# Patient Record
Sex: FEMALE | Race: BLACK OR AFRICAN AMERICAN | NOT HISPANIC OR LATINO | Employment: OTHER | URBAN - METROPOLITAN AREA
[De-identification: names, ages, dates, MRNs, and addresses within clinical notes are randomized per-mention and may not be internally consistent; named-entity substitution may affect disease eponyms.]

---

## 2023-06-26 ENCOUNTER — APPOINTMENT (OUTPATIENT)
Dept: LAB | Facility: LAB | Age: 68
End: 2023-06-26
Payer: MEDICARE

## 2023-06-26 LAB
ACTIVATED PARTIAL THROMBOPLASTIN TIME IN PPP BY COAGULATION ASSAY: 33 SEC (ref 27–38)
ALBUMIN (G/DL) IN SER/PLAS: 4.1 G/DL (ref 3.4–5)
ANION GAP IN SER/PLAS: 14 MMOL/L (ref 10–20)
CALCIUM (MG/DL) IN SER/PLAS: 10.1 MG/DL (ref 8.6–10.6)
CARBON DIOXIDE, TOTAL (MMOL/L) IN SER/PLAS: 27 MMOL/L (ref 21–32)
CHLORIDE (MMOL/L) IN SER/PLAS: 104 MMOL/L (ref 98–107)
CREATININE (MG/DL) IN SER/PLAS: 0.8 MG/DL (ref 0.5–1.05)
ERYTHROCYTE DISTRIBUTION WIDTH (RATIO) BY AUTOMATED COUNT: 31.4 % (ref 11.5–14.5)
ERYTHROCYTE MEAN CORPUSCULAR HEMOGLOBIN CONCENTRATION (G/DL) BY AUTOMATED: 31 G/DL (ref 32–36)
ERYTHROCYTE MEAN CORPUSCULAR VOLUME (FL) BY AUTOMATED COUNT: 74 FL (ref 80–100)
ERYTHROCYTES (10*6/UL) IN BLOOD BY AUTOMATED COUNT: 5.85 X10E12/L (ref 4–5.2)
GFR FEMALE: 80 ML/MIN/1.73M2
GLUCOSE (MG/DL) IN SER/PLAS: 97 MG/DL (ref 74–99)
HEMATOCRIT (%) IN BLOOD BY AUTOMATED COUNT: 43.5 % (ref 36–46)
HEMOGLOBIN (G/DL) IN BLOOD: 13.5 G/DL (ref 12–16)
INR IN PPP BY COAGULATION ASSAY: 1.2 (ref 0.9–1.1)
LEUKOCYTES (10*3/UL) IN BLOOD BY AUTOMATED COUNT: 6.5 X10E9/L (ref 4.4–11.3)
NRBC (PER 100 WBCS) BY AUTOMATED COUNT: 0 /100 WBC (ref 0–0)
PHOSPHATE (MG/DL) IN SER/PLAS: 4.7 MG/DL (ref 2.5–4.9)
PLATELETS (10*3/UL) IN BLOOD AUTOMATED COUNT: 255 X10E9/L (ref 150–450)
POTASSIUM (MMOL/L) IN SER/PLAS: 4.5 MMOL/L (ref 3.5–5.3)
PROTHROMBIN TIME (PT) IN PPP BY COAGULATION ASSAY: 13.6 SEC (ref 9.8–12.8)
SODIUM (MMOL/L) IN SER/PLAS: 140 MMOL/L (ref 136–145)
UREA NITROGEN (MG/DL) IN SER/PLAS: 12 MG/DL (ref 6–23)

## 2023-07-06 ENCOUNTER — APPOINTMENT (OUTPATIENT)
Dept: LAB | Facility: LAB | Age: 68
End: 2023-07-06
Payer: MEDICARE

## 2023-07-06 LAB
ALANINE AMINOTRANSFERASE (SGPT) (U/L) IN SER/PLAS: 13 U/L (ref 7–45)
ALBUMIN (G/DL) IN SER/PLAS: 4.2 G/DL (ref 3.4–5)
ALKALINE PHOSPHATASE (U/L) IN SER/PLAS: 109 U/L (ref 33–136)
ANION GAP IN SER/PLAS: 13 MMOL/L (ref 10–20)
ASPARTATE AMINOTRANSFERASE (SGOT) (U/L) IN SER/PLAS: 16 U/L (ref 9–39)
BASOPHILS (10*3/UL) IN BLOOD BY AUTOMATED COUNT: 0.1 X10E9/L (ref 0–0.1)
BASOPHILS/100 LEUKOCYTES IN BLOOD BY AUTOMATED COUNT: 1.7 % (ref 0–2)
BILIRUBIN TOTAL (MG/DL) IN SER/PLAS: 0.7 MG/DL (ref 0–1.2)
BURR CELLS PRESENCE IN BLOOD BY LIGHT MICROSCOPY: NORMAL
CALCIUM (MG/DL) IN SER/PLAS: 9.8 MG/DL (ref 8.6–10.6)
CARBON DIOXIDE, TOTAL (MMOL/L) IN SER/PLAS: 27 MMOL/L (ref 21–32)
CHLORIDE (MMOL/L) IN SER/PLAS: 103 MMOL/L (ref 98–107)
CREATININE (MG/DL) IN SER/PLAS: 0.81 MG/DL (ref 0.5–1.05)
DEAMIDATED GLIADIN PEPTIDE IGA: <1 U/ML (ref 0–14)
DEAMIDATED GLIADIN PEPTIDE IGG: <1 U/ML (ref 0–14)
EOSINOPHILS (10*3/UL) IN BLOOD BY AUTOMATED COUNT: 0.1 X10E9/L (ref 0–0.7)
EOSINOPHILS/100 LEUKOCYTES IN BLOOD BY AUTOMATED COUNT: 1.7 % (ref 0–6)
ERYTHROCYTE DISTRIBUTION WIDTH (RATIO) BY AUTOMATED COUNT: ABNORMAL % (ref 11.5–14.5)
ERYTHROCYTE MEAN CORPUSCULAR HEMOGLOBIN CONCENTRATION (G/DL) BY AUTOMATED: 31.6 G/DL (ref 32–36)
ERYTHROCYTE MEAN CORPUSCULAR VOLUME (FL) BY AUTOMATED COUNT: 76 FL (ref 80–100)
ERYTHROCYTES (10*6/UL) IN BLOOD BY AUTOMATED COUNT: 5.96 X10E12/L (ref 4–5.2)
GFR FEMALE: 79 ML/MIN/1.73M2
GLUCOSE (MG/DL) IN SER/PLAS: 78 MG/DL (ref 74–99)
HEMATOCRIT (%) IN BLOOD BY AUTOMATED COUNT: 45.2 % (ref 36–46)
HEMOGLOBIN (G/DL) IN BLOOD: 14.3 G/DL (ref 12–16)
IMMATURE GRANULOCYTES/100 LEUKOCYTES IN BLOOD BY AUTOMATED COUNT: 0.3 % (ref 0–0.9)
LEUKOCYTES (10*3/UL) IN BLOOD BY AUTOMATED COUNT: 6 X10E9/L (ref 4.4–11.3)
LYMPHOCYTES (10*3/UL) IN BLOOD BY AUTOMATED COUNT: 2.9 X10E9/L (ref 1.2–4.8)
LYMPHOCYTES/100 LEUKOCYTES IN BLOOD BY AUTOMATED COUNT: 48.3 % (ref 13–44)
MONOCYTES (10*3/UL) IN BLOOD BY AUTOMATED COUNT: 0.7 X10E9/L (ref 0.1–1)
MONOCYTES/100 LEUKOCYTES IN BLOOD BY AUTOMATED COUNT: 11.6 % (ref 2–10)
NEUTROPHILS (10*3/UL) IN BLOOD BY AUTOMATED COUNT: 2.19 X10E9/L (ref 1.2–7.7)
NEUTROPHILS/100 LEUKOCYTES IN BLOOD BY AUTOMATED COUNT: 36.4 % (ref 40–80)
NRBC (PER 100 WBCS) BY AUTOMATED COUNT: 0 /100 WBC (ref 0–0)
PLATELETS (10*3/UL) IN BLOOD AUTOMATED COUNT: 282 X10E9/L (ref 150–450)
POTASSIUM (MMOL/L) IN SER/PLAS: 4.6 MMOL/L (ref 3.5–5.3)
PROTEIN TOTAL: 6.9 G/DL (ref 6.4–8.2)
RBC MORPHOLOGY IN BLOOD: NORMAL
SCHISTOCYTES (PRESENCE) IN BLOOD BY LIGHT MICROSCOPY: NORMAL
SODIUM (MMOL/L) IN SER/PLAS: 138 MMOL/L (ref 136–145)
TARGET CELLS IN BLOOD BY LIGHT MICROSCOPY: NORMAL
THYROTROPIN (MIU/L) IN SER/PLAS BY DETECTION LIMIT <= 0.05 MIU/L: 1.58 MIU/L (ref 0.44–3.98)
TISSUE TRANSGLUTAMINASE IGG: <1 U/ML (ref 0–14)
TISSUE TRANSGLUTAMINASE, IGA: <1 U/ML (ref 0–14)
UREA NITROGEN (MG/DL) IN SER/PLAS: 12 MG/DL (ref 6–23)

## 2023-07-25 ENCOUNTER — APPOINTMENT (OUTPATIENT)
Dept: LAB | Facility: LAB | Age: 68
End: 2023-07-25
Payer: MEDICARE

## 2023-07-25 LAB
ANION GAP IN SER/PLAS: 15 MMOL/L (ref 10–20)
CALCIUM (MG/DL) IN SER/PLAS: 10 MG/DL (ref 8.6–10.6)
CARBON DIOXIDE, TOTAL (MMOL/L) IN SER/PLAS: 31 MMOL/L (ref 21–32)
CHLORIDE (MMOL/L) IN SER/PLAS: 99 MMOL/L (ref 98–107)
CHOLESTEROL (MG/DL) IN SER/PLAS: 141 MG/DL (ref 0–199)
CHOLESTEROL IN HDL (MG/DL) IN SER/PLAS: 51.4 MG/DL
CHOLESTEROL/HDL RATIO: 2.7
CREATININE (MG/DL) IN SER/PLAS: 0.81 MG/DL (ref 0.5–1.05)
ERYTHROCYTE DISTRIBUTION WIDTH (RATIO) BY AUTOMATED COUNT: 26.9 % (ref 11.5–14.5)
ERYTHROCYTE MEAN CORPUSCULAR HEMOGLOBIN CONCENTRATION (G/DL) BY AUTOMATED: 32.5 G/DL (ref 32–36)
ERYTHROCYTE MEAN CORPUSCULAR VOLUME (FL) BY AUTOMATED COUNT: 79 FL (ref 80–100)
ERYTHROCYTES (10*6/UL) IN BLOOD BY AUTOMATED COUNT: 5.83 X10E12/L (ref 4–5.2)
ESTIMATED AVERAGE GLUCOSE FOR HBA1C: 123 MG/DL
GFR FEMALE: 79 ML/MIN/1.73M2
GLUCOSE (MG/DL) IN SER/PLAS: 92 MG/DL (ref 74–99)
HEMATOCRIT (%) IN BLOOD BY AUTOMATED COUNT: 45.8 % (ref 36–46)
HEMOGLOBIN (G/DL) IN BLOOD: 14.9 G/DL (ref 12–16)
HEMOGLOBIN A1C/HEMOGLOBIN TOTAL IN BLOOD: 5.9 %
INR IN PPP BY COAGULATION ASSAY: 1.9 (ref 0.9–1.1)
LDL: 72 MG/DL (ref 0–99)
LEUKOCYTES (10*3/UL) IN BLOOD BY AUTOMATED COUNT: 8 X10E9/L (ref 4.4–11.3)
NRBC (PER 100 WBCS) BY AUTOMATED COUNT: 0 /100 WBC (ref 0–0)
PLATELETS (10*3/UL) IN BLOOD AUTOMATED COUNT: 247 X10E9/L (ref 150–450)
POTASSIUM (MMOL/L) IN SER/PLAS: 5.3 MMOL/L (ref 3.5–5.3)
PROTHROMBIN TIME (PT) IN PPP BY COAGULATION ASSAY: 21.6 SEC (ref 9.8–12.8)
SODIUM (MMOL/L) IN SER/PLAS: 140 MMOL/L (ref 136–145)
TRIGLYCERIDE (MG/DL) IN SER/PLAS: 86 MG/DL (ref 0–149)
UREA NITROGEN (MG/DL) IN SER/PLAS: 16 MG/DL (ref 6–23)
VLDL: 17 MG/DL (ref 0–40)

## 2023-07-26 LAB
CALPROTECTIN, STOOL: NORMAL
CAMPYLOBACTER GP: NOT DETECTED
CRYPTOSPORIDIUM ANTIGEN-DATA CONVERSION: NORMAL
FAT, FECAL - NEUTRAL: NORMAL
FAT, FECAL - SPLIT: NORMAL
GIARDIA LAMBLIA AG-DATA CONVERSION: NORMAL
HELICOBACTER PYLORI AG: NORMAL
LACTOFERRIN, FECAL, QUANT.: NORMAL
NOROVIRUS GI/GII: NOT DETECTED
OVA + PARASITE EXAM: NORMAL
ROTAVIRUS A: NOT DETECTED
SALMONELLA SP.: NOT DETECTED
SHIGA TOXIN 1: NOT DETECTED
SHIGA TOXIN 2: NOT DETECTED
SHIGELLA SP.: NOT DETECTED
VIBRIO GRP.: NOT DETECTED
YERSINIA ENTEROCOLITICA: NOT DETECTED

## 2023-08-14 ENCOUNTER — APPOINTMENT (OUTPATIENT)
Dept: LAB | Facility: LAB | Age: 68
End: 2023-08-14
Payer: MEDICARE

## 2023-08-14 LAB
ALBUMIN (G/DL) IN SER/PLAS: 4.2 G/DL (ref 3.4–5)
ANION GAP IN SER/PLAS: 13 MMOL/L (ref 10–20)
CALCIUM (MG/DL) IN SER/PLAS: 9.7 MG/DL (ref 8.6–10.6)
CARBON DIOXIDE, TOTAL (MMOL/L) IN SER/PLAS: 27 MMOL/L (ref 21–32)
CHLORIDE (MMOL/L) IN SER/PLAS: 106 MMOL/L (ref 98–107)
CREATININE (MG/DL) IN SER/PLAS: 0.81 MG/DL (ref 0.5–1.05)
ERYTHROCYTE DISTRIBUTION WIDTH (RATIO) BY AUTOMATED COUNT: 22.2 % (ref 11.5–14.5)
ERYTHROCYTE MEAN CORPUSCULAR HEMOGLOBIN CONCENTRATION (G/DL) BY AUTOMATED: 32.5 G/DL (ref 32–36)
ERYTHROCYTE MEAN CORPUSCULAR VOLUME (FL) BY AUTOMATED COUNT: 80 FL (ref 80–100)
ERYTHROCYTES (10*6/UL) IN BLOOD BY AUTOMATED COUNT: 5.79 X10E12/L (ref 4–5.2)
FERRITIN (UG/LL) IN SER/PLAS: 30 UG/L (ref 8–150)
GFR FEMALE: 79 ML/MIN/1.73M2
GLUCOSE (MG/DL) IN SER/PLAS: 77 MG/DL (ref 74–99)
HEMATOCRIT (%) IN BLOOD BY AUTOMATED COUNT: 46.2 % (ref 36–46)
HEMOGLOBIN (G/DL) IN BLOOD: 15 G/DL (ref 12–16)
IRON (UG/DL) IN SER/PLAS: 44 UG/DL (ref 35–150)
IRON BINDING CAPACITY (UG/DL) IN SER/PLAS: 368 UG/DL (ref 240–445)
IRON SATURATION (%) IN SER/PLAS: 12 % (ref 25–45)
LEUKOCYTES (10*3/UL) IN BLOOD BY AUTOMATED COUNT: 6.6 X10E9/L (ref 4.4–11.3)
NATRIURETIC PEPTIDE B (PG/ML) IN SER/PLAS: 385 PG/ML (ref 0–99)
NRBC (PER 100 WBCS) BY AUTOMATED COUNT: 0 /100 WBC (ref 0–0)
PHOSPHATE (MG/DL) IN SER/PLAS: 4.9 MG/DL (ref 2.5–4.9)
PLATELETS (10*3/UL) IN BLOOD AUTOMATED COUNT: 295 X10E9/L (ref 150–450)
POTASSIUM (MMOL/L) IN SER/PLAS: 4.8 MMOL/L (ref 3.5–5.3)
SODIUM (MMOL/L) IN SER/PLAS: 141 MMOL/L (ref 136–145)
UREA NITROGEN (MG/DL) IN SER/PLAS: 8 MG/DL (ref 6–23)

## 2023-10-05 PROCEDURE — 96374 THER/PROPH/DIAG INJ IV PUSH: CPT

## 2023-10-05 PROCEDURE — 85025 COMPLETE CBC W/AUTO DIFF WBC: CPT | Performed by: EMERGENCY MEDICINE

## 2023-10-05 PROCEDURE — 80053 COMPREHEN METABOLIC PANEL: CPT | Performed by: EMERGENCY MEDICINE

## 2023-10-05 PROCEDURE — 99285 EMERGENCY DEPT VISIT HI MDM: CPT | Mod: 25 | Performed by: STUDENT IN AN ORGANIZED HEALTH CARE EDUCATION/TRAINING PROGRAM

## 2023-10-05 PROCEDURE — 93010 ELECTROCARDIOGRAM REPORT: CPT | Performed by: STUDENT IN AN ORGANIZED HEALTH CARE EDUCATION/TRAINING PROGRAM

## 2023-10-05 PROCEDURE — 85025 COMPLETE CBC W/AUTO DIFF WBC: CPT | Performed by: STUDENT IN AN ORGANIZED HEALTH CARE EDUCATION/TRAINING PROGRAM

## 2023-10-05 PROCEDURE — 36415 COLL VENOUS BLD VENIPUNCTURE: CPT | Performed by: EMERGENCY MEDICINE

## 2023-10-05 PROCEDURE — 80053 COMPREHEN METABOLIC PANEL: CPT | Performed by: STUDENT IN AN ORGANIZED HEALTH CARE EDUCATION/TRAINING PROGRAM

## 2023-10-05 PROCEDURE — 99285 EMERGENCY DEPT VISIT HI MDM: CPT | Performed by: STUDENT IN AN ORGANIZED HEALTH CARE EDUCATION/TRAINING PROGRAM

## 2023-10-05 PROCEDURE — 2500000004 HC RX 250 GENERAL PHARMACY W/ HCPCS (ALT 636 FOR OP/ED)

## 2023-10-05 RX ORDER — ONDANSETRON HYDROCHLORIDE 2 MG/ML
4 INJECTION, SOLUTION INTRAVENOUS ONCE
Status: COMPLETED | OUTPATIENT
Start: 2023-10-05 | End: 2023-10-05

## 2023-10-05 RX ORDER — ONDANSETRON HYDROCHLORIDE 2 MG/ML
INJECTION, SOLUTION INTRAVENOUS
Status: COMPLETED
Start: 2023-10-05 | End: 2023-10-05

## 2023-10-05 RX ADMIN — ONDANSETRON HYDROCHLORIDE 4 MG: 2 INJECTION, SOLUTION INTRAVENOUS at 23:51

## 2023-10-05 RX ADMIN — ONDANSETRON 4 MG: 2 INJECTION INTRAMUSCULAR; INTRAVENOUS at 23:51

## 2023-10-05 ASSESSMENT — PAIN - FUNCTIONAL ASSESSMENT: PAIN_FUNCTIONAL_ASSESSMENT: 0-10

## 2023-10-05 ASSESSMENT — PAIN DESCRIPTION - LOCATION: LOCATION: ABDOMEN

## 2023-10-05 ASSESSMENT — PAIN SCALES - GENERAL: PAINLEVEL_OUTOF10: 5 - MODERATE PAIN

## 2023-10-06 ENCOUNTER — ANESTHESIA EVENT (OUTPATIENT)
Dept: OPERATING ROOM | Facility: HOSPITAL | Age: 68
DRG: 336 | End: 2023-10-06
Payer: MEDICARE

## 2023-10-06 ENCOUNTER — ANESTHESIA (OUTPATIENT)
Dept: OPERATING ROOM | Facility: HOSPITAL | Age: 68
DRG: 336 | End: 2023-10-06
Payer: MEDICARE

## 2023-10-06 ENCOUNTER — HOSPITAL ENCOUNTER (INPATIENT)
Facility: HOSPITAL | Age: 68
LOS: 13 days | Discharge: HOME | DRG: 336 | End: 2023-10-19
Attending: STUDENT IN AN ORGANIZED HEALTH CARE EDUCATION/TRAINING PROGRAM | Admitting: SURGERY
Payer: MEDICARE

## 2023-10-06 ENCOUNTER — APPOINTMENT (OUTPATIENT)
Dept: CARDIOLOGY | Facility: HOSPITAL | Age: 68
DRG: 336 | End: 2023-10-06
Payer: MEDICARE

## 2023-10-06 ENCOUNTER — APPOINTMENT (OUTPATIENT)
Dept: RADIOLOGY | Facility: HOSPITAL | Age: 68
DRG: 336 | End: 2023-10-06
Payer: MEDICARE

## 2023-10-06 DIAGNOSIS — N93.9 VAGINAL BLEEDING: ICD-10-CM

## 2023-10-06 DIAGNOSIS — K55.9 MESENTERIC ISCHEMIA (MULTI): ICD-10-CM

## 2023-10-06 DIAGNOSIS — I34.2 NONRHEUMATIC MITRAL VALVE STENOSIS: ICD-10-CM

## 2023-10-06 DIAGNOSIS — I38 VALVULAR HEART DISEASE: ICD-10-CM

## 2023-10-06 DIAGNOSIS — I73.9 PAD (PERIPHERAL ARTERY DISEASE) (CMS-HCC): ICD-10-CM

## 2023-10-06 DIAGNOSIS — K55.069 SUPERIOR MESENTERIC ARTERY THROMBOSIS (MULTI): Primary | ICD-10-CM

## 2023-10-06 DIAGNOSIS — R19.7 DIARRHEA, UNSPECIFIED TYPE: ICD-10-CM

## 2023-10-06 DIAGNOSIS — R60.0 ARM EDEMA: ICD-10-CM

## 2023-10-06 DIAGNOSIS — I48.20 CHRONIC ATRIAL FIBRILLATION (MULTI): ICD-10-CM

## 2023-10-06 DIAGNOSIS — I48.91 ATRIAL FIBRILLATION, UNSPECIFIED TYPE (MULTI): ICD-10-CM

## 2023-10-06 PROBLEM — J44.9 CHRONIC OBSTRUCTIVE PULMONARY DISEASE (MULTI): Status: ACTIVE | Noted: 2023-10-06

## 2023-10-06 PROBLEM — F17.210 CIGARETTE SMOKER: Status: ACTIVE | Noted: 2023-10-06

## 2023-10-06 PROBLEM — E11.9 DIABETES MELLITUS, TYPE 2 (MULTI): Status: ACTIVE | Noted: 2023-10-06

## 2023-10-06 PROBLEM — M32.9 SYSTEMIC LUPUS ERYTHEMATOSUS, UNSPECIFIED (MULTI): Status: ACTIVE | Noted: 2022-09-01

## 2023-10-06 PROBLEM — M13.0 POLYARTHRITIS: Status: ACTIVE | Noted: 2019-04-17

## 2023-10-06 PROBLEM — F41.9 ANXIETY: Status: ACTIVE | Noted: 2019-05-31

## 2023-10-06 LAB
ABO GROUP (TYPE) IN BLOOD: NORMAL
ALBUMIN SERPL BCP-MCNC: 3.9 G/DL (ref 3.4–5)
ALBUMIN SERPL BCP-MCNC: 4.3 G/DL (ref 3.4–5)
ALP SERPL-CCNC: 107 U/L (ref 33–136)
ALT SERPL W P-5'-P-CCNC: 18 U/L (ref 7–45)
ANION GAP BLDA CALCULATED.4IONS-SCNC: 11 MMO/L (ref 10–25)
ANION GAP BLDA CALCULATED.4IONS-SCNC: 9 MMO/L (ref 10–25)
ANION GAP BLDV CALCULATED.4IONS-SCNC: 8 MMOL/L (ref 10–25)
ANION GAP SERPL CALC-SCNC: 17 MMOL/L (ref 10–20)
ANION GAP SERPL CALC-SCNC: 19 MMOL/L (ref 10–20)
ANTIBODY SCREEN: NORMAL
APTT PPP: 26 SECONDS (ref 27–38)
APTT PPP: 27 SECONDS (ref 27–38)
APTT PPP: 28 SECONDS (ref 27–38)
AST SERPL W P-5'-P-CCNC: 53 U/L (ref 9–39)
ATRIAL RATE: 55 BPM
BASE EXCESS BLDA CALC-SCNC: -0.5 MMOL/L (ref -2–3)
BASE EXCESS BLDA CALC-SCNC: -1.2 MMOL/L (ref -2–3)
BASE EXCESS BLDV CALC-SCNC: 2.5 MMOL/L (ref -2–3)
BASOPHILS # BLD AUTO: 0.13 X10*3/UL (ref 0–0.1)
BASOPHILS NFR BLD AUTO: 0.8 %
BILIRUB SERPL-MCNC: 0.6 MG/DL (ref 0–1.2)
BODY TEMPERATURE: 37 DEGREES CELSIUS
BUN SERPL-MCNC: 13 MG/DL (ref 6–23)
BUN SERPL-MCNC: 21 MG/DL (ref 6–23)
CA-I BLD-SCNC: 1.05 MMOL/L (ref 1.1–1.33)
CA-I BLDA-SCNC: 1.09 MMOL/L (ref 1.1–1.33)
CA-I BLDA-SCNC: 1.11 MMOL/L (ref 1.1–1.33)
CA-I BLDV-SCNC: 1.2 MMOL/L (ref 1.1–1.33)
CALCIUM SERPL-MCNC: 8.5 MG/DL (ref 8.6–10.6)
CALCIUM SERPL-MCNC: 9.7 MG/DL (ref 8.6–10.6)
CHLORIDE BLDA-SCNC: 108 MMOL/L (ref 98–107)
CHLORIDE BLDA-SCNC: 109 MMOL/L (ref 98–107)
CHLORIDE BLDV-SCNC: 105 MMOL/L (ref 98–107)
CHLORIDE SERPL-SCNC: 100 MMOL/L (ref 98–107)
CHLORIDE SERPL-SCNC: 110 MMOL/L (ref 98–107)
CLOT INIT BLD TEG: 7.8 MIN (ref 4.6–9.1)
CLOT LYSIS 30M P MA LENFR BLD TEG: 0 % (ref 0–2.6)
CO2 SERPL-SCNC: 24 MMOL/L (ref 21–32)
CO2 SERPL-SCNC: 24 MMOL/L (ref 21–32)
CREAT SERPL-MCNC: 0.63 MG/DL (ref 0.5–1.05)
CREAT SERPL-MCNC: 1.03 MG/DL (ref 0.5–1.05)
EOSINOPHIL # BLD AUTO: 0.25 X10*3/UL (ref 0–0.7)
EOSINOPHIL NFR BLD AUTO: 1.5 %
ERYTHROCYTE [DISTWIDTH] IN BLOOD BY AUTOMATED COUNT: 15.6 % (ref 11.5–14.5)
ERYTHROCYTE [DISTWIDTH] IN BLOOD BY AUTOMATED COUNT: 15.7 % (ref 11.5–14.5)
GFR SERPL CREATININE-BSD FRML MDRD: 59 ML/MIN/1.73M*2
GFR SERPL CREATININE-BSD FRML MDRD: >90 ML/MIN/1.73M*2
GLUCOSE BLD MANUAL STRIP-MCNC: 214 MG/DL (ref 74–99)
GLUCOSE BLDA-MCNC: 176 MG/DL (ref 74–99)
GLUCOSE BLDA-MCNC: 181 MG/DL (ref 74–99)
GLUCOSE BLDV-MCNC: 227 MG/DL (ref 74–99)
GLUCOSE SERPL-MCNC: 187 MG/DL (ref 74–99)
GLUCOSE SERPL-MCNC: 197 MG/DL (ref 74–99)
HCO3 BLDA-SCNC: 24.2 MMOL/L (ref 22–26)
HCO3 BLDA-SCNC: 24.8 MMOL/L (ref 22–26)
HCO3 BLDV-SCNC: 28.5 MMOL/L (ref 22–26)
HCT VFR BLD AUTO: 36 % (ref 36–46)
HCT VFR BLD AUTO: 45.3 % (ref 36–46)
HCT VFR BLD EST: 38 % (ref 36–46)
HCT VFR BLD EST: 45 % (ref 36–46)
HCT VFR BLD EST: 53 % (ref 36–46)
HGB BLD-MCNC: 12.1 G/DL (ref 12–16)
HGB BLD-MCNC: 15.6 G/DL (ref 12–16)
HGB BLDA-MCNC: 12.6 G/DL (ref 12–16)
HGB BLDA-MCNC: 15.1 G/DL (ref 12–16)
HGB BLDV-MCNC: 17.6 G/DL (ref 12–16)
IMM GRANULOCYTES # BLD AUTO: 0.08 X10*3/UL (ref 0–0.7)
IMM GRANULOCYTES NFR BLD AUTO: 0.5 % (ref 0–0.9)
INHALED O2 CONCENTRATION: 44 %
INHALED O2 CONCENTRATION: 60 %
INR PPP: 1.1 (ref 0.9–1.1)
INR PPP: 1.4 (ref 0.9–1.1)
LACTATE BLDA-SCNC: 2.6 MMOL/L (ref 0.4–2)
LACTATE BLDA-SCNC: 2.9 MMOL/L (ref 0.4–2)
LACTATE BLDV-SCNC: 1.8 MMOL/L (ref 0.4–2)
LYMPHOCYTES # BLD AUTO: 2.69 X10*3/UL (ref 1.2–4.8)
LYMPHOCYTES NFR BLD AUTO: 16.3 %
MCF BLD TEG: 20 MM (ref 15–32)
MCF BLD TEG: 61 MM (ref 52–70)
MCH RBC QN AUTO: 26.4 PG (ref 26–34)
MCH RBC QN AUTO: 26.7 PG (ref 26–34)
MCHC RBC AUTO-ENTMCNC: 33.6 G/DL (ref 32–36)
MCHC RBC AUTO-ENTMCNC: 34.4 G/DL (ref 32–36)
MCV RBC AUTO: 77 FL (ref 80–100)
MCV RBC AUTO: 79 FL (ref 80–100)
MONOCYTES # BLD AUTO: 1.76 X10*3/UL (ref 0.1–1)
MONOCYTES NFR BLD AUTO: 10.7 %
NEUTROPHILS # BLD AUTO: 11.59 X10*3/UL (ref 1.2–7.7)
NEUTROPHILS NFR BLD AUTO: 70.2 %
NRBC BLD-RTO: 0 /100 WBCS (ref 0–0)
NRBC BLD-RTO: 0 /100 WBCS (ref 0–0)
OXYHGB MFR BLDA: 93.2 % (ref 94–98)
OXYHGB MFR BLDA: 95.6 % (ref 94–98)
OXYHGB MFR BLDV: 93.6 % (ref 45–75)
P AXIS: 71 DEGREES
P OFFSET: 147 MS
P ONSET: 84 MS
PCO2 BLDA: 39 MM HG (ref 38–42)
PCO2 BLDA: 45 MM HG (ref 38–42)
PCO2 BLDV: 47 MM HG (ref 41–51)
PH BLDA: 7.35 PH (ref 7.38–7.42)
PH BLDA: 7.4 PH (ref 7.38–7.42)
PH BLDV: 7.39 PH (ref 7.33–7.43)
PHOSPHATE SERPL-MCNC: 3 MG/DL (ref 2.5–4.9)
PLATELET # BLD AUTO: 182 X10*3/UL (ref 150–450)
PLATELET # BLD AUTO: 247 X10*3/UL (ref 150–450)
PMV BLD AUTO: 10.1 FL (ref 7.5–11.5)
PMV BLD AUTO: 11.1 FL (ref 7.5–11.5)
PO2 BLDA: 108 MM HG (ref 85–95)
PO2 BLDA: 84 MM HG (ref 85–95)
PO2 BLDV: 88 MM HG (ref 35–45)
POTASSIUM BLDA-SCNC: 2.8 MMOL/L (ref 3.5–5.3)
POTASSIUM BLDA-SCNC: 3.5 MMOL/L (ref 3.5–5.3)
POTASSIUM BLDV-SCNC: 3 MMOL/L (ref 3.5–5.3)
POTASSIUM SERPL-SCNC: 3.5 MMOL/L (ref 3.5–5.3)
POTASSIUM SERPL-SCNC: 3.7 MMOL/L (ref 3.5–5.3)
PR INTERVAL: 200 MS
PROT SERPL-MCNC: 7.4 G/DL (ref 6.4–8.2)
PROTHROMBIN TIME: 12.6 SECONDS (ref 9.8–12.8)
PROTHROMBIN TIME: 15.3 SECONDS (ref 9.8–12.8)
Q ONSET: 184 MS
QRS COUNT: 9 BEATS
QRS DURATION: 152 MS
QT INTERVAL: 514 MS
QTC CALCULATION(BAZETT): 491 MS
QTC FREDERICIA: 499 MS
R AXIS: -43 DEGREES
RBC # BLD AUTO: 4.54 X10*6/UL (ref 4–5.2)
RBC # BLD AUTO: 5.9 X10*6/UL (ref 4–5.2)
RBC MORPH BLD: NORMAL
RH FACTOR (ANTIGEN D): NORMAL
SAO2 % BLDA: 97 % (ref 94–100)
SAO2 % BLDA: 99 % (ref 94–100)
SAO2 % BLDV: 98 % (ref 45–75)
SODIUM BLDA-SCNC: 139 MMOL/L (ref 136–145)
SODIUM BLDA-SCNC: 141 MMOL/L (ref 136–145)
SODIUM BLDV-SCNC: 138 MMOL/L (ref 136–145)
SODIUM SERPL-SCNC: 139 MMOL/L (ref 136–145)
SODIUM SERPL-SCNC: 147 MMOL/L (ref 136–145)
T AXIS: 52 DEGREES
T OFFSET: 441 MS
TARGETS BLD QL SMEAR: NORMAL
TEST COMMENT: NORMAL
VENTRICULAR RATE: 55 BPM
WBC # BLD AUTO: 16.1 X10*3/UL (ref 4.4–11.3)
WBC # BLD AUTO: 16.5 X10*3/UL (ref 4.4–11.3)

## 2023-10-06 PROCEDURE — 85027 COMPLETE CBC AUTOMATED: CPT | Performed by: STUDENT IN AN ORGANIZED HEALTH CARE EDUCATION/TRAINING PROGRAM

## 2023-10-06 PROCEDURE — 88304 TISSUE EXAM BY PATHOLOGIST: CPT | Mod: TC | Performed by: SURGERY

## 2023-10-06 PROCEDURE — 3700000002 HC GENERAL ANESTHESIA TIME - EACH INCREMENTAL 1 MINUTE: Performed by: SURGERY

## 2023-10-06 PROCEDURE — 0DNW0ZZ RELEASE PERITONEUM, OPEN APPROACH: ICD-10-PCS | Performed by: SURGERY

## 2023-10-06 PROCEDURE — 3700000001 HC GENERAL ANESTHESIA TIME - INITIAL BASE CHARGE: Performed by: SURGERY

## 2023-10-06 PROCEDURE — 3600000003 HC OR TIME - INITIAL BASE CHARGE - PROCEDURE LEVEL THREE: Performed by: SURGERY

## 2023-10-06 PROCEDURE — 74174 CTA ABD&PLVS W/CONTRAST: CPT

## 2023-10-06 PROCEDURE — 85610 PROTHROMBIN TIME: CPT | Performed by: STUDENT IN AN ORGANIZED HEALTH CARE EDUCATION/TRAINING PROGRAM

## 2023-10-06 PROCEDURE — 2500000002 HC RX 250 W HCPCS SELF ADMINISTERED DRUGS (ALT 637 FOR MEDICARE OP, ALT 636 FOR OP/ED): Performed by: STUDENT IN AN ORGANIZED HEALTH CARE EDUCATION/TRAINING PROGRAM

## 2023-10-06 PROCEDURE — 82947 ASSAY GLUCOSE BLOOD QUANT: CPT

## 2023-10-06 PROCEDURE — 2500000005 HC RX 250 GENERAL PHARMACY W/O HCPCS: Performed by: ANESTHESIOLOGIST ASSISTANT

## 2023-10-06 PROCEDURE — 2580000001 HC RX 258 IV SOLUTIONS: Performed by: ANESTHESIOLOGIST ASSISTANT

## 2023-10-06 PROCEDURE — 2500000004 HC RX 250 GENERAL PHARMACY W/ HCPCS (ALT 636 FOR OP/ED): Performed by: SURGERY

## 2023-10-06 PROCEDURE — 2020000001 HC ICU ROOM DAILY

## 2023-10-06 PROCEDURE — 85730 THROMBOPLASTIN TIME PARTIAL: CPT | Performed by: EMERGENCY MEDICINE

## 2023-10-06 PROCEDURE — 99140 ANES COMP EMERGENCY COND: CPT | Performed by: STUDENT IN AN ORGANIZED HEALTH CARE EDUCATION/TRAINING PROGRAM

## 2023-10-06 PROCEDURE — C1757 CATH, THROMBECTOMY/EMBOLECT: HCPCS | Performed by: SURGERY

## 2023-10-06 PROCEDURE — 94002 VENT MGMT INPAT INIT DAY: CPT

## 2023-10-06 PROCEDURE — 82435 ASSAY OF BLOOD CHLORIDE: CPT | Performed by: ANESTHESIOLOGIST ASSISTANT

## 2023-10-06 PROCEDURE — 74018 RADEX ABDOMEN 1 VIEW: CPT | Mod: FR

## 2023-10-06 PROCEDURE — 34151 REMOVAL OF ARTERY CLOT: CPT | Performed by: SURGERY

## 2023-10-06 PROCEDURE — 2500000004 HC RX 250 GENERAL PHARMACY W/ HCPCS (ALT 636 FOR OP/ED)

## 2023-10-06 PROCEDURE — 2500000004 HC RX 250 GENERAL PHARMACY W/ HCPCS (ALT 636 FOR OP/ED): Performed by: ANESTHESIOLOGIST ASSISTANT

## 2023-10-06 PROCEDURE — 3600000008 HC OR TIME - EACH INCREMENTAL 1 MINUTE - PROCEDURE LEVEL THREE: Performed by: SURGERY

## 2023-10-06 PROCEDURE — 85018 HEMOGLOBIN: CPT | Performed by: ANESTHESIOLOGIST ASSISTANT

## 2023-10-06 PROCEDURE — 74174 CTA ABD&PLVS W/CONTRAST: CPT | Performed by: RADIOLOGY

## 2023-10-06 PROCEDURE — 74177 CT ABD & PELVIS W/CONTRAST: CPT | Performed by: RADIOLOGY

## 2023-10-06 PROCEDURE — 85347 COAGULATION TIME ACTIVATED: CPT

## 2023-10-06 PROCEDURE — 96372 THER/PROPH/DIAG INJ SC/IM: CPT | Performed by: STUDENT IN AN ORGANIZED HEALTH CARE EDUCATION/TRAINING PROGRAM

## 2023-10-06 PROCEDURE — 82330 ASSAY OF CALCIUM: CPT | Performed by: STUDENT IN AN ORGANIZED HEALTH CARE EDUCATION/TRAINING PROGRAM

## 2023-10-06 PROCEDURE — 80069 RENAL FUNCTION PANEL: CPT | Performed by: STUDENT IN AN ORGANIZED HEALTH CARE EDUCATION/TRAINING PROGRAM

## 2023-10-06 PROCEDURE — 74177 CT ABD & PELVIS W/CONTRAST: CPT

## 2023-10-06 PROCEDURE — A34151 PR REMV ART CLOT VISC,ABD INCIS: Performed by: ANESTHESIOLOGIST ASSISTANT

## 2023-10-06 PROCEDURE — A4217 STERILE WATER/SALINE, 500 ML: HCPCS | Performed by: SURGERY

## 2023-10-06 PROCEDURE — 99223 1ST HOSP IP/OBS HIGH 75: CPT | Performed by: SURGERY

## 2023-10-06 PROCEDURE — 2720000007 HC OR 272 NO HCPCS: Performed by: SURGERY

## 2023-10-06 PROCEDURE — 88304 TISSUE EXAM BY PATHOLOGIST: CPT | Performed by: PATHOLOGY

## 2023-10-06 PROCEDURE — 2780000003 HC OR 278 NO HCPCS: Performed by: SURGERY

## 2023-10-06 PROCEDURE — G0378 HOSPITAL OBSERVATION PER HR: HCPCS

## 2023-10-06 PROCEDURE — 84295 ASSAY OF SERUM SODIUM: CPT

## 2023-10-06 PROCEDURE — 85384 FIBRINOGEN ACTIVITY: CPT | Performed by: STUDENT IN AN ORGANIZED HEALTH CARE EDUCATION/TRAINING PROGRAM

## 2023-10-06 PROCEDURE — 2550000001 HC RX 255 CONTRASTS: Performed by: EMERGENCY MEDICINE

## 2023-10-06 PROCEDURE — 71045 X-RAY EXAM CHEST 1 VIEW: CPT | Mod: FOREIGN READ | Performed by: RADIOLOGY

## 2023-10-06 PROCEDURE — 88304 TISSUE EXAM BY PATHOLOGIST: CPT | Mod: TC,SUR | Performed by: SURGERY

## 2023-10-06 PROCEDURE — 82330 ASSAY OF CALCIUM: CPT | Performed by: ANESTHESIOLOGIST ASSISTANT

## 2023-10-06 PROCEDURE — 2500000005 HC RX 250 GENERAL PHARMACY W/O HCPCS: Performed by: SURGERY

## 2023-10-06 PROCEDURE — 04C50ZZ EXTIRPATION OF MATTER FROM SUPERIOR MESENTERIC ARTERY, OPEN APPROACH: ICD-10-PCS | Performed by: SURGERY

## 2023-10-06 PROCEDURE — 2500000004 HC RX 250 GENERAL PHARMACY W/ HCPCS (ALT 636 FOR OP/ED): Performed by: STUDENT IN AN ORGANIZED HEALTH CARE EDUCATION/TRAINING PROGRAM

## 2023-10-06 PROCEDURE — 85730 THROMBOPLASTIN TIME PARTIAL: CPT | Performed by: STUDENT IN AN ORGANIZED HEALTH CARE EDUCATION/TRAINING PROGRAM

## 2023-10-06 PROCEDURE — 99291 CRITICAL CARE FIRST HOUR: CPT | Performed by: STUDENT IN AN ORGANIZED HEALTH CARE EDUCATION/TRAINING PROGRAM

## 2023-10-06 PROCEDURE — 82435 ASSAY OF BLOOD CHLORIDE: CPT | Performed by: STUDENT IN AN ORGANIZED HEALTH CARE EDUCATION/TRAINING PROGRAM

## 2023-10-06 PROCEDURE — 37799 UNLISTED PX VASCULAR SURGERY: CPT | Performed by: STUDENT IN AN ORGANIZED HEALTH CARE EDUCATION/TRAINING PROGRAM

## 2023-10-06 PROCEDURE — P9045 ALBUMIN (HUMAN), 5%, 250 ML: HCPCS | Mod: JZ | Performed by: ANESTHESIOLOGIST ASSISTANT

## 2023-10-06 PROCEDURE — 82805 BLOOD GASES W/O2 SATURATION: CPT | Performed by: ANESTHESIOLOGIST ASSISTANT

## 2023-10-06 PROCEDURE — 93005 ELECTROCARDIOGRAM TRACING: CPT

## 2023-10-06 PROCEDURE — A34151 PR REMV ART CLOT VISC,ABD INCIS: Performed by: STUDENT IN AN ORGANIZED HEALTH CARE EDUCATION/TRAINING PROGRAM

## 2023-10-06 PROCEDURE — 86900 BLOOD TYPING SEROLOGIC ABO: CPT | Performed by: SURGERY

## 2023-10-06 PROCEDURE — 76937 US GUIDE VASCULAR ACCESS: CPT | Performed by: STUDENT IN AN ORGANIZED HEALTH CARE EDUCATION/TRAINING PROGRAM

## 2023-10-06 PROCEDURE — 71045 X-RAY EXAM CHEST 1 VIEW: CPT | Mod: FY,FR

## 2023-10-06 PROCEDURE — 36556 INSERT NON-TUNNEL CV CATH: CPT | Performed by: STUDENT IN AN ORGANIZED HEALTH CARE EDUCATION/TRAINING PROGRAM

## 2023-10-06 PROCEDURE — 74018 RADEX ABDOMEN 1 VIEW: CPT | Mod: FOREIGN READ | Performed by: RADIOLOGY

## 2023-10-06 PROCEDURE — 36415 COLL VENOUS BLD VENIPUNCTURE: CPT | Performed by: STUDENT IN AN ORGANIZED HEALTH CARE EDUCATION/TRAINING PROGRAM

## 2023-10-06 PROCEDURE — 86920 COMPATIBILITY TEST SPIN: CPT

## 2023-10-06 PROCEDURE — 36620 INSERTION CATHETER ARTERY: CPT | Performed by: STUDENT IN AN ORGANIZED HEALTH CARE EDUCATION/TRAINING PROGRAM

## 2023-10-06 DEVICE — PATCH, TACHOSIL, 9.5CM X 4.8CM, ABSORBABLE FIBRIN SEALANT: Type: IMPLANTABLE DEVICE | Site: ABDOMEN | Status: FUNCTIONAL

## 2023-10-06 RX ORDER — DEXTROSE MONOHYDRATE 100 MG/ML
0.3 INJECTION, SOLUTION INTRAVENOUS ONCE AS NEEDED
Status: DISCONTINUED | OUTPATIENT
Start: 2023-10-06 | End: 2023-10-11

## 2023-10-06 RX ORDER — PROPOFOL 10 MG/ML
INJECTION, EMULSION INTRAVENOUS
Status: COMPLETED
Start: 2023-10-06 | End: 2023-10-06

## 2023-10-06 RX ORDER — POTASSIUM CHLORIDE 14.9 MG/ML
20 INJECTION INTRAVENOUS EVERY 6 HOURS PRN
Status: DISCONTINUED | OUTPATIENT
Start: 2023-10-06 | End: 2023-10-11

## 2023-10-06 RX ORDER — PHENYLEPHRINE HCL IN 0.9% NACL 0.4MG/10ML
SYRINGE (ML) INTRAVENOUS AS NEEDED
Status: DISCONTINUED | OUTPATIENT
Start: 2023-10-06 | End: 2023-10-06

## 2023-10-06 RX ORDER — POTASSIUM CHLORIDE 29.8 MG/ML
40 INJECTION INTRAVENOUS EVERY 6 HOURS PRN
Status: DISCONTINUED | OUTPATIENT
Start: 2023-10-06 | End: 2023-10-11

## 2023-10-06 RX ORDER — NEOSTIGMINE METHYLSULFATE 0.5 MG/ML
3 INJECTION, SOLUTION INTRAVENOUS ONCE
Status: DISCONTINUED | OUTPATIENT
Start: 2023-10-06 | End: 2023-10-07

## 2023-10-06 RX ORDER — GLYCOPYRROLATE 0.2 MG/ML
0.3 INJECTION INTRAMUSCULAR; INTRAVENOUS ONCE
Status: DISCONTINUED | OUTPATIENT
Start: 2023-10-06 | End: 2023-10-06

## 2023-10-06 RX ORDER — HEPARIN SODIUM 5000 [USP'U]/ML
2000-4000 INJECTION, SOLUTION INTRAVENOUS; SUBCUTANEOUS EVERY 4 HOURS PRN
Status: DISCONTINUED | OUTPATIENT
Start: 2023-10-06 | End: 2023-10-06

## 2023-10-06 RX ORDER — CEFAZOLIN SODIUM 2 G/50ML
SOLUTION INTRAVENOUS AS NEEDED
Status: DISCONTINUED | OUTPATIENT
Start: 2023-10-06 | End: 2023-10-06

## 2023-10-06 RX ORDER — ROCURONIUM BROMIDE 10 MG/ML
INJECTION, SOLUTION INTRAVENOUS AS NEEDED
Status: DISCONTINUED | OUTPATIENT
Start: 2023-10-06 | End: 2023-10-06

## 2023-10-06 RX ORDER — MAGNESIUM SULFATE HEPTAHYDRATE 40 MG/ML
4 INJECTION, SOLUTION INTRAVENOUS EVERY 6 HOURS PRN
Status: DISCONTINUED | OUTPATIENT
Start: 2023-10-06 | End: 2023-10-11

## 2023-10-06 RX ORDER — HYDROMORPHONE HYDROCHLORIDE 1 MG/ML
0.2 INJECTION, SOLUTION INTRAMUSCULAR; INTRAVENOUS; SUBCUTANEOUS
Status: DISCONTINUED | OUTPATIENT
Start: 2023-10-06 | End: 2023-10-19 | Stop reason: HOSPADM

## 2023-10-06 RX ORDER — DEXTROSE 50 % IN WATER (D50W) INTRAVENOUS SYRINGE
25
Status: DISCONTINUED | OUTPATIENT
Start: 2023-10-06 | End: 2023-10-11

## 2023-10-06 RX ORDER — MAGNESIUM SULFATE HEPTAHYDRATE 40 MG/ML
2 INJECTION, SOLUTION INTRAVENOUS EVERY 6 HOURS PRN
Status: DISCONTINUED | OUTPATIENT
Start: 2023-10-06 | End: 2023-10-11

## 2023-10-06 RX ORDER — POLYETHYLENE GLYCOL 3350 17 G/17G
17 POWDER, FOR SOLUTION ORAL DAILY
Status: DISCONTINUED | OUTPATIENT
Start: 2023-10-06 | End: 2023-10-12

## 2023-10-06 RX ORDER — CALCIUM GLUCONATE 20 MG/ML
INJECTION, SOLUTION INTRAVENOUS
Status: COMPLETED
Start: 2023-10-06 | End: 2023-10-07

## 2023-10-06 RX ORDER — HEPARIN SODIUM 10000 [USP'U]/100ML
0-4500 INJECTION, SOLUTION INTRAVENOUS CONTINUOUS
Status: DISCONTINUED | OUTPATIENT
Start: 2023-10-06 | End: 2023-10-06

## 2023-10-06 RX ORDER — PROPOFOL 10 MG/ML
10-50 INJECTION, EMULSION INTRAVENOUS CONTINUOUS
Status: DISCONTINUED | OUTPATIENT
Start: 2023-10-06 | End: 2023-10-06

## 2023-10-06 RX ORDER — LIDOCAINE HYDROCHLORIDE 20 MG/ML
INJECTION, SOLUTION INFILTRATION; PERINEURAL AS NEEDED
Status: DISCONTINUED | OUTPATIENT
Start: 2023-10-06 | End: 2023-10-06

## 2023-10-06 RX ORDER — GLYCOPYRROLATE 0.2 MG/ML
INJECTION INTRAMUSCULAR; INTRAVENOUS
Status: COMPLETED
Start: 2023-10-06 | End: 2023-10-06

## 2023-10-06 RX ORDER — PANTOPRAZOLE SODIUM 40 MG/1
40 TABLET, DELAYED RELEASE ORAL
Status: DISCONTINUED | OUTPATIENT
Start: 2023-10-07 | End: 2023-10-06

## 2023-10-06 RX ORDER — CEFAZOLIN SODIUM 2 G/100ML
2 INJECTION, SOLUTION INTRAVENOUS EVERY 8 HOURS
Status: CANCELLED | OUTPATIENT
Start: 2023-10-06

## 2023-10-06 RX ORDER — CEFAZOLIN 1 G/1
INJECTION, POWDER, FOR SOLUTION INTRAVENOUS AS NEEDED
Status: DISCONTINUED | OUTPATIENT
Start: 2023-10-06 | End: 2023-10-06

## 2023-10-06 RX ORDER — ESOMEPRAZOLE MAGNESIUM 40 MG/1
40 GRANULE, DELAYED RELEASE ORAL
Status: DISCONTINUED | OUTPATIENT
Start: 2023-10-07 | End: 2023-10-06

## 2023-10-06 RX ORDER — CEFAZOLIN SODIUM 2 G/100ML
2 INJECTION, SOLUTION INTRAVENOUS EVERY 8 HOURS
Status: COMPLETED | OUTPATIENT
Start: 2023-10-07 | End: 2023-10-08

## 2023-10-06 RX ORDER — METRONIDAZOLE 500 MG/100ML
INJECTION, SOLUTION INTRAVENOUS AS NEEDED
Status: DISCONTINUED | OUTPATIENT
Start: 2023-10-06 | End: 2023-10-06

## 2023-10-06 RX ORDER — METRONIDAZOLE 500 MG/100ML
500 INJECTION, SOLUTION INTRAVENOUS EVERY 8 HOURS
Status: DISCONTINUED | OUTPATIENT
Start: 2023-10-07 | End: 2023-10-09

## 2023-10-06 RX ORDER — HEPARIN SODIUM 5000 [USP'U]/ML
80 INJECTION, SOLUTION INTRAVENOUS; SUBCUTANEOUS ONCE
Status: COMPLETED | OUTPATIENT
Start: 2023-10-06 | End: 2023-10-06

## 2023-10-06 RX ORDER — CEFAZOLIN SODIUM 2 G/100ML
2 INJECTION, SOLUTION INTRAVENOUS EVERY 8 HOURS
Status: DISCONTINUED | OUTPATIENT
Start: 2023-10-07 | End: 2023-10-06

## 2023-10-06 RX ORDER — PROPOFOL 10 MG/ML
5-50 INJECTION, EMULSION INTRAVENOUS CONTINUOUS
Status: DISCONTINUED | OUTPATIENT
Start: 2023-10-06 | End: 2023-10-08

## 2023-10-06 RX ORDER — HYDROMORPHONE HYDROCHLORIDE 1 MG/ML
0.5 INJECTION, SOLUTION INTRAMUSCULAR; INTRAVENOUS; SUBCUTANEOUS ONCE
Status: COMPLETED | OUTPATIENT
Start: 2023-10-06 | End: 2023-10-06

## 2023-10-06 RX ORDER — ALBUMIN HUMAN 50 G/1000ML
SOLUTION INTRAVENOUS AS NEEDED
Status: DISCONTINUED | OUTPATIENT
Start: 2023-10-06 | End: 2023-10-06

## 2023-10-06 RX ORDER — PROPOFOL 10 MG/ML
INJECTION, EMULSION INTRAVENOUS AS NEEDED
Status: DISCONTINUED | OUTPATIENT
Start: 2023-10-06 | End: 2023-10-06

## 2023-10-06 RX ORDER — HEPARIN SODIUM 1000 [USP'U]/ML
INJECTION, SOLUTION INTRAVENOUS; SUBCUTANEOUS AS NEEDED
Status: DISCONTINUED | OUTPATIENT
Start: 2023-10-06 | End: 2023-10-06

## 2023-10-06 RX ORDER — FENTANYL CITRATE 50 UG/ML
INJECTION, SOLUTION INTRAMUSCULAR; INTRAVENOUS AS NEEDED
Status: DISCONTINUED | OUTPATIENT
Start: 2023-10-06 | End: 2023-10-06

## 2023-10-06 RX ORDER — INSULIN LISPRO 100 [IU]/ML
0-10 INJECTION, SOLUTION INTRAVENOUS; SUBCUTANEOUS EVERY 4 HOURS
Status: DISCONTINUED | OUTPATIENT
Start: 2023-10-06 | End: 2023-10-19 | Stop reason: HOSPADM

## 2023-10-06 RX ORDER — PROTAMINE SULFATE 10 MG/ML
INJECTION, SOLUTION INTRAVENOUS AS NEEDED
Status: DISCONTINUED | OUTPATIENT
Start: 2023-10-06 | End: 2023-10-06

## 2023-10-06 RX ORDER — PANTOPRAZOLE SODIUM 40 MG/10ML
40 INJECTION, POWDER, LYOPHILIZED, FOR SOLUTION INTRAVENOUS
Status: DISCONTINUED | OUTPATIENT
Start: 2023-10-07 | End: 2023-10-19 | Stop reason: HOSPADM

## 2023-10-06 RX ORDER — GLYCOPYRROLATE 0.2 MG/ML
0.6 INJECTION INTRAMUSCULAR; INTRAVENOUS ONCE
Status: COMPLETED | OUTPATIENT
Start: 2023-10-06 | End: 2023-10-06

## 2023-10-06 RX ORDER — NEOSTIGMINE METHYLSULFATE 1 MG/ML
INJECTION, SOLUTION INTRAVENOUS
Status: COMPLETED
Start: 2023-10-06 | End: 2023-10-06

## 2023-10-06 RX ORDER — POTASSIUM CHLORIDE 29.8 MG/ML
INJECTION INTRAVENOUS AS NEEDED
Status: DISCONTINUED | OUTPATIENT
Start: 2023-10-06 | End: 2023-10-06

## 2023-10-06 RX ADMIN — HYDROMORPHONE HYDROCHLORIDE 0.5 MG: 1 INJECTION, SOLUTION INTRAMUSCULAR; INTRAVENOUS; SUBCUTANEOUS at 09:23

## 2023-10-06 RX ADMIN — METRONIDAZOLE 500 MG: 500 INJECTION, SOLUTION INTRAVENOUS at 16:47

## 2023-10-06 RX ADMIN — ALBUMIN (HUMAN) 250 ML: 12.5 SOLUTION INTRAVENOUS at 19:39

## 2023-10-06 RX ADMIN — HEPARIN SODIUM 1152 UNITS/HR: 10000 INJECTION, SOLUTION INTRAVENOUS at 14:49

## 2023-10-06 RX ADMIN — ALBUMIN (HUMAN) 250 ML: 12.5 SOLUTION INTRAVENOUS at 18:37

## 2023-10-06 RX ADMIN — IOHEXOL 70 ML: 350 INJECTION, SOLUTION INTRAVENOUS at 14:12

## 2023-10-06 RX ADMIN — Medication: at 20:20

## 2023-10-06 RX ADMIN — POTASSIUM CHLORIDE 40 MEQ: 29.8 INJECTION, SOLUTION INTRAVENOUS at 18:15

## 2023-10-06 RX ADMIN — NEOSTIGMINE METHYLSULFATE 10 MG: 1 INJECTION INTRAVENOUS at 22:56

## 2023-10-06 RX ADMIN — Medication 80 MCG: at 17:23

## 2023-10-06 RX ADMIN — ROCURONIUM BROMIDE 20 MG: 10 INJECTION, SOLUTION INTRAVENOUS at 17:57

## 2023-10-06 RX ADMIN — Medication 120 MCG: at 19:35

## 2023-10-06 RX ADMIN — ROCURONIUM BROMIDE 20 MG: 10 INJECTION, SOLUTION INTRAVENOUS at 16:45

## 2023-10-06 RX ADMIN — SODIUM CHLORIDE, SODIUM LACTATE, POTASSIUM CHLORIDE, AND CALCIUM CHLORIDE: 600; 310; 30; 20 INJECTION, SOLUTION INTRAVENOUS at 19:20

## 2023-10-06 RX ADMIN — HEPARIN SODIUM 5000 UNITS: 5000 INJECTION, SOLUTION INTRAVENOUS; SUBCUTANEOUS at 14:47

## 2023-10-06 RX ADMIN — Medication 120 MCG: at 18:58

## 2023-10-06 RX ADMIN — GLYCOPYRROLATE 0.6 MG: 0.2 INJECTION INTRAMUSCULAR; INTRAVENOUS at 22:59

## 2023-10-06 RX ADMIN — Medication 120 MCG: at 19:24

## 2023-10-06 RX ADMIN — Medication 200 MCG: at 19:29

## 2023-10-06 RX ADMIN — Medication 120 MCG: at 16:15

## 2023-10-06 RX ADMIN — HEPARIN SODIUM 5000 UNITS: 1000 INJECTION, SOLUTION INTRAVENOUS; SUBCUTANEOUS at 17:42

## 2023-10-06 RX ADMIN — SODIUM CHLORIDE: 0.9 INJECTION, SOLUTION INTRAVENOUS at 16:41

## 2023-10-06 RX ADMIN — SODIUM CHLORIDE, SODIUM LACTATE, POTASSIUM CHLORIDE, AND CALCIUM CHLORIDE: 600; 310; 30; 20 INJECTION, SOLUTION INTRAVENOUS at 15:49

## 2023-10-06 RX ADMIN — PROTAMINE SULFATE 20 MG: 10 INJECTION, SOLUTION INTRAVENOUS at 19:02

## 2023-10-06 RX ADMIN — ROCURONIUM BROMIDE 20 MG: 10 INJECTION, SOLUTION INTRAVENOUS at 17:24

## 2023-10-06 RX ADMIN — INSULIN LISPRO 2 UNITS: 100 INJECTION, SOLUTION INTRAVENOUS; SUBCUTANEOUS at 21:34

## 2023-10-06 RX ADMIN — ALBUMIN (HUMAN) 250 ML: 12.5 SOLUTION INTRAVENOUS at 18:18

## 2023-10-06 RX ADMIN — ROCURONIUM BROMIDE 20 MG: 10 INJECTION, SOLUTION INTRAVENOUS at 19:27

## 2023-10-06 RX ADMIN — Medication 160 MCG: at 19:10

## 2023-10-06 RX ADMIN — ROCURONIUM BROMIDE 50 MG: 10 INJECTION, SOLUTION INTRAVENOUS at 16:10

## 2023-10-06 RX ADMIN — PROPOFOL 40 MCG/KG/MIN: 10 INJECTION, EMULSION INTRAVENOUS at 21:29

## 2023-10-06 RX ADMIN — CEFAZOLIN SODIUM 2 G: 2 SOLUTION INTRAVENOUS at 16:23

## 2023-10-06 RX ADMIN — FENTANYL CITRATE 100 MCG: 50 INJECTION, SOLUTION INTRAMUSCULAR; INTRAVENOUS at 16:09

## 2023-10-06 RX ADMIN — Medication 80 MCG: at 16:09

## 2023-10-06 RX ADMIN — PROPOFOL 50 MG: 10 INJECTION, EMULSION INTRAVENOUS at 16:09

## 2023-10-06 RX ADMIN — Medication 120 MCG: at 16:19

## 2023-10-06 RX ADMIN — IOHEXOL 75 ML: 350 INJECTION, SOLUTION INTRAVENOUS at 09:38

## 2023-10-06 RX ADMIN — GLYCOPYRROLATE 0.6 MG: 0.2 INJECTION, SOLUTION INTRAMUSCULAR; INTRAVENOUS at 22:59

## 2023-10-06 RX ADMIN — LIDOCAINE HYDROCHLORIDE 60 MG: 20 INJECTION, SOLUTION INFILTRATION; PERINEURAL at 16:09

## 2023-10-06 ASSESSMENT — ENCOUNTER SYMPTOMS
HEMATURIA: 0
CHILLS: 0
WOUND: 0
NECK PAIN: 0
SHORTNESS OF BREATH: 1
FEVER: 0
RECTAL PAIN: 0
NAUSEA: 1
COUGH: 0
LIGHT-HEADEDNESS: 0
DIARRHEA: 1
PALPITATIONS: 0
APPETITE CHANGE: 1
JOINT SWELLING: 0
HEADACHES: 0
ABDOMINAL PAIN: 1
NUMBNESS: 0
CHEST TIGHTNESS: 0
CONSTIPATION: 0

## 2023-10-06 ASSESSMENT — COLUMBIA-SUICIDE SEVERITY RATING SCALE - C-SSRS
2. HAVE YOU ACTUALLY HAD ANY THOUGHTS OF KILLING YOURSELF?: NO
1. IN THE PAST MONTH, HAVE YOU WISHED YOU WERE DEAD OR WISHED YOU COULD GO TO SLEEP AND NOT WAKE UP?: NO
6. HAVE YOU EVER DONE ANYTHING, STARTED TO DO ANYTHING, OR PREPARED TO DO ANYTHING TO END YOUR LIFE?: NO
2. HAVE YOU ACTUALLY HAD ANY THOUGHTS OF KILLING YOURSELF?: NO
1. IN THE PAST MONTH, HAVE YOU WISHED YOU WERE DEAD OR WISHED YOU COULD GO TO SLEEP AND NOT WAKE UP?: NO
6. HAVE YOU EVER DONE ANYTHING, STARTED TO DO ANYTHING, OR PREPARED TO DO ANYTHING TO END YOUR LIFE?: NO

## 2023-10-06 ASSESSMENT — LIFESTYLE VARIABLES
EVER FELT BAD OR GUILTY ABOUT YOUR DRINKING: NO
EVER HAD A DRINK FIRST THING IN THE MORNING TO STEADY YOUR NERVES TO GET RID OF A HANGOVER: NO
HAVE YOU EVER FELT YOU SHOULD CUT DOWN ON YOUR DRINKING: NO
HAVE PEOPLE ANNOYED YOU BY CRITICIZING YOUR DRINKING: NO

## 2023-10-06 ASSESSMENT — PAIN SCALES - WONG BAKER: WONGBAKER_NUMERICALRESPONSE: NO HURT

## 2023-10-06 ASSESSMENT — PAIN - FUNCTIONAL ASSESSMENT
PAIN_FUNCTIONAL_ASSESSMENT: CPOT (CRITICAL CARE PAIN OBSERVATION TOOL)
PAIN_FUNCTIONAL_ASSESSMENT: WONG-BAKER FACES

## 2023-10-06 NOTE — ANESTHESIA PREPROCEDURE EVALUATION
Patient: Hilda Jones    Procedure Information       Date/Time: 10/06/23 1350    Procedure: Laparotomy with open superior mesenteric embolectomy    Location: [facility] OR 22 / [facility] OR    Surgeons: Júnior Isaacs MD          HPI  Patient is a 68-year-old female with PMH of COPD, CHF, atrial flutter/A-fib on Coumadin, mitral stenosis, tricuspid regurgitation, aortic insufficiency s/p AVR x2, prior SMA occlusion s/p thromboembolectomy, DM 2, and HTN presenting to the emergency department for abdominal pain, vaginal bleeding, diarrhea.  Patient reports that all her symptoms began earlier today.  With regards to the abdominal pain, it does appear to be diffuse without any localization.  No radiation of pain.  Does report her vomiting in addition to her diarrhea.  Of note, patient states that she has had approximately half a Gatorade bottles worth of vaginal bleeding.  Has not had a hysterectomy.  Is compliant with her Coumadin, however does not know her last INR.  Denies any other subjective fevers, chest pain, or shortness of breath at this time.    Relevant Problems   Cardiovascular  Mitral Stenosis     (+) Atrial fibrillation (CMS/HCC)   (+) Superior mesenteric artery thrombosis (CMS/HCC)   (+) Valvular heart disease      Endocrine   (+) Diabetes mellitus, type 2 (CMS/HCC)      Pulmonary   (+) Chronic obstructive pulmonary disease (CMS/HCC)     Vitals:    10/06/23 1300   BP:    Pulse: 73   Resp: 15   Temp:    SpO2: 99%       No past surgical history on file.  No past medical history on file.  No current facility-administered medications for this encounter.    Current Outpatient Medications:   •  acetaminophen (Tylenol) 325 mg tablet, TAKE 2 TABLETS BY MOUTH EVERY 4 HOURS AS NEEDED FOR PAIN, Disp: 360 tablet, Rfl: 3  •  albuterol 90 mcg/actuation inhaler, INHALE 1 PUFF BY MOUTH EVERY 4 HOURS AS NEEDED, Disp: 6.7 g, Rfl: 3  •  atorvastatin (Lipitor) 10 mg tablet, TAKE 1 TABLET BY MOUTH ONCE DAILY, Disp: 30  tablet, Rfl: 3  •  cholecalciferol (Vitamin D-3) 50 MCG (2000 UT) tablet, TAKE 1 TABLET BY MOUTH ONCE DAILY, Disp: 30 tablet, Rfl: 3  •  donepezil (Aricept) 5 mg tablet, TAKE 1 TABLET BY MOUTH AT BEDTIME, Disp: 30 tablet, Rfl: 3  •  empagliflozin (Jardiance) 10 mg, TAKE 1 TABLET BY MOUTH ONCE DAILY, Disp: 30 tablet, Rfl: 3  •  enoxaparin (Lovenox) 60 mg/0.6 mL syringe, INJECT 0.6 ML (60MG) UNDER THE SKIN TWO TIMES A DAY, Disp: 8.4 mL, Rfl: 3  •  FLUoxetine (PROzac) 20 mg capsule, TAKE 1 CAPSULE BY MOUTH ONCE DAILY, Disp: 30 capsule, Rfl: 3  •  fluticasone furoate-vilanteroL (Breo Ellipta) 100-25 mcg/dose inhaler, INHALE 1 PUFF BY MOUTH ONCE DAILY, Disp: 60 each, Rfl: 3  •  gabapentin (Neurontin) 100 mg capsule, TAKE 1 CAPSULE BY MOUTH AT BEDTIME, Disp: 30 capsule, Rfl: 3  •  insulin glargine (Lantus) 100 unit/mL (3 mL) pen, INJECT 10 UNITS UNDER THE SKIN AT BEDTIME, Disp: 15 mL, Rfl: 3  •  melatonin 5 mg tablet, TAKE 1 TABLET BY MOUTH AT BEDTIME AS NEEDED FOR INSOMNIA, Disp: 30 tablet, Rfl: 3  •  metoprolol tartrate (Lopressor) 50 mg tablet, TAKE 1 TABLET BY MOUTH TWO TIMES A DAY, Disp: 60 tablet, Rfl: 3  •  multivitamin with minerals tablet, TAKE 1 TABLET BY MOUTH ONCE DAILY, Disp: 30 tablet, Rfl: 3  •  nicotine (Nicoderm CQ) 14 mg/24 hr patch, APPLY 1 PATCH TO SKIN EVERY 24 HOURS, Disp: 28 patch, Rfl: 3  •  potassium chloride CR (Klor-Con M20) 20 mEq ER tablet, TAKE 1 TABLET BY MOUTH ONCE DAILY, Disp: 30 tablet, Rfl: 3  •  spironolactone (Aldactone) 25 mg tablet, TAKE 1 TABLET BY MOUTH ONCE DAILY, Disp: 30 tablet, Rfl: 3  •  torsemide (Demadex) 20 mg tablet, TAKE 2 TABLETS BY MOUTH ONCE DAILY, Disp: 60 tablet, Rfl: 3  •  traZODone (Desyrel) 50 mg tablet, TAKE 1 TABLET BY MOUTH AT BEDTIME AS NEEDED, Disp: 30 tablet, Rfl: 3  •  warfarin (Coumadin) 5 mg tablet, TAKE 1 TABLET BY MOUTH ONCE DAILY, Disp: 30 tablet, Rfl: 3  Prior to Admission medications    Medication Sig Start Date End Date Taking? Authorizing Provider    acetaminophen (Tylenol) 325 mg tablet TAKE 2 TABLETS BY MOUTH EVERY 4 HOURS AS NEEDED FOR PAIN 6/12/23 6/11/24  Vandana Villanueva, PUJA-CNP   albuterol 90 mcg/actuation inhaler INHALE 1 PUFF BY MOUTH EVERY 4 HOURS AS NEEDED 6/12/23 6/11/24  Vandana Villanueva, PUJA-CNP   atorvastatin (Lipitor) 10 mg tablet TAKE 1 TABLET BY MOUTH ONCE DAILY 6/12/23 6/11/24  Vandana  Kay, PUJA-CNP   cholecalciferol (Vitamin D-3) 50 MCG (2000 UT) tablet TAKE 1 TABLET BY MOUTH ONCE DAILY 6/12/23 6/11/24  Vandana Villanueva, PUJA-CNP   donepezil (Aricept) 5 mg tablet TAKE 1 TABLET BY MOUTH AT BEDTIME 6/12/23 6/11/24  Vandana  Kay, PUJA-CNP   empagliflozin (Jardiance) 10 mg TAKE 1 TABLET BY MOUTH ONCE DAILY 6/12/23 6/11/24  Vandana  PUJA Villanueva-CNP   enoxaparin (Lovenox) 60 mg/0.6 mL syringe INJECT 0.6 ML (60MG) UNDER THE SKIN TWO TIMES A DAY 6/12/23 6/11/24  Vandana  ANTONIO Villanueva   FLUoxetine (PROzac) 20 mg capsule TAKE 1 CAPSULE BY MOUTH ONCE DAILY 6/12/23 6/11/24  Vandana  Kay, PUJA-CNP   fluticasone furoate-vilanteroL (Breo Ellipta) 100-25 mcg/dose inhaler INHALE 1 PUFF BY MOUTH ONCE DAILY 6/12/23 6/11/24  Vandana  Kay, APRN-CNP   gabapentin (Neurontin) 100 mg capsule TAKE 1 CAPSULE BY MOUTH AT BEDTIME 6/12/23 6/11/24  Vandana  Kay, PUJA-CNP   insulin glargine (Lantus) 100 unit/mL (3 mL) pen INJECT 10 UNITS UNDER THE SKIN AT BEDTIME 6/12/23 6/11/24  Vandana Villanueva, APRN-CNP   melatonin 5 mg tablet TAKE 1 TABLET BY MOUTH AT BEDTIME AS NEEDED FOR INSOMNIA 6/12/23 6/11/24  VandanaANTONIO Deng   metoprolol tartrate (Lopressor) 50 mg tablet TAKE 1 TABLET BY MOUTH TWO TIMES A DAY 6/12/23 6/11/24  ANTONIO Yan   multivitamin with minerals tablet TAKE 1 TABLET BY MOUTH ONCE DAILY 6/12/23 6/11/24  ANTONIO Yan   nicotine (Nicoderm CQ) 14 mg/24 hr patch APPLY 1 PATCH TO SKIN EVERY 24 HOURS 6/12/23 6/11/24  ANTONIO Yan   potassium chloride CR (Klor-Con M20) 20 mEq ER tablet TAKE 1 TABLET BY MOUTH  ONCE DAILY 6/12/23 6/11/24  Vandana GINGER Houstonsh, APRN-CNP   spironolactone (Aldactone) 25 mg tablet TAKE 1 TABLET BY MOUTH ONCE DAILY 6/12/23 6/11/24  [facility], APRN-CNP   torsemide (Demadex) 20 mg tablet TAKE 2 TABLETS BY MOUTH ONCE DAILY 6/12/23 6/11/24  [facility], APRN-CNP   traZODone (Desyrel) 50 mg tablet TAKE 1 TABLET BY MOUTH AT BEDTIME AS NEEDED 6/12/23 6/11/24  [facility], APRN-CNP   warfarin (Coumadin) 5 mg tablet TAKE 1 TABLET BY MOUTH ONCE DAILY 6/12/23 6/11/24  Vandana GINGER Houstonsh, APRN-CNP     Allergies   Allergen Reactions   • Flexeril [Cyclobenzaprine] Unknown     Social History     Tobacco Use   • Smoking status: Not on file   • Smokeless tobacco: Not on file   Substance Use Topics   • Alcohol use: Not on file         Chemistry    Lab Results   Component Value Date/Time     10/05/2023 2342    K 3.5 10/05/2023 2342     10/05/2023 2342    CO2 24 10/05/2023 2342    BUN 21 10/05/2023 2342    CREATININE 1.03 10/05/2023 2342    Lab Results   Component Value Date/Time    CALCIUM 9.7 10/05/2023 2342    ALKPHOS 107 10/05/2023 2342    AST 53 (H) 10/05/2023 2342    ALT 18 10/05/2023 2342    BILITOT 0.6 10/05/2023 2342          Lab Results   Component Value Date/Time    WBC 16.5 (H) 10/05/2023 2342    HGB 15.6 10/05/2023 2342    HCT 45.3 10/05/2023 2342     10/05/2023 2342     Lab Results   Component Value Date/Time    PROTIME 12.6 10/06/2023 0642    INR 1.1 10/06/2023 0642     Encounter Date: 10/06/23   ECG 12 lead   Result Value    Ventricular Rate 55    Atrial Rate 55    LA Interval 200    QRS Duration 152    QT Interval 514    QTC Calculation(Bazett) 491    P Axis 71    R Axis -43    T Axis 52    QRS Count 9    Q Onset 184    P Onset 84    P Offset 147    T Offset 441    QTC Fredericia 499    Narrative    Please see ED Provider Note for formal interpretation    Confirmed by Pranay Anderson ([id number]) on 10/6/2023 5:05:33 AM     KATHY 7/13/23  CONCLUSIONS:  1. Left ventricular systolic  function is normal with a 55-60% estimated ejection fraction.  2. The left atrium is severely dilated.  3. The mitral valve is mild to moderately thickened. The mitral valve appears to be rheumatic.  4. There is severe mitral valve stenosis.  5. There is moderate thickening of the tricuspid valve leaflets.  6. Mild to moderate tricuspid regurgitation visualized.  7. RVSP within normal limits.  8. Aortic valve stenosis is not present.  9. Atrial septal aneurysm present.  10. There is Stentless Free style.  11. There is plaque visualized in the descending aorta.  12. Due to the presence of SALTY thrombus; percutaneous mitral balloon valvuloplasty procedure was aborted.  No results found for this or any previous visit from the past 1095 days.   Clinical information reviewed:    CT A/P 10/6/23  IMPRESSION:  1. Occlusion of the distal superior mesenteric artery proximal to the  takeoff the jejunal and ileocolic branches increased in extension  when compared to 08/17/2022 CT. Note that finding is seen in the  context of interval thromboembolectomy 08/17/2022 and documented  resolution of findings on 08/24/2022 CT, demonstrating interval  recurrence of SMA occlusion. Additionally, there is mild  hypoenhancement of jejunal and ileal loops as well as the cecum  suspected to represent underlying hypoperfusion/early ischemic  changes. Recommend correlation with laboratory values including  lactate and mesenteric ischemia protocol CTA of the abdomen and  pelvis for definitive evaluation of the mesenteric vasculature. Given  extensive thrombogenic history, consider further evaluation of  underlying cause with nonemergent coagulation panel.  2. Enlarged uterus with intramural fibroid which is not significantly  changed in size or imaging characteristics from 08/17/2022 CT and in  the setting of anticoagulant use may be the related to abnormal  uterine bleeding, however underlying malignant degeneration can not  be definitively  excluded. This finding may be further evaluated with  pelvic ultrasound, obstetric/gynecology consultation, and potential  tissue sampling when clinically feasible.  3. Additional stable chronic findings as detailed above.      I personally reviewed the images/study and I agree with the findings  as stated. This study was interpreted at [facility], [place], [place].      MACRO:  Dino Reyes discussed the significance and urgency of this critical  finding by telephone with  Nicholas Harris mD on 10/6/2023 at 11:15 am.  (**-RCF-**) Findings:  See findings.      Signed by: Jeremy Buckley 10/6/2023 11:37 AM  Dictation workstation:   [id number]      Allergies                NPO Detail:  No data recorded     PHYSICAL EXAM    Anesthesia Plan    ASA 4 - emergent     general

## 2023-10-06 NOTE — ANESTHESIA PROCEDURE NOTES
Arterial Line:    Date/Time: 10/6/2023 4:06 PM    Staffing  Performed: attending   Authorized by: Wilber Felipe MD    Performed by: RONNA Fowler    An arterial line was placed. in the OR for the following indication(s): continuous blood pressure monitoring and blood sampling needed.    A 20 gauge (size) (length), Angiocath (type) catheter was placed into the Right radial artery, secured by Tegaderm,   Seldinger technique not used.  Events:  patient tolerated procedure well with no complications.

## 2023-10-06 NOTE — ANESTHESIA PROCEDURE NOTES
Airway  Date/Time: 10/6/2023 4:13 PM  Urgency: elective    Airway not difficult    Staffing  Performed: RONNA   Authorized by: Wilber Felipe MD    Performed by: RONNA Fowler  Patient location during procedure: OR    Indications and Patient Condition  Indications for airway management: anesthesia  Spontaneous Ventilation: absent  Sedation level: deep  Preoxygenated: yes  Patient position: sniffing  Mask difficulty assessment: 1 - vent by mask  Planned trial extubation    Final Airway Details  Final airway type: endotracheal airway      Successful airway: ETT  Cuffed: yes   Successful intubation technique: direct laryngoscopy  Blade: Anny  Blade size: #3  ETT size (mm): 7.0  Cormack-Lehane Classification: grade I - full view of glottis  Placement verified by: chest auscultation and capnometry   Measured from: lips  ETT to lips (cm): 19  Number of attempts at approach: 1

## 2023-10-06 NOTE — H&P
History Of Present Illness  Hilda Jones is a 68 y.o. female presenting with abdominal pain, vaginal bleeding, and diarrhea. She has a history of COPD, CHF, atrial flutter/A-fib on Coumadin, mitral stenosis, tricuspid regurgitation, aortic insufficiency s/p AVR x2, prior SMA occlusion s/p thromboembolectomy, DM 2, and HTN. In 8/2022 the patient underwent open mesenteric embolectomy. She is on coumadin, but comes in with an INR of 1.1. Also, multiple days of significant vaginal bleeding, which has been a recurrent issue for her.      Past Medical History  Past Medical History:   Diagnosis Date    Atrial fibrillation (CMS/HCC)     CHF (congestive heart failure) (CMS/HCC)     COPD (chronic obstructive pulmonary disease) (CMS/HCC)     Diabetes (CMS/HCC)     HTN (hypertension)     Mitral insufficiency     S/P AVR        Surgical History  Past Surgical History:   Procedure Laterality Date    AORTIC ROOT REPLACEMENT      AORTIC VALVE REPLACEMENT      EMBOLECTOMY N/A 08/17/2022    SMA embolectomy via laparotomy        Social History  She reports that she has been smoking cigarettes. She has a 15.00 pack-year smoking history. She uses smokeless tobacco. She reports that she does not drink alcohol and does not use drugs.    Family History  Family History   Family history unknown: Yes        Allergies  Flexeril [cyclobenzaprine]    Review of Systems   Constitutional:  Positive for appetite change. Negative for chills and fever.   HENT:  Negative for nosebleeds.    Respiratory:  Positive for shortness of breath. Negative for cough and chest tightness.    Cardiovascular:  Negative for chest pain, palpitations and leg swelling.   Gastrointestinal:  Positive for abdominal pain, diarrhea and nausea. Negative for constipation and rectal pain.   Genitourinary:  Positive for menstrual problem. Negative for hematuria.   Musculoskeletal:  Positive for gait problem. Negative for joint swelling and neck pain.   Skin:  Negative for wound.  "  Neurological:  Negative for light-headedness, numbness and headaches.        Physical Exam  Vitals and nursing note reviewed.   Constitutional:       General: She is not in acute distress.     Appearance: Normal appearance.   HENT:      Head: Normocephalic and atraumatic.      Nose: No rhinorrhea.      Mouth/Throat:      Mouth: Mucous membranes are moist.   Eyes:      General: No scleral icterus.     Conjunctiva/sclera: Conjunctivae normal.   Cardiovascular:      Rate and Rhythm: Normal rate.   Pulmonary:      Effort: Pulmonary effort is normal. No respiratory distress.      Breath sounds: No stridor. Wheezing present.      Comments: On supplemental oxygen  Abdominal:      General: There is no distension.      Palpations: Abdomen is soft.      Tenderness: There is abdominal tenderness. There is no guarding or rebound.   Musculoskeletal:         General: Normal range of motion.      Cervical back: Normal range of motion and neck supple.   Skin:     General: Skin is warm.      Capillary Refill: Capillary refill takes less than 2 seconds.   Neurological:      General: No focal deficit present.      Mental Status: She is alert and oriented to person, place, and time. Mental status is at baseline.   Psychiatric:         Mood and Affect: Mood normal.          Last Recorded Vitals  Blood pressure (!) 158/98, pulse 73, temperature 36.3 °C (97.3 °F), temperature source Oral, resp. rate 15, height 1.651 m (5' 5\"), weight 63.5 kg (139 lb 15.9 oz), SpO2 99 %.    Relevant Results  Results for orders placed or performed during the hospital encounter of 10/06/23 (from the past 24 hour(s))   CBC with Differential   Result Value Ref Range    WBC 16.5 (H) 4.4 - 11.3 x10*3/uL    nRBC 0.0 0.0 - 0.0 /100 WBCs    RBC 5.90 (H) 4.00 - 5.20 x10*6/uL    Hemoglobin 15.6 12.0 - 16.0 g/dL    Hematocrit 45.3 36.0 - 46.0 %    MCV 77 (L) 80 - 100 fL    MCH 26.4 26.0 - 34.0 pg    MCHC 34.4 32.0 - 36.0 g/dL    RDW 15.6 (H) 11.5 - 14.5 %    " Platelets 247 150 - 450 x10*3/uL    MPV 10.1 7.5 - 11.5 fL    Neutrophils % 70.2 40.0 - 80.0 %    Immature Granulocytes %, Automated 0.5 0.0 - 0.9 %    Lymphocytes % 16.3 13.0 - 44.0 %    Monocytes % 10.7 2.0 - 10.0 %    Eosinophils % 1.5 0.0 - 6.0 %    Basophils % 0.8 0.0 - 2.0 %    Neutrophils Absolute 11.59 (H) 1.20 - 7.70 x10*3/uL    Immature Granulocytes Absolute, Automated 0.08 0.00 - 0.70 x10*3/uL    Lymphocytes Absolute 2.69 1.20 - 4.80 x10*3/uL    Monocytes Absolute 1.76 (H) 0.10 - 1.00 x10*3/uL    Eosinophils Absolute 0.25 0.00 - 0.70 x10*3/uL    Basophils Absolute 0.13 (H) 0.00 - 0.10 x10*3/uL   Comprehensive Metabolic Panel   Result Value Ref Range    Glucose 197 (H) 74 - 99 mg/dL    Sodium 139 136 - 145 mmol/L    Potassium 3.5 3.5 - 5.3 mmol/L    Chloride 100 98 - 107 mmol/L    Bicarbonate 24 21 - 32 mmol/L    Anion Gap 19 10 - 20 mmol/L    Urea Nitrogen 21 6 - 23 mg/dL    Creatinine 1.03 0.50 - 1.05 mg/dL    eGFR 59 (L) >60 mL/min/1.73m*2    Calcium 9.7 8.6 - 10.6 mg/dL    Albumin 4.3 3.4 - 5.0 g/dL    Alkaline Phosphatase 107 33 - 136 U/L    Total Protein 7.4 6.4 - 8.2 g/dL    AST 53 (H) 9 - 39 U/L    Bilirubin, Total 0.6 0.0 - 1.2 mg/dL    ALT 18 7 - 45 U/L   Morphology   Result Value Ref Range    RBC Morphology See Below     Target Cells Many    ECG 12 lead   Result Value Ref Range    Ventricular Rate 55 BPM    Atrial Rate 55 BPM    NV Interval 200 ms    QRS Duration 152 ms    QT Interval 514 ms    QTC Calculation(Bazett) 491 ms    P Axis 71 degrees    R Axis -43 degrees    T Axis 52 degrees    QRS Count 9 beats    Q Onset 184 ms    P Onset 84 ms    P Offset 147 ms    T Offset 441 ms    QTC Fredericia 499 ms   Coagulation Screen   Result Value Ref Range    Protime 12.6 9.8 - 12.8 seconds    INR 1.1 0.9 - 1.1    aPTT 26 (L) 27 - 38 seconds     CT scan of the abdomen and pelvis reviewed and patient noted to have an acute SMA embolus.     Assessment/Plan   Principal Problem:    Mesenteric ischemia  (CMS/HCC)      Patient with acute SMA embolus and diarrhea, concerning for acute mesenteric ischemia    Plan:  Heparin drip  Type and screen, preop labs  Emergent surgery for laparotomy with SMA embolectomy  Gyn consult for vaginal bleeding since patient is unable to be off anticoagulation       Bahman Dubose MD

## 2023-10-06 NOTE — PROGRESS NOTES
Patient was handed off to me from the previous team. For full history, physical, and prior ED course, please see previous provider note prior to patient handoff. This is an addendum to the record.    Briefly, this is a 68-year-old female with history of mitral stenosis, tricuspid and aortic regurgitation, SMA thrombus s/p thrombectomy on Coumadin, who presents emergency department with 24 hours of increased abdominal pain as well as increased vaginal bleeding.  Pending CT imaging of the abdomen pelvis and reevaluation for disposition at time of signout.    Hospital Course/MDM:  Contrasted CT imaging is concerning for a new occlusive thrombus within the SMA with early findings suggestive of bowel ischemia.  Lactate is normal, hemodynamically stable.  Discussed with vascular surgery who reviewed imaging and evaluated patient.  We will plan for emergent mesenteric embolectomy, admission to SICU afterwards for further management.  Patient started on a heparin drip.    I did reach out to gynecology about her vaginal bleeding, they do not feel strongly about doing an emergent hysterectomy in the OR as they cannot confirm a history of cancer in this patient who is having postmenopausal bleeding.  They are open to consult while inpatient or for any acute concerns per vascular surgery.    Disposition:  OR with vascular surgery then SICU    --  Nicholas Harris MD  Emergency Medicine, PGY-3

## 2023-10-06 NOTE — ED PROVIDER NOTES
HPI  Patient is a 68-year-old female with PMH of COPD, CHF, atrial flutter/A-fib on Coumadin, mitral stenosis, tricuspid regurgitation, aortic insufficiency s/p AVR x2, prior SMA occlusion s/p thromboembolectomy, DM 2, and HTN presenting to the emergency department for abdominal pain, vaginal bleeding, diarrhea.  Patient reports that all her symptoms began earlier today.  With regards to the abdominal pain, it does appear to be diffuse without any localization.  No radiation of pain.  Does report her vomiting in addition to her diarrhea.  Of note, patient states that she has had approximately half a Gatorade bottles worth of vaginal bleeding.  Has not had a hysterectomy.  Is compliant with her Coumadin, however does not know her last INR.  Denies any other subjective fevers, chest pain, or shortness of breath at this time.    Physical Exam  VITALS: Vital signs reviewed in nursing triage note, EMR flow sheets, and at patient's bedside  CONSTITUTIONAL: Well-appearing, in no apparent distress  HEAD: NCAT  EYES: PERRL, EOMI  NECK: Supple, non-tender, full ROM  CARD: RRR, no m/r/g, no JVD  RESP: LCTAB, speaking full sentences, no increased work of breathing, no use of accessory respiratory muscles  ABD: Bowel sounds present, non-distended, soft, diffuse mild tenderness, well-healed laparotomy scar, no palpable organomegaly, no masses, no guarding or rebound tenderness  : Normal external female genitalia, no active bleeding, blood clot appreciated within the vaginal vault, no obvious lacerations or masses appreciated, no significant CMT  BACK: No vertebral point or CVA tenderness, no obvious bony deformities, no spinal step-offs   EXT: Normal ROM in all four extremities, 2+ radial and DP pulses bilaterally, no edema  SKIN: Dry with appropriate turgor, no apparent rashes, suspicious lesions, or masses   NEURO: CNs II-XII grossly intact, AAOx4, sensation intact bilaterally, strength 5/5 on UEs and LEs bilaterally, speech  is fluent and comprehensible  PSYCH: Appropriate mood and affect, no HI/SI, not responding to internal stimuli    Vitals:    10/06/23 0545   BP: 169/89   Pulse:    Resp:    Temp:    SpO2: 94%       Assessment/Plan/MDM  Patient clinically stable with normal vital signs upon presentation to the emergency department.  Laboratory work-up does demonstrate a slight leukocytosis, however hemoglobin is stable.  Given the fact that she was having vaginal bleeding and is on Coumadin, pelvic exam was performed at the bedside which not demonstrate any significant active vaginal hemorrhage.  However, given her significant comorbidities as well as presenting symptoms of leukocytosis, CT abdomen/pelvis was ordered in addition to a C. difficile.  Of note, patient does not have risk factors for nosocomial infection at this time, however given profuse diarrhea will order.  Coags will be added onto laboratory work-up to assess patient's INR.  EKG obtained which does not demonstrate any significant ischemic events.  We will presented to think a provider pending CT imaging, however suspect patient will likely require admission for symptomatic Introl the very least.    Results  *See section(s) entitled ``Lab Results´´, ``Diagnostic Imaging Results Review´´ for entirety.  Notable results listed below  - EKG: TIME: 2345, I independently interpreted as normal sinus rhythm, rate of 55, SD interval 200 ms, QTc interval 491 ms, all other intervals normal length, left axis deviation, no ST elevations, isolated T wave versions appreciated in lead V1, RBBB appreciated, no significant change compared to EKG on 7/28/2023  - Labs: I independently interpreted as WBC 16.9 with neutrophilic predominance, remainder of laboratory work-up unremarkable  - Images: I independently interpreted as CT A/P pending at the time of signout    Clinical Impression  Abdominal pain    Dispo: Signed out to Dr. Harris at 0700; please see their note for full details  regarding disposition.    Patient seen and discussed with attending physician Dr. Rajput.    Prince Santana MD  Emergency Medicine, PGY-3    Was dictated using Dragon dictation. Please excuse any errors found in the note.     Prince Santana MD  Resident  10/06/23 0676

## 2023-10-06 NOTE — ANESTHESIA PROCEDURE NOTES
Central Venous Line:    Date/Time: 10/6/2023 4:29 PM    A central venous line was placed in the OR for the following indication(s): central venous access.  Staffing  Performed: attending   Authorized by: Wilber Felipe MD    Performed by: RONNA Fowler    Sterility preparation included the following: provider hand hygiene performed prior to central venous catheter insertion, all 5 sterile barriers used (gloves, gown, cap, mask, large sterile drape) during central venous catheter insertion, antiseptic used during central venous catheter insertion and skin prep agent completely dried prior to procedure.  The patient was placed in Trendelenburg position.    Right internal jugular vein was prepped.    The site was prepped with Chlorhexidine.  Size: 9 Fr   Length: 11.5  Number of Lumens: double lumen      During the procedure, the following specific steps were taken: target vein identified, needle advanced into vein and blood aspirated and guidewire advanced into vein.  Seldinger technique used.  Procedure performed using ultrasound guidance.  Sterile gel and probe cover used in ultrasound-guided central venous catheter insertion.    Intravenous verification was obtained by ultrasound and venous blood return.      Post insertion care included: all ports aspirated, all ports flushed easily, guidewire removed intact, Biopatch applied, line sutured in place and dressing applied.    During the procedure the patient experienced: patient tolerated procedure well with no complications.

## 2023-10-06 NOTE — H&P
SICU History & Physical    Subjective   HPI:  Hilda Jones is a 68-year-old female with PMH of COPD, CHF, atrial flutter/A-fib on Coumadin, mitral stenosis, tricuspid regurgitation, aortic insufficiency s/p AVR x2, prior SMA occlusion s/p thromboembolectomy, DM 2, and HTN presenting to the emergency department for abdominal pain, vaginal bleeding, diarrhea. Presents from the OR s/p laparotomy with KLEVER, SMA embolectomy  w/ Dr. Isaacs on 10/6.    OR Course:   EBL: 300  UOP: 1L  Crystalloid: 2L  Colloid: 500cc 5%  Products: none  Antibiotic time: Acef last @ 1623, Zosyn last @ 1623  Intubation: easy intubation  Lines/Access: RIJ Mac, R radial kayla, PIV x 2     Surgery requests:  - no hep tonight  - Take back Sunday  - ancef and flagyl while abd open  - -140    Echo from 7/13/23  CONCLUSIONS:  1. Left ventricular systolic function is normal with a 55-60% estimated ejection fraction.  2. The left atrium is severely dilated.  3. The mitral valve is mild to moderately thickened. The mitral valve appears to be rheumatic.  4. There is severe mitral valve stenosis.  5. There is moderate thickening of the tricuspid valve leaflets.  6. Mild to moderate tricuspid regurgitation visualized.  7. RVSP within normal limits.  8. Aortic valve stenosis is not present.  9. Atrial septal aneurysm present.  10. There is Stentless Free style.  11. There is plaque visualized in the descending aorta.  12. Due to the presence of SALTY thrombus; percutaneous mitral balloon valvuloplasty procedure was aborted.    Past Medical History:   Diagnosis Date    Atrial fibrillation (CMS/HCC)     Awareness under anesthesia     CHF (congestive heart failure) (CMS/HCC)     COPD (chronic obstructive pulmonary disease) (CMS/HCC)     Diabetes (CMS/HCC)     GERD (gastroesophageal reflux disease)     HTN (hypertension)     Mitral insufficiency     PONV (postoperative nausea and vomiting)     S/P AVR      Past Surgical History:   Procedure Laterality Date     AORTIC ROOT REPLACEMENT      AORTIC VALVE REPLACEMENT      CT ABDOMEN PELVIS ANGIOGRAM W AND/OR WO IV CONTRAST  10/6/2023    CT ABDOMEN PELVIS ANGIOGRAM W AND/OR WO IV CONTRAST 10/6/2023 [facility] CT    EMBOLECTOMY N/A 08/17/2022    SMA embolectomy via laparotomy     Medications Prior to Admission   Medication Sig Dispense Refill Last Dose    acetaminophen (Tylenol) 325 mg tablet TAKE 2 TABLETS BY MOUTH EVERY 4 HOURS AS NEEDED FOR PAIN 360 tablet 3     albuterol 90 mcg/actuation inhaler INHALE 1 PUFF BY MOUTH EVERY 4 HOURS AS NEEDED 6.7 g 3     atorvastatin (Lipitor) 10 mg tablet TAKE 1 TABLET BY MOUTH ONCE DAILY 30 tablet 3     cholecalciferol (Vitamin D-3) 50 MCG (2000 UT) tablet TAKE 1 TABLET BY MOUTH ONCE DAILY 30 tablet 3     donepezil (Aricept) 5 mg tablet TAKE 1 TABLET BY MOUTH AT BEDTIME 30 tablet 3     empagliflozin (Jardiance) 10 mg TAKE 1 TABLET BY MOUTH ONCE DAILY 30 tablet 3     enoxaparin (Lovenox) 60 mg/0.6 mL syringe INJECT 0.6 ML (60MG) UNDER THE SKIN TWO TIMES A DAY 8.4 mL 3     FLUoxetine (PROzac) 20 mg capsule TAKE 1 CAPSULE BY MOUTH ONCE DAILY 30 capsule 3     fluticasone furoate-vilanteroL (Breo Ellipta) 100-25 mcg/dose inhaler INHALE 1 PUFF BY MOUTH ONCE DAILY 60 each 3     gabapentin (Neurontin) 100 mg capsule TAKE 1 CAPSULE BY MOUTH AT BEDTIME 30 capsule 3     insulin glargine (Lantus) 100 unit/mL (3 mL) pen INJECT 10 UNITS UNDER THE SKIN AT BEDTIME 15 mL 3     melatonin 5 mg tablet TAKE 1 TABLET BY MOUTH AT BEDTIME AS NEEDED FOR INSOMNIA 30 tablet 3     metoprolol tartrate (Lopressor) 50 mg tablet TAKE 1 TABLET BY MOUTH TWO TIMES A DAY 60 tablet 3     multivitamin with minerals tablet TAKE 1 TABLET BY MOUTH ONCE DAILY 30 tablet 3     nicotine (Nicoderm CQ) 14 mg/24 hr patch APPLY 1 PATCH TO SKIN EVERY 24 HOURS 28 patch 3     potassium chloride CR (Klor-Con M20) 20 mEq ER tablet TAKE 1 TABLET BY MOUTH ONCE DAILY 30 tablet 3     spironolactone (Aldactone) 25 mg tablet TAKE 1 TABLET BY MOUTH ONCE  DAILY 30 tablet 3     torsemide (Demadex) 20 mg tablet TAKE 2 TABLETS BY MOUTH ONCE DAILY 60 tablet 3     traZODone (Desyrel) 50 mg tablet TAKE 1 TABLET BY MOUTH AT BEDTIME AS NEEDED 30 tablet 3     warfarin (Coumadin) 5 mg tablet TAKE 1 TABLET BY MOUTH ONCE DAILY 30 tablet 3      Flexeril [cyclobenzaprine]  Social History     Tobacco Use    Smoking status: Every Day     Packs/day: 1.00     Years: 15.00     Additional pack years: 0.00     Total pack years: 15.00     Types: Cigarettes    Smokeless tobacco: Current   Substance Use Topics    Alcohol use: Never    Drug use: Never     Family History   Family history unknown: Yes       Review of Systems:  Review of Systems   Reason unable to perform ROS: Patient intubated and sedated.       Scheduled Medications:   [START ON 10/7/2023] esomeprazole, 40 mg, nasoduodenal tube, Daily before breakfast   Or  [START ON 10/7/2023] pantoprazole, 40 mg, oral, Daily before breakfast   Or  [START ON 10/7/2023] pantoprazole, 40 mg, intravenous, Daily before breakfast         Continuous Medications:   heparin, 0-4,500 Units/hr, Last Rate: 1,152 Units/hr (10/06/23 1449)         PRN Medications:   PRN medications: [MAR Hold] heparin, oxygen    Objective   Vitals:  Most Recent:  Vitals:    10/06/23 1530   BP: 161/87   Pulse: 82   Resp: 16   Temp: 36.5 °C (97.7 °F)   SpO2: 100%       24hr Min/Max:  Temp  Min: 36.1 °C (97 °F)  Max: 36.5 °C (97.7 °F)  Pulse  Min: 57  Max: 90  BP  Min: 151/131  Max: 169/89  Resp  Min: 8  Max: 22  SpO2  Min: 88 %  Max: 100 %    I/O:  I/O last 2 completed shifts:  In: - (0 mL/kg)   Out: 975 (15.4 mL/kg) [Urine:475 (0.3 mL/kg/hr); Emesis/NG output:200; Blood:300]  Weight: 63.5 kg     Hemodynamic parameters for last 24 hours:         Vent settings:  PEEP/CPAP (cm H2O):  [0 cm H20-5 cm H20] 0 cm H20    LDA:  CVC 10/06/23 Double lumen Right Internal jugular (Active)   Placement Date/Time: 10/06/23 (c) 4391   Hand Hygiene Performed Prior to CVC Insertion: Yes  Site  Prep: Chlorhexidine   Site Prep Agent has Completely Dried Before Insertion: Yes  All 5 Sterile Barriers Used (Gloves, Gown, Cap, Mask, Large Sterile Katie...   Number of days: 0       Arterial Line 10/06/23 Right Radial (Active)   Placement Date/Time: 10/06/23 (c) 1606   Size: 20 G  Orientation: Right  Location: Radial  Securement Method: Transparent dressing  Patient Tolerance: Tolerated well   Number of days: 0       Urethral Catheter Non-latex 14 Fr. (Active)   Placement Date/Time: 10/06/23 1621   Placed by: sent rn  Catheter Type: Non-latex  Tube Size (Fr.): 14 Fr.  Catheter Balloon Size: 10 mL   Number of days: 0       NG/OG/Feeding Tube Nasogastric 16 Fr Right nostril (Active)   Placement Date/Time: 10/06/23 1711   Placed by: Ez FRIEND  Hand Hygiene Completed: Yes  Type of Tube: NG/OG Tube  Tube Type: Nasogastric  NG/OG Tube Size: 16 Fr  Tube Location: Right nostril   Number of days: 0         Physical Exam:   Physical Exam  HENT:      Head: Normocephalic and atraumatic.   Eyes:      General: No scleral icterus.  Neck:      Comments: RIJ Mac in place. Dressing CDI  Cardiovascular:      Rate and Rhythm: Normal rate.   Pulmonary:      Comments: Intubated with symmetrical chest rise  Abdominal:      General: There is no distension.      Comments: Open abdomen with wound vac in place   Musculoskeletal:      Right lower leg: No edema.      Left lower leg: No edema.   Neurological:      General: No focal deficit present.         Lab/Radiology/Diagnostic Review:  Results for orders placed or performed during the hospital encounter of 10/06/23 (from the past 24 hour(s))   CBC with Differential   Result Value Ref Range    WBC 16.5 (H) 4.4 - 11.3 x10*3/uL    nRBC 0.0 0.0 - 0.0 /100 WBCs    RBC 5.90 (H) 4.00 - 5.20 x10*6/uL    Hemoglobin 15.6 12.0 - 16.0 g/dL    Hematocrit 45.3 36.0 - 46.0 %    MCV 77 (L) 80 - 100 fL    MCH 26.4 26.0 - 34.0 pg    MCHC 34.4 32.0 - 36.0 g/dL    RDW 15.6 (H) 11.5 - 14.5 %    Platelets  247 150 - 450 x10*3/uL    MPV 10.1 7.5 - 11.5 fL    Neutrophils % 70.2 40.0 - 80.0 %    Immature Granulocytes %, Automated 0.5 0.0 - 0.9 %    Lymphocytes % 16.3 13.0 - 44.0 %    Monocytes % 10.7 2.0 - 10.0 %    Eosinophils % 1.5 0.0 - 6.0 %    Basophils % 0.8 0.0 - 2.0 %    Neutrophils Absolute 11.59 (H) 1.20 - 7.70 x10*3/uL    Immature Granulocytes Absolute, Automated 0.08 0.00 - 0.70 x10*3/uL    Lymphocytes Absolute 2.69 1.20 - 4.80 x10*3/uL    Monocytes Absolute 1.76 (H) 0.10 - 1.00 x10*3/uL    Eosinophils Absolute 0.25 0.00 - 0.70 x10*3/uL    Basophils Absolute 0.13 (H) 0.00 - 0.10 x10*3/uL   Comprehensive Metabolic Panel   Result Value Ref Range    Glucose 197 (H) 74 - 99 mg/dL    Sodium 139 136 - 145 mmol/L    Potassium 3.5 3.5 - 5.3 mmol/L    Chloride 100 98 - 107 mmol/L    Bicarbonate 24 21 - 32 mmol/L    Anion Gap 19 10 - 20 mmol/L    Urea Nitrogen 21 6 - 23 mg/dL    Creatinine 1.03 0.50 - 1.05 mg/dL    eGFR 59 (L) >60 mL/min/1.73m*2    Calcium 9.7 8.6 - 10.6 mg/dL    Albumin 4.3 3.4 - 5.0 g/dL    Alkaline Phosphatase 107 33 - 136 U/L    Total Protein 7.4 6.4 - 8.2 g/dL    AST 53 (H) 9 - 39 U/L    Bilirubin, Total 0.6 0.0 - 1.2 mg/dL    ALT 18 7 - 45 U/L   Morphology   Result Value Ref Range    RBC Morphology See Below     Target Cells Many    ECG 12 lead   Result Value Ref Range    Ventricular Rate 55 BPM    Atrial Rate 55 BPM    IL Interval 200 ms    QRS Duration 152 ms    QT Interval 514 ms    QTC Calculation(Bazett) 491 ms    P Axis 71 degrees    R Axis -43 degrees    T Axis 52 degrees    QRS Count 9 beats    Q Onset 184 ms    P Onset 84 ms    P Offset 147 ms    T Offset 441 ms    QTC Fredericia 499 ms   Coagulation Screen   Result Value Ref Range    Protime 12.6 9.8 - 12.8 seconds    INR 1.1 0.9 - 1.1    aPTT 26 (L) 27 - 38 seconds   BLOOD GAS VENOUS FULL PANEL   Result Value Ref Range    POCT pH, Venous 7.39 7.33 - 7.43 pH    POCT pCO2, Venous 47 41 - 51 mm Hg    POCT pO2, Venous 88 (H) 35 - 45 mm Hg     POCT SO2, Venous 98 (H) 45 - 75 %    POCT Oxy Hemoglobin, Venous 93.6 (H) 45.0 - 75.0 %    POCT Hematocrit Calculated, Venous 53.0 (H) 36.0 - 46.0 %    POCT Sodium, Venous 138 136 - 145 mmol/L    POCT Potassium, Venous 3.0 (L) 3.5 - 5.3 mmol/L    POCT Chloride, Venous 105 98 - 107 mmol/L    POCT Ionized Calicum, Venous 1.20 1.10 - 1.33 mmol/L    POCT Glucose, Venous 227 (H) 74 - 99 mg/dL    POCT Lactate, Venous 1.8 0.4 - 2.0 mmol/L    POCT Base Excess, Venous 2.5 -2.0 - 3.0 mmol/L    POCT HCO3 Calculated, Venous 28.5 (H) 22.0 - 26.0 mmol/L    POCT Hemoglobin, Venous 17.6 (H) 12.0 - 16.0 g/dL    POCT Anion Gap, Venous 8.0 (L) 10.0 - 25.0 mmol/L    Patient Temperature 37.0 degrees Celsius    FiO2 44 %   Type and screen   Result Value Ref Range    ABO TYPE B     Rh TYPE POS     ANTIBODY SCREEN NEG    APTT   Result Value Ref Range    aPTT 27 27 - 38 seconds   POCT GLUCOSE   Result Value Ref Range    POCT Glucose 214 (H) 74 - 99 mg/dL   Blood Gas Arterial Full Panel   Result Value Ref Range    POCT pH, Arterial 7.35 (L) 7.38 - 7.42 pH    POCT pCO2, Arterial 45 (H) 38 - 42 mm Hg    POCT pO2, Arterial 84 (L) 85 - 95 mm Hg    POCT SO2, Arterial 97 94 - 100 %    POCT Oxy Hemoglobin, Arterial 93.2 (L) 94.0 - 98.0 %    POCT Hematocrit Calculated, Arterial 45.0 36.0 - 46.0 %    POCT Sodium, Arterial 139 136 - 145 mmol/L    POCT Potassium, Arterial 2.8 (LL) 3.5 - 5.3 mmol/L    POCT Chloride, Arterial 108 (H) 98 - 107 mmol/L    POCT Ionized Calcium, Arterial 1.11 1.10 - 1.33 mmol/L    POCT Glucose, Arterial 176 (H) 74 - 99 mg/dL    POCT Lactate, Arterial 2.6 (H) 0.4 - 2.0 mmol/L    POCT Base Excess, Arterial -1.2 -2.0 - 3.0 mmol/L    POCT HCO3 Calculated, Arterial 24.8 22.0 - 26.0 mmol/L    POCT Hemoglobin, Arterial 15.1 12.0 - 16.0 g/dL    POCT Anion Gap, Arterial 9 (L) 10 - 25 mmo/L    Patient Temperature 37.0 degrees Celsius    FiO2 60 %   Prepare Plasma: 2 Units   Result Value Ref Range    PRODUCT CODE Y5458Z34     Unit  Number [id number]     Unit ABO B     Unit RH POS     Dispense Status IS     Blood Expiration Date October 11, 2023 11:40 EDT     PRODUCT BLOOD TYPE 7300     UNIT VOLUME 191     PRODUCT CODE W6475B18     Unit Number [id number]     Unit ABO B     Unit RH POS     Dispense Status IS     Blood Expiration Date October 11, 2023 11:40 EDT     PRODUCT BLOOD TYPE 7300     UNIT VOLUME 296    Prepare RBC: 2 Units   Result Value Ref Range    PRODUCT CODE S7325P87     Unit Number [id number]     Unit ABO B     Unit RH POS     XM INTEP COMP     Dispense Status RE     Blood Expiration Date October 24, 2023 23:59 EDT     PRODUCT BLOOD TYPE 7300     UNIT VOLUME 281     PRODUCT CODE Q5624I50     Unit Number [id number]     Unit ABO B     Unit RH POS     XM INTEP COMP     Dispense Status RE     Blood Expiration Date October 24, 2023 23:59 EDT     PRODUCT BLOOD TYPE 7300     UNIT VOLUME 284     PRODUCT CODE S2858T93     Unit Number [id number]     Unit ABO B     Unit RH POS     XM INTEP COMP     Dispense Status XM     Blood Expiration Date October 26, 2023 23:59 EDT     PRODUCT BLOOD TYPE 7300     UNIT VOLUME 350     PRODUCT CODE V2690C35     Unit Number [id number]     Unit ABO B     Unit RH POS     XM INTEP COMP     Dispense Status XM     Blood Expiration Date October 24, 2023 23:59 EDT     PRODUCT BLOOD TYPE 7300     UNIT VOLUME 350        Additional Labs:    This patient has a central line   Reason for the central line remaining today? Parenteral medication    This patient has a urinary catheter   Reason for the urinary catheter remaining today? critically ill patient who need accurate urinary output measurements        Assessment/Plan     Hilda Jones is a 68-year-old female with PMH of COPD, CHF, atrial flutter/A-fib on Coumadin, severe mitral stenosis, moderate tricuspid regurgitation, aortic insufficiency s/p AVR x2, prior SMA occlusion s/p thromboembolectomy, DM 2, and HTN presenting to the emergency  department for abdominal pain, vaginal bleeding, diarrhea. Presents from the OR s/p laparotomy with SMA embolectomy  w/ Dr. Isaacs on 10/6.    Plan:  NEURO: Sedated. Acute post op pain  - Pain control with hydromorphone prn    CV:  CHF, atrial flutter/A-fib on Coumadin, severe MS, moderate TR, AI s/p AVR x2. Patient maintaining adequate BP without vasoactive support. Baseline cardiac function 55-60% (7/2023). Presents from the OR s/p laparotomy with KLEVER, SMA embolectomy  w/ Dr. Isaacs on 10/6.  -Goal -140  - continuous EKG/abp monitoring    PULM: Arrived to SICU intubated  - Wean FiO2 as tolerated.    - Q1h incentive spirometer while awake  - additional pulm toilet prn.    - F/U post op CXR    GI: NPO.   - NG to LIWS  - PPI for GI prophylaxis    : No history of renal disease. Baseline creatinine 0.6-1.0  - Maintenance IVF  - Volume resuscitation as needed  - Maintain U/O >0.5ml/kg/hr  - Check renal function panel post op and daily  - Replete electrolytes to goal K>4, Mg>2, Phos>2.5, ionized Ca>1.10.    HEME: Acute blood loss anemia  - Check CBC and coags post op and daily  - scds  - ongoing monitoring for s/s bleeding  - No heparin per surgery    ENDO: Hx of DM  - Q4h BG   - SSI Lispro per ICU protocol.    ID: Afebrile. Await post op WBC  - Continue Ancef and flagyl while abdomen Is open  - temp q4h, wbc daily  - ongoing monitoring for s/s infection    Lines:  - R radial art line 10/7  - RIJ Mac 10/7  - piv    Dispo: continue ICU care. Reassess dispo in am

## 2023-10-07 ENCOUNTER — ANESTHESIA (OUTPATIENT)
Dept: OPERATING ROOM | Facility: HOSPITAL | Age: 68
DRG: 336 | End: 2023-10-07
Payer: MEDICARE

## 2023-10-07 ENCOUNTER — ANESTHESIA EVENT (OUTPATIENT)
Dept: OPERATING ROOM | Facility: HOSPITAL | Age: 68
DRG: 336 | End: 2023-10-07
Payer: MEDICARE

## 2023-10-07 LAB
ALBUMIN SERPL BCP-MCNC: 3.6 G/DL (ref 3.4–5)
ALBUMIN SERPL BCP-MCNC: 3.9 G/DL (ref 3.4–5)
ANION GAP BLDA CALCULATED.4IONS-SCNC: 12 MMO/L (ref 10–25)
ANION GAP BLDA CALCULATED.4IONS-SCNC: 12 MMO/L (ref 10–25)
ANION GAP SERPL CALC-SCNC: 15 MMOL/L (ref 10–20)
ANION GAP SERPL CALC-SCNC: 16 MMOL/L (ref 10–20)
APTT PPP: 27 SECONDS (ref 27–38)
BASE EXCESS BLDA CALC-SCNC: -1.3 MMOL/L (ref -2–3)
BASE EXCESS BLDA CALC-SCNC: 2.3 MMOL/L (ref -2–3)
BODY TEMPERATURE: 37 DEGREES CELSIUS
BODY TEMPERATURE: 37 DEGREES CELSIUS
BUN SERPL-MCNC: 12 MG/DL (ref 6–23)
BUN SERPL-MCNC: 14 MG/DL (ref 6–23)
CA-I BLD-SCNC: 1.19 MMOL/L (ref 1.1–1.33)
CA-I BLD-SCNC: 1.19 MMOL/L (ref 1.1–1.33)
CA-I BLDA-SCNC: 1.17 MMOL/L (ref 1.1–1.33)
CA-I BLDA-SCNC: 1.24 MMOL/L (ref 1.1–1.33)
CALCIUM SERPL-MCNC: 9.2 MG/DL (ref 8.6–10.6)
CALCIUM SERPL-MCNC: 9.5 MG/DL (ref 8.6–10.6)
CHLORIDE BLDA-SCNC: 109 MMOL/L (ref 98–107)
CHLORIDE BLDA-SCNC: 109 MMOL/L (ref 98–107)
CHLORIDE SERPL-SCNC: 109 MMOL/L (ref 98–107)
CHLORIDE SERPL-SCNC: 110 MMOL/L (ref 98–107)
CO2 SERPL-SCNC: 25 MMOL/L (ref 21–32)
CO2 SERPL-SCNC: 25 MMOL/L (ref 21–32)
CREAT SERPL-MCNC: 0.61 MG/DL (ref 0.5–1.05)
CREAT SERPL-MCNC: 0.72 MG/DL (ref 0.5–1.05)
ERYTHROCYTE [DISTWIDTH] IN BLOOD BY AUTOMATED COUNT: 15.7 % (ref 11.5–14.5)
ERYTHROCYTE [DISTWIDTH] IN BLOOD BY AUTOMATED COUNT: 15.9 % (ref 11.5–14.5)
GFR SERPL CREATININE-BSD FRML MDRD: >90 ML/MIN/1.73M*2
GFR SERPL CREATININE-BSD FRML MDRD: >90 ML/MIN/1.73M*2
GLUCOSE BLD MANUAL STRIP-MCNC: 158 MG/DL (ref 74–99)
GLUCOSE BLD MANUAL STRIP-MCNC: 171 MG/DL (ref 74–99)
GLUCOSE BLD MANUAL STRIP-MCNC: 173 MG/DL (ref 74–99)
GLUCOSE BLD MANUAL STRIP-MCNC: 179 MG/DL (ref 74–99)
GLUCOSE BLD MANUAL STRIP-MCNC: 187 MG/DL (ref 74–99)
GLUCOSE BLDA-MCNC: 145 MG/DL (ref 74–99)
GLUCOSE BLDA-MCNC: 148 MG/DL (ref 74–99)
GLUCOSE SERPL-MCNC: 149 MG/DL (ref 74–99)
GLUCOSE SERPL-MCNC: 157 MG/DL (ref 74–99)
HCO3 BLDA-SCNC: 25 MMOL/L (ref 22–26)
HCO3 BLDA-SCNC: 25.7 MMOL/L (ref 22–26)
HCT VFR BLD AUTO: 37.2 % (ref 36–46)
HCT VFR BLD AUTO: 40 % (ref 36–46)
HCT VFR BLD EST: 40 % (ref 36–46)
HCT VFR BLD EST: 42 % (ref 36–46)
HGB BLD-MCNC: 12.8 G/DL (ref 12–16)
HGB BLD-MCNC: 13.4 G/DL (ref 12–16)
HGB BLDA-MCNC: 13.2 G/DL (ref 12–16)
HGB BLDA-MCNC: 14 G/DL (ref 12–16)
INR PPP: 1.3 (ref 0.9–1.1)
LACTATE BLDA-SCNC: 2.3 MMOL/L (ref 0.4–2)
LACTATE BLDA-SCNC: 2.3 MMOL/L (ref 0.4–2)
MCH RBC QN AUTO: 26.2 PG (ref 26–34)
MCH RBC QN AUTO: 26.6 PG (ref 26–34)
MCHC RBC AUTO-ENTMCNC: 33.5 G/DL (ref 32–36)
MCHC RBC AUTO-ENTMCNC: 34.4 G/DL (ref 32–36)
MCV RBC AUTO: 77 FL (ref 80–100)
MCV RBC AUTO: 78 FL (ref 80–100)
NRBC BLD-RTO: 0 /100 WBCS (ref 0–0)
NRBC BLD-RTO: 0 /100 WBCS (ref 0–0)
OXYHGB MFR BLDA: 94.2 % (ref 94–98)
OXYHGB MFR BLDA: 96.3 % (ref 94–98)
PCO2 BLDA: 32 MM HG (ref 38–42)
PCO2 BLDA: 51 MM HG (ref 38–42)
PH BLDA: 7.31 PH (ref 7.38–7.42)
PH BLDA: 7.5 PH (ref 7.38–7.42)
PHOSPHATE SERPL-MCNC: 2.6 MG/DL (ref 2.5–4.9)
PHOSPHATE SERPL-MCNC: 3.6 MG/DL (ref 2.5–4.9)
PLATELET # BLD AUTO: 178 X10*3/UL (ref 150–450)
PLATELET # BLD AUTO: 190 X10*3/UL (ref 150–450)
PMV BLD AUTO: 11.2 FL (ref 7.5–11.5)
PMV BLD AUTO: 11.7 FL (ref 7.5–11.5)
PO2 BLDA: 125 MM HG (ref 85–95)
PO2 BLDA: 90 MM HG (ref 85–95)
POTASSIUM BLDA-SCNC: 3.7 MMOL/L (ref 3.5–5.3)
POTASSIUM BLDA-SCNC: 5.3 MMOL/L (ref 3.5–5.3)
POTASSIUM SERPL-SCNC: 3.2 MMOL/L (ref 3.5–5.3)
POTASSIUM SERPL-SCNC: 5.1 MMOL/L (ref 3.5–5.3)
PROTHROMBIN TIME: 15.2 SECONDS (ref 9.8–12.8)
RBC # BLD AUTO: 4.81 X10*6/UL (ref 4–5.2)
RBC # BLD AUTO: 5.11 X10*6/UL (ref 4–5.2)
SAO2 % BLDA: 97 % (ref 94–100)
SAO2 % BLDA: 99 % (ref 94–100)
SODIUM BLDA-SCNC: 141 MMOL/L (ref 136–145)
SODIUM BLDA-SCNC: 142 MMOL/L (ref 136–145)
SODIUM SERPL-SCNC: 146 MMOL/L (ref 136–145)
SODIUM SERPL-SCNC: 146 MMOL/L (ref 136–145)
WBC # BLD AUTO: 13.2 X10*3/UL (ref 4.4–11.3)
WBC # BLD AUTO: 17.1 X10*3/UL (ref 4.4–11.3)

## 2023-10-07 PROCEDURE — 2500000005 HC RX 250 GENERAL PHARMACY W/O HCPCS: Performed by: STUDENT IN AN ORGANIZED HEALTH CARE EDUCATION/TRAINING PROGRAM

## 2023-10-07 PROCEDURE — 2500000005 HC RX 250 GENERAL PHARMACY W/O HCPCS

## 2023-10-07 PROCEDURE — 37799 UNLISTED PX VASCULAR SURGERY: CPT | Performed by: STUDENT IN AN ORGANIZED HEALTH CARE EDUCATION/TRAINING PROGRAM

## 2023-10-07 PROCEDURE — 93005 ELECTROCARDIOGRAM TRACING: CPT

## 2023-10-07 PROCEDURE — 2500000004 HC RX 250 GENERAL PHARMACY W/ HCPCS (ALT 636 FOR OP/ED): Performed by: SURGERY

## 2023-10-07 PROCEDURE — 2500000004 HC RX 250 GENERAL PHARMACY W/ HCPCS (ALT 636 FOR OP/ED): Performed by: STUDENT IN AN ORGANIZED HEALTH CARE EDUCATION/TRAINING PROGRAM

## 2023-10-07 PROCEDURE — A49002 PR REOPEN RECENT ABD EXPLORATORY: Performed by: STUDENT IN AN ORGANIZED HEALTH CARE EDUCATION/TRAINING PROGRAM

## 2023-10-07 PROCEDURE — 49002 REOPENING OF ABDOMEN: CPT | Performed by: SURGERY

## 2023-10-07 PROCEDURE — 3700000002 HC GENERAL ANESTHESIA TIME - EACH INCREMENTAL 1 MINUTE: Performed by: SURGERY

## 2023-10-07 PROCEDURE — 99291 CRITICAL CARE FIRST HOUR: CPT | Performed by: STUDENT IN AN ORGANIZED HEALTH CARE EDUCATION/TRAINING PROGRAM

## 2023-10-07 PROCEDURE — 80069 RENAL FUNCTION PANEL: CPT | Performed by: STUDENT IN AN ORGANIZED HEALTH CARE EDUCATION/TRAINING PROGRAM

## 2023-10-07 PROCEDURE — 2500000004 HC RX 250 GENERAL PHARMACY W/ HCPCS (ALT 636 FOR OP/ED)

## 2023-10-07 PROCEDURE — 51702 INSERT TEMP BLADDER CATH: CPT

## 2023-10-07 PROCEDURE — 2720000007 HC OR 272 NO HCPCS: Performed by: SURGERY

## 2023-10-07 PROCEDURE — 96372 THER/PROPH/DIAG INJ SC/IM: CPT | Performed by: STUDENT IN AN ORGANIZED HEALTH CARE EDUCATION/TRAINING PROGRAM

## 2023-10-07 PROCEDURE — 82330 ASSAY OF CALCIUM: CPT | Performed by: STUDENT IN AN ORGANIZED HEALTH CARE EDUCATION/TRAINING PROGRAM

## 2023-10-07 PROCEDURE — 2580000001 HC RX 258 IV SOLUTIONS: Performed by: STUDENT IN AN ORGANIZED HEALTH CARE EDUCATION/TRAINING PROGRAM

## 2023-10-07 PROCEDURE — 85027 COMPLETE CBC AUTOMATED: CPT | Performed by: STUDENT IN AN ORGANIZED HEALTH CARE EDUCATION/TRAINING PROGRAM

## 2023-10-07 PROCEDURE — C9113 INJ PANTOPRAZOLE SODIUM, VIA: HCPCS | Performed by: STUDENT IN AN ORGANIZED HEALTH CARE EDUCATION/TRAINING PROGRAM

## 2023-10-07 PROCEDURE — 82805 BLOOD GASES W/O2 SATURATION: CPT

## 2023-10-07 PROCEDURE — 82947 ASSAY GLUCOSE BLOOD QUANT: CPT | Performed by: STUDENT IN AN ORGANIZED HEALTH CARE EDUCATION/TRAINING PROGRAM

## 2023-10-07 PROCEDURE — 82947 ASSAY GLUCOSE BLOOD QUANT: CPT

## 2023-10-07 PROCEDURE — 2500000002 HC RX 250 W HCPCS SELF ADMINISTERED DRUGS (ALT 637 FOR MEDICARE OP, ALT 636 FOR OP/ED): Performed by: STUDENT IN AN ORGANIZED HEALTH CARE EDUCATION/TRAINING PROGRAM

## 2023-10-07 PROCEDURE — 84132 ASSAY OF SERUM POTASSIUM: CPT

## 2023-10-07 PROCEDURE — 85610 PROTHROMBIN TIME: CPT | Performed by: STUDENT IN AN ORGANIZED HEALTH CARE EDUCATION/TRAINING PROGRAM

## 2023-10-07 PROCEDURE — A4217 STERILE WATER/SALINE, 500 ML: HCPCS | Performed by: SURGERY

## 2023-10-07 PROCEDURE — 3600000003 HC OR TIME - INITIAL BASE CHARGE - PROCEDURE LEVEL THREE: Performed by: SURGERY

## 2023-10-07 PROCEDURE — 3700000001 HC GENERAL ANESTHESIA TIME - INITIAL BASE CHARGE: Performed by: SURGERY

## 2023-10-07 PROCEDURE — 49000 EXPLORATION OF ABDOMEN: CPT | Performed by: STUDENT IN AN ORGANIZED HEALTH CARE EDUCATION/TRAINING PROGRAM

## 2023-10-07 PROCEDURE — 2020000001 HC ICU ROOM DAILY

## 2023-10-07 PROCEDURE — 94003 VENT MGMT INPAT SUBQ DAY: CPT

## 2023-10-07 PROCEDURE — 3600000008 HC OR TIME - EACH INCREMENTAL 1 MINUTE - PROCEDURE LEVEL THREE: Performed by: SURGERY

## 2023-10-07 PROCEDURE — 0DJD0ZZ INSPECTION OF LOWER INTESTINAL TRACT, OPEN APPROACH: ICD-10-PCS | Performed by: SURGERY

## 2023-10-07 PROCEDURE — A6213 FOAM DRG >16<=48 SQ IN W/BDR: HCPCS | Performed by: SURGERY

## 2023-10-07 RX ORDER — PROPOFOL 10 MG/ML
INJECTION, EMULSION INTRAVENOUS
Status: COMPLETED
Start: 2023-10-07 | End: 2023-10-07

## 2023-10-07 RX ORDER — CALCIUM GLUCONATE 20 MG/ML
1 INJECTION, SOLUTION INTRAVENOUS EVERY 6 HOURS PRN
Status: DISCONTINUED | OUTPATIENT
Start: 2023-10-07 | End: 2023-10-11

## 2023-10-07 RX ORDER — METOPROLOL TARTRATE 1 MG/ML
INJECTION, SOLUTION INTRAVENOUS
Status: COMPLETED
Start: 2023-10-07 | End: 2023-10-07

## 2023-10-07 RX ORDER — METOPROLOL TARTRATE 1 MG/ML
10 INJECTION, SOLUTION INTRAVENOUS ONCE
Status: COMPLETED | OUTPATIENT
Start: 2023-10-07 | End: 2023-10-07

## 2023-10-07 RX ORDER — CALCIUM GLUCONATE 20 MG/ML
2 INJECTION, SOLUTION INTRAVENOUS EVERY 6 HOURS PRN
Status: DISCONTINUED | OUTPATIENT
Start: 2023-10-07 | End: 2023-10-11

## 2023-10-07 RX ORDER — FENTANYL CITRATE 50 UG/ML
INJECTION, SOLUTION INTRAMUSCULAR; INTRAVENOUS AS NEEDED
Status: DISCONTINUED | OUTPATIENT
Start: 2023-10-07 | End: 2023-10-07

## 2023-10-07 RX ORDER — LABETALOL HYDROCHLORIDE 5 MG/ML
10 INJECTION, SOLUTION INTRAVENOUS EVERY 4 HOURS PRN
Status: DISCONTINUED | OUTPATIENT
Start: 2023-10-07 | End: 2023-10-19 | Stop reason: HOSPADM

## 2023-10-07 RX ORDER — ALBUMIN HUMAN 50 G/1000ML
12.5 SOLUTION INTRAVENOUS ONCE
Status: COMPLETED | OUTPATIENT
Start: 2023-10-07 | End: 2023-10-08

## 2023-10-07 RX ORDER — SODIUM CHLORIDE 0.9 G/100ML
IRRIGANT IRRIGATION AS NEEDED
Status: DISCONTINUED | OUTPATIENT
Start: 2023-10-07 | End: 2023-10-07 | Stop reason: HOSPADM

## 2023-10-07 RX ORDER — POTASSIUM CHLORIDE 29.8 MG/ML
40 INJECTION INTRAVENOUS ONCE
Status: COMPLETED | OUTPATIENT
Start: 2023-10-07 | End: 2023-10-07

## 2023-10-07 RX ORDER — METOPROLOL TARTRATE 1 MG/ML
5 INJECTION, SOLUTION INTRAVENOUS ONCE
Status: COMPLETED | OUTPATIENT
Start: 2023-10-07 | End: 2023-10-07

## 2023-10-07 RX ORDER — DEXAMETHASONE SODIUM PHOSPHATE 100 MG/10ML
INJECTION INTRAMUSCULAR; INTRAVENOUS AS NEEDED
Status: DISCONTINUED | OUTPATIENT
Start: 2023-10-07 | End: 2023-10-07

## 2023-10-07 RX ORDER — HYDROMORPHONE HYDROCHLORIDE 1 MG/ML
INJECTION, SOLUTION INTRAMUSCULAR; INTRAVENOUS; SUBCUTANEOUS AS NEEDED
Status: DISCONTINUED | OUTPATIENT
Start: 2023-10-07 | End: 2023-10-07

## 2023-10-07 RX ORDER — ONDANSETRON HYDROCHLORIDE 2 MG/ML
INJECTION, SOLUTION INTRAVENOUS AS NEEDED
Status: DISCONTINUED | OUTPATIENT
Start: 2023-10-07 | End: 2023-10-07

## 2023-10-07 RX ORDER — ROCURONIUM BROMIDE 10 MG/ML
INJECTION, SOLUTION INTRAVENOUS AS NEEDED
Status: DISCONTINUED | OUTPATIENT
Start: 2023-10-07 | End: 2023-10-07

## 2023-10-07 RX ADMIN — FENTANYL CITRATE 50 MCG: 50 INJECTION, SOLUTION INTRAMUSCULAR; INTRAVENOUS at 11:44

## 2023-10-07 RX ADMIN — ROCURONIUM BROMIDE 50 MG: 10 INJECTION, SOLUTION INTRAVENOUS at 11:27

## 2023-10-07 RX ADMIN — PROPOFOL 40 MCG/KG/MIN: 10 INJECTION, EMULSION INTRAVENOUS at 09:21

## 2023-10-07 RX ADMIN — PANTOPRAZOLE SODIUM 40 MG: 40 INJECTION, POWDER, FOR SOLUTION INTRAVENOUS at 07:16

## 2023-10-07 RX ADMIN — AMIODARONE HYDROCHLORIDE 1 MG/MIN: 1.8 INJECTION, SOLUTION INTRAVENOUS at 22:44

## 2023-10-07 RX ADMIN — METRONIDAZOLE 500 MG: 500 INJECTION, SOLUTION INTRAVENOUS at 16:20

## 2023-10-07 RX ADMIN — AMIODARONE HYDROCHLORIDE 150 MG: 1.5 INJECTION, SOLUTION INTRAVENOUS at 19:38

## 2023-10-07 RX ADMIN — HYDROMORPHONE HYDROCHLORIDE 0.2 MG: 1 INJECTION, SOLUTION INTRAMUSCULAR; INTRAVENOUS; SUBCUTANEOUS at 12:30

## 2023-10-07 RX ADMIN — INSULIN LISPRO 2 UNITS: 100 INJECTION, SOLUTION INTRAVENOUS; SUBCUTANEOUS at 04:35

## 2023-10-07 RX ADMIN — HYDROMORPHONE HYDROCHLORIDE 0.2 MG: 1 INJECTION, SOLUTION INTRAMUSCULAR; INTRAVENOUS; SUBCUTANEOUS at 12:55

## 2023-10-07 RX ADMIN — METOPROLOL TARTRATE 5 MG: 1 INJECTION, SOLUTION INTRAVENOUS at 18:26

## 2023-10-07 RX ADMIN — ONDANSETRON 4 MG: 2 INJECTION INTRAMUSCULAR; INTRAVENOUS at 12:25

## 2023-10-07 RX ADMIN — CEFAZOLIN SODIUM 2 G: 2 INJECTION, SOLUTION INTRAVENOUS at 16:20

## 2023-10-07 RX ADMIN — SODIUM CHLORIDE, SODIUM LACTATE, POTASSIUM CHLORIDE, AND CALCIUM CHLORIDE: 600; 310; 30; 20 INJECTION, SOLUTION INTRAVENOUS at 12:32

## 2023-10-07 RX ADMIN — Medication: at 13:01

## 2023-10-07 RX ADMIN — METRONIDAZOLE 500 MG: 500 INJECTION, SOLUTION INTRAVENOUS at 00:58

## 2023-10-07 RX ADMIN — CEFAZOLIN SODIUM 2 G: 2 INJECTION, SOLUTION INTRAVENOUS at 07:54

## 2023-10-07 RX ADMIN — LABETALOL HYDROCHLORIDE 10 MG: 5 INJECTION, SOLUTION INTRAVENOUS at 14:18

## 2023-10-07 RX ADMIN — INSULIN LISPRO 2 UNITS: 100 INJECTION, SOLUTION INTRAVENOUS; SUBCUTANEOUS at 01:04

## 2023-10-07 RX ADMIN — ROCURONIUM BROMIDE 10 MG: 10 INJECTION, SOLUTION INTRAVENOUS at 11:50

## 2023-10-07 RX ADMIN — PROPOFOL 40 MCG/KG/MIN: 10 INJECTION, EMULSION INTRAVENOUS at 03:04

## 2023-10-07 RX ADMIN — METOPROLOL TARTRATE 10 MG: 1 INJECTION, SOLUTION INTRAVENOUS at 18:34

## 2023-10-07 RX ADMIN — POTASSIUM CHLORIDE 40 MEQ: 29.8 INJECTION, SOLUTION INTRAVENOUS at 06:42

## 2023-10-07 RX ADMIN — METRONIDAZOLE 500 MG: 500 INJECTION, SOLUTION INTRAVENOUS at 07:54

## 2023-10-07 RX ADMIN — INSULIN LISPRO 2 UNITS: 100 INJECTION, SOLUTION INTRAVENOUS; SUBCUTANEOUS at 16:30

## 2023-10-07 RX ADMIN — HYDROMORPHONE HYDROCHLORIDE 0.2 MG: 1 INJECTION, SOLUTION INTRAMUSCULAR; INTRAVENOUS; SUBCUTANEOUS at 20:40

## 2023-10-07 RX ADMIN — POTASSIUM CHLORIDE 40 MEQ: 29.8 INJECTION, SOLUTION INTRAVENOUS at 09:35

## 2023-10-07 RX ADMIN — FENTANYL CITRATE 50 MCG: 50 INJECTION, SOLUTION INTRAMUSCULAR; INTRAVENOUS at 11:53

## 2023-10-07 RX ADMIN — SUGAMMADEX 200 MG: 100 INJECTION, SOLUTION INTRAVENOUS at 12:29

## 2023-10-07 RX ADMIN — CEFAZOLIN SODIUM 2 G: 2 INJECTION, SOLUTION INTRAVENOUS at 00:58

## 2023-10-07 RX ADMIN — CALCIUM GLUCONATE 1 G: 20 INJECTION, SOLUTION INTRAVENOUS at 00:58

## 2023-10-07 RX ADMIN — DEXAMETHASONE SODIUM PHOSPHATE 4 MG: 10 INJECTION INTRAMUSCULAR; INTRAVENOUS at 11:45

## 2023-10-07 RX ADMIN — INSULIN LISPRO 2 UNITS: 100 INJECTION, SOLUTION INTRAVENOUS; SUBCUTANEOUS at 20:41

## 2023-10-07 SDOH — SOCIAL STABILITY: SOCIAL INSECURITY: DO YOU FEEL UNSAFE GOING BACK TO THE PLACE WHERE YOU ARE LIVING?: UNABLE TO ASSESS

## 2023-10-07 SDOH — SOCIAL STABILITY: SOCIAL INSECURITY: ARE THERE ANY APPARENT SIGNS OF INJURIES/BEHAVIORS THAT COULD BE RELATED TO ABUSE/NEGLECT?: UNABLE TO ASSESS

## 2023-10-07 SDOH — SOCIAL STABILITY: SOCIAL INSECURITY: DOES ANYONE TRY TO KEEP YOU FROM HAVING/CONTACTING OTHER FRIENDS OR DOING THINGS OUTSIDE YOUR HOME?: UNABLE TO ASSESS

## 2023-10-07 SDOH — SOCIAL STABILITY: SOCIAL INSECURITY: WERE YOU ABLE TO COMPLETE ALL THE BEHAVIORAL HEALTH SCREENINGS?: NO

## 2023-10-07 SDOH — SOCIAL STABILITY: SOCIAL INSECURITY: ARE YOU OR HAVE YOU BEEN THREATENED OR ABUSED PHYSICALLY, EMOTIONALLY, OR SEXUALLY BY ANYONE?: UNABLE TO ASSESS

## 2023-10-07 SDOH — SOCIAL STABILITY: SOCIAL INSECURITY: HAVE YOU HAD THOUGHTS OF HARMING ANYONE ELSE?: UNABLE TO ASSESS

## 2023-10-07 SDOH — SOCIAL STABILITY: SOCIAL INSECURITY: ABUSE: ADULT

## 2023-10-07 SDOH — SOCIAL STABILITY: SOCIAL INSECURITY: HAS ANYONE EVER THREATENED TO HURT YOUR FAMILY OR YOUR PETS?: UNABLE TO ASSESS

## 2023-10-07 SDOH — SOCIAL STABILITY: SOCIAL INSECURITY: DO YOU FEEL ANYONE HAS EXPLOITED OR TAKEN ADVANTAGE OF YOU FINANCIALLY OR OF YOUR PERSONAL PROPERTY?: UNABLE TO ASSESS

## 2023-10-07 ASSESSMENT — ACTIVITIES OF DAILY LIVING (ADL)
HEARING - LEFT EAR: UNABLE TO ASSESS
FEEDING YOURSELF: UNABLE TO ASSESS
ADEQUATE_TO_COMPLETE_ADL: UNABLE TO ASSESS
WALKS IN HOME: UNABLE TO ASSESS
PATIENT'S MEMORY ADEQUATE TO SAFELY COMPLETE DAILY ACTIVITIES?: UNABLE TO ASSESS
JUDGMENT_ADEQUATE_SAFELY_COMPLETE_DAILY_ACTIVITIES: UNABLE TO ASSESS
HEARING - RIGHT EAR: UNABLE TO ASSESS
TOILETING: UNABLE TO ASSESS
BATHING: UNABLE TO ASSESS
DRESSING YOURSELF: UNABLE TO ASSESS
GROOMING: UNABLE TO ASSESS

## 2023-10-07 ASSESSMENT — PAIN SCALES - GENERAL
PAIN_LEVEL: 2
PAINLEVEL_OUTOF10: 0 - NO PAIN
PAINLEVEL_OUTOF10: 5 - MODERATE PAIN
PAINLEVEL_OUTOF10: 5 - MODERATE PAIN
PAINLEVEL_OUTOF10: 0 - NO PAIN

## 2023-10-07 ASSESSMENT — PAIN - FUNCTIONAL ASSESSMENT
PAIN_FUNCTIONAL_ASSESSMENT: CPOT (CRITICAL CARE PAIN OBSERVATION TOOL)
PAIN_FUNCTIONAL_ASSESSMENT: CPOT (CRITICAL CARE PAIN OBSERVATION TOOL)
PAIN_FUNCTIONAL_ASSESSMENT: 0-10
PAIN_FUNCTIONAL_ASSESSMENT: CPOT (CRITICAL CARE PAIN OBSERVATION TOOL)

## 2023-10-07 ASSESSMENT — COGNITIVE AND FUNCTIONAL STATUS - GENERAL: PATIENT BASELINE BEDBOUND: UNABLE TO ASSESS AT THIS TIME

## 2023-10-07 NOTE — BRIEF OP NOTE
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]     Procedures  Exploratory laparotomy  07805 - [place] EXPLORATORY LAPAROTOMY CELIOTOMY W/WO BIOPSY SPX    Open embolectomy of the superior mesenteric artery  85104 - [place] EMBLC/THRMBC RNL CELIAC MESENTRY AORTO-ILIAC ART    Lysis of Adhesions for 30 minutes  82998 - [place] ENTEROLSS FRING INTSTINAL ADHESION SPX    Application of Abthera Vaccum Open Abdomen Management Device      Surgeons      * Júnior Isaacs - Primary    Resident/Fellow/Other Assistant:  Bahman Dubose - Fellow    Procedure Summary  Anesthesia: General  ASA: IV  Anesthesia Staff: Anesthesiologist: Wilber Felipe MD; Marge Pelayo MD  C-AA: RONNA Fowler  Anesthesia Resident: Roxann Collazo MD  Estimated Blood Loss: 300 mL  Intra-op Medications: * Intraprocedure medication information is unavailable because the case start and end events have not been set *           Anesthesia Record               Intraprocedure I/O Totals          Intake    LR 1000.00 mL    NaCl 0.9 % 1000.00 mL    Propofol Drip 0.00 mL    The total shown is the total volume documented since Anesthesia Start was filed.    Total Intake 2000 mL       Output    Urine 1000 mL    Est. Blood Loss 300 mL    NG/OG Tube Output 300 mL    Total Output 1600 mL       Net    Net Volume 400 mL          Specimen:   ID Type Source Tests Collected by Time   1 : 1. SMA THROMBUS Tissue CLOT/THROMBUS SURGICAL PATHOLOGY EXAM Júnior Isaacs MD 10/6/2023 1904        Staff:   Circulator: Espinoza Becerril RN; Max Amaro RN  Relief Circulator: Grace Jacobs RN; Fadumo Bueno RN  Relief Scrub: Prudence  Dallas; Júnior Gillette  Scrub Person: Christiane Wallace RN          Findings: Dense intraabdominal adhesions. SMA with adhesions and scar from prior dissection. SMA embolus retrieved with Tito thrombectomy. Excellent distal mesenteric flow.     Complications:  None; patient tolerated the procedure well.     Disposition: ICU - intubated and hemodynamically stable.  Condition: stable  Specimens Collected:   ID Type Source Tests Collected by Time   1 : 1. SMA THROMBUS Tissue CLOT/THROMBUS SURGICAL PATHOLOGY EXAM Júnior Isaacs MD 10/6/2023 1904     Attending Attestation: I was present and scrubbed for the entire procedure.    Júnior Isaacs  Phone Number: [phone]

## 2023-10-07 NOTE — BRIEF OP NOTE
Date: 10/6/2023 - 10/7/2023  OR Location: [facility] OR    Name: Hilda Jones YOB: 1955, Age: 68 y.o., MRN: [id number], Sex: female    Diagnosis  Pre-op Diagnosis     * Mesenteric ischemia (CMS/HCC) [K55.9] Post-op Diagnosis     * Mesenteric ischemia (CMS/HCC) [K55.9]     Procedures  Exploration Laparotomy  90592 - [place] REOPENING RECENT LAPAROTOMY      Surgeons      * Júnior Isaacs - Primary     * Júnior Isaacs    Resident/Fellow/Other Assistant:  No surgical staff documented.    Acute care surgery:  Luzmaria Pak M.D.    Procedure Summary  Anesthesia: General  ASA: III  Anesthesia Staff: Anesthesiologist: Steffi Gaona MD  Anesthesia Resident: Faye Gamboa DO; Nicho Stokes DO  Estimated Blood Loss: 5mL  Intra-op Medications:   Medication Name Total Dose   sodium chloride 0.9 % irrigation solution 2,000 mL   calcium gluconate in NS IV 1 g Cannot be calculated   calcium gluconate in NS IV 2 g Cannot be calculated   dextrose 10 % in water (D10W) infusion Cannot be calculated   dextrose 50 % injection 25 g Cannot be calculated   glucagon (Glucagen) injection 1 mg Cannot be calculated   HYDROmorphone (Dilaudid) injection 0.2 mg Cannot be calculated   insulin lispro (HumaLOG) injection 0-10 Units Cannot be calculated   magnesium sulfate IV 2 g Cannot be calculated   magnesium sulfate IV 4 g Cannot be calculated   oxygen (O2) therapy Cannot be calculated   polyethylene glycol (Glycolax, Miralax) packet 17 g Cannot be calculated   potassium chloride IV 20 mEq Cannot be calculated   potassium chloride IV 40 mEq Cannot be calculated   propofol (Diprivan) infusion Cannot be calculated              Anesthesia Record               Intraprocedure I/O Totals          Intake    Propofol Drip 0.00 mL    The total shown is the total volume documented since Anesthesia Start was filed.    Total Intake 0 mL       Output    Urine 130 mL    Est. Blood Loss 0 mL    Total Output 130 mL       Net    Net Volume -130 mL           Specimen: No specimens collected     Staff:   Circulator: Cindi Dey RN  Scrub Person: Mauricio Maki          Findings: Well-perfused bowel. Also inspected by Dr. Pak ([facility])    Complications:  None; patient tolerated the procedure well.     Disposition: ICU - extubated and stable.  Condition: stable  Specimens Collected: No specimens collected  Attending Attestation: I was present and scrubbed for the entire procedure.    Marcio Quintero MD  PGY5 - Integrated Vascular Surgery

## 2023-10-07 NOTE — SIGNIFICANT EVENT
Vascular surgery post-op plan of care  S/p re-exploration of open laparotomy.   Bowel well-perfused. Abdomen closed    Plan:  - Will plan to restart heparin anticoagulation tomorrow pending clinical stability, abdominal exam, Hgb trend  - 24hrs antibiotics after closure  - NPO with NGT in place to RONDA Quintero MD  PGY5 - Integrated Vascular Surgery

## 2023-10-07 NOTE — OP NOTE
Operative Date: 10/7/2023     PREOPERATIVE DIAGNOSIS:   Acute mesenteric ischemia, open abdomen; s/p superior mesenteric artery embolectomy     POSTOPERATIVE DIAGNOSIS:   Same.      PROCEDURE PERFORMED:  Second look exploratory laparotomy.   Closure of the abdomen.         SURGEON: Júnior Isaacs M.D.     ASSISTANT: Marcio Quintero M.D.    ACUTE CARE SURGERY: Luzmaria Pak M.D.     ANESTHESIA:  General      FINDINGS:  Viable small and large intestines.     INDICATION:  This is a 68 year old female patient s/p SMA embolectomy who is here for planned second look laparotomy.   I discussed the plan for second look exploratory laparotomy for acute mesenteric ischemia. The risks of general anesthesia and surgery, including myocardial infarction, other heart problems, pneumonia or respiratory failure, renal failure, mesenteric ischemia, hemorrhage, wound complications, infection, sexual dysfunction, extremity ischemia, nerve injury, hernia, and other perioperative or late complications, were discussed. We also discussed possibility of bowel resection, ostomy creation, open abdomen with vac system for second look laparotomy. We also discussed the potential need for and risks of blood transfusion. The patient's daughter (given that the patient remains intubated under sedation at the ICU) indicated understanding of the procedure, plan, alternatives, and risks, and voiced consent to proceed.     DESCRIPTION OF PROCEDURE:  The patient was placed supine on the operating table. Patient's arms were placed on arm rests in an 80 degree angle. General anesthesia was achieved by anesthesia team. The patient's abdomen was prepped and draped in the usual sterile fashion. Preoperative antibiotics were administered prior to skin incision.     Wound vac system was removed and abdominal cavity was thoroughly irrigated with warm saline solution. The bowel was examined and appeared to be viable. At this point, Dr. Pak came to the OR to  examine the entire bowel for viability. Please refer to her operative note. Dr. Pak deemed the bowel to be viable, thus we proceeded with abdominal closure.   Abdominal wall closure was then performed with running double looped 1 PDS sutures. The wound was irrigated and dried.  The skin was closed with skin stapler.     The patient tolerated the procedure well and was transferred to the ICU in stable condition.      I was physically present for the entire length of the case and scrubbed for the entire portions.      Júnior Isaacs M.D.  Clinical   [facility] [employer]  Co-Director, [facility]  [facility] [place]

## 2023-10-07 NOTE — OP NOTE
INTRAOPERATIVE CONSULT REPORT  I was called intraoperatively by Dr. Isaacs to assess the bowel in Hilda [place]. She had undergone redo SMA embolectomy yesterday by the Vascular Surgery team and had been left open with plan for second look. Bowel all appeared viable at that time. On my arrival to the OR the patient had already been prepped and draped and Vascular had inspected their repair. I ran the bowel from ligament of Treitz to terminal ileum. It all appeared warm, pink, and well-perfused with no evidence of ischemia. She had some adhesions that were non obstructing. About 40 cm segment of the terminal ileum appeared chronically thickened with some creeping fat, suspicious for inflammatory bowel disease, but this area was not ischemic appearing at all. The colon was inspected and appeared normal. I was in support of Dr. Isaacs's plan for abdominal closure. The patient's diet may be advanced per the usual Vascular Surgery protocol (note she is at high risk of ileus). I then scrubbed out and the patient was turned back over to the Vascular team to complete the operation.     Luzmaria Pak MD  Acute Care Surgery

## 2023-10-07 NOTE — ANESTHESIA POSTPROCEDURE EVALUATION
Patient: Hilda Jones    Procedure Summary       Date: 10/06/23 Room / Location: [facility] OR 22 / [facility] OR    Anesthesia Start: 1549 Anesthesia Stop:     Procedure: Exploratory laparotomy, lysis of adhesions for 30 minutes, open embolectomy of the superior mesenteric artery, and application of Abthera wound vac (Abdomen) Diagnosis:       Acute mesenteric ischemia (CMS/HCC)      Embolus of superior mesenteric artery (CMS/HCC)      (Mesenteric ischemia (CMS/HCC) [K55.9])    Surgeons: Júnior Isaacs MD Responsible Provider: Wilber Felipe MD    Anesthesia Type: general ASA Status: 4 - Emergent            Anesthesia Type: general    Vitals Value Taken Time   /70 10/06/23 2028   Temp 36 10/06/23 2028   Pulse 63 10/06/23 2027   Resp 15 10/06/23 2027   SpO2 100 % 10/06/23 2027   Vitals shown include unvalidated device data.    Anesthesia Post Evaluation    Patient location during evaluation: ICU  Post-procedure mental status: sedated on propofol.  Pain management: adequate  Multimodal analgesia pain management approach  Airway patency: patent  Cardiovascular status: acceptable and hemodynamically stable  Respiratory status: ETT  Hydration status: acceptable  Comments: Transported to ICU intubated on propofol drip, HDS       No notable events documented.

## 2023-10-07 NOTE — ANESTHESIA PREPROCEDURE EVALUATION
Patient: Hilda Jones    Procedure Information       Date/Time: 10/07/23 1200    Procedure: Exploration Laparotomy    Location: [facility] OR 17 / [facility] OR    Surgeons: Júnior Isaacs MD            Relevant Problems   Cardiovascular   (+) Atrial fibrillation (CMS/HCC)   (+) PAD (peripheral artery disease) (CMS/HCC)   (+) Superior mesenteric artery thrombosis (CMS/HCC)   (+) Valvular heart disease      Endocrine   (+) Diabetes mellitus, type 2 (CMS/HCC)      Neuro/Psych   (+) Anxiety   (+) Major depressive disorder      Pulmonary   (+) Chronic obstructive pulmonary disease (CMS/HCC)      Musculoskeletal   (+) Systemic lupus erythematosus, unspecified (CMS/HCC)       Clinical information reviewed:   Tobacco  Allergies  Meds  Problems  Med Hx  Surg Hx  OB Status    Fam Hx  Soc Hx        NPO Detail:  NPO/Void Status  Date of Last Liquid: 10/06/23  Time of Last Liquid: 0000  Date of Last Solid: 10/06/23  Time of Last Solid: 0000      Vitals:    10/07/23 1000   BP:    Pulse: 95   Resp: 16   Temp:    SpO2: 100%         Physical Exam    Airway  Mallampati: unable to assess  Comments: Intubated. 7.0 ETT. 19 cm at the lip.   Cardiovascular    Dental    Pulmonary    Abdominal      Other findings: Sedated on propofol.          Anesthesia Plan    ASA 3     general     intravenous induction   Postoperative administration of opioids is intended.

## 2023-10-07 NOTE — SIGNIFICANT EVENT
S:    POD 0 from relook ex lap for acute mesenteric ischemia s/p SMA embolectomy    O:   Vital signs are stable, normotensive, afebrile, no new or worsening oxygen requirement, not tachycardic  Visit Vitals  /88   Pulse (!) 135   Temp 36.5 °C (97.7 °F) (Bladder)   Resp 16        Constitutional: no acute distress  Skin: warm and dry overall   Neuro: A/O x4, no gross deficits   HEENT: Atraumatic, no scleral icterus  Cardiac: RRR  Pulmonary: Unlabored respirations on NC  Abdomen: Non distended, appropriately tender. Mepilex with silver in place without strikethrough over midline laparotomy.  GI: Voiding appropriately into torre    A/P:  Overall, patient is doing well postoperatively with no acute concerns.  Will continue to optimize pain control as needed.  Will continue to monitor clinical exam, vitals, I&O's, and labs when available.  Will follow up on the patient in the a.m. or sooner as needed.  Remainder of care per CTICU    Moreno Quintanilla MD  PGY-1 General Surgery  Epic Chat Preferred

## 2023-10-07 NOTE — OP NOTE
PREOPERATIVE DIAGNOSIS:   Superior mesenteric artery occlusion with acute mesenteric ischemia     POSTOPERATIVE DIAGNOSIS:   Same.      PROCEDURE PERFORMED:  Superior mesenteric artery Tito catheter embolectomy.   Exploratory laparotomy.   Abdominal vac placement.   Extensive lysis of adhesion.         SURGEON: Júnior Isaacs M.D.     ASSISTANT: Mateusz Dubose M.D.    ACUTE CARE SURGERY: Luzmaria Pak M.D.     ANESTHESIA:  General      FINDINGS:  SMA thromboembolus.   Grossly viable small bowel and colon       INDICATION:  This is a 68 year old female with extensive PMH with PSH of previous SMA embolectomy with Dr. Oropeza last year. She presented to the ER with diarrhea and with leukocytosis with CT finding of SMA thrombus.   I discussed the plan for open superior mesenteric artery embolectomy for acute mesenteric ischemia. The risks of general anesthesia and surgery, including myocardial infarction, other heart problems, pneumonia or respiratory failure, renal failure, mesenteric ischemia, hemorrhage, wound complications, infection, sexual dysfunction, extremity ischemia, nerve injury, hernia, and other perioperative or late complications, were discussed. We also discussed possibility of bowel resection, ostomy creation, open abdomen with vac system for second look laparotomy. We also discussed the potential need for and risks of blood transfusion. The patient and her daughter indicated understanding of the procedure, plan, alternatives, and risks, and voiced consent to proceed.     DESCRIPTION OF PROCEDURE:  The patient was placed supine on the operating table. Patient's arms were tucked in. General anesthesia was achieved by anesthesia team. The patient's abdomen was prepped and draped in the usual sterile fashion. Preoperative antibiotics were administered prior to skin incision.      The skin incision was made from the subxiphoid to the suprapubic region. The subcutaneous tissue was divided with electrocautery.  The linea alba was exposed and incised. The abdominal wall incision was then extended to the full length of the skin incision.   There was significant amount of adhesions requiring lysis for approximately an hour. Using electrocautery and Metzenbaum, lysis of adhesion was performed.    Abdominal exploration revealed ischemic distal ileum and right colon.      The superior mesenteric artery (SMA)'s surrounding tissue was extremely scarred from the previous exposure; with care the SMA was exposed by dissecting the peritoneum at the root of the small bowel mesentery.  The SMA was pulseless distally but proximally had pulse. Systemic anticoagulation was achieved with heparin. Proximal and distal control of the SMA was obtained with vessel loops and vascular clamps.     A transverse arteriotomy over the anterior SMA wall was made with scalpel. Using a Tito catheter, proximally and distally, embolectomy was performed until the clean pass. Well organized thromboembolic materials were sent to pathology. After successful thromboembolectomy, SMA had excellent forward and back bleeding. The SMA arteriotomy closed with interrupted 6-0 Prolene suture. Hemostasis was achieved. SMA had easily palpable pulses, and doppler exam was satisfactory proximal and distal to the arteriotomy closure site, mid-distal SMA and marginal artery.     At this point, acute care surgery team (Dr. Pak) join and examined the bowel and deemed viable. Please refer to Dr. Pak' note operative note).     Temporary abdominal vac was placed for the planned second look.   The patient tolerated the procedure well and was transferred to the ICU in stable condition.     I was physically present for the entire length of the case and scrubbed for the entire portions.

## 2023-10-07 NOTE — SIGNIFICANT EVENT
Postop Check    Hilda Jones  is a 68 y.o. female POD#0 s/p laparotomy with SMA embolectomy who is recovering in the ICU postoperatively. Patient is currently sedated and intubated.    Vitals:    10/07/23 0145   BP:    Pulse: 82   Resp: 16   Temp:    SpO2: 100%     General: appears comfortable  Neuro: sedated  CV: regular rate  Resp: nonlabored respirations, vent setting per ICU  Abd: wound vac in place over midline wound, holding suction. soft, nondistended.  Extremities: MAEx4    A/P:  68 year old female POD0 s/p SMA embolectomy, recovering well in the ICU.    Plan:  - continue ancef and flagyl  -no heparin POD0  - CMP, CBC, ABG with lactate q4h   - Monitor Abthera output  -rest of care per ICU team    Tracy Rios MD-[id number]  Vascular Surgery  Pager [phone]

## 2023-10-07 NOTE — ANESTHESIA POSTPROCEDURE EVALUATION
Patient: Hilda Jones    Procedure Summary       Date: 10/07/23 Room / Location: [facility] OR 17 / [facility] OR    Anesthesia Start: 1118 Anesthesia Stop: 1258    Procedure: Exploration Laparotomy Diagnosis:       Mesenteric ischemia (CMS/HCC)      (Mesenteric ischemia (CMS/HCC) [K55.9])    Surgeons: Júnior Isaacs MD Responsible Provider: Steffi Gaona MD    Anesthesia Type: general ASA Status: 3            Anesthesia Type: general    Vitals Value Taken Time   /76 10/07/23 1258   Temp 36 10/07/23 1258   Pulse 78 10/07/23 1258   Resp 10 10/07/23 1258   SpO2 100 10/07/23 1258       Anesthesia Post Evaluation    Patient location during evaluation: ICU  Patient participation: complete - patient cannot participate  Level of consciousness: awake  Pain score: 2  Pain management: adequate  Airway patency: patent  Cardiovascular status: acceptable  Respiratory status: acceptable  Hydration status: acceptable        There were no known notable events for this encounter.

## 2023-10-07 NOTE — PROGRESS NOTES
"Hilda Jones is a 68 y.o. female on day 1 of admission presenting with Mesenteric ischemia (CMS/HCC).    Subjective   Patient intubated and sedated this morning. Patient planned for OR on 10/7/2023 for re-exploration of open laparotomy.         Objective     Physical Exam  Constitutional:       Comments: Patient intubated and sedated   HENT:      Head: Normocephalic and atraumatic.      Mouth/Throat:      Mouth: Mucous membranes are moist.   Eyes:      Comments: Eyes closed, patient intubated and sedated   Cardiovascular:      Rate and Rhythm: Normal rate and regular rhythm.   Pulmonary:      Comments: Patient intubated and sedated  Abdominal:      General: Abdomen is flat.      Palpations: Abdomen is soft.   Musculoskeletal:      Right lower leg: No edema.      Left lower leg: No edema.   Skin:     General: Skin is warm and dry.   Neurological:      Comments: Patient intubated and sedated    Psychiatric:      Comments: Unable to assess, patient intubated and sedated         Last Recorded Vitals  Blood pressure 173/88, pulse 77, temperature 36.5 °C (97.7 °F), temperature source Bladder, resp. rate 17, height 1.651 m (5' 5\"), weight 63.5 kg (139 lb 15.9 oz), SpO2 97 %.  Intake/Output last 3 Shifts:  I/O last 3 completed shifts:  In: 2100 (33.1 mL/kg) [IV Piggyback:2100]  Out: 2400 (37.8 mL/kg) [Urine:1500 (0.7 mL/kg/hr); Emesis/NG output:400; Drains:200; Blood:300]  Weight: 63.5 kg     Relevant Results  Scheduled medications  ceFAZolin, 2 g, intravenous, q8h  insulin lispro, 0-10 Units, subcutaneous, q4h  metroNIDAZOLE, 500 mg, intravenous, q8h  pantoprazole, 40 mg, intravenous, Daily before breakfast  polyethylene glycol, 17 g, oral, Daily      Continuous medications  propofol, 5-50 mcg/kg/min, Last Rate: Stopped (10/07/23 1125)      PRN medications  PRN medications: calcium gluconate, calcium gluconate, dextrose 10 % in water (D10W), dextrose, glucagon, HYDROmorphone, labetaloL, magnesium sulfate, magnesium sulfate, " oxygen, potassium chloride, potassium chloride    Results for orders placed or performed during the hospital encounter of 10/06/23 (from the past 24 hour(s))   Blood Gas Arterial Full Panel   Result Value Ref Range    POCT pH, Arterial 7.35 (L) 7.38 - 7.42 pH    POCT pCO2, Arterial 45 (H) 38 - 42 mm Hg    POCT pO2, Arterial 84 (L) 85 - 95 mm Hg    POCT SO2, Arterial 97 94 - 100 %    POCT Oxy Hemoglobin, Arterial 93.2 (L) 94.0 - 98.0 %    POCT Hematocrit Calculated, Arterial 45.0 36.0 - 46.0 %    POCT Sodium, Arterial 139 136 - 145 mmol/L    POCT Potassium, Arterial 2.8 (LL) 3.5 - 5.3 mmol/L    POCT Chloride, Arterial 108 (H) 98 - 107 mmol/L    POCT Ionized Calcium, Arterial 1.11 1.10 - 1.33 mmol/L    POCT Glucose, Arterial 176 (H) 74 - 99 mg/dL    POCT Lactate, Arterial 2.6 (H) 0.4 - 2.0 mmol/L    POCT Base Excess, Arterial -1.2 -2.0 - 3.0 mmol/L    POCT HCO3 Calculated, Arterial 24.8 22.0 - 26.0 mmol/L    POCT Hemoglobin, Arterial 15.1 12.0 - 16.0 g/dL    POCT Anion Gap, Arterial 9 (L) 10 - 25 mmo/L    Patient Temperature 37.0 degrees Celsius    FiO2 60 %   Prepare RBC: 2 Units   Result Value Ref Range    PRODUCT CODE J0860X65     Unit Number [id number]     Unit ABO B     Unit RH POS     XM INTEP COMP     Dispense Status RE     Blood Expiration Date October 24, 2023 23:59 EDT     PRODUCT BLOOD TYPE 7300     UNIT VOLUME 281     PRODUCT CODE H1822B63     Unit Number [id number]     Unit ABO B     Unit RH POS     XM INTEP COMP     Dispense Status RE     Blood Expiration Date October 24, 2023 23:59 EDT     PRODUCT BLOOD TYPE 7300     UNIT VOLUME 284     PRODUCT CODE W7592Y37     Unit Number [id number]     Unit ABO B     Unit RH POS     XM INTEP COMP     Dispense Status XM     Blood Expiration Date October 26, 2023 23:59 EDT     PRODUCT BLOOD TYPE 7300     UNIT VOLUME 350     PRODUCT CODE B1256O19     Unit Number [id number]     Unit ABO B     Unit RH POS     XM INTEP COMP     Dispense Status XM      Blood Expiration Date October 24, 2023 23:59 EDT     PRODUCT BLOOD TYPE 7300     UNIT VOLUME 350    Blood Gas Arterial Full Panel Unsolicited   Result Value Ref Range    POCT pH, Arterial 7.40 7.38 - 7.42 pH    POCT pCO2, Arterial 39 38 - 42 mm Hg    POCT pO2, Arterial 108 (H) 85 - 95 mm Hg    POCT SO2, Arterial 99 94 - 100 %    POCT Oxy Hemoglobin, Arterial 95.6 94.0 - 98.0 %    POCT Hematocrit Calculated, Arterial 38.0 36.0 - 46.0 %    POCT Sodium, Arterial 141 136 - 145 mmol/L    POCT Potassium, Arterial 3.5 3.5 - 5.3 mmol/L    POCT Chloride, Arterial 109 (H) 98 - 107 mmol/L    POCT Ionized Calcium, Arterial 1.09 (L) 1.10 - 1.33 mmol/L    POCT Glucose, Arterial 181 (H) 74 - 99 mg/dL    POCT Lactate, Arterial 2.9 (H) 0.4 - 2.0 mmol/L    POCT Base Excess, Arterial -0.5 -2.0 - 3.0 mmol/L    POCT HCO3 Calculated, Arterial 24.2 22.0 - 26.0 mmol/L    POCT Hemoglobin, Arterial 12.6 12.0 - 16.0 g/dL    POCT Anion Gap, Arterial 11 10 - 25 mmo/L    Patient Temperature 37.0 degrees Celsius   TEG Clot Lysis Profile   Result Value Ref Range    R (Reaction Time) K 7.8 4.6 - 9.1 min    LY30 (Lysis) 0.0 0.0 - 2.6 %    MA (Max Amplitude) RT 61.0 52.0 - 70.0 mm    MA ( Eusebio Amplitude) FF 20.0 15.0 - 32.0 mm    Test Comment [id number]    CBC   Result Value Ref Range    WBC 16.1 (H) 4.4 - 11.3 x10*3/uL    nRBC 0.0 0.0 - 0.0 /100 WBCs    RBC 4.54 4.00 - 5.20 x10*6/uL    Hemoglobin 12.1 12.0 - 16.0 g/dL    Hematocrit 36.0 36.0 - 46.0 %    MCV 79 (L) 80 - 100 fL    MCH 26.7 26.0 - 34.0 pg    MCHC 33.6 32.0 - 36.0 g/dL    RDW 15.7 (H) 11.5 - 14.5 %    Platelets 182 150 - 450 x10*3/uL    MPV 11.1 7.5 - 11.5 fL   Coagulation Screen   Result Value Ref Range    Protime 15.3 (H) 9.8 - 12.8 seconds    INR 1.4 (H) 0.9 - 1.1    aPTT 28 27 - 38 seconds   Renal Function Panel   Result Value Ref Range    Glucose 187 (H) 74 - 99 mg/dL    Sodium 147 (H) 136 - 145 mmol/L    Potassium 3.7 3.5 - 5.3 mmol/L    Chloride 110 (H) 98 - 107 mmol/L     Bicarbonate 24 21 - 32 mmol/L    Anion Gap 17 10 - 20 mmol/L    Urea Nitrogen 13 6 - 23 mg/dL    Creatinine 0.63 0.50 - 1.05 mg/dL    eGFR >90 >60 mL/min/1.73m*2    Calcium 8.5 (L) 8.6 - 10.6 mg/dL    Phosphorus 3.0 2.5 - 4.9 mg/dL    Albumin 3.9 3.4 - 5.0 g/dL   Calcium, Ionized   Result Value Ref Range    POCT Calcium, Ionized 1.05 (L) 1.1 - 1.33 mmol/L   Prepare Plasma: 2 Units   Result Value Ref Range    PRODUCT CODE L2760A91     Unit Number [id number]     Unit ABO B     Unit RH POS     Dispense Status XM     Blood Expiration Date October 11, 2023 11:40 EDT     PRODUCT BLOOD TYPE 7300     UNIT VOLUME 191     PRODUCT CODE K8549Q66     Unit Number [id number]     Unit ABO B     Unit RH POS     Dispense Status XM     Blood Expiration Date October 11, 2023 11:40 EDT     PRODUCT BLOOD TYPE 7300     UNIT VOLUME 296    POCT GLUCOSE   Result Value Ref Range    POCT Glucose 187 (H) 74 - 99 mg/dL   Calcium, Ionized   Result Value Ref Range    POCT Calcium, Ionized 1.19 1.1 - 1.33 mmol/L   CBC   Result Value Ref Range    WBC 13.2 (H) 4.4 - 11.3 x10*3/uL    nRBC 0.0 0.0 - 0.0 /100 WBCs    RBC 4.81 4.00 - 5.20 x10*6/uL    Hemoglobin 12.8 12.0 - 16.0 g/dL    Hematocrit 37.2 36.0 - 46.0 %    MCV 77 (L) 80 - 100 fL    MCH 26.6 26.0 - 34.0 pg    MCHC 34.4 32.0 - 36.0 g/dL    RDW 15.7 (H) 11.5 - 14.5 %    Platelets 178 150 - 450 x10*3/uL    MPV 11.2 7.5 - 11.5 fL   Renal Function Panel   Result Value Ref Range    Glucose 157 (H) 74 - 99 mg/dL    Sodium 146 (H) 136 - 145 mmol/L    Potassium 3.2 (L) 3.5 - 5.3 mmol/L    Chloride 109 (H) 98 - 107 mmol/L    Bicarbonate 25 21 - 32 mmol/L    Anion Gap 15 10 - 20 mmol/L    Urea Nitrogen 12 6 - 23 mg/dL    Creatinine 0.61 0.50 - 1.05 mg/dL    eGFR >90 >60 mL/min/1.73m*2    Calcium 9.5 8.6 - 10.6 mg/dL    Phosphorus 2.6 2.5 - 4.9 mg/dL    Albumin 3.9 3.4 - 5.0 g/dL   POCT GLUCOSE   Result Value Ref Range    POCT Glucose 158 (H) 74 - 99 mg/dL   Blood Gas Arterial Full Panel  Unsolicited   Result Value Ref Range    POCT pH, Arterial 7.50 (H) 7.38 - 7.42 pH    POCT pCO2, Arterial 32 (L) 38 - 42 mm Hg    POCT pO2, Arterial 125 (H) 85 - 95 mm Hg    POCT SO2, Arterial 99 94 - 100 %    POCT Oxy Hemoglobin, Arterial 96.3 94.0 - 98.0 %    POCT Hematocrit Calculated, Arterial 40.0 36.0 - 46.0 %    POCT Sodium, Arterial 142 136 - 145 mmol/L    POCT Potassium, Arterial 3.7 3.5 - 5.3 mmol/L    POCT Chloride, Arterial 109 (H) 98 - 107 mmol/L    POCT Ionized Calcium, Arterial 1.17 1.10 - 1.33 mmol/L    POCT Glucose, Arterial 145 (H) 74 - 99 mg/dL    POCT Lactate, Arterial 2.3 (H) 0.4 - 2.0 mmol/L    POCT Base Excess, Arterial 2.3 -2.0 - 3.0 mmol/L    POCT HCO3 Calculated, Arterial 25.0 22.0 - 26.0 mmol/L    POCT Hemoglobin, Arterial 13.2 12.0 - 16.0 g/dL    POCT Anion Gap, Arterial 12 10 - 25 mmo/L    Patient Temperature 37.0 degrees Celsius   Blood Gas Arterial Full Panel Unsolicited   Result Value Ref Range    POCT pH, Arterial 7.31 (L) 7.38 - 7.42 pH    POCT pCO2, Arterial 51 (H) 38 - 42 mm Hg    POCT pO2, Arterial 90 85 - 95 mm Hg    POCT SO2, Arterial 97 94 - 100 %    POCT Oxy Hemoglobin, Arterial 94.2 94.0 - 98.0 %    POCT Hematocrit Calculated, Arterial 42.0 36.0 - 46.0 %    POCT Sodium, Arterial 141 136 - 145 mmol/L    POCT Potassium, Arterial 5.3 3.5 - 5.3 mmol/L    POCT Chloride, Arterial 109 (H) 98 - 107 mmol/L    POCT Ionized Calcium, Arterial 1.24 1.10 - 1.33 mmol/L    POCT Glucose, Arterial 148 (H) 74 - 99 mg/dL    POCT Lactate, Arterial 2.3 (H) 0.4 - 2.0 mmol/L    POCT Base Excess, Arterial -1.3 -2.0 - 3.0 mmol/L    POCT HCO3 Calculated, Arterial 25.7 22.0 - 26.0 mmol/L    POCT Hemoglobin, Arterial 14.0 12.0 - 16.0 g/dL    POCT Anion Gap, Arterial 12 10 - 25 mmo/L    Patient Temperature 37.0 degrees Celsius   Calcium, Ionized   Result Value Ref Range    POCT Calcium, Ionized 1.19 1.1 - 1.33 mmol/L   CBC   Result Value Ref Range    WBC 17.1 (H) 4.4 - 11.3 x10*3/uL    nRBC 0.0 0.0 - 0.0  /100 WBCs    RBC 5.11 4.00 - 5.20 x10*6/uL    Hemoglobin 13.4 12.0 - 16.0 g/dL    Hematocrit 40.0 36.0 - 46.0 %    MCV 78 (L) 80 - 100 fL    MCH 26.2 26.0 - 34.0 pg    MCHC 33.5 32.0 - 36.0 g/dL    RDW 15.9 (H) 11.5 - 14.5 %    Platelets 190 150 - 450 x10*3/uL    MPV 11.7 (H) 7.5 - 11.5 fL   Coagulation Screen   Result Value Ref Range    Protime 15.2 (H) 9.8 - 12.8 seconds    INR 1.3 (H) 0.9 - 1.1    aPTT 27 27 - 38 seconds   Renal Function Panel   Result Value Ref Range    Glucose 149 (H) 74 - 99 mg/dL    Sodium 146 (H) 136 - 145 mmol/L    Potassium 5.1 3.5 - 5.3 mmol/L    Chloride 110 (H) 98 - 107 mmol/L    Bicarbonate 25 21 - 32 mmol/L    Anion Gap 16 10 - 20 mmol/L    Urea Nitrogen 14 6 - 23 mg/dL    Creatinine 0.72 0.50 - 1.05 mg/dL    eGFR >90 >60 mL/min/1.73m*2    Calcium 9.2 8.6 - 10.6 mg/dL    Phosphorus 3.6 2.5 - 4.9 mg/dL    Albumin 3.6 3.4 - 5.0 g/dL              Assessment/Plan   Principal Problem:    Mesenteric ischemia (CMS/HCC)  Active Problems:    Superior mesenteric artery thrombosis (CMS/HCC)    Atrial fibrillation (CMS/HCC)    Valvular heart disease    Chronic obstructive pulmonary disease (CMS/HCC)    Diabetes mellitus, type 2 (CMS/HCC)      Hilda Jones is a 68-year-old female with PMH of COPD, CHF, atrial flutter/A-fib on Coumadin, severe mitral stenosis, moderate tricuspid regurgitation, aortic insufficiency s/p AVR x2, prior SMA occlusion s/p thromboembolectomy, DM 2, and HTN presenting to the emergency department for abdominal pain, vaginal bleeding, diarrhea. Presents from the OR s/p laparotomy with SMA embolectomy  w/ Dr. Isaacs on 10/6. Patient planned for OR on 10/7/2023 for re-exploration of open laparotomy.       Plan:  NEURO: Sedated. Acute post op pain  - Pain control with hydromorphone prn     CV:  CHF, atrial flutter/A-fib on Coumadin, severe MS, moderate TR, AI s/p AVR x2. Patient maintaining adequate BP without vasoactive support. Baseline cardiac function 55-60% (7/2023). Presents  from the OR s/p laparotomy with KLEVER, SMA embolectomy  w/ Dr. Isaacs on 10/6.  -Goal -140  -Continuous EKG/abp monitoring     PULM: Arrived to SICU intubated  - Wean FiO2 as tolerated.    - Q1h incentive spirometer while awake  - additional pulm toilet prn.    - F/U post op CXR     GI: NPO.   - NG to LIWS  - PPI for GI prophylaxis     : No history of renal disease. Baseline creatinine 0.6-1.0  - Maintenance IVF  - Volume resuscitation as needed  - Maintain U/O >0.5ml/kg/hr  - Check renal function panel post op and daily  - Replete electrolytes to goal K>4, Mg>2, Phos>2.5, ionized Ca>1.10.     HEME: Acute blood loss anemia  - Check CBC and coags post op and daily  - scds  - ongoing monitoring for s/s bleeding  - No heparin per surgery     ENDO: Hx of DM  - Q4h BG   - SSI Lispro per ICU protocol.     ID: Afebrile. Await post op WBC  - Continue Ancef and flagyl while abdomen Is open  - temp q4h, wbc daily  - ongoing monitoring for s/s infection     Lines:  - R radial art line 10/7  - RIJ Mac 10/7  - piv     Dispo: continue ICU care         Yokasta Marrero DO

## 2023-10-07 NOTE — SIGNIFICANT EVENT
Vascular Surgery Plan of Care    Keep ancef and Flagyl while abdomen open  No heparinization on POD#0  Check CMP, CBC, ABG with lactate q4hr overnight  Monitor Abthera output  Abthera to 125 mmHg suction via KCI machine      10/06/23 at 8:46 PM - Bahman Dubose MD

## 2023-10-08 ENCOUNTER — APPOINTMENT (OUTPATIENT)
Dept: RADIOLOGY | Facility: HOSPITAL | Age: 68
DRG: 336 | End: 2023-10-08
Payer: MEDICARE

## 2023-10-08 LAB
ACT BLD: 178 SEC (ref 89–169)
ACT BLD: 249 SEC (ref 89–169)
ACT BLD: 291 SEC (ref 89–169)
ACT BLD: 319 SEC (ref 89–169)
ALBUMIN SERPL BCP-MCNC: 3.4 G/DL (ref 3.4–5)
ALBUMIN SERPL BCP-MCNC: 3.6 G/DL (ref 3.4–5)
ANION GAP BLDA CALCULATED.4IONS-SCNC: 8 MMO/L (ref 10–25)
ANION GAP SERPL CALC-SCNC: 12 MMOL/L (ref 10–20)
ANION GAP SERPL CALC-SCNC: 14 MMOL/L (ref 10–20)
APTT PPP: 28 SECONDS (ref 27–38)
APTT PPP: 28 SECONDS (ref 27–38)
APTT PPP: 38 SECONDS (ref 27–38)
BASE EXCESS BLDA CALC-SCNC: 2.6 MMOL/L (ref -2–3)
BLOOD EXPIRATION DATE: NORMAL
BLOOD EXPIRATION DATE: NORMAL
BODY TEMPERATURE: 37 DEGREES CELSIUS
BUN SERPL-MCNC: 15 MG/DL (ref 6–23)
BUN SERPL-MCNC: 18 MG/DL (ref 6–23)
CA-I BLD-SCNC: 1.13 MMOL/L (ref 1.1–1.33)
CA-I BLD-SCNC: 1.2 MMOL/L (ref 1.1–1.33)
CA-I BLDA-SCNC: 1.17 MMOL/L (ref 1.1–1.33)
CALCIUM SERPL-MCNC: 8.5 MG/DL (ref 8.6–10.6)
CALCIUM SERPL-MCNC: 9 MG/DL (ref 8.6–10.6)
CHLORIDE BLDA-SCNC: 108 MMOL/L (ref 98–107)
CHLORIDE SERPL-SCNC: 109 MMOL/L (ref 98–107)
CHLORIDE SERPL-SCNC: 109 MMOL/L (ref 98–107)
CO2 SERPL-SCNC: 27 MMOL/L (ref 21–32)
CO2 SERPL-SCNC: 30 MMOL/L (ref 21–32)
CREAT SERPL-MCNC: 0.76 MG/DL (ref 0.5–1.05)
CREAT SERPL-MCNC: 0.9 MG/DL (ref 0.5–1.05)
DISPENSE STATUS: NORMAL
DISPENSE STATUS: NORMAL
ERYTHROCYTE [DISTWIDTH] IN BLOOD BY AUTOMATED COUNT: 15.7 % (ref 11.5–14.5)
ERYTHROCYTE [DISTWIDTH] IN BLOOD BY AUTOMATED COUNT: 15.9 % (ref 11.5–14.5)
GFR SERPL CREATININE-BSD FRML MDRD: 69 ML/MIN/1.73M*2
GFR SERPL CREATININE-BSD FRML MDRD: 85 ML/MIN/1.73M*2
GLUCOSE BLD MANUAL STRIP-MCNC: 142 MG/DL (ref 74–99)
GLUCOSE BLD MANUAL STRIP-MCNC: 157 MG/DL (ref 74–99)
GLUCOSE BLD MANUAL STRIP-MCNC: 160 MG/DL (ref 74–99)
GLUCOSE BLD MANUAL STRIP-MCNC: 177 MG/DL (ref 74–99)
GLUCOSE BLDA-MCNC: 176 MG/DL (ref 74–99)
GLUCOSE SERPL-MCNC: 154 MG/DL (ref 74–99)
GLUCOSE SERPL-MCNC: 172 MG/DL (ref 74–99)
HCO3 BLDA-SCNC: 27.9 MMOL/L (ref 22–26)
HCT VFR BLD AUTO: 30.3 % (ref 36–46)
HCT VFR BLD AUTO: 35.4 % (ref 36–46)
HCT VFR BLD EST: 35 % (ref 36–46)
HGB BLD-MCNC: 10.3 G/DL (ref 12–16)
HGB BLD-MCNC: 12.1 G/DL (ref 12–16)
HGB BLDA-MCNC: 11.6 G/DL (ref 12–16)
INR PPP: 1.3 (ref 0.9–1.1)
INR PPP: 1.3 (ref 0.9–1.1)
LACTATE BLDA-SCNC: 1.1 MMOL/L (ref 0.4–2)
MCH RBC QN AUTO: 26.3 PG (ref 26–34)
MCH RBC QN AUTO: 26.7 PG (ref 26–34)
MCHC RBC AUTO-ENTMCNC: 34 G/DL (ref 32–36)
MCHC RBC AUTO-ENTMCNC: 34.2 G/DL (ref 32–36)
MCV RBC AUTO: 78 FL (ref 80–100)
MCV RBC AUTO: 78 FL (ref 80–100)
NRBC BLD-RTO: 0 /100 WBCS (ref 0–0)
NRBC BLD-RTO: 0 /100 WBCS (ref 0–0)
OXYHGB MFR BLDA: 91 % (ref 94–98)
PCO2 BLDA: 45 MM HG (ref 38–42)
PH BLDA: 7.4 PH (ref 7.38–7.42)
PHOSPHATE SERPL-MCNC: 1.8 MG/DL (ref 2.5–4.9)
PHOSPHATE SERPL-MCNC: 2.5 MG/DL (ref 2.5–4.9)
PLATELET # BLD AUTO: 156 X10*3/UL (ref 150–450)
PLATELET # BLD AUTO: 164 X10*3/UL (ref 150–450)
PLATELET # BLD AUTO: 170 X10*3/UL (ref 150–450)
PMV BLD AUTO: 12 FL (ref 7.5–11.5)
PMV BLD AUTO: 12.2 FL (ref 7.5–11.5)
PO2 BLDA: 67 MM HG (ref 85–95)
POTASSIUM BLDA-SCNC: 4 MMOL/L (ref 3.5–5.3)
POTASSIUM SERPL-SCNC: 3.6 MMOL/L (ref 3.5–5.3)
POTASSIUM SERPL-SCNC: 4 MMOL/L (ref 3.5–5.3)
PRODUCT BLOOD TYPE: 7300
PRODUCT BLOOD TYPE: 7300
PRODUCT CODE: NORMAL
PRODUCT CODE: NORMAL
PROTHROMBIN TIME: 14.6 SECONDS (ref 9.8–12.8)
PROTHROMBIN TIME: 14.7 SECONDS (ref 9.8–12.8)
RBC # BLD AUTO: 3.91 X10*6/UL (ref 4–5.2)
RBC # BLD AUTO: 4.54 X10*6/UL (ref 4–5.2)
SAO2 % BLDA: 93 % (ref 94–100)
SODIUM BLDA-SCNC: 140 MMOL/L (ref 136–145)
SODIUM SERPL-SCNC: 146 MMOL/L (ref 136–145)
SODIUM SERPL-SCNC: 147 MMOL/L (ref 136–145)
UFH PPP CHRO-ACNC: 0.2 IU/ML
UFH PPP CHRO-ACNC: 0.3 IU/ML
UFH PPP CHRO-ACNC: <0.1 IU/ML
UNIT ABO: NORMAL
UNIT ABO: NORMAL
UNIT NUMBER: NORMAL
UNIT NUMBER: NORMAL
UNIT RH: NORMAL
UNIT RH: NORMAL
UNIT VOLUME: 191
UNIT VOLUME: 296
WBC # BLD AUTO: 18 X10*3/UL (ref 4.4–11.3)
WBC # BLD AUTO: 18.2 X10*3/UL (ref 4.4–11.3)

## 2023-10-08 PROCEDURE — 71045 X-RAY EXAM CHEST 1 VIEW: CPT | Performed by: STUDENT IN AN ORGANIZED HEALTH CARE EDUCATION/TRAINING PROGRAM

## 2023-10-08 PROCEDURE — 85049 AUTOMATED PLATELET COUNT: CPT | Performed by: STUDENT IN AN ORGANIZED HEALTH CARE EDUCATION/TRAINING PROGRAM

## 2023-10-08 PROCEDURE — P9045 ALBUMIN (HUMAN), 5%, 250 ML: HCPCS | Mod: JZ | Performed by: STUDENT IN AN ORGANIZED HEALTH CARE EDUCATION/TRAINING PROGRAM

## 2023-10-08 PROCEDURE — 96372 THER/PROPH/DIAG INJ SC/IM: CPT | Performed by: STUDENT IN AN ORGANIZED HEALTH CARE EDUCATION/TRAINING PROGRAM

## 2023-10-08 PROCEDURE — P9045 ALBUMIN (HUMAN), 5%, 250 ML: HCPCS | Mod: JZ | Performed by: NURSE PRACTITIONER

## 2023-10-08 PROCEDURE — 85520 HEPARIN ASSAY: CPT | Performed by: STUDENT IN AN ORGANIZED HEALTH CARE EDUCATION/TRAINING PROGRAM

## 2023-10-08 PROCEDURE — 71045 X-RAY EXAM CHEST 1 VIEW: CPT

## 2023-10-08 PROCEDURE — 82435 ASSAY OF BLOOD CHLORIDE: CPT | Performed by: STUDENT IN AN ORGANIZED HEALTH CARE EDUCATION/TRAINING PROGRAM

## 2023-10-08 PROCEDURE — 37799 UNLISTED PX VASCULAR SURGERY: CPT | Performed by: STUDENT IN AN ORGANIZED HEALTH CARE EDUCATION/TRAINING PROGRAM

## 2023-10-08 PROCEDURE — 2500000004 HC RX 250 GENERAL PHARMACY W/ HCPCS (ALT 636 FOR OP/ED): Mod: JZ | Performed by: STUDENT IN AN ORGANIZED HEALTH CARE EDUCATION/TRAINING PROGRAM

## 2023-10-08 PROCEDURE — 82330 ASSAY OF CALCIUM: CPT | Performed by: STUDENT IN AN ORGANIZED HEALTH CARE EDUCATION/TRAINING PROGRAM

## 2023-10-08 PROCEDURE — 2580000001 HC RX 258 IV SOLUTIONS: Performed by: STUDENT IN AN ORGANIZED HEALTH CARE EDUCATION/TRAINING PROGRAM

## 2023-10-08 PROCEDURE — C9113 INJ PANTOPRAZOLE SODIUM, VIA: HCPCS | Performed by: STUDENT IN AN ORGANIZED HEALTH CARE EDUCATION/TRAINING PROGRAM

## 2023-10-08 PROCEDURE — 99291 CRITICAL CARE FIRST HOUR: CPT | Performed by: STUDENT IN AN ORGANIZED HEALTH CARE EDUCATION/TRAINING PROGRAM

## 2023-10-08 PROCEDURE — 83605 ASSAY OF LACTIC ACID: CPT

## 2023-10-08 PROCEDURE — 85730 THROMBOPLASTIN TIME PARTIAL: CPT | Performed by: STUDENT IN AN ORGANIZED HEALTH CARE EDUCATION/TRAINING PROGRAM

## 2023-10-08 PROCEDURE — 2020000001 HC ICU ROOM DAILY

## 2023-10-08 PROCEDURE — 82947 ASSAY GLUCOSE BLOOD QUANT: CPT

## 2023-10-08 PROCEDURE — 2500000004 HC RX 250 GENERAL PHARMACY W/ HCPCS (ALT 636 FOR OP/ED): Performed by: STUDENT IN AN ORGANIZED HEALTH CARE EDUCATION/TRAINING PROGRAM

## 2023-10-08 PROCEDURE — 82947 ASSAY GLUCOSE BLOOD QUANT: CPT | Performed by: STUDENT IN AN ORGANIZED HEALTH CARE EDUCATION/TRAINING PROGRAM

## 2023-10-08 PROCEDURE — 2500000002 HC RX 250 W HCPCS SELF ADMINISTERED DRUGS (ALT 637 FOR MEDICARE OP, ALT 636 FOR OP/ED): Performed by: STUDENT IN AN ORGANIZED HEALTH CARE EDUCATION/TRAINING PROGRAM

## 2023-10-08 PROCEDURE — 85027 COMPLETE CBC AUTOMATED: CPT | Performed by: STUDENT IN AN ORGANIZED HEALTH CARE EDUCATION/TRAINING PROGRAM

## 2023-10-08 PROCEDURE — 2500000004 HC RX 250 GENERAL PHARMACY W/ HCPCS (ALT 636 FOR OP/ED): Mod: JZ | Performed by: NURSE PRACTITIONER

## 2023-10-08 RX ORDER — POTASSIUM CHLORIDE 14.9 MG/ML
20 INJECTION INTRAVENOUS
Status: COMPLETED | OUTPATIENT
Start: 2023-10-08 | End: 2023-10-08

## 2023-10-08 RX ORDER — HEPARIN SODIUM 10000 [USP'U]/100ML
0-4000 INJECTION, SOLUTION INTRAVENOUS CONTINUOUS
Status: DISCONTINUED | OUTPATIENT
Start: 2023-10-08 | End: 2023-10-13

## 2023-10-08 RX ORDER — HEPARIN SODIUM 5000 [USP'U]/ML
INJECTION, SOLUTION INTRAVENOUS; SUBCUTANEOUS EVERY 4 HOURS PRN
Status: DISCONTINUED | OUTPATIENT
Start: 2023-10-08 | End: 2023-10-13

## 2023-10-08 RX ORDER — SODIUM CHLORIDE, SODIUM LACTATE, POTASSIUM CHLORIDE, CALCIUM CHLORIDE 600; 310; 30; 20 MG/100ML; MG/100ML; MG/100ML; MG/100ML
100 INJECTION, SOLUTION INTRAVENOUS CONTINUOUS
Status: DISCONTINUED | OUTPATIENT
Start: 2023-10-08 | End: 2023-10-09

## 2023-10-08 RX ORDER — ALBUMIN HUMAN 50 G/1000ML
12.5 SOLUTION INTRAVENOUS ONCE
Status: COMPLETED | OUTPATIENT
Start: 2023-10-08 | End: 2023-10-08

## 2023-10-08 RX ADMIN — INSULIN LISPRO 2 UNITS: 100 INJECTION, SOLUTION INTRAVENOUS; SUBCUTANEOUS at 09:40

## 2023-10-08 RX ADMIN — INSULIN LISPRO 2 UNITS: 100 INJECTION, SOLUTION INTRAVENOUS; SUBCUTANEOUS at 00:06

## 2023-10-08 RX ADMIN — HYDROMORPHONE HYDROCHLORIDE 0.2 MG: 1 INJECTION, SOLUTION INTRAMUSCULAR; INTRAVENOUS; SUBCUTANEOUS at 11:02

## 2023-10-08 RX ADMIN — METRONIDAZOLE 500 MG: 500 INJECTION, SOLUTION INTRAVENOUS at 15:30

## 2023-10-08 RX ADMIN — INSULIN LISPRO 2 UNITS: 100 INJECTION, SOLUTION INTRAVENOUS; SUBCUTANEOUS at 15:53

## 2023-10-08 RX ADMIN — METRONIDAZOLE 500 MG: 500 INJECTION, SOLUTION INTRAVENOUS at 00:05

## 2023-10-08 RX ADMIN — POTASSIUM CHLORIDE 20 MEQ: 14.9 INJECTION, SOLUTION INTRAVENOUS at 15:45

## 2023-10-08 RX ADMIN — HEPARIN SODIUM 800 UNITS/HR: 10000 INJECTION, SOLUTION INTRAVENOUS at 09:48

## 2023-10-08 RX ADMIN — HYDROMORPHONE HYDROCHLORIDE 0.2 MG: 1 INJECTION, SOLUTION INTRAMUSCULAR; INTRAVENOUS; SUBCUTANEOUS at 01:06

## 2023-10-08 RX ADMIN — SODIUM CHLORIDE, POTASSIUM CHLORIDE, SODIUM LACTATE AND CALCIUM CHLORIDE 100 ML/HR: 600; 310; 30; 20 INJECTION, SOLUTION INTRAVENOUS at 13:42

## 2023-10-08 RX ADMIN — LABETALOL HYDROCHLORIDE 10 MG: 5 INJECTION, SOLUTION INTRAVENOUS at 14:32

## 2023-10-08 RX ADMIN — METRONIDAZOLE 500 MG: 500 INJECTION, SOLUTION INTRAVENOUS at 07:44

## 2023-10-08 RX ADMIN — HYDROMORPHONE HYDROCHLORIDE 0.2 MG: 1 INJECTION, SOLUTION INTRAMUSCULAR; INTRAVENOUS; SUBCUTANEOUS at 07:17

## 2023-10-08 RX ADMIN — HYDROMORPHONE HYDROCHLORIDE 0.2 MG: 1 INJECTION, SOLUTION INTRAMUSCULAR; INTRAVENOUS; SUBCUTANEOUS at 14:32

## 2023-10-08 RX ADMIN — LABETALOL HYDROCHLORIDE 10 MG: 5 INJECTION, SOLUTION INTRAVENOUS at 05:03

## 2023-10-08 RX ADMIN — POTASSIUM CHLORIDE 20 MEQ: 14.9 INJECTION, SOLUTION INTRAVENOUS at 17:52

## 2023-10-08 RX ADMIN — AMIODARONE HYDROCHLORIDE 1 MG/MIN: 1.8 INJECTION, SOLUTION INTRAVENOUS at 21:41

## 2023-10-08 RX ADMIN — LABETALOL HYDROCHLORIDE 10 MG: 5 INJECTION, SOLUTION INTRAVENOUS at 18:47

## 2023-10-08 RX ADMIN — ALBUMIN HUMAN 12.5 G: 0.05 INJECTION, SOLUTION INTRAVENOUS at 00:05

## 2023-10-08 RX ADMIN — AMIODARONE HYDROCHLORIDE 1 MG/MIN: 1.8 INJECTION, SOLUTION INTRAVENOUS at 08:58

## 2023-10-08 RX ADMIN — CEFAZOLIN SODIUM 2 G: 2 INJECTION, SOLUTION INTRAVENOUS at 00:05

## 2023-10-08 RX ADMIN — PANTOPRAZOLE SODIUM 40 MG: 40 INJECTION, POWDER, FOR SOLUTION INTRAVENOUS at 07:17

## 2023-10-08 RX ADMIN — AMIODARONE HYDROCHLORIDE 1 MG/MIN: 1.8 INJECTION, SOLUTION INTRAVENOUS at 15:21

## 2023-10-08 RX ADMIN — CEFAZOLIN SODIUM 2 G: 2 INJECTION, SOLUTION INTRAVENOUS at 15:30

## 2023-10-08 RX ADMIN — ALBUMIN HUMAN 12.5 G: 0.05 INJECTION, SOLUTION INTRAVENOUS at 10:33

## 2023-10-08 RX ADMIN — HYDROMORPHONE HYDROCHLORIDE 0.2 MG: 1 INJECTION, SOLUTION INTRAMUSCULAR; INTRAVENOUS; SUBCUTANEOUS at 20:02

## 2023-10-08 RX ADMIN — CEFAZOLIN SODIUM 2 G: 2 INJECTION, SOLUTION INTRAVENOUS at 07:44

## 2023-10-08 SDOH — SOCIAL STABILITY: SOCIAL INSECURITY: HAVE YOU HAD THOUGHTS OF HARMING ANYONE ELSE?: NO

## 2023-10-08 SDOH — SOCIAL STABILITY: SOCIAL INSECURITY: ARE THERE ANY APPARENT SIGNS OF INJURIES/BEHAVIORS THAT COULD BE RELATED TO ABUSE/NEGLECT?: NO

## 2023-10-08 SDOH — SOCIAL STABILITY: SOCIAL INSECURITY: WERE YOU ABLE TO COMPLETE ALL THE BEHAVIORAL HEALTH SCREENINGS?: YES

## 2023-10-08 SDOH — SOCIAL STABILITY: SOCIAL INSECURITY: HAS ANYONE EVER THREATENED TO HURT YOUR FAMILY OR YOUR PETS?: NO

## 2023-10-08 SDOH — SOCIAL STABILITY: SOCIAL INSECURITY: DO YOU FEEL ANYONE HAS EXPLOITED OR TAKEN ADVANTAGE OF YOU FINANCIALLY OR OF YOUR PERSONAL PROPERTY?: NO

## 2023-10-08 SDOH — SOCIAL STABILITY: SOCIAL INSECURITY: DO YOU FEEL UNSAFE GOING BACK TO THE PLACE WHERE YOU ARE LIVING?: NO

## 2023-10-08 SDOH — SOCIAL STABILITY: SOCIAL INSECURITY: DOES ANYONE TRY TO KEEP YOU FROM HAVING/CONTACTING OTHER FRIENDS OR DOING THINGS OUTSIDE YOUR HOME?: NO

## 2023-10-08 SDOH — SOCIAL STABILITY: SOCIAL INSECURITY: ARE YOU OR HAVE YOU BEEN THREATENED OR ABUSED PHYSICALLY, EMOTIONALLY, OR SEXUALLY BY ANYONE?: NO

## 2023-10-08 ASSESSMENT — PAIN - FUNCTIONAL ASSESSMENT
PAIN_FUNCTIONAL_ASSESSMENT: 0-10

## 2023-10-08 ASSESSMENT — PAIN SCALES - GENERAL
PAINLEVEL_OUTOF10: 5 - MODERATE PAIN
PAINLEVEL_OUTOF10: 6
PAINLEVEL_OUTOF10: 8
PAINLEVEL_OUTOF10: 0 - NO PAIN
PAINLEVEL_OUTOF10: 0 - NO PAIN
PAINLEVEL_OUTOF10: 6
PAINLEVEL_OUTOF10: 0 - NO PAIN
PAINLEVEL_OUTOF10: 2
PAINLEVEL_OUTOF10: 3
PAINLEVEL_OUTOF10: 5 - MODERATE PAIN
PAINLEVEL_OUTOF10: 2

## 2023-10-08 ASSESSMENT — PATIENT HEALTH QUESTIONNAIRE - PHQ9
1. LITTLE INTEREST OR PLEASURE IN DOING THINGS: NOT AT ALL
SUM OF ALL RESPONSES TO PHQ9 QUESTIONS 1 & 2: 0
2. FEELING DOWN, DEPRESSED OR HOPELESS: NOT AT ALL

## 2023-10-08 ASSESSMENT — PAIN SCALES - WONG BAKER: WONGBAKER_NUMERICALRESPONSE: HURTS EVEN MORE

## 2023-10-08 ASSESSMENT — LIFESTYLE VARIABLES
HOW OFTEN DO YOU HAVE 6 OR MORE DRINKS ON ONE OCCASION: NEVER
AUDIT-C TOTAL SCORE: 0
SKIP TO QUESTIONS 9-10: 1
AUDIT-C TOTAL SCORE: 0
HOW OFTEN DO YOU HAVE A DRINK CONTAINING ALCOHOL: NEVER
HOW MANY STANDARD DRINKS CONTAINING ALCOHOL DO YOU HAVE ON A TYPICAL DAY: PATIENT DOES NOT DRINK

## 2023-10-08 NOTE — PROGRESS NOTES
"Hilda Jones is a 68 y.o. female on day 2 of admission presenting with Mesenteric ischemia (CMS/HCC).    Subjective     Patient went into Afib RVR last night. Patient was given 15mg total metoprolol injection and 150mg amiodarone bolus. Patient was then started on an amiodarone drip. Patient reportedly went back into sinus rhythm around 1am. No significant issues reported this morning.        Objective     Physical Exam  Constitutional:       Appearance: Normal appearance.   HENT:      Head: Normocephalic and atraumatic.      Mouth/Throat:      Mouth: Mucous membranes are moist.   Eyes:      Extraocular Movements: Extraocular movements intact.   Cardiovascular:      Rate and Rhythm: Normal rate and regular rhythm.   Pulmonary:      Effort: Pulmonary effort is normal.   Abdominal:      General: Abdomen is flat.      Palpations: Abdomen is soft.   Musculoskeletal:      Cervical back: Normal range of motion.      Right lower leg: No edema.      Left lower leg: No edema.   Skin:     General: Skin is warm and dry.   Neurological:      General: No focal deficit present.      Mental Status: She is alert and oriented to person, place, and time.   Psychiatric:         Mood and Affect: Mood normal.         Behavior: Behavior normal.         Last Recorded Vitals  Blood pressure 173/88, pulse 76, temperature 37.3 °C (99.1 °F), temperature source Bladder, resp. rate 14, height 1.651 m (5' 5\"), weight 63.5 kg (139 lb 15.9 oz), SpO2 98 %.  Intake/Output last 3 Shifts:  I/O last 3 completed shifts:  In: 4420.8 (69.6 mL/kg) [I.V.:570.8 (9 mL/kg); IV Piggyback:3850]  Out: 2640 (41.6 mL/kg) [Urine:1840 (0.8 mL/kg/hr); Emesis/NG output:600; Drains:200]  Weight: 63.5 kg     Relevant Results  Scheduled medications  ceFAZolin, 2 g, intravenous, q8h  insulin lispro, 0-10 Units, subcutaneous, q4h  metroNIDAZOLE, 500 mg, intravenous, q8h  pantoprazole, 40 mg, intravenous, Daily before breakfast  polyethylene glycol, 17 g, oral, " Daily      Continuous medications  amiodarone, 0.5-1 mg/min, Last Rate: 1 mg/min (10/08/23 0858)  heparin, 0-4,000 Units/hr, Last Rate: 800 Units/hr (10/08/23 0948)      PRN medications  PRN medications: calcium gluconate, calcium gluconate, dextrose 10 % in water (D10W), dextrose, glucagon, heparin, HYDROmorphone, labetaloL, magnesium sulfate, magnesium sulfate, oxygen, potassium chloride, potassium chloride    Results for orders placed or performed during the hospital encounter of 10/06/23 (from the past 24 hour(s))   Blood Gas Arterial Full Panel Unsolicited   Result Value Ref Range    POCT pH, Arterial 7.31 (L) 7.38 - 7.42 pH    POCT pCO2, Arterial 51 (H) 38 - 42 mm Hg    POCT pO2, Arterial 90 85 - 95 mm Hg    POCT SO2, Arterial 97 94 - 100 %    POCT Oxy Hemoglobin, Arterial 94.2 94.0 - 98.0 %    POCT Hematocrit Calculated, Arterial 42.0 36.0 - 46.0 %    POCT Sodium, Arterial 141 136 - 145 mmol/L    POCT Potassium, Arterial 5.3 3.5 - 5.3 mmol/L    POCT Chloride, Arterial 109 (H) 98 - 107 mmol/L    POCT Ionized Calcium, Arterial 1.24 1.10 - 1.33 mmol/L    POCT Glucose, Arterial 148 (H) 74 - 99 mg/dL    POCT Lactate, Arterial 2.3 (H) 0.4 - 2.0 mmol/L    POCT Base Excess, Arterial -1.3 -2.0 - 3.0 mmol/L    POCT HCO3 Calculated, Arterial 25.7 22.0 - 26.0 mmol/L    POCT Hemoglobin, Arterial 14.0 12.0 - 16.0 g/dL    POCT Anion Gap, Arterial 12 10 - 25 mmo/L    Patient Temperature 37.0 degrees Celsius   Calcium, Ionized   Result Value Ref Range    POCT Calcium, Ionized 1.19 1.1 - 1.33 mmol/L   CBC   Result Value Ref Range    WBC 17.1 (H) 4.4 - 11.3 x10*3/uL    nRBC 0.0 0.0 - 0.0 /100 WBCs    RBC 5.11 4.00 - 5.20 x10*6/uL    Hemoglobin 13.4 12.0 - 16.0 g/dL    Hematocrit 40.0 36.0 - 46.0 %    MCV 78 (L) 80 - 100 fL    MCH 26.2 26.0 - 34.0 pg    MCHC 33.5 32.0 - 36.0 g/dL    RDW 15.9 (H) 11.5 - 14.5 %    Platelets 190 150 - 450 x10*3/uL    MPV 11.7 (H) 7.5 - 11.5 fL   Coagulation Screen   Result Value Ref Range     Protime 15.2 (H) 9.8 - 12.8 seconds    INR 1.3 (H) 0.9 - 1.1    aPTT 27 27 - 38 seconds   Renal Function Panel   Result Value Ref Range    Glucose 149 (H) 74 - 99 mg/dL    Sodium 146 (H) 136 - 145 mmol/L    Potassium 5.1 3.5 - 5.3 mmol/L    Chloride 110 (H) 98 - 107 mmol/L    Bicarbonate 25 21 - 32 mmol/L    Anion Gap 16 10 - 20 mmol/L    Urea Nitrogen 14 6 - 23 mg/dL    Creatinine 0.72 0.50 - 1.05 mg/dL    eGFR >90 >60 mL/min/1.73m*2    Calcium 9.2 8.6 - 10.6 mg/dL    Phosphorus 3.6 2.5 - 4.9 mg/dL    Albumin 3.6 3.4 - 5.0 g/dL   POCT GLUCOSE   Result Value Ref Range    POCT Glucose 171 (H) 74 - 99 mg/dL   POCT GLUCOSE   Result Value Ref Range    POCT Glucose 179 (H) 74 - 99 mg/dL   POCT GLUCOSE   Result Value Ref Range    POCT Glucose 173 (H) 74 - 99 mg/dL   Calcium, Ionized   Result Value Ref Range    POCT Calcium, Ionized 1.20 1.1 - 1.33 mmol/L   CBC   Result Value Ref Range    WBC 18.2 (H) 4.4 - 11.3 x10*3/uL    nRBC 0.0 0.0 - 0.0 /100 WBCs    RBC 4.54 4.00 - 5.20 x10*6/uL    Hemoglobin 12.1 12.0 - 16.0 g/dL    Hematocrit 35.4 (L) 36.0 - 46.0 %    MCV 78 (L) 80 - 100 fL    MCH 26.7 26.0 - 34.0 pg    MCHC 34.2 32.0 - 36.0 g/dL    RDW 15.9 (H) 11.5 - 14.5 %    Platelets 170 150 - 450 x10*3/uL    MPV 12.0 (H) 7.5 - 11.5 fL   Coagulation Screen   Result Value Ref Range    Protime 14.6 (H) 9.8 - 12.8 seconds    INR 1.3 (H) 0.9 - 1.1    aPTT 28 27 - 38 seconds   Renal Function Panel   Result Value Ref Range    Glucose 172 (H) 74 - 99 mg/dL    Sodium 146 (H) 136 - 145 mmol/L    Potassium 4.0 3.5 - 5.3 mmol/L    Chloride 109 (H) 98 - 107 mmol/L    Bicarbonate 27 21 - 32 mmol/L    Anion Gap 14 10 - 20 mmol/L    Urea Nitrogen 18 6 - 23 mg/dL    Creatinine 0.90 0.50 - 1.05 mg/dL    eGFR 69 >60 mL/min/1.73m*2    Calcium 9.0 8.6 - 10.6 mg/dL    Phosphorus 2.5 2.5 - 4.9 mg/dL    Albumin 3.6 3.4 - 5.0 g/dL   Prepare Plasma: 2 Units   Result Value Ref Range    PRODUCT CODE I7171D19     Unit Number [id number]     Unit ABO  B     Unit RH POS     Dispense Status RE     Blood Expiration Date October 11, 2023 11:40 EDT     PRODUCT BLOOD TYPE 7300     UNIT VOLUME 191     PRODUCT CODE V3408D17     Unit Number [id number]     Unit ABO B     Unit RH POS     Dispense Status RE     Blood Expiration Date October 11, 2023 11:40 EDT     PRODUCT BLOOD TYPE 7300     UNIT VOLUME 296    POCT GLUCOSE   Result Value Ref Range    POCT Glucose 177 (H) 74 - 99 mg/dL   Blood Gas Arterial Full Panel Unsolicited   Result Value Ref Range    POCT pH, Arterial 7.40 7.38 - 7.42 pH    POCT pCO2, Arterial 45 (H) 38 - 42 mm Hg    POCT pO2, Arterial 67 (L) 85 - 95 mm Hg    POCT SO2, Arterial 93 (L) 94 - 100 %    POCT Oxy Hemoglobin, Arterial 91.0 (L) 94.0 - 98.0 %    POCT Hematocrit Calculated, Arterial 35.0 (L) 36.0 - 46.0 %    POCT Sodium, Arterial 140 136 - 145 mmol/L    POCT Potassium, Arterial 4.0 3.5 - 5.3 mmol/L    POCT Chloride, Arterial 108 (H) 98 - 107 mmol/L    POCT Ionized Calcium, Arterial 1.17 1.10 - 1.33 mmol/L    POCT Glucose, Arterial 176 (H) 74 - 99 mg/dL    POCT Lactate, Arterial 1.1 0.4 - 2.0 mmol/L    POCT Base Excess, Arterial 2.6 -2.0 - 3.0 mmol/L    POCT HCO3 Calculated, Arterial 27.9 (H) 22.0 - 26.0 mmol/L    POCT Hemoglobin, Arterial 11.6 (L) 12.0 - 16.0 g/dL    POCT Anion Gap, Arterial 8 (L) 10 - 25 mmo/L    Patient Temperature 37.0 degrees Celsius   Heparin Assay, UFH   Result Value Ref Range    Heparin Unfractionated <0.1 See Comment Below for Therapeutic Ranges IU/mL   aPTT - baseline   Result Value Ref Range    aPTT 28 27 - 38 seconds   Platelet count - baseline   Result Value Ref Range    Platelets 164 150 - 450 x10*3/uL              Assessment/Plan   Principal Problem:    Mesenteric ischemia (CMS/HCC)  Active Problems:    Superior mesenteric artery thrombosis (CMS/HCC)    Atrial fibrillation (CMS/HCC)    Valvular heart disease    Chronic obstructive pulmonary disease (CMS/HCC)    Diabetes mellitus, type 2 (CMS/HCC)      Hilda  Karen is a 68-year-old female with PMH of COPD, CHF, atrial flutter/A-fib on Coumadin, severe mitral stenosis, moderate tricuspid regurgitation, aortic insufficiency s/p AVR x2, prior SMA occlusion s/p thromboembolectomy, DM 2, and HTN presenting to the emergency department for abdominal pain, vaginal bleeding, diarrhea. Presents from the OR s/p laparotomy with SMA embolectomy  w/ Dr. Isaacs on 10/6. Patient planned for OR on 10/7/2023 for re-exploration of open laparotomy.       Plan:  NEURO: No significant neurological history  - Ongoing neuro and pain assessments  - PRN hydromorphone for pain control  - PT/OT as appropriate    CV:  CHF, atrial flutter/A-fib on Coumadin, severe MS, moderate TR, AI s/p AVR x2. Patient maintaining adequate BP without vasoactive support. Baseline cardiac function 55-60% (7/2023). Presents from the OR s/p laparotomy with KLEVER, SMA embolectomy  w/ Dr. Isaacs on 10/6.  - Goal -140  - Continuous EKG/abp monitoring  - Continue amiodarone drip (Afib RVR on 10/7/2023)     PULM: Arrived to SICU intubated  - Wean FiO2 as tolerated.    - Q1h incentive spirometer while awake  - additional pulm toilet prn.    - F/U post op CXR     GI: NPO.   - NG to LIWS  - PPI for GI prophylaxis     : No history of renal disease. Baseline creatinine 0.6-1.0  - Maintenance IVF  - Volume resuscitation as needed  - Maintain U/O >0.5ml/kg/hr  - Check renal function panel post op and daily  - Replete electrolytes to goal K>4, Mg>2, Phos>2.5, ionized Ca>1.10.     HEME: Acute blood loss anemia  - Check CBC and coags post op and daily  - SCDs  - Ongoing monitoring for s/s bleeding  - Low intensity heparin drip ordered      ENDO: Hx of DM  - Q4h BG   - SSI Lispro per ICU protocol.     ID: Afebrile. Await post op WBC  - Antibiotics can be stopped 24 hours post-op from closure  - Temp q4h, wbc daily  - Ongoing monitoring for s/s infection     Lines:  - R radial art line 10/7  - RIJ Mac 10/7 (discontinue 10/8/2023)   -  PIV     Dispo: continue ICU care         Sameam Elida DO

## 2023-10-08 NOTE — PROGRESS NOTES
Hilda Jones is a 68 y.o. female on day 2 of admission presenting with Mesenteric ischemia (CMS/HCC).    Subjective   Afib with RVR overnight requiring doses of metoprolol and then amio bolus with drip. Converted to NSR with amio  Pain OK, denies flatus or Bms.  Hgb stable       Objective   Last Recorded Vitals  Heart Rate:  []   Temp:  [36.5 °C (97.7 °F)-37.5 °C (99.5 °F)]   Resp:  [11-22]   BP: (173)/(88)   SpO2:  [92 %-100 %]     Intake/Output last 3 Shifts:  I/O last 3 completed shifts:  In: 4420.8 (69.6 mL/kg) [I.V.:570.8 (9 mL/kg); IV Piggyback:3850]  Out: 2640 (41.6 mL/kg) [Urine:1840 (0.8 mL/kg/hr); Emesis/NG output:600; Drains:200]  Weight: 63.5 kg     Vascular Physical Exam  Constitutional:       General: She is awake.   Cardiovascular:      Rate and Rhythm: Normal rate and regular rhythm.   Abdominal:      Tenderness: There is abdominal tenderness.      Comments: Soft, nondistended. Midline dressed and closed with staples  NGT in place with dark bilious output, 200cc last 24hrs   Neurological:      Mental Status: She is alert.      Comments: Grossly intact       Relevant Results  Lab Results   Component Value Date    WBC 18.2 (H) 10/08/2023    HGB 12.1 10/08/2023    HCT 35.4 (L) 10/08/2023    MCV 78 (L) 10/08/2023     10/08/2023     Lab Results   Component Value Date    GLUCOSE 172 (H) 10/08/2023    CALCIUM 9.0 10/08/2023     (H) 10/08/2023    K 4.0 10/08/2023    CO2 27 10/08/2023     (H) 10/08/2023    BUN 18 10/08/2023    CREATININE 0.90 10/08/2023       Active Medications  Scheduled medications  ceFAZolin, 2 g, intravenous, q8h  insulin lispro, 0-10 Units, subcutaneous, q4h  metroNIDAZOLE, 500 mg, intravenous, q8h  pantoprazole, 40 mg, intravenous, Daily before breakfast  polyethylene glycol, 17 g, oral, Daily      Continuous medications  amiodarone, 0.5-1 mg/min, Last Rate: 1 mg/min (10/08/23 0858)  heparin, 0-4,000 Units/hr  propofol, 5-50 mcg/kg/min, Last Rate: Stopped  (10/07/23 1125)      PRN medications  PRN medications: calcium gluconate, calcium gluconate, dextrose 10 % in water (D10W), dextrose, glucagon, heparin, HYDROmorphone, labetaloL, magnesium sulfate, magnesium sulfate, oxygen, potassium chloride, potassium chloride     Assessment:  68 y.o. female presenting with abdominal pain, vaginal bleeding, and diarrhea 10/6. She has a history of COPD, CHF, atrial flutter/A-fib on Coumadin, mitral stenosis, tricuspid regurgitation, aortic insufficiency s/p AVR x2, prior SMA occlusion s/p thromboembolectomy, DM 2, and HTN. In 8/2022 the patient underwent open mesenteric embolectomy. She was on coumadin, but presented with an INR of 1.1.      S/p redo exlap, open SMA embolectomy; left open with abthera dressing and brought back to OR 10/7 where she was subsequently closed and extubated.    Plan:  - OK for heparin anticoagulation today (low-intensity drip without initial bolus, OK for repeat boluses)  - Maintain NPO with NGT in place pending ROBF  - Abx can be stopped after 24hrs post-op from closure  - PT/OT, activity as tolerated  - Afib with RVR management per SICU  - Updated echo this admission       Discussed with Dr. Shital Quintero MD  [id number] - Integrated Vascular Surgery  [id number]

## 2023-10-09 ENCOUNTER — APPOINTMENT (OUTPATIENT)
Dept: CARDIOLOGY | Facility: HOSPITAL | Age: 68
DRG: 336 | End: 2023-10-09
Payer: MEDICARE

## 2023-10-09 ENCOUNTER — APPOINTMENT (OUTPATIENT)
Dept: RADIOLOGY | Facility: HOSPITAL | Age: 68
DRG: 336 | End: 2023-10-09
Payer: MEDICARE

## 2023-10-09 LAB
ALBUMIN SERPL BCP-MCNC: 3 G/DL (ref 3.4–5)
ALBUMIN SERPL BCP-MCNC: 3 G/DL (ref 3.4–5)
ANION GAP BLDA CALCULATED.4IONS-SCNC: 5 MMO/L (ref 10–25)
ANION GAP SERPL CALC-SCNC: 10 MMOL/L (ref 10–20)
ANION GAP SERPL CALC-SCNC: 14 MMOL/L (ref 10–20)
APTT PPP: 49 SECONDS (ref 27–38)
BASE EXCESS BLDA CALC-SCNC: 3.5 MMOL/L (ref -2–3)
BLOOD EXPIRATION DATE: NORMAL
BODY TEMPERATURE: 37 DEGREES CELSIUS
BUN SERPL-MCNC: 10 MG/DL (ref 6–23)
BUN SERPL-MCNC: 12 MG/DL (ref 6–23)
CA-I BLD-SCNC: 0.97 MMOL/L (ref 1.1–1.33)
CA-I BLD-SCNC: 1.15 MMOL/L (ref 1.1–1.33)
CA-I BLDA-SCNC: 1.21 MMOL/L (ref 1.1–1.33)
CALCIUM SERPL-MCNC: 8.2 MG/DL (ref 8.6–10.6)
CALCIUM SERPL-MCNC: 8.2 MG/DL (ref 8.6–10.6)
CHLORIDE BLDA-SCNC: 107 MMOL/L (ref 98–107)
CHLORIDE SERPL-SCNC: 108 MMOL/L (ref 98–107)
CHLORIDE SERPL-SCNC: 109 MMOL/L (ref 98–107)
CO2 SERPL-SCNC: 29 MMOL/L (ref 21–32)
CO2 SERPL-SCNC: 30 MMOL/L (ref 21–32)
CREAT SERPL-MCNC: 0.6 MG/DL (ref 0.5–1.05)
CREAT SERPL-MCNC: 0.63 MG/DL (ref 0.5–1.05)
DISPENSE STATUS: NORMAL
ERYTHROCYTE [DISTWIDTH] IN BLOOD BY AUTOMATED COUNT: 15.9 % (ref 11.5–14.5)
GFR SERPL CREATININE-BSD FRML MDRD: >90 ML/MIN/1.73M*2
GFR SERPL CREATININE-BSD FRML MDRD: >90 ML/MIN/1.73M*2
GLUCOSE BLD MANUAL STRIP-MCNC: 130 MG/DL (ref 74–99)
GLUCOSE BLD MANUAL STRIP-MCNC: 133 MG/DL (ref 74–99)
GLUCOSE BLD MANUAL STRIP-MCNC: 154 MG/DL (ref 74–99)
GLUCOSE BLDA-MCNC: 137 MG/DL (ref 74–99)
GLUCOSE SERPL-MCNC: 138 MG/DL (ref 74–99)
GLUCOSE SERPL-MCNC: 140 MG/DL (ref 74–99)
HCO3 BLDA-SCNC: 29.6 MMOL/L (ref 22–26)
HCT VFR BLD AUTO: 28.2 % (ref 36–46)
HCT VFR BLD EST: 39 % (ref 36–46)
HGB BLD-MCNC: 9.5 G/DL (ref 12–16)
HGB BLDA-MCNC: 13.1 G/DL (ref 12–16)
INR PPP: 1.3 (ref 0.9–1.1)
LACTATE BLDA-SCNC: 0.6 MMOL/L (ref 0.4–2)
MAGNESIUM SERPL-MCNC: 2.04 MG/DL (ref 1.6–2.4)
MCH RBC QN AUTO: 26.2 PG (ref 26–34)
MCHC RBC AUTO-ENTMCNC: 33.7 G/DL (ref 32–36)
MCV RBC AUTO: 78 FL (ref 80–100)
NRBC BLD-RTO: 0 /100 WBCS (ref 0–0)
OXYHGB MFR BLDA: 96 % (ref 94–98)
PCO2 BLDA: 50 MM HG (ref 38–42)
PH BLDA: 7.38 PH (ref 7.38–7.42)
PHOSPHATE SERPL-MCNC: 1.8 MG/DL (ref 2.5–4.9)
PHOSPHATE SERPL-MCNC: 3.4 MG/DL (ref 2.5–4.9)
PLATELET # BLD AUTO: 152 X10*3/UL (ref 150–450)
PMV BLD AUTO: 12.3 FL (ref 7.5–11.5)
PO2 BLDA: 92 MM HG (ref 85–95)
POTASSIUM BLDA-SCNC: 3.5 MMOL/L (ref 3.5–5.3)
POTASSIUM SERPL-SCNC: 3.7 MMOL/L (ref 3.5–5.3)
POTASSIUM SERPL-SCNC: 4.1 MMOL/L (ref 3.5–5.3)
PRODUCT BLOOD TYPE: 7300
PRODUCT CODE: NORMAL
PROTHROMBIN TIME: 14.6 SECONDS (ref 9.8–12.8)
RBC # BLD AUTO: 3.62 X10*6/UL (ref 4–5.2)
SAO2 % BLDA: 99 % (ref 94–100)
SODIUM BLDA-SCNC: 138 MMOL/L (ref 136–145)
SODIUM SERPL-SCNC: 145 MMOL/L (ref 136–145)
SODIUM SERPL-SCNC: 147 MMOL/L (ref 136–145)
UFH PPP CHRO-ACNC: 0.2 IU/ML
UFH PPP CHRO-ACNC: 0.4 IU/ML
UNIT ABO: NORMAL
UNIT NUMBER: NORMAL
UNIT RH: NORMAL
UNIT VOLUME: 281
UNIT VOLUME: 284
UNIT VOLUME: 350
UNIT VOLUME: 350
WBC # BLD AUTO: 18.2 X10*3/UL (ref 4.4–11.3)
XM INTEP: NORMAL

## 2023-10-09 PROCEDURE — 37799 UNLISTED PX VASCULAR SURGERY: CPT | Performed by: STUDENT IN AN ORGANIZED HEALTH CARE EDUCATION/TRAINING PROGRAM

## 2023-10-09 PROCEDURE — 71045 X-RAY EXAM CHEST 1 VIEW: CPT | Performed by: RADIOLOGY

## 2023-10-09 PROCEDURE — 96372 THER/PROPH/DIAG INJ SC/IM: CPT | Performed by: STUDENT IN AN ORGANIZED HEALTH CARE EDUCATION/TRAINING PROGRAM

## 2023-10-09 PROCEDURE — 2500000002 HC RX 250 W HCPCS SELF ADMINISTERED DRUGS (ALT 637 FOR MEDICARE OP, ALT 636 FOR OP/ED): Performed by: STUDENT IN AN ORGANIZED HEALTH CARE EDUCATION/TRAINING PROGRAM

## 2023-10-09 PROCEDURE — 85018 HEMOGLOBIN: CPT

## 2023-10-09 PROCEDURE — 99291 CRITICAL CARE FIRST HOUR: CPT | Performed by: STUDENT IN AN ORGANIZED HEALTH CARE EDUCATION/TRAINING PROGRAM

## 2023-10-09 PROCEDURE — 2500000004 HC RX 250 GENERAL PHARMACY W/ HCPCS (ALT 636 FOR OP/ED): Performed by: STUDENT IN AN ORGANIZED HEALTH CARE EDUCATION/TRAINING PROGRAM

## 2023-10-09 PROCEDURE — 85520 HEPARIN ASSAY: CPT | Performed by: STUDENT IN AN ORGANIZED HEALTH CARE EDUCATION/TRAINING PROGRAM

## 2023-10-09 PROCEDURE — 93306 TTE W/DOPPLER COMPLETE: CPT

## 2023-10-09 PROCEDURE — 2500000005 HC RX 250 GENERAL PHARMACY W/O HCPCS: Performed by: STUDENT IN AN ORGANIZED HEALTH CARE EDUCATION/TRAINING PROGRAM

## 2023-10-09 PROCEDURE — 85610 PROTHROMBIN TIME: CPT | Performed by: STUDENT IN AN ORGANIZED HEALTH CARE EDUCATION/TRAINING PROGRAM

## 2023-10-09 PROCEDURE — 71045 X-RAY EXAM CHEST 1 VIEW: CPT

## 2023-10-09 PROCEDURE — 1100000001 HC PRIVATE ROOM DAILY

## 2023-10-09 PROCEDURE — 82435 ASSAY OF BLOOD CHLORIDE: CPT | Performed by: STUDENT IN AN ORGANIZED HEALTH CARE EDUCATION/TRAINING PROGRAM

## 2023-10-09 PROCEDURE — C9113 INJ PANTOPRAZOLE SODIUM, VIA: HCPCS | Performed by: STUDENT IN AN ORGANIZED HEALTH CARE EDUCATION/TRAINING PROGRAM

## 2023-10-09 PROCEDURE — 97165 OT EVAL LOW COMPLEX 30 MIN: CPT | Mod: GO

## 2023-10-09 PROCEDURE — 83735 ASSAY OF MAGNESIUM: CPT | Performed by: STUDENT IN AN ORGANIZED HEALTH CARE EDUCATION/TRAINING PROGRAM

## 2023-10-09 PROCEDURE — 97161 PT EVAL LOW COMPLEX 20 MIN: CPT | Mod: GP

## 2023-10-09 PROCEDURE — 82947 ASSAY GLUCOSE BLOOD QUANT: CPT

## 2023-10-09 PROCEDURE — 82330 ASSAY OF CALCIUM: CPT | Performed by: STUDENT IN AN ORGANIZED HEALTH CARE EDUCATION/TRAINING PROGRAM

## 2023-10-09 PROCEDURE — 84132 ASSAY OF SERUM POTASSIUM: CPT | Performed by: STUDENT IN AN ORGANIZED HEALTH CARE EDUCATION/TRAINING PROGRAM

## 2023-10-09 PROCEDURE — 93306 TTE W/DOPPLER COMPLETE: CPT | Performed by: STUDENT IN AN ORGANIZED HEALTH CARE EDUCATION/TRAINING PROGRAM

## 2023-10-09 PROCEDURE — 85027 COMPLETE CBC AUTOMATED: CPT | Performed by: STUDENT IN AN ORGANIZED HEALTH CARE EDUCATION/TRAINING PROGRAM

## 2023-10-09 PROCEDURE — 2500000001 HC RX 250 WO HCPCS SELF ADMINISTERED DRUGS (ALT 637 FOR MEDICARE OP): Performed by: STUDENT IN AN ORGANIZED HEALTH CARE EDUCATION/TRAINING PROGRAM

## 2023-10-09 PROCEDURE — 84295 ASSAY OF SERUM SODIUM: CPT | Performed by: STUDENT IN AN ORGANIZED HEALTH CARE EDUCATION/TRAINING PROGRAM

## 2023-10-09 RX ORDER — FUROSEMIDE 10 MG/ML
40 INJECTION INTRAMUSCULAR; INTRAVENOUS ONCE
Status: DISCONTINUED | OUTPATIENT
Start: 2023-10-09 | End: 2023-10-09

## 2023-10-09 RX ORDER — AMIODARONE HYDROCHLORIDE 200 MG/1
400 TABLET ORAL 2 TIMES DAILY
Status: DISCONTINUED | OUTPATIENT
Start: 2023-10-09 | End: 2023-10-11

## 2023-10-09 RX ORDER — POTASSIUM CHLORIDE 1.5 G/1.58G
20 POWDER, FOR SOLUTION ORAL ONCE
Status: COMPLETED | OUTPATIENT
Start: 2023-10-09 | End: 2023-10-09

## 2023-10-09 RX ORDER — FUROSEMIDE 10 MG/ML
20 INJECTION INTRAMUSCULAR; INTRAVENOUS ONCE
Status: COMPLETED | OUTPATIENT
Start: 2023-10-09 | End: 2023-10-09

## 2023-10-09 RX ADMIN — AMIODARONE HYDROCHLORIDE 400 MG: 200 TABLET ORAL at 21:15

## 2023-10-09 RX ADMIN — METRONIDAZOLE 500 MG: 500 INJECTION, SOLUTION INTRAVENOUS at 00:11

## 2023-10-09 RX ADMIN — POTASSIUM PHOSPHATE, MONOBASIC POTASSIUM PHOSPHATE, DIBASIC 21 MMOL: 224; 236 INJECTION, SOLUTION, CONCENTRATE INTRAVENOUS at 09:12

## 2023-10-09 RX ADMIN — AMIODARONE HYDROCHLORIDE 400 MG: 200 TABLET ORAL at 13:31

## 2023-10-09 RX ADMIN — HYDROMORPHONE HYDROCHLORIDE 0.2 MG: 1 INJECTION, SOLUTION INTRAMUSCULAR; INTRAVENOUS; SUBCUTANEOUS at 02:24

## 2023-10-09 RX ADMIN — PERFLUTREN 0.5 ML: 6.52 INJECTION, SUSPENSION INTRAVENOUS at 13:00

## 2023-10-09 RX ADMIN — PERFLUTREN 0.5 ML: 6.52 INJECTION, SUSPENSION INTRAVENOUS at 11:28

## 2023-10-09 RX ADMIN — POTASSIUM CHLORIDE 20 MEQ: 1.5 POWDER, FOR SOLUTION ORAL at 16:39

## 2023-10-09 RX ADMIN — METRONIDAZOLE 500 MG: 500 INJECTION, SOLUTION INTRAVENOUS at 09:12

## 2023-10-09 RX ADMIN — POLYETHYLENE GLYCOL 3350 17 G: 17 POWDER, FOR SOLUTION ORAL at 09:12

## 2023-10-09 RX ADMIN — PANTOPRAZOLE SODIUM 40 MG: 40 INJECTION, POWDER, FOR SOLUTION INTRAVENOUS at 09:12

## 2023-10-09 RX ADMIN — CALCIUM GLUCONATE 1 G: 20 INJECTION, SOLUTION INTRAVENOUS at 06:00

## 2023-10-09 RX ADMIN — FUROSEMIDE 20 MG: 10 INJECTION, SOLUTION INTRAVENOUS at 13:31

## 2023-10-09 RX ADMIN — HYDROMORPHONE HYDROCHLORIDE 0.2 MG: 1 INJECTION, SOLUTION INTRAMUSCULAR; INTRAVENOUS; SUBCUTANEOUS at 14:40

## 2023-10-09 RX ADMIN — AMIODARONE HYDROCHLORIDE 0.5 MG/MIN: 1.8 INJECTION, SOLUTION INTRAVENOUS at 09:32

## 2023-10-09 RX ADMIN — HYDROMORPHONE HYDROCHLORIDE 0.2 MG: 1 INJECTION, SOLUTION INTRAMUSCULAR; INTRAVENOUS; SUBCUTANEOUS at 22:27

## 2023-10-09 RX ADMIN — HEPARIN SODIUM 1100 UNITS/HR: 10000 INJECTION, SOLUTION INTRAVENOUS at 06:00

## 2023-10-09 RX ADMIN — POTASSIUM CHLORIDE 20 MEQ: 14.9 INJECTION, SOLUTION INTRAVENOUS at 06:01

## 2023-10-09 RX ADMIN — AMIODARONE HYDROCHLORIDE 1 MG/MIN: 1.8 INJECTION, SOLUTION INTRAVENOUS at 04:11

## 2023-10-09 RX ADMIN — INSULIN LISPRO 2 UNITS: 100 INJECTION, SOLUTION INTRAVENOUS; SUBCUTANEOUS at 15:43

## 2023-10-09 ASSESSMENT — PAIN SCALES - WONG BAKER
WONGBAKER_NUMERICALRESPONSE: HURTS EVEN MORE

## 2023-10-09 ASSESSMENT — COGNITIVE AND FUNCTIONAL STATUS - GENERAL
STANDING UP FROM CHAIR USING ARMS: A LITTLE
DAILY ACTIVITIY SCORE: 21
WALKING IN HOSPITAL ROOM: A LITTLE
DRESSING REGULAR LOWER BODY CLOTHING: A LITTLE
MOVING TO AND FROM BED TO CHAIR: A LITTLE
MOBILITY SCORE: 17
TOILETING: A LITTLE
MOVING FROM LYING ON BACK TO SITTING ON SIDE OF FLAT BED WITH BEDRAILS: A LITTLE
HELP NEEDED FOR BATHING: A LITTLE
TURNING FROM BACK TO SIDE WHILE IN FLAT BAD: A LITTLE
CLIMB 3 TO 5 STEPS WITH RAILING: A LOT

## 2023-10-09 ASSESSMENT — PAIN SCALES - GENERAL
PAINLEVEL_OUTOF10: 0 - NO PAIN
PAINLEVEL_OUTOF10: 3
PAINLEVEL_OUTOF10: 6
PAINLEVEL_OUTOF10: 0 - NO PAIN
PAINLEVEL_OUTOF10: 7
PAINLEVEL_OUTOF10: 3
PAINLEVEL_OUTOF10: 2

## 2023-10-09 ASSESSMENT — PAIN - FUNCTIONAL ASSESSMENT
PAIN_FUNCTIONAL_ASSESSMENT: 0-10

## 2023-10-09 ASSESSMENT — ACTIVITIES OF DAILY LIVING (ADL)
ADL_ASSISTANCE: NEEDS ASSISTANCE
ADLS_ADDRESSED: YES
ADL_ASSISTANCE: NEEDS ASSISTANCE
BATHING_ASSISTANCE: MINIMAL

## 2023-10-09 NOTE — PROGRESS NOTES
"Hilda Jones is a 68 y.o. female on day 3 of admission presenting with Mesenteric ischemia (CMS/HCC).    Subjective   NAEON. Pt in rate controlled a fib. Reports pain well controlled. OOBTC yesterday and today.       Objective     Physical Exam  Vitals and nursing note reviewed.   Constitutional:       General: She is not in acute distress.     Appearance: Normal appearance.   HENT:      Head: Normocephalic and atraumatic.      Nose:      Comments: NGT in place     Mouth/Throat:      Mouth: Mucous membranes are dry.   Eyes:      Extraocular Movements: Extraocular movements intact.      Conjunctiva/sclera: Conjunctivae normal.      Pupils: Pupils are equal, round, and reactive to light.   Cardiovascular:      Rate and Rhythm: Normal rate. Rhythm irregular.      Pulses: Normal pulses.      Heart sounds: Normal heart sounds. No murmur heard.  Pulmonary:      Effort: Pulmonary effort is normal. No respiratory distress.      Comments: Crackles at bases  Abdominal:      General: Abdomen is flat. There is no distension.      Palpations: Abdomen is soft.      Tenderness: There is no abdominal tenderness.   Musculoskeletal:      Cervical back: Neck supple.      Right lower leg: No edema.      Left lower leg: No edema.   Skin:     General: Skin is warm and dry.   Neurological:      General: No focal deficit present.      Mental Status: She is alert and oriented to person, place, and time.   Psychiatric:         Mood and Affect: Mood normal.         Behavior: Behavior normal.       Last Recorded Vitals  Blood pressure 173/88, pulse 101, temperature 37.3 °C (99.1 °F), temperature source Bladder, resp. rate 17, height 1.651 m (5' 5\"), weight 63.5 kg (139 lb 15.9 oz), SpO2 98 %.  Intake/Output last 3 Shifts:  I/O last 3 completed shifts:  In: 4313.6 (67.9 mL/kg) [I.V.:2763.6 (43.5 mL/kg); IV Piggyback:1550]  Out: 1675 (26.4 mL/kg) [Urine:1425 (0.6 mL/kg/hr); Emesis/NG output:250]  Weight: 63.5 kg     Relevant Results            " Scheduled medications  amiodarone, 400 mg, nasogastric tube, BID  furosemide, 20 mg, intravenous, Once  insulin lispro, 0-10 Units, subcutaneous, q4h  pantoprazole, 40 mg, intravenous, Daily before breakfast  polyethylene glycol, 17 g, oral, Daily  potassium phosphate, 21 mmol, intravenous, Once      Continuous medications  amiodarone, 0.5-1 mg/min, Last Rate: 0.5 mg/min (10/09/23 0943)  heparin, 0-4,000 Units/hr, Last Rate: 1,100 Units/hr (10/09/23 0943)      PRN medications  PRN medications: calcium gluconate, calcium gluconate, dextrose 10 % in water (D10W), dextrose, glucagon, heparin, HYDROmorphone, labetaloL, magnesium sulfate, magnesium sulfate, oxygen, potassium chloride, potassium chloride     Results for orders placed or performed during the hospital encounter of 10/06/23 (from the past 24 hour(s))   POCT GLUCOSE   Result Value Ref Range    POCT Glucose 142 (H) 74 - 99 mg/dL   Heparin Assay, UFH   Result Value Ref Range    Heparin Unfractionated 0.2 See Comment Below for Therapeutic Ranges IU/mL   Calcium, Ionized   Result Value Ref Range    POCT Calcium, Ionized 1.13 1.1 - 1.33 mmol/L   CBC   Result Value Ref Range    WBC 18.0 (H) 4.4 - 11.3 x10*3/uL    nRBC 0.0 0.0 - 0.0 /100 WBCs    RBC 3.91 (L) 4.00 - 5.20 x10*6/uL    Hemoglobin 10.3 (L) 12.0 - 16.0 g/dL    Hematocrit 30.3 (L) 36.0 - 46.0 %    MCV 78 (L) 80 - 100 fL    MCH 26.3 26.0 - 34.0 pg    MCHC 34.0 32.0 - 36.0 g/dL    RDW 15.7 (H) 11.5 - 14.5 %    Platelets 156 150 - 450 x10*3/uL    MPV 12.2 (H) 7.5 - 11.5 fL   Renal Function Panel   Result Value Ref Range    Glucose 154 (H) 74 - 99 mg/dL    Sodium 147 (H) 136 - 145 mmol/L    Potassium 3.6 3.5 - 5.3 mmol/L    Chloride 109 (H) 98 - 107 mmol/L    Bicarbonate 30 21 - 32 mmol/L    Anion Gap 12 10 - 20 mmol/L    Urea Nitrogen 15 6 - 23 mg/dL    Creatinine 0.76 0.50 - 1.05 mg/dL    eGFR 85 >60 mL/min/1.73m*2    Calcium 8.5 (L) 8.6 - 10.6 mg/dL    Phosphorus 1.8 (L) 2.5 - 4.9 mg/dL    Albumin 3.4 3.4 -  5.0 g/dL   Coagulation Screen   Result Value Ref Range    Protime 14.7 (H) 9.8 - 12.8 seconds    INR 1.3 (H) 0.9 - 1.1    aPTT 38 27 - 38 seconds   POCT GLUCOSE   Result Value Ref Range    POCT Glucose 160 (H) 74 - 99 mg/dL   Heparin Assay, UFH   Result Value Ref Range    Heparin Unfractionated 0.3 See Comment Below for Therapeutic Ranges IU/mL   POCT GLUCOSE   Result Value Ref Range    POCT Glucose 157 (H) 74 - 99 mg/dL   Heparin Assay   Result Value Ref Range    Heparin Unfractionated 0.2 See Comment Below for Therapeutic Ranges IU/mL   Heparin Assay   Result Value Ref Range    Heparin Unfractionated 0.2 See Comment Below for Therapeutic Ranges IU/mL   Calcium, Ionized   Result Value Ref Range    POCT Calcium, Ionized 0.97 (L) 1.1 - 1.33 mmol/L   CBC   Result Value Ref Range    WBC 18.2 (H) 4.4 - 11.3 x10*3/uL    nRBC 0.0 0.0 - 0.0 /100 WBCs    RBC 3.62 (L) 4.00 - 5.20 x10*6/uL    Hemoglobin 9.5 (L) 12.0 - 16.0 g/dL    Hematocrit 28.2 (L) 36.0 - 46.0 %    MCV 78 (L) 80 - 100 fL    MCH 26.2 26.0 - 34.0 pg    MCHC 33.7 32.0 - 36.0 g/dL    RDW 15.9 (H) 11.5 - 14.5 %    Platelets 152 150 - 450 x10*3/uL    MPV 12.3 (H) 7.5 - 11.5 fL   Coagulation Screen   Result Value Ref Range    Protime 14.6 (H) 9.8 - 12.8 seconds    INR 1.3 (H) 0.9 - 1.1    aPTT 49 (H) 27 - 38 seconds   Renal Function Panel   Result Value Ref Range    Glucose 138 (H) 74 - 99 mg/dL    Sodium 145 136 - 145 mmol/L    Potassium 3.7 3.5 - 5.3 mmol/L    Chloride 109 (H) 98 - 107 mmol/L    Bicarbonate 30 21 - 32 mmol/L    Anion Gap 10 10 - 20 mmol/L    Urea Nitrogen 12 6 - 23 mg/dL    Creatinine 0.63 0.50 - 1.05 mg/dL    eGFR >90 >60 mL/min/1.73m*2    Calcium 8.2 (L) 8.6 - 10.6 mg/dL    Phosphorus 1.8 (L) 2.5 - 4.9 mg/dL    Albumin 3.0 (L) 3.4 - 5.0 g/dL   Blood Gas Arterial Full Panel Unsolicited   Result Value Ref Range    POCT pH, Arterial 7.38 7.38 - 7.42 pH    POCT pCO2, Arterial 50 (H) 38 - 42 mm Hg    POCT pO2, Arterial 92 85 - 95 mm Hg    POCT SO2,  Arterial 99 94 - 100 %    POCT Oxy Hemoglobin, Arterial 96.0 94.0 - 98.0 %    POCT Hematocrit Calculated, Arterial 39.0 36.0 - 46.0 %    POCT Sodium, Arterial 138 136 - 145 mmol/L    POCT Potassium, Arterial 3.5 3.5 - 5.3 mmol/L    POCT Chloride, Arterial 107 98 - 107 mmol/L    POCT Ionized Calcium, Arterial 1.21 1.10 - 1.33 mmol/L    POCT Glucose, Arterial 137 (H) 74 - 99 mg/dL    POCT Lactate, Arterial 0.6 0.4 - 2.0 mmol/L    POCT Base Excess, Arterial 3.5 (H) -2.0 - 3.0 mmol/L    POCT HCO3 Calculated, Arterial 29.6 (H) 22.0 - 26.0 mmol/L    POCT Hemoglobin, Arterial 13.1 12.0 - 16.0 g/dL    POCT Anion Gap, Arterial 5 (L) 10 - 25 mmo/L    Patient Temperature 37.0 degrees Celsius        Assessment/Plan   Principal Problem:    Mesenteric ischemia (CMS/HCC)  Active Problems:    Superior mesenteric artery thrombosis (CMS/HCC)    Atrial fibrillation (CMS/HCC)    Valvular heart disease    Chronic obstructive pulmonary disease (CMS/HCC)    Diabetes mellitus, type 2 (CMS/HCC)    Hilda Jones is a 68-year-old female with PMH of COPD, CHF, atrial flutter/A-fib on Coumadin, severe mitral stenosis, moderate tricuspid regurgitation, aortic insufficiency s/p AVR x2, prior SMA occlusion s/p thromboembolectomy, DM 2, and HTN presenting to the emergency department for abdominal pain, vaginal bleeding, diarrhea. S/p laparotomy with SMA embolectomy w/ Dr. Isaacs on 10/6 and closure on 10/7.    Plan:  NEURO: No Hx. Acute postoperative pain.  - Ongoing neuro and pain assessments  - PRN hydromorphone for pain control  - OOBTC  - PT/OT     CV:  CHF, atrial flutter/A-fib on Coumadin, severe MS, moderate TR, AI s/p AVR x2. Patient maintaining adequate BP without vasoactive support. Baseline cardiac function 55-60% (7/2023). S/p laparotomy with KLEVER, SMA embolectomy  w/ Dr. Isaacs on 10/6. Afib RVR on 10/7/2023, given IVP metopo, amio bolus and started on amio gtt.   - Goal -140  - Continuous EKG/abp monitoring  - Transition amio to PO  400 mg BID until 10g load    PULM: No hx. Oxygenating well on NC. CXR with L>R effusion v atelectasis.  - Wean FiO2 as tolerated.    - Q1h incentive spirometer while awake  - additional pulm toilet prn.      GI: NPO.   - OK for meds with NGT  - PPI for GI prophylaxis    : No history of renal disease. Baseline creatinine 0.6-1.0  - Volume resuscitation as needed  - Maintain U/O >0.5ml/kg/hr  - Check renal function panel post op and daily  - Replete electrolytes to goal K>4, Mg>2, Phos>2.5, ionized Ca>1.10.  - Diuresis with lasix 20mg IVP     HEME: Acute blood loss anemia. H/H stable  - Check CBC and coags post op and daily  - SCDs  - Ongoing monitoring for s/s bleeding  - Low intensity heparin drip     ENDO: Hx of DM. BG well controlled on SSI  - Q4h BG and SSI Lispro per ICU protocol.    ID: Afebrile. Leukocytosis  - Flagyl dc'd today  - Temp q4h, wbc daily  - Ongoing monitoring for s/s infection    Lines:  - R radial art line 10/7 - dc today  - PIV    Dispo: transfer to [facility]  Discussed with SICU intensivist, Dr. Dakotah Resendez MD

## 2023-10-09 NOTE — PROGRESS NOTES
Physical Therapy    Physical Therapy Evaluation    Patient Name: Hilda Jones  MRN: [id number]  Today's Date: 10/9/2023   Time Calculation  Start Time: 1409  Stop Time: 1425  Time Calculation (min): 16 min    Assessment/Plan   PT Assessment  PT Assessment Results: Decreased strength, Decreased endurance, Impaired balance, Decreased mobility, Pain  Rehab Prognosis: Good  Evaluation/Treatment Tolerance: Patient limited by fatigue  Medical Staff Made Aware: Yes  End of Session Communication: Bedside nurse  End of Session Patient Position: Up in chair, Alarm off, not on at start of session  IP OR SWING BED PT PLAN  Inpatient or Swing Bed: Inpatient  PT Plan  Treatment/Interventions: Bed mobility, Transfer training, Gait training, Stair training, Balance training, Strengthening, Endurance training, Therapeutic exercise, Therapeutic activity  PT Plan: Skilled PT  PT Frequency: 4 times per week  PT Discharge Recommendations: Low intensity level of continued care (with 24/7 supervision - pt stated she ian be able to stay with her daughter at D/C if needed)  PT Recommended Transfer Status: Assist x1      Subjective   General Visit Information:  General  Reason for Referral: 68 yr old female s/p laparotomy with SMA embolectomy  Referred By: Dr. Isaacs  Past Medical History Relevant to Rehab: COPD, CHF, atrial flutter/A-fib on Coumadin, mitral stenosis, tricuspid regurgitation, aortic insufficiency s/p AVR x2, prior SMA occlusion s/p thromboembolectomy, DM 2, and HTN  Family/Caregiver Present: No  Prior to Session Communication: Bedside nurse  Patient Position Received: Bed, 3 rail up, Alarm off, not on at start of session  Preferred Learning Style: auditory, verbal  General Comment: Supine, awake and alert upon entrance.  Pt agreeable to working with PT this date. (NG leaking on pts bed and gown - RN notified)  Home Living:  Home Living  Type of Home: Apartment  Lives With:  (Alone, however daughter assist when able.  Pt reports  having a home health aide 4x/wk to assist with bathing, cleaning, shopping)  Home Adaptive Equipment:  (Rollator)  Home Layout: One level (Walk in shower)  Home Living Comments: Pt reported she can live with her daughter at her apartment at D/C.  Pt stated ther are [address], 1st floor set up.  Prior Level of Function:  Prior Function Per Pt/Caregiver Report  Level of [place]: Needs assistance with ADLs (Pt states she does the cooking)  Receives Help From: Family, Home health  ADL Assistance: Needs assistance (Pt states she has trouble washing her back - home health aide assists or daughter)  Homemaking Assistance: Needs assistance (Pt states her family gets her food, home health aide does cleaning, but that she does the cooking)  Driving/Transportation:  ((-) drive)  Ambulatory Assistance: Needs assistance  Transfers: Assistive device  Gait: Assistive device (Rollator)  Stairs: Railings  Vocational: Retired  Leisure: Enjoys reading and watching movies  Prior Function Comments: Pt reports x2 falls in the past 6 months  Precautions:  Precautions  Hearing/Visual Limitations: Appears WFL  Medical Precautions: Abdominal precautions, Fall precautions, Oxygen therapy device and L/min  Post-Surgical Precautions: Abdominal surgery precautions  Precautions Comment: -140 (Amio)  Vital Signs:  Vital Signs  Heart Rate: 109 (End of session -114)  Heart Rate Source: Monitor  Resp: 22 (End of session - 22)  SpO2: 91 % (End of session - 97)  BP: 123/69 (End of session - 127/81)  MAP (mmHg): 90 (End of session - 96)  BP Method: Arterial line  Patient Position: Lying (Sitting at end of session)    Objective   Pain:  Pain Assessment  Pain Assessment:  (Pt reported mild abdominal pain, unrated however)  Cognition:  Cognition  Overall Cognitive Status: Within Functional Limits  Orientation Level: Oriented X4    General Assessments:  General Observation  General Observation: Call light in reach     Activity Tolerance  Activity  "Tolerance Comments: After transfer to the chair, pt stated, \"I feel like I have ran a mile\"    Sensation  Light Touch: No apparent deficits    Postural Control  Postural Control: Within Functional Limits    Static Sitting Balance  Static Sitting-Balance Support: Bilateral upper extremity supported  Static Sitting-Level of Assistance: Contact guard    Static Standing Balance  Static Standing-Balance Support: Bilateral upper extremity supported  Static Standing-Level of Assistance: Contact guard  Static Standing-Comment/Number of Minutes: B arm in arm  Functional Assessments:  Bed Mobility  Bed Mobility: Yes  Bed Mobility 1  Bed Mobility 1: Supine to sitting  Level of Assistance 1: Minimum assistance  Bed Mobility Comments 1: HOB elevated, cues for hand placement and sequencing - log roll;  Assistance with advancing trunk upright    Transfers  Transfer: Yes  Transfer 1  Transfer From 1: Sit to, Stand to  Transfer to 1: Sit, Stand  Technique 1: Sit to stand, Stand to sit  Transfer Device 1:  (B arm in arm)  Transfer Level of Assistance 1: Minimum assistance (x1)  Trials/Comments 1: Cues for hand placement    Ambulation/Gait Training  Ambulation/Gait Training Performed: Yes  Ambulation/Gait Training 1  Surface 1: Level tile  Device 1:  (B arm in arm)  Assistance 1: Minimum assistance (x1)  Comments/Distance (ft) 1: ~5ft (bed>chair) (Limited distance d/t low endurance and fatigue)    Stairs  Stairs: No  Extremity/Trunk Assessments:  RUE   RUE : Within Functional Limits  LUE   LUE: Within Functional Limits  RLE   RLE : Within Functional Limits  LLE   LLE : Within Functional Limits  Outcome Measures:  [facility] Basic Mobility  Turning from your back to your side while in a flat bed without using bedrails: A little  Moving from lying on your back to sitting on the side of a flat bed without using bedrails: A little  Moving to and from bed to chair (including a wheelchair): A little  Standing up from a chair using your arms " (e.g. wheelchair or bedside chair): A little  To walk in hospital room: A little  Climbing 3-5 steps with railing: A lot  Basic Mobility - Total Score: 17    FSS-ICU  Ambulation: Walks <50 feet with any assistance x1 or walks any distance with assistance x2 people  Rolling: Minimal assistance (performs 75% or more of task)  Sitting: Supervision or set-up only  Transfer Sit-to-Stand: Minimal assistance (performs 75% or more of task)  Transfer Supine-to-Sit: Minimal assistance (performs 75% or more of task)  Total Score: 18    Encounter Problems       Encounter Problems (Active)       Balance       Pt will demonstrate ability to complete standing static/dynamic balance activities with unilateral UE support and no LOB for increase in safety up D/C.  (Progressing)       Start:  10/09/23    Expected End:  10/23/23               Mobility       Pt will demonstrated ability to ambulate >/=100ft with proper form and no balance deficits for safe home going.   (Progressing)       Start:  10/09/23    Expected End:  10/23/23            Pt will be able to complete 5X STS from lowest bed height with UE assist and RW  <30 seconds with stable vitals and RPD </=3/10 and RPE </=13/20 for improved functional mobility  (Progressing)       Start:  10/09/23    Expected End:  10/23/23               Transfers       Pt will demonstrated ability to complete bed mobility and sit<>stand transfers without assistance and LRAD to safely return home.  (Progressing)       Start:  10/09/23    Expected End:  10/23/23                   Education Documentation  Precautions, taught by Lori Ramirez PT at 10/9/2023  3:23 PM.  Learner: Patient  Readiness: Eager  Method: Explanation  Response: Verbalizes Understanding, Demonstrated Understanding  Comment: Log roll transfer - abdominal precautions    Mobility Training, taught by Lori Ramirez PT at 10/9/2023  3:23 PM.  Learner: Patient  Readiness: Eager  Method: Explanation  Response: Verbalizes  Understanding, Demonstrated Understanding  Comment: Log roll transfer - abdominal precautions    Education Comments  No comments found.

## 2023-10-09 NOTE — NURSING NOTE
Heparin Assay sent at approximately 1200; however, it took over 1hr to be processed in lab, therefore an additional assay was sent

## 2023-10-09 NOTE — CARE PLAN
Problem: Balance  Goal: Pt will demonstrate ability to complete standing static/dynamic balance activities with unilateral UE support and no LOB for increase in safety up D/C.   Outcome: Progressing     Problem: Mobility  Goal: Pt will demonstrated ability to ambulate >/=100ft with proper form and no balance deficits for safe home going.    Outcome: Progressing  Goal: Pt will be able to complete 5X STS from lowest bed height with UE assist and RW  <30 seconds with stable vitals and RPD </=3/10 and RPE </=13/20 for improved functional mobility   Outcome: Progressing     Problem: Transfers  Goal: Pt will demonstrated ability to complete bed mobility and sit<>stand transfers without assistance and LRAD to safely return home.   Outcome: Progressing

## 2023-10-09 NOTE — PROGRESS NOTES
Hilda Jones is a 68 y.o. female on day 3 of admission presenting with Mesenteric ischemia (CMS/HCC).      Subjective:  NAOE. Patient doing well on rounds today. No BM, gas. No F/C. NGT in place. Waiting for return to bowel function. Patient to be transferred out of the unit.     Objective:  Vitals:  Vitals:    10/09/23 1800   BP:    Pulse: 102   Resp: 21   Temp:    SpO2:         Exam:  Constitutional:       General: She is awake.   Cardiovascular:      Rate and Rhythm: Normal rate and regular rhythm.   Abdominal:      Tenderness: There is abdominal tenderness.      Comments: Soft, nondistended. Midline dressed and closed with staples  NGT in place with dark bilious output, 50cc last 24hrs, yesterday 200.   Neurological:      Mental Status: She is alert.      Comments: Grossly intact     I&Os:    Intake/Output Summary (Last 24 hours) at 10/9/2023 1833  Last data filed at 10/9/2023 1802  Gross per 24 hour   Intake 2507.83 ml   Output 1750 ml   Net 757.83 ml        Imaging/Labs:  Lab Results   Component Value Date    WBC 18.2 (H) 10/09/2023    HGB 9.5 (L) 10/09/2023    HCT 28.2 (L) 10/09/2023    MCV 78 (L) 10/09/2023     10/09/2023     Lab Results   Component Value Date    GLUCOSE 140 (H) 10/09/2023    CALCIUM 8.2 (L) 10/09/2023     (H) 10/09/2023    K 4.1 10/09/2023    CO2 29 10/09/2023     (H) 10/09/2023    BUN 10 10/09/2023    CREATININE 0.60 10/09/2023     Lab Results   Component Value Date    INR 1.3 (H) 10/09/2023    PROTIME 14.6 (H) 10/09/2023     Scheduled medications  amiodarone, 400 mg, nasogastric tube, BID  insulin lispro, 0-10 Units, subcutaneous, q4h  pantoprazole, 40 mg, intravenous, Daily before breakfast  perflutren protein A microsphere, 0.5 mL, intravenous, Once in imaging  polyethylene glycol, 17 g, oral, Daily  sulfur hexafluoride microsphr, 2 mL, intravenous, Once in imaging      Continuous medications  amiodarone, 0.5-1 mg/min, Last Rate: 0.5 mg/min (10/09/23 1802)  heparin,  0-4,000 Units/hr, Last Rate: 1,200 Units/hr (10/09/23 1802)      PRN medications  PRN medications: calcium gluconate, calcium gluconate, dextrose 10 % in water (D10W), dextrose, glucagon, heparin, HYDROmorphone, labetaloL, magnesium sulfate, magnesium sulfate, oxygen, potassium chloride, potassium chloride   XR chest 1 view 10/09/2023    Narrative  Interpreted By:  Gus Ruiz and Maltbie Grace  STUDY:  XR CHEST 1 VIEW; ;  10/9/2023 3:34 am    INDICATION:  Signs/Symptoms:daily icu xray.    COMPARISON:  None.  Chest radiograph 10/08/2023 CT 10/06/2023, CTA chest 05/28/2023  ACCESSION NUMBER(S):    ACCESSION NUMBER(S):  [id number]    ORDERING CLINICIAN:  MARY SAUNDERS    TECHNIQUE:  AP radiograph of the chest was provided.    FINDINGS:  Test    The central venous catheter has been removed.  The enteric tube distal tip projects at the expected location of the  gastric body. Mediastinal wires remain stable.    CARDIOMEDIASTINAL SILHOUETTE:  Cardiomediastinal silhouette is stable in size and configuration.    LUNGS:  Bilateral emphysema is present. Bibasilar hazy opacities with  blunting of the costophrenic angle increased slightly in comparison  to previous study. Right calcifications are noted.    Impression  1. Bilateral parahilar interstitial edema and ground-glass opacities  within lower lobe segments greater on the left than right consistent  with subsegmental atelectasis and bilateral pleural effusions.  2. Bilateral emphysema      I personally reviewed the images/study and I agree with the findings  as stated. This study was interpreted at [place], [place].    MACRO:  None    Signed by: Gus Ruiz 10/9/2023 10:54 AM  Dictation workstation:   [id number]        Assessment & Plan:    Hilda Jones 68 y.o. female presenting with abdominal pain, vaginal bleeding, and diarrhea 10/6. She has a history of COPD, CHF, atrial flutter/A-fib on Coumadin, mitral stenosis,  tricuspid regurgitation, aortic insufficiency s/p AVR x2, prior SMA occlusion s/p thromboembolectomy, DM 2, and HTN. In 8/2022 the patient underwent open mesenteric embolectomy. She was on coumadin, but presented with an INR of 1.1.       S/p redo exlap, open SMA embolectomy; left open with abthera dressing and brought back to OR 10/7 where she was subsequently closed and extubated.    Plan:   - Recommend Diuresis with 40 mg Lasix  - Follow up WBC    - Maintain NPO with NGT in place pending ROBF  - PT/OT, activity as tolerated  - Patient meant to be transferred to the SDU, but will be transferred to floors.   - Continue Afib with RVR management per SICU   - Updated echo this admission     Discussed with Dr. Shital Helton MD, PGY1   Vascular Surgery [id number]

## 2023-10-09 NOTE — CARE PLAN
Problem: ADLs  Goal: Pt. Will complete LB dressing with MOD I.    Outcome: Progressing  Goal: Pt. will complete toileting with MOD I.   Outcome: Progressing  Goal: Pt. will complete bathing with MOD I.   Outcome: Progressing     Problem: TRANSFERS  Goal: Pt. Will complete stand pivot transfer with MOD I and using LRAD.    Outcome: Progressing     Problem: MOBILITY  Goal: Pt. Will demo household distance functional mobility with MOD I using LRAD.    Outcome: Progressing

## 2023-10-09 NOTE — PROGRESS NOTES
Occupational Therapy    Evaluation    Patient Name: Hilda Jones  MRN: [id number]  Today's Date: 10/9/2023  Time Calculation  Start Time: 1256  Stop Time: 1319  Time Calculation (min): 23 min    Assessment  IP OT Assessment  OT Assessment: Pt. demo good understanding of post op precautions and insight into CLOF. Educated pt on sleep hygiene and delirium prevention precautions and pt agreeable to lights on during and after session.  Prognosis: Excellent  Barriers to Discharge: None  Evaluation/Treatment Tolerance: Patient tolerated treatment well  Plan:  Treatment Interventions: ADL retraining, Visual perceptual retraining, Functional transfer training, UE strengthening/ROM, Endurance training, Compensatory technique education, Cognitive reorientation, Patient/family training, Equipment evaluation/education  OT Frequency: 2 times per week  OT Discharge Recommendations: Low intensity level of continued care  OT Recommended Transfer Status: Minimal assist, Assist of 1  OT - Requires Next F/U Appointment: 10/11/23    Subjective   Current Problem:  1. Superior mesenteric artery thrombosis (CMS/HCC)        2. Mesenteric ischemia (CMS/HCC)  Case Request Operating Room: Laparotomy with open superior mesenteric embolectomy    Case Request Operating Room: Laparotomy with open superior mesenteric embolectomy    Surgical Pathology Exam    Surgical Pathology Exam    Case Request Operating Room: Exploration Laparotomy    Case Request Operating Room: Exploration Laparotomy      3. Vaginal bleeding        4. Chronic atrial fibrillation (CMS/HCC)  Transthoracic Echo (TTE) Complete    Transthoracic Echo (TTE) Complete      5. Valvular heart disease  Transthoracic Echo (TTE) Complete    Transthoracic Echo (TTE) Complete        General:  General  Reason for Referral: 69 y/o F s/p  laparotomy with SMA embolectomy w/ Dr. Isaacs on 10/6.  Past Medical History Relevant to Rehab: COPD, CHF, atrial flutter/A-fib on Coumadin, severe mitral  stenosis, moderate tricuspid regurgitation, aortic insufficiency s/p AVR x2, prior SMA occlusion s/p thromboembolectomy, DM 2, and HTN  Family/Caregiver Present: No  Prior to Session Communication: Bedside nurse  Patient Position Received: Bed, 3 rail up, Alarm off, not on at start of session  Preferred Learning Style: verbal, visual  General Comment: Pt. seated in bed at start and end of session and agreeable to OT eval and requesting oral care.  Precautions:  Medical Precautions: Fall precautions, Abdominal precautions, Oxygen therapy device and L/min  Post-Surgical Precautions: Abdominal surgery precautions  Precautions Comment: -140  Vital Signs:  Heart Rate: 110  SpO2: 100 % (4L NC)  BP: 109/66  Pain:  Pain Assessment  Pain Assessment: 0-10  Pain Score: 0 - No pain    Objective   Cognition:  Overall Cognitive Status: Within Functional Limits  Orientation Level: Oriented X4           Home Living:  Type of Home: Apartment  Lives With: Alone  Home Layout:  (apt on first floor; elevator to 2nd floor for laundry)  Home Access: Level entry  Bathroom Shower/Tub: Walk-in shower (with grab bars and shower chair)  Home Living Comments: Pt reported she can ask daughter to stay with her or live with her daughter at her apartment at D/C. Pt stated ther are [address], 1st floor set up.   Prior Function:  Level of [place]: Independent with ADLs and functional transfers, Needs assistance with homemaking  Receives Help From: Family, Home health  ADL Assistance: Needs assistance  Bath:  (some assist to wash back from HHA or dtr.)  Homemaking Assistance: Needs assistance (pt completes cooking and laundry. HHA assisting with household cleaning. Friends and family assist with grocery shopping.)  Driving/Transportation: Family/Friend (or Provide a Ride)  Ambulatory Assistance: Needs assistance  Transfers: Assistive device  Gait: Assistive device  Stairs: Railings  Vocational: Retired  Leisure: Enjoys reading, crocheting and  spending time outside.  Hand Dominance: Right       ADL:  Eating Assistance: Modified independent (Device)  Eating Deficit: Setup  Grooming Assistance: Stand by  Grooming Deficit: Supervision/safety (seated in bed)  Bathing Assistance: Minimal  UE Dressing Assistance: Stand by  LE Dressing Assistance: Minimal  Toileting Assistance with Device: Minimal  Activity Tolerance:  Endurance: Endurance does not limit participation in activity    Vision: Vision - Basic Assessment  Current Vision: No visual deficits       Strength:  Strength Comments: Within post op precautions    Extremities: RUE   RUE : Within Functional Limits and LUE   LUE: Within Functional Limits      Outcome Measures: [facility] Daily Activity  Putting on and taking off regular lower body clothing: A little  Bathing (including washing, rinsing, drying): A little  Putting on and taking off regular upper body clothing: None  Toileting, which includes using toilet, bedpan or urinal: A little  Taking care of personal grooming such as brushing teeth: None  Eating Meals: None  Daily Activity - Total Score: 21      Education Documentation  Precautions, taught by Disha Tamayo OT at 10/9/2023  3:51 PM.  Learner: Patient  Readiness: Acceptance  Method: Explanation  Response: Verbalizes Understanding    ADL Training, taught by Disha Tamayo OT at 10/9/2023  3:51 PM.  Learner: Patient  Readiness: Acceptance  Method: Explanation  Response: Verbalizes Understanding        Goals:   Encounter Problems       Encounter Problems (Active)       ADLs       Pt. Will complete LB dressing with MOD I.   (Progressing)       Start:  10/09/23    Expected End:  10/23/23            Pt. will complete toileting with MOD I.  (Progressing)       Start:  10/09/23    Expected End:  10/23/23            Pt. will complete bathing with MOD I.  (Progressing)       Start:  10/09/23    Expected End:  10/23/23                       MOBILITY       Pt. Will demo household distance functional  mobility with MOD I using LRAD.   (Progressing)       Start:  10/09/23    Expected End:  10/23/23                       TRANSFERS       Pt. Will complete stand pivot transfer with MOD I and using LRAD.   (Progressing)       Start:  10/09/23    Expected End:  10/23/23

## 2023-10-09 NOTE — SIGNIFICANT EVENT
Heparin assay 0.2, subtheraputic, gtt increased from 900 units to 1000 units and 1500 unit bolus given per protocol

## 2023-10-10 ENCOUNTER — APPOINTMENT (OUTPATIENT)
Dept: RADIOLOGY | Facility: HOSPITAL | Age: 68
DRG: 336 | End: 2023-10-10
Payer: MEDICARE

## 2023-10-10 LAB
ALBUMIN SERPL BCP-MCNC: 2.8 G/DL (ref 3.4–5)
ANION GAP SERPL CALC-SCNC: 12 MMOL/L (ref 10–20)
APTT PPP: 58 SECONDS (ref 27–38)
BUN SERPL-MCNC: 11 MG/DL (ref 6–23)
C DIF TOX TCDA+TCDB STL QL NAA+PROBE: NOT DETECTED
CA-I BLD-SCNC: 1.12 MMOL/L (ref 1.1–1.33)
CALCIUM SERPL-MCNC: 8.1 MG/DL (ref 8.6–10.6)
CHLORIDE SERPL-SCNC: 108 MMOL/L (ref 98–107)
CO2 SERPL-SCNC: 29 MMOL/L (ref 21–32)
CREAT SERPL-MCNC: 0.6 MG/DL (ref 0.5–1.05)
ERYTHROCYTE [DISTWIDTH] IN BLOOD BY AUTOMATED COUNT: 15.9 % (ref 11.5–14.5)
GFR SERPL CREATININE-BSD FRML MDRD: >90 ML/MIN/1.73M*2
GLUCOSE BLD MANUAL STRIP-MCNC: 109 MG/DL (ref 74–99)
GLUCOSE BLD MANUAL STRIP-MCNC: 111 MG/DL (ref 74–99)
GLUCOSE SERPL-MCNC: 118 MG/DL (ref 74–99)
HCT VFR BLD AUTO: 28.7 % (ref 36–46)
HGB BLD-MCNC: 9.7 G/DL (ref 12–16)
INR PPP: 1.6 (ref 0.9–1.1)
MCH RBC QN AUTO: 26.6 PG (ref 26–34)
MCHC RBC AUTO-ENTMCNC: 33.8 G/DL (ref 32–36)
MCV RBC AUTO: 79 FL (ref 80–100)
NRBC BLD-RTO: 0 /100 WBCS (ref 0–0)
PHOSPHATE SERPL-MCNC: 2.5 MG/DL (ref 2.5–4.9)
PLATELET # BLD AUTO: 187 X10*3/UL (ref 150–450)
PMV BLD AUTO: 11.6 FL (ref 7.5–11.5)
POTASSIUM SERPL-SCNC: 3.8 MMOL/L (ref 3.5–5.3)
PROTHROMBIN TIME: 18.2 SECONDS (ref 9.8–12.8)
RBC # BLD AUTO: 3.65 X10*6/UL (ref 4–5.2)
SODIUM SERPL-SCNC: 145 MMOL/L (ref 136–145)
UFH PPP CHRO-ACNC: 0.4 IU/ML
WBC # BLD AUTO: 15.2 X10*3/UL (ref 4.4–11.3)

## 2023-10-10 PROCEDURE — 85520 HEPARIN ASSAY: CPT | Performed by: STUDENT IN AN ORGANIZED HEALTH CARE EDUCATION/TRAINING PROGRAM

## 2023-10-10 PROCEDURE — 37799 UNLISTED PX VASCULAR SURGERY: CPT | Performed by: STUDENT IN AN ORGANIZED HEALTH CARE EDUCATION/TRAINING PROGRAM

## 2023-10-10 PROCEDURE — 2500000001 HC RX 250 WO HCPCS SELF ADMINISTERED DRUGS (ALT 637 FOR MEDICARE OP): Performed by: STUDENT IN AN ORGANIZED HEALTH CARE EDUCATION/TRAINING PROGRAM

## 2023-10-10 PROCEDURE — 99291 CRITICAL CARE FIRST HOUR: CPT | Performed by: STUDENT IN AN ORGANIZED HEALTH CARE EDUCATION/TRAINING PROGRAM

## 2023-10-10 PROCEDURE — 80069 RENAL FUNCTION PANEL: CPT | Performed by: STUDENT IN AN ORGANIZED HEALTH CARE EDUCATION/TRAINING PROGRAM

## 2023-10-10 PROCEDURE — 71045 X-RAY EXAM CHEST 1 VIEW: CPT | Mod: FY

## 2023-10-10 PROCEDURE — 2500000004 HC RX 250 GENERAL PHARMACY W/ HCPCS (ALT 636 FOR OP/ED): Performed by: STUDENT IN AN ORGANIZED HEALTH CARE EDUCATION/TRAINING PROGRAM

## 2023-10-10 PROCEDURE — 85027 COMPLETE CBC AUTOMATED: CPT | Performed by: STUDENT IN AN ORGANIZED HEALTH CARE EDUCATION/TRAINING PROGRAM

## 2023-10-10 PROCEDURE — 85730 THROMBOPLASTIN TIME PARTIAL: CPT | Performed by: STUDENT IN AN ORGANIZED HEALTH CARE EDUCATION/TRAINING PROGRAM

## 2023-10-10 PROCEDURE — 71045 X-RAY EXAM CHEST 1 VIEW: CPT | Performed by: RADIOLOGY

## 2023-10-10 PROCEDURE — 82330 ASSAY OF CALCIUM: CPT | Performed by: STUDENT IN AN ORGANIZED HEALTH CARE EDUCATION/TRAINING PROGRAM

## 2023-10-10 PROCEDURE — 2500000002 HC RX 250 W HCPCS SELF ADMINISTERED DRUGS (ALT 637 FOR MEDICARE OP, ALT 636 FOR OP/ED): Performed by: STUDENT IN AN ORGANIZED HEALTH CARE EDUCATION/TRAINING PROGRAM

## 2023-10-10 PROCEDURE — 2500000001 HC RX 250 WO HCPCS SELF ADMINISTERED DRUGS (ALT 637 FOR MEDICARE OP): Performed by: PHYSICIAN ASSISTANT

## 2023-10-10 PROCEDURE — 87493 C DIFF AMPLIFIED PROBE: CPT | Performed by: STUDENT IN AN ORGANIZED HEALTH CARE EDUCATION/TRAINING PROGRAM

## 2023-10-10 PROCEDURE — 99222 1ST HOSP IP/OBS MODERATE 55: CPT | Performed by: STUDENT IN AN ORGANIZED HEALTH CARE EDUCATION/TRAINING PROGRAM

## 2023-10-10 PROCEDURE — 82947 ASSAY GLUCOSE BLOOD QUANT: CPT

## 2023-10-10 PROCEDURE — 97530 THERAPEUTIC ACTIVITIES: CPT | Mod: GP

## 2023-10-10 PROCEDURE — C9113 INJ PANTOPRAZOLE SODIUM, VIA: HCPCS | Performed by: STUDENT IN AN ORGANIZED HEALTH CARE EDUCATION/TRAINING PROGRAM

## 2023-10-10 PROCEDURE — 1100000001 HC PRIVATE ROOM DAILY

## 2023-10-10 RX ORDER — TORSEMIDE 20 MG/1
20 TABLET ORAL 2 TIMES DAILY
Status: DISCONTINUED | OUTPATIENT
Start: 2023-10-10 | End: 2023-10-11

## 2023-10-10 RX ORDER — METOPROLOL TARTRATE 25 MG/1
12.5 TABLET, FILM COATED ORAL 2 TIMES DAILY
Status: DISCONTINUED | OUTPATIENT
Start: 2023-10-10 | End: 2023-10-10

## 2023-10-10 RX ORDER — METOPROLOL TARTRATE 25 MG/1
25 TABLET, FILM COATED ORAL 2 TIMES DAILY
Status: DISCONTINUED | OUTPATIENT
Start: 2023-10-10 | End: 2023-10-11

## 2023-10-10 RX ORDER — WARFARIN SODIUM 5 MG/1
5 TABLET ORAL DAILY
Status: DISCONTINUED | OUTPATIENT
Start: 2023-10-10 | End: 2023-10-13

## 2023-10-10 RX ADMIN — METOPROLOL TARTRATE 25 MG: 25 TABLET, FILM COATED ORAL at 22:13

## 2023-10-10 RX ADMIN — POLYETHYLENE GLYCOL 3350 17 G: 17 POWDER, FOR SOLUTION ORAL at 07:47

## 2023-10-10 RX ADMIN — WARFARIN SODIUM 5 MG: 5 TABLET ORAL at 22:13

## 2023-10-10 RX ADMIN — TORSEMIDE 20 MG: 20 TABLET ORAL at 22:14

## 2023-10-10 RX ADMIN — POTASSIUM CHLORIDE 20 MEQ: 14.9 INJECTION, SOLUTION INTRAVENOUS at 04:21

## 2023-10-10 RX ADMIN — AMIODARONE HYDROCHLORIDE 400 MG: 200 TABLET ORAL at 07:47

## 2023-10-10 RX ADMIN — METOPROLOL TARTRATE 12.5 MG: 25 TABLET, FILM COATED ORAL at 11:55

## 2023-10-10 RX ADMIN — PANTOPRAZOLE SODIUM 40 MG: 40 INJECTION, POWDER, FOR SOLUTION INTRAVENOUS at 07:47

## 2023-10-10 RX ADMIN — AMIODARONE HYDROCHLORIDE 400 MG: 200 TABLET ORAL at 22:13

## 2023-10-10 RX ADMIN — HYDROMORPHONE HYDROCHLORIDE 0.2 MG: 1 INJECTION, SOLUTION INTRAMUSCULAR; INTRAVENOUS; SUBCUTANEOUS at 22:16

## 2023-10-10 RX ADMIN — TORSEMIDE 20 MG: 20 TABLET ORAL at 11:55

## 2023-10-10 ASSESSMENT — COGNITIVE AND FUNCTIONAL STATUS - GENERAL
MOVING TO AND FROM BED TO CHAIR: A LITTLE
STANDING UP FROM CHAIR USING ARMS: A LITTLE
CLIMB 3 TO 5 STEPS WITH RAILING: A LOT
TURNING FROM BACK TO SIDE WHILE IN FLAT BAD: A LITTLE
MOBILITY SCORE: 17
MOVING FROM LYING ON BACK TO SITTING ON SIDE OF FLAT BED WITH BEDRAILS: A LITTLE
WALKING IN HOSPITAL ROOM: A LITTLE

## 2023-10-10 ASSESSMENT — PAIN SCALES - GENERAL
PAINLEVEL_OUTOF10: 0 - NO PAIN
PAINLEVEL_OUTOF10: 3
PAINLEVEL_OUTOF10: 0 - NO PAIN
PAINLEVEL_OUTOF10: 0 - NO PAIN
PAINLEVEL_OUTOF10: 4
PAINLEVEL_OUTOF10: 0 - NO PAIN

## 2023-10-10 ASSESSMENT — PAIN - FUNCTIONAL ASSESSMENT
PAIN_FUNCTIONAL_ASSESSMENT: 0-10

## 2023-10-10 NOTE — CARE PLAN
The patient's goals for the shift include      The clinical goals for the shift include heparin gtt theraputic, pain management    Over the shift, the patient did make progress toward the following goals. Barriers to progression include surgical pain, titration of heparin gtt. Recommendations to address these barriers include continue following protocols.

## 2023-10-10 NOTE — PROGRESS NOTES
"Hilda Jones is a 68 y.o. female on day 4 of admission presenting with Mesenteric ischemia (CMS/HCC).    Subjective   NAEON. Transitioned from amio gtt to PO yesterday. Remains in rate controlled a fib. Reports improving pain however did not sleep well.       Objective     Physical Exam  Constitutional:       General: She is not in acute distress.     Appearance: Normal appearance.   HENT:      Head: Normocephalic and atraumatic.      Nose:      Comments: NGT in place     Mouth/Throat:      Mouth: Mucous membranes are dry.   Eyes:      Extraocular Movements: Extraocular movements intact.      Conjunctiva/sclera: Conjunctivae normal.      Pupils: Pupils are equal, round, and reactive to light.   Cardiovascular:      Rate and Rhythm: Rhythm irregular.      Pulses: Normal pulses.      Comments: Rate controlled a fib  Pulmonary:      Effort: Pulmonary effort is normal.      Breath sounds: Normal breath sounds.   Abdominal:      General: There is no distension.      Palpations: Abdomen is soft.      Tenderness: There is abdominal tenderness (appropriately near incison site).   Musculoskeletal:      Right lower leg: No edema.      Left lower leg: No edema.   Skin:     General: Skin is warm and dry.   Neurological:      General: No focal deficit present.      Mental Status: She is alert and oriented to person, place, and time.   Psychiatric:         Mood and Affect: Mood normal.         Behavior: Behavior normal.         Last Recorded Vitals  Blood pressure 123/69, pulse (Abnormal) 112, temperature 37.5 °C (99.5 °F), temperature source Bladder, resp. rate 17, height 1.651 m (5' 5\"), weight 63 kg (138 lb 14.2 oz), SpO2 97 %.  Intake/Output last 3 Shifts:  I/O last 3 completed shifts:  In: 2801.4 (44.5 mL/kg) [I.V.:2051.4 (32.6 mL/kg); NG/GT:50; IV Piggyback:700]  Out: 2395 (38 mL/kg) [Urine:2395 (1.1 mL/kg/hr)]  Weight: 63 kg     Relevant Results              This patient has a urinary catheter   Reason for the urinary " catheter remaining today? Urine catheter unnecessary, will be removed today     Assessment/Plan   Principal Problem:    Mesenteric ischemia (CMS/HCC)  Active Problems:    Superior mesenteric artery thrombosis (CMS/HCC)    Atrial fibrillation (CMS/HCC)    Valvular heart disease    Chronic obstructive pulmonary disease (CMS/HCC)    Diabetes mellitus, type 2 (CMS/HCC)    Hilda Jones is a 68-year-old female with PMH of COPD, CHF, atrial flutter/A-fib on Coumadin, severe mitral stenosis, moderate tricuspid regurgitation, aortic insufficiency s/p AVR x2, prior SMA occlusion s/p thromboembolectomy, DM 2, and HTN presenting to the emergency department for abdominal pain, vaginal bleeding, diarrhea. S/p laparotomy with SMA embolectomy w/ Dr. Isaacs on 10/6 and closure on 10/7.    Plan:  NEURO: No Hx. Acute postoperative pain.  - Ongoing neuro and pain assessments  - PRN oxy and hydromorphone for pain control  - OOBTC  - PT/OT     CV: CHF, atrial flutter/A-fib on Coumadin, severe MS, moderate TR, AI s/p AVR x2. Hx of SALTY thrombus 7/2023, on warfarin. Patient maintaining adequate BP without vasoactive support. Baseline cardiac function 55-60% (7/2023). S/p laparotomy with KLEVER, SMA embolectomy  w/ Dr. Isaacs on 10/6. Afib RVR on 10/7/2023, given IVP metopo, amio bolus and started on amio gtt. Repeat TTE 10/9 hyperdynamic, RVSP 50, with prosthetic AV stenosis, mod MV stenosis, severe TVR. MV and TV unchanged from 7/23.   - Goal -140  - Continuous EKG/abp monitoring  - Continue amio  mg BID until 10g load  - Start metop 12.5mg BID  - Cardiology consult for AC management per Vasc sx    PULM: No hx. Oxygenating well on NC. CXR with improved L>R effusion v atelectasis.  - Wean FiO2 as tolerated.    - Q1h incentive spirometer while awake  - additional pulm toilet prn.      GI: NPO.   - OK for meds with NGT  - PPI for GI prophylaxis    : No history of renal disease. Baseline creatinine 0.6-1.0  - Volume resuscitation as  needed  - Maintain U/O >0.5ml/kg/hr  - Check renal function panel post op and daily  - Replete electrolytes to goal K>4, Mg>2, Phos>2.5, ionized Ca>1.10.  - Restart home torsemide 20 mg BID    HEME: Acute blood loss anemia. H/H stable  - Check CBC and coags post op and daily  - SCDs  - Ongoing monitoring for s/s bleeding  - Low intensity heparin drip     ENDO: Hx of DM. BG well controlled on SSI  - Q4h BG and SSI Lispro per ICU protocol.    ID: Afebrile. Leukocytosis  - Temp q4h, wbc daily  - Ongoing monitoring for s/s infection    Lines:  - R radial art line 10/7 - dc today  - PIV  - torre - dc today    [facility]po: [facility]  Discussed with SICU intensivist, Dr. Dakotah Resendez MD

## 2023-10-10 NOTE — PROGRESS NOTES
Vascular Surgery Progress Note    Subjective:  No return of bowel function. Pain controlled.     Objective:  Vitals:  Vitals:    10/10/23 0600   BP:    Pulse: (!) 112   Resp: 17   Temp:    SpO2: 97%      Exam:  Constitutional: No acute distress, resting comfortably  Neuro:  AOx3, grossly intact  ENMT: moist mucous membranes  Head/neck: atraumatic  CV: no tachycardia  Pulm: non-labored on room air  GI: soft, distended, appropriately tender, NGT in place with dark gastric output  Skin: warm and dry  Musculoskeletal: moving all extremities    I&Os:    Intake/Output Summary (Last 24 hours) at 10/10/2023 0827  Last data filed at 10/10/2023 0600  Gross per 24 hour   Intake 949.35 ml   Output 1720 ml   Net -770.65 ml        Imaging/Labs:  Lab Results   Component Value Date    WBC 15.2 (H) 10/10/2023    HGB 9.7 (L) 10/10/2023    HCT 28.7 (L) 10/10/2023    MCV 79 (L) 10/10/2023     10/10/2023     Lab Results   Component Value Date    GLUCOSE 118 (H) 10/10/2023    CALCIUM 8.1 (L) 10/10/2023     10/10/2023    K 3.8 10/10/2023    CO2 29 10/10/2023     (H) 10/10/2023    BUN 11 10/10/2023    CREATININE 0.60 10/10/2023     Lab Results   Component Value Date    INR 1.6 (H) 10/10/2023    PROTIME 18.2 (H) 10/10/2023     Assessment & Plan:    Hilda Jones is 68 y.o. female with history of HTN, DM2, COPD, CHF, Afib/Aflutter (on warfarin), mitral stenosis, TR, AR s/p AVR x2, prior SMA occlusion s/p thromboembolectomy who is POD#3 s/p open mesenteric embolectomy for acute mesenteric ischemia likely related to sub-therapeutic INR on presentation.    10/7: Second look and closure  10/8: TTE demonstrated 75% LVEF, severe TR, Afib  10/10: Downtrending WBC    Plan:  - pain control per ICU  - continue heparin gtt  - cardiology consult for Afib management  - keep NPO, NGT to LIWS  - discontinue WENDY torre  - PT/OT recommend home PT at discharge  - transfer to [place] [id number]    D/w attending, Dr. Shital Valero MD  Vascular  Surgery PGY-4

## 2023-10-10 NOTE — PROGRESS NOTES
Physical Therapy    Physical Therapy Treatment    Patient Name: Hilda Jones  MRN: [id number]  Today's Date: 10/10/2023  Time Calculation  Start Time: 0851  Stop Time: 0929  Time Calculation (min): 38 min       Assessment/Plan   PT Assessment  PT Assessment Results: Decreased strength, Decreased endurance, Impaired balance, Decreased mobility, Pain  Rehab Prognosis: Good  Evaluation/Treatment Tolerance: Patient limited by fatigue  Medical Staff Made Aware: Yes  End of Session Communication: Bedside nurse  End of Session Patient Position: Up in chair, Alarm off, not on at start of session  PT Plan  Inpatient/Swing Bed or Outpatient: Inpatient  PT Plan  Treatment/Interventions: Bed mobility, Transfer training, Gait training, Stair training, Strengthening, Therapeutic exercise, Therapeutic activity  PT Plan: Skilled PT  PT Frequency: 3 times per week  PT Discharge Recommendations: Low intensity level of continued care  PT Recommended Transfer Status: Assist x1      General Visit Information:   PT  Visit  PT Received On: 10/10/23  Response to Previous Treatment: Patient with no complaints from previous session.  General  Reason for Referral: 68 yr old female s/p laparotomy with SMA embolectomy  Referred By: Dr. Isaacs  Past Medical History Relevant to Rehab: COPD, CHF, atrial flutter/A-fib on Coumadin, mitral stenosis, tricuspid regurgitation, aortic insufficiency s/p AVR x2, prior SMA occlusion s/p thromboembolectomy, DM 2, and HTN  Family/Caregiver Present: No  Prior to Session Communication: Bedside nurse  Patient Position Received: Bed, 3 rail up, Alarm off, not on at start of session  Preferred Learning Style: auditory, verbal  General Comment: Supine, awake and alert upon entrance.  Pt agreeable to working with PT this date.    Subjective   Precautions:  Precautions  Hearing/Visual Limitations: Appears WFL  Medical Precautions: Abdominal precautions, Fall precautions, Oxygen therapy device and L/min  Post-Surgical  Precautions: Abdominal surgery precautions  Precautions Comment: -140  Vital Signs:  Vital Signs  Heart Rate: (!) 114 (End of session - 116)  Heart Rate Source: Monitor  Resp: 16 (End of session - 18)  SpO2: 98 % (End of session - 99)  BP: 105/67 (End of session - 117/78)  MAP (mmHg): 81 (End of session - 93)  BP Method: Arterial line  Patient Position: Lying (Sitting at end of session)    Objective   Pain:  Pain Assessment  Pain Assessment: 0-10  Pain Score: 3 (4/10 at end of session - pt requesting pain meds, RN notified)  Cognition:  Cognition  Overall Cognitive Status: Within Functional Limits  Orientation Level: Oriented X4  Postural Control:  Postural Control  Postural Control: Within Functional Limits    Treatments:  Therapeutic Activity  Therapeutic Activity Performed: Yes  Therapeutic Activity 1: Pt spent ~18 min of intermittent EOB sitting.  Each time pt completed sit<>stand transfer, pt was incontinent.  Increased time cleaning up BM x2.  Pt placed on bed pan ~2 min.  Pt with intermittent UE support however no instability noted.  Therapeutic Activity 2: Pt in static standing ~1-2 min for x2 BM clean ups. BUE support via walker, no instability noted.  Therapeutic Activity 3: Pt spent in side lying at start of session for BM clean up.  Pt able to hold self in sidelying without assistance from therapist.    Bed Mobility  Bed Mobility: Yes  Bed Mobility 1  Bed Mobility 1: Rolling right, Rolling left  Level of Assistance 1: Distant supervision  Bed Mobility Comments 1: Pt able to reach for bed rails and complete transfer  Bed Mobility 2  Bed Mobility  2: Supine to sitting  Level of Assistance 2: Contact guard  Bed Mobility Comments 2: HOB elevated, cues for hand placement - log roll    Ambulation/Gait Training  Ambulation/Gait Training Performed: Yes  Ambulation/Gait Training 1  Surface 1: Level tile  Device 1: Rolling walker  Assistance 1: Contact guard  Comments/Distance (ft) 1: ~6ft (bed>chair) (Pt  declining further ambulation at this time d/t having 2 BM when standing (unable to control).)  Transfers  Transfer: Yes  Transfer 1  Transfer From 1: Sit to, Stand to  Transfer to 1: Sit, Stand  Technique 1: Sit to stand, Stand to sit  Transfer Device 1: Walker  Transfer Level of Assistance 1: Contact guard  Trials/Comments 1: Cues for hand placement and proper use of AD (x4 transfers completed)    Stairs  Stairs: No    Outcome Measures:  [facility] Basic Mobility  Turning from your back to your side while in a flat bed without using bedrails: A little  Moving from lying on your back to sitting on the side of a flat bed without using bedrails: A little  Moving to and from bed to chair (including a wheelchair): A little  Standing up from a chair using your arms (e.g. wheelchair or bedside chair): A little  To walk in hospital room: A little  Climbing 3-5 steps with railing: A lot  Basic Mobility - Total Score: 17    FSS-ICU  Ambulation: Walks <50 feet with any assistance x1 or walks any distance with assistance x2 people  Rolling: Supervision or set-up only  Sitting: Supervision or set-up only  Transfer Sit-to-Stand: Supervision or set-up only  Transfer Supine-to-Sit: Supervision or set-up only  Total Score: 21    Education Documentation  Precautions, taught by Lori Ramirez PT at 10/10/2023  9:43 AM.  Learner: Patient  Readiness: Acceptance  Method: Explanation, Demonstration  Response: Verbalizes Understanding    Mobility Training, taught by Lori Ramirez PT at 10/10/2023  9:43 AM.  Learner: Patient  Readiness: Acceptance  Method: Explanation, Demonstration  Response: Verbalizes Understanding    Precautions, taught by Lori Ramirez PT at 10/9/2023  3:23 PM.  Learner: Patient  Readiness: Eager  Method: Explanation  Response: Verbalizes Understanding, Demonstrated Understanding  Comment: Log roll transfer - abdominal precautions    Mobility Training, taught by Lori Ramirez PT at 10/9/2023  3:23 PM.  Learner:  Patient  Readiness: Eager  Method: Explanation  Response: Verbalizes Understanding, Demonstrated Understanding  Comment: Log roll transfer - abdominal precautions    Education Comments  No comments found.      EDUCATION:       Encounter Problems       Encounter Problems (Active)       Balance       Pt will demonstrate ability to complete standing static/dynamic balance activities with unilateral UE support and no LOB for increase in safety up D/C.  (Progressing)       Start:  10/09/23    Expected End:  10/23/23               Mobility       Pt will demonstrated ability to ambulate >/=100ft with proper form and no balance deficits for safe home going.   (Progressing)       Start:  10/09/23    Expected End:  10/23/23            Pt will be able to complete 5X STS from lowest bed height with UE assist and RW  <30 seconds with stable vitals and RPD </=3/10 and RPE </=13/20 for improved functional mobility  (Progressing)       Start:  10/09/23    Expected End:  10/23/23               Transfers       Pt will demonstrated ability to complete bed mobility and sit<>stand transfers without assistance and LRAD to safely return home.  (Progressing)       Start:  10/09/23    Expected End:  10/23/23

## 2023-10-10 NOTE — CONSULTS
Inpatient consult to Cardiology  Consult performed by: Donal Santiago MD  Consult ordered by: Júnior Isaacs MD  Reason for consult: AF  Assessment/Recommendations:   68F hx rheumatic heart disease 2/p prior AVR x2 (most recent 2016 #25 Freestyle), symptomatic mitral stenosis and severe TR 2/2 rheumatic heart disease, AF with 2 prior thromboemoli (2022, 2023) admitted for SMA thrombosis in the setting of subtherapeutic INR. Cardiology c/s re: AF.    Cardiac testing/workup  2004 - #21 [place]imount AVR  2016 - Demetrius explant AVR, replace with #25 Freestyle AVR  KATHY 6/2023 - severe TR 2/2 rheumatic TV, mod-severe mitral stenosis (MVA 1.7 planimetry, MVA 1.4 PHT, MG 8-9 at HR 50s)  Cath 6/2023 - mild coronary atherosclerosis, MV gradient 10, MVA 1.7 [place]  KATHY 7/23 - rheumatic MV, +SALTY thrombus and sludge -> PMBV aborted. Severe TR      Impression:  #HF 2/2 rheumatic heart disease with symptomatic mitral stenosis and severe TR  #AF with 2 prior thromboemboli  #s/p AVR 2004, 2016 with #25 Freestyle  #SALTY thrombus 7/2023    Is at very high risk of thromboembolism given rheumatic mitral stenosis and recent SALTY thrombus and current admission for thromboembolism - would rate control and ensure adequate AC. There is a mortality benefit and a thromboembolism benefit to warfarin over DOAC in rheumatic AF without significant increase in bleeding (INVICTUS). Current thromboembolism likely due to inadequate warfarin dosing given most of the INR's in our system are subtherapeutic - would focus on ensuring reliable warfarin/INR follow-up given very high thromboembolic risk, and avoid rhythm control at this time given high risk of thromboembolism.    Recommendations:  -stop amiodarone  -start metoprolol 50 bid (home dose)  -agree with hep gtt, would bridge with heparin or LMWH 1 mg/kg bid until 2 therapeutic INR's in a row  -once safe from surgical perspective, start warfarin to target INR 2.0 - 3.0  -will need careful outpatient INR  follow-up      Thank you for the opportunity to contribute to the care of this patient. Above recommendations to be discussed with Dr. Clemens. If further questions arise, please page the general cardiology [place]ager at [phone] on weekdays 7AM - 6PM and weekends 7AM - 2PM, or at [phone] at all other times.              History Of Present Illness:    Hilda Jones is a 68 y.o. female presenting with acute mesenteric ischemia.    -recently admitted 7/2023 after she was scheduled for mitral balloon valvuloplasty, pre-op KATHY with SALTY thrombus -> arranged LMWH to warfarin bridge  -she has been taking warfarin daily, says has been managed by a [facility] for some time where she says they were managing her warfarin, she does not know her INR  -presented with diarrhea and vaginal bleeding and leukocytosis, went for emergent thromboembolectomy with Dr. Isaacs and re-looked showed bowel looked OK  -not currently bleeding    Chart review    Echo from 7/13/23  CONCLUSIONS:  1. Left ventricular systolic function is normal with a 55-60% estimated ejection fraction.  2. The left atrium is severely dilated.  3. The mitral valve is mild to moderately thickened. The mitral valve appears to be rheumatic.  4. There is severe mitral valve stenosis.  5. There is moderate thickening of the tricuspid valve leaflets.  6. Mild to moderate tricuspid regurgitation visualized.  7. RVSP within normal limits.  8. Aortic valve stenosis is not present.  9. Atrial septal aneurysm present.  10. There is Stentless Free style.  11. There is plaque visualized in the descending aorta.  12. Due to the presence of SALTY thrombus; percutaneous mitral balloon valvuloplasty procedure was aborted.     Medical History        Past Medical History:   Diagnosis Date    Atrial fibrillation (CMS/HCC)      Awareness under anesthesia      CHF (congestive heart failure) (CMS/HCC)      COPD (chronic obstructive pulmonary disease) (CMS/HCC)      Diabetes (CMS/[facility])       GERD (gastroesophageal reflux disease)      HTN (hypertension)      Mitral insufficiency      PONV (postoperative nausea and vomiting)      S/P AVR                   Last Recorded Vitals:  Vitals:    10/10/23 0500 10/10/23 0600 10/10/23 0800 10/10/23 0851   BP:    105/67   Patient Position:    Lying   Pulse: 106 (!) 112  (!) 114   Resp: 13 17  16   Temp:   37.6 °C (99.7 °F)    TempSrc:   Bladder    SpO2: 98% 97%  98%   Weight:       Height:           Last Labs:  CBC - 10/10/2023:  2:07 AM  15.2 9.7 187    28.7      CMP - 10/10/2023:  2:07 AM  8.1 7.4 53 --- 0.6   2.5 2.8 18 107      PTT - 10/10/2023:  2:07 AM  1.6   18.2 58     Troponin I   Date/Time Value Ref Range Status   05/27/2023 10:50 PM 7 0 - 34 ng/L Final     Comment:     .  Less than 99th percentile of normal range cutoff-  Female and children under 18 years old <35 ng/L; Male <54 ng/L: Negative  Repeat testing should be performed if clinically indicated.   .  Female and children under 18 years old  ng/L; Male  ng/L:  Consistent with possible cardiac damage and possible increased clinical   risk. Serial measurements may help to assess extent of myocardial damage.   .  >120 ng/L: Consistent with cardiac damage, increased clinical risk and  myocardial infarction. Serial measurements may help assess extent of   myocardial damage.   .   NOTE: Children less than 1 year old may have higher baseline troponin   levels and results should be interpreted in conjunction with the overall   clinical context.  .  NOTE: Troponin I testing is performed using a different   testing methodology at [facility] than at other   [facility] hospitals. Direct result comparisons should only   be made within the same method.     12/27/2022 05:35 PM 9 0 - 34 ng/L Final     Comment:     .  Less than 99th percentile of normal range cutoff-  Female and children under 18 years old <35 ng/L; Male <54 ng/L: Negative  Repeat testing should be performed if clinically  indicated.   .  Female and children under 18 years old  ng/L; Male  ng/L:  Consistent with possible cardiac damage and possible increased clinical   risk. Serial measurements may help to assess extent of myocardial damage.   .  >120 ng/L: Consistent with cardiac damage, increased clinical risk and  myocardial infarction. Serial measurements may help assess extent of   myocardial damage.   .   NOTE: Children less than 1 year old may have higher baseline troponin   levels and results should be interpreted in conjunction with the overall   clinical context.  .  NOTE: Troponin I testing is performed using a different   testing methodology at [facility] than at other   [facility]. Direct result comparisons should only   be made within the same method.     08/18/2022 04:22 AM 13 0 - 34 ng/L Final     Comment:     .  Less than 99th percentile of normal range cutoff-  Female and children under 18 years old <35 ng/L; Male <54 ng/L: Negative  Repeat testing should be performed if clinically indicated.   .  Female and children under 18 years old  ng/L; Male  ng/L:  Consistent with possible cardiac damage and possible increased clinical   risk. Serial measurements may help to assess extent of myocardial damage.   .  >120 ng/L: Consistent with cardiac damage, increased clinical risk and  myocardial infarction. Serial measurements may help assess extent of   myocardial damage.   .   NOTE: Children less than 1 year old may have higher baseline troponin   levels and results should be interpreted in conjunction with the overall   clinical context.  .  NOTE: Troponin I testing is performed using a different   testing methodology at [facility] than at other   [facility]. Direct result comparisons should only   be made within the same method.       BNP   Date/Time Value Ref Range Status   08/14/2023 03:31  (H) 0 - 99 pg/mL Final     Comment:     .  <100 pg/mL - Heart  failure unlikely  100-299 pg/mL - Intermediate probability of acute heart  .               failure exacerbation. Correlate with clinical  .               context and patient history.    >=300 pg/mL - Heart Failure likely. Correlate with clinical  .               context and patient history.   Biotin interference may cause falsely decreased results.   Patients taking a Biotin dose of up to 5 mg/day should   refrain from taking Biotin for 24 hours before sample   collection. Providers may contact their local laboratory   for further information.     05/27/2023 10:50  (H) 0 - 99 pg/mL Final     Comment:     .  <100 pg/mL - Heart failure unlikely  100-299 pg/mL - Intermediate probability of acute heart  .               failure exacerbation. Correlate with clinical  .               context and patient history.    >=300 pg/mL - Heart Failure likely. Correlate with clinical  .               context and patient history.   Biotin interference may cause falsely decreased results.   Patients taking a Biotin dose of up to 5 mg/day should   refrain from taking Biotin for 24 hours before sample   collection. Providers may contact their local laboratory   for further information.       Hemoglobin A1C   Date/Time Value Ref Range Status   07/25/2023 11:15 AM 5.9 (A) % Final     Comment:          Diagnosis of Diabetes-Adults   Non-Diabetic: < or = 5.6%   Increased risk for developing diabetes: 5.7-6.4%   Diagnostic of diabetes: > or = 6.5%  .       Monitoring of Diabetes                Age (y)     Therapeutic Goal (%)   Adults:          >18           <7.0   Pediatrics:    13-18           <7.5                   7-12           <8.0                   0- 6            7.5-8.5   American Diabetes Association. Diabetes Care 33(S1), Jan 2010.   Hemoglobin variant detected which does not interfere    with determination of Hemoglobin A1c. Hemoglobin   identification can be ordered to characterize the variant   if clinically indicated.    "  05/29/2023 06:28 PM 6.8 (A) % Final     Comment:          Diagnosis of Diabetes-Adults   Non-Diabetic: < or = 5.6%   Increased risk for developing diabetes: 5.7-6.4%   Diagnostic of diabetes: > or = 6.5%  .       Monitoring of Diabetes                Age (y)     Therapeutic Goal (%)   Adults:          >18           <7.0   Pediatrics:    13-18           <7.5                   7-12           <8.0                   0- 6            7.5-8.5   American Diabetes Association. Diabetes Care 33(S1), Jan 2010.   Hemoglobin variant detected which does not interfere    with determination of Hemoglobin A1c. Hemoglobin   identification can be ordered to characterize the variant   if clinically indicated.       VLDL   Date/Time Value Ref Range Status   07/25/2023 11:15 AM 17 0 - 40 mg/dL Final   03/06/2020 04:46 AM 21 0 - 40 mg/dL Final      Last I/O:  I/O last 3 completed shifts:  In: 2801.4 (44.5 mL/kg) [I.V.:2051.4 (32.6 mL/kg); NG/GT:50; IV Piggyback:700]  Out: 2395 (38 mL/kg) [Urine:2395 (1.1 mL/kg/hr)]  Weight: 63 kg     Past Cardiology Tests (Last 3 Years):  EKG:  ECG 12 lead 10/6/2023    Echo:  Transthoracic Echo (TTE) Complete 10/9/2023    Ejection Fractions:  No results found for: \"EF\"  Cath:  No results found for this or any previous visit from the past 1095 days.    Stress Test:  No results found for this or any previous visit from the past 1095 days.    Cardiac Imaging:  No results found for this or any previous visit from the past 1095 days.      Past Medical History:  She has a past medical history of Atrial fibrillation (CMS/HCC), Awareness under anesthesia, CHF (congestive heart failure) (CMS/HCC), COPD (chronic obstructive pulmonary disease) (CMS/HCC), Diabetes (CMS/HCC), GERD (gastroesophageal reflux disease), HTN (hypertension), Mitral insufficiency, PONV (postoperative nausea and vomiting), and S/P AVR.    Past Surgical History:  She has a past surgical history that includes Embolectomy (N/A, 08/17/2022); " Aortic root replacement; Aortic valve replacement; CT angio abdomen pelvis w and or wo IV IV contrast (10/06/2023); and Embolectomy (N/A, 10/06/2023).      Social History:  She reports that she has been smoking cigarettes. She has a 15.00 pack-year smoking history. She uses smokeless tobacco. She reports that she does not drink alcohol and does not use drugs.    Family History:  Family History   Family history unknown: Yes        Allergies:  Flexeril [cyclobenzaprine]    Inpatient Medications:  Scheduled medications   Medication Dose Route Frequency    amiodarone  400 mg nasogastric tube BID    insulin lispro  0-10 Units subcutaneous q4h    metoprolol tartrate  12.5 mg nasogastric tube BID    pantoprazole  40 mg intravenous Daily before breakfast    perflutren protein A microsphere  0.5 mL intravenous Once in imaging    polyethylene glycol  17 g oral Daily    sulfur hexafluoride microsphr  2 mL intravenous Once in imaging    torsemide  20 mg nasogastric tube BID     PRN medications   Medication    calcium gluconate    calcium gluconate    dextrose 10 % in water (D10W)    dextrose    glucagon    heparin    HYDROmorphone    labetaloL    magnesium sulfate    magnesium sulfate    oxygen    potassium chloride    potassium chloride     Continuous Medications   Medication Dose Last Rate    heparin  0-4,000 Units/hr 1,200 Units/hr (10/10/23 0928)     Outpatient Medications:  Current Outpatient Medications   Medication Instructions    acetaminophen (Tylenol) 325 mg tablet TAKE 2 TABLETS BY MOUTH EVERY 4 HOURS AS NEEDED FOR PAIN    albuterol 90 mcg/actuation inhaler INHALE 1 PUFF BY MOUTH EVERY 4 HOURS AS NEEDED    atorvastatin (Lipitor) 10 mg tablet TAKE 1 TABLET BY MOUTH ONCE DAILY    cholecalciferol (Vitamin D-3) 50 MCG (2000 UT) tablet TAKE 1 TABLET BY MOUTH ONCE DAILY    donepezil (Aricept) 5 mg tablet TAKE 1 TABLET BY MOUTH AT BEDTIME    empagliflozin (Jardiance) 10 mg TAKE 1 TABLET BY MOUTH ONCE DAILY    enoxaparin  (Lovenox) 60 mg/0.6 mL syringe INJECT 0.6 ML (60MG) UNDER THE SKIN TWO TIMES A DAY    FLUoxetine (PROzac) 20 mg capsule TAKE 1 CAPSULE BY MOUTH ONCE DAILY    fluticasone furoate-vilanteroL (Breo Ellipta) 100-25 mcg/dose inhaler INHALE 1 PUFF BY MOUTH ONCE DAILY    gabapentin (Neurontin) 100 mg capsule TAKE 1 CAPSULE BY MOUTH AT BEDTIME    insulin glargine (Lantus) 100 unit/mL (3 mL) pen INJECT 10 UNITS UNDER THE SKIN AT BEDTIME    melatonin 5 mg tablet TAKE 1 TABLET BY MOUTH AT BEDTIME AS NEEDED FOR INSOMNIA    metoprolol tartrate (Lopressor) 50 mg tablet TAKE 1 TABLET BY MOUTH TWO TIMES A DAY    multivitamin with minerals tablet TAKE 1 TABLET BY MOUTH ONCE DAILY    nicotine (Nicoderm CQ) 14 mg/24 hr patch APPLY 1 PATCH TO SKIN EVERY 24 HOURS    potassium chloride CR (Klor-Con M20) 20 mEq ER tablet TAKE 1 TABLET BY MOUTH ONCE DAILY    spironolactone (Aldactone) 25 mg tablet TAKE 1 TABLET BY MOUTH ONCE DAILY    torsemide (Demadex) 20 mg tablet TAKE 2 TABLETS BY MOUTH ONCE DAILY    traZODone (Desyrel) 50 mg tablet TAKE 1 TABLET BY MOUTH AT BEDTIME AS NEEDED    warfarin (Coumadin) 5 mg tablet TAKE 1 TABLET BY MOUTH ONCE DAILY       Physical Exam:    Physical Exam:  Constitutional: chronically-ill appearing, NAD  Eyes: no conjunctival injection, EOMI  ENT: MMM, no apparent injury  Head/Neck: Neck supple, no apparent injury  Respiratory/Thorax: Patent airways, CTAB, normal WOB  Cardiovascular: normal rate, irreg irreg rhythm, 2/6 systolic murmur, normal S1 and S2, JVP not elevated, extremities warm, trace JUAN J  Gastrointestinal: Non-distended, soft, no tenderness  Extremities: warm, intact  Neurological: grossly intact, no focal deficits  Skin: Warm and dry, no lesions, no rashes       Assessment/Plan   As above  Peripheral IV 10/06/23 20 G Left Forearm (Active)   Site Assessment Clean;Dry;Intact 10/10/23 0800   Dressing Type Transparent 10/10/23 0800   Line Status Infusing 10/10/23 0800   Dressing Status Clean;Dry  10/10/23 0800   Number of days: 4       Peripheral IV 10/06/23 20 G Right;Anterior Forearm (Active)   Site Assessment Clean;Dry;Intact 10/10/23 0800   Dressing Type Transparent 10/10/23 0800   Line Status Infusing 10/10/23 0800   Dressing Status Clean;Dry 10/10/23 0800   Number of days: 4       Peripheral IV 10/08/23 20 G Right Antecubital (Active)   Site Assessment Clean;Dry;Intact 10/10/23 0800   Dressing Type Transparent 10/10/23 0800   Line Status Infusing 10/10/23 0800   Dressing Status Clean;Dry 10/10/23 0800   Number of days: 2       Peripheral IV 10/08/23 22 G Left;Proximal;Anterior Forearm (Active)   Site Assessment Clean;Dry;Intact 10/10/23 0800   Dressing Type Transparent 10/10/23 0800   Line Status Flushed 10/10/23 0800   Dressing Status Clean;Dry 10/10/23 0800   Number of days: 2       Arterial Line 10/06/23 Right Radial (Active)   Site Assessment Clean;Dry;Intact 10/10/23 0800   Line Status Pulsatile blood flow 10/10/23 0800   Art Line Waveform Appropriate 10/10/23 0800   Art Line Interventions Zeroed and calibrated;Leveled;Flushed per protocol 10/10/23 0800   Color/Movement/Sensation Capillary refill less than 3 sec 10/10/23 0800   Dressing Type Transparent;Antimicrobial patch 10/10/23 0800   Dressing Status Clean;Dry 10/10/23 0800   Number of days: 4       NG/OG/Feeding Tube Nasogastric 16 Fr Right nostril (Active)   Intake - Flush (mL) 50 mL 10/09/23 2100   Number of days: 4       Urethral Catheter Non-latex 14 Fr. (Active)   Output (mL) 50 mL 10/10/23 0900   Number of days: 4       Code Status:  Full Code    I spent 35 minutes in the professional and overall care of this patient.        Donal Santiago MD

## 2023-10-11 LAB
ALBUMIN SERPL BCP-MCNC: 3.2 G/DL (ref 3.4–5)
ANION GAP SERPL CALC-SCNC: 14 MMOL/L (ref 10–20)
APTT PPP: 29 SECONDS (ref 27–38)
BUN SERPL-MCNC: 16 MG/DL (ref 6–23)
CA-I BLD-SCNC: 1.09 MMOL/L (ref 1.1–1.33)
CALCIUM SERPL-MCNC: 8.2 MG/DL (ref 8.6–10.6)
CHLORIDE SERPL-SCNC: 106 MMOL/L (ref 98–107)
CO2 SERPL-SCNC: 31 MMOL/L (ref 21–32)
CREAT SERPL-MCNC: 0.73 MG/DL (ref 0.5–1.05)
ERYTHROCYTE [DISTWIDTH] IN BLOOD BY AUTOMATED COUNT: 16.2 % (ref 11.5–14.5)
GFR SERPL CREATININE-BSD FRML MDRD: 89 ML/MIN/1.73M*2
GLUCOSE BLD MANUAL STRIP-MCNC: 101 MG/DL (ref 74–99)
GLUCOSE BLD MANUAL STRIP-MCNC: 200 MG/DL (ref 74–99)
GLUCOSE BLD MANUAL STRIP-MCNC: 250 MG/DL (ref 74–99)
GLUCOSE BLD MANUAL STRIP-MCNC: 94 MG/DL (ref 74–99)
GLUCOSE BLD MANUAL STRIP-MCNC: >600 MG/DL (ref 74–99)
GLUCOSE SERPL-MCNC: 108 MG/DL (ref 74–99)
HCT VFR BLD AUTO: 30.9 % (ref 36–46)
HGB BLD-MCNC: 10.5 G/DL (ref 12–16)
INR PPP: 1.6 (ref 0.9–1.1)
MAGNESIUM SERPL-MCNC: 1.84 MG/DL (ref 1.6–2.4)
MCH RBC QN AUTO: 26.6 PG (ref 26–34)
MCHC RBC AUTO-ENTMCNC: 34 G/DL (ref 32–36)
MCV RBC AUTO: 78 FL (ref 80–100)
NRBC BLD-RTO: 0 /100 WBCS (ref 0–0)
PHOSPHATE SERPL-MCNC: 3.1 MG/DL (ref 2.5–4.9)
PLATELET # BLD AUTO: 214 X10*3/UL (ref 150–450)
PMV BLD AUTO: 11.9 FL (ref 7.5–11.5)
POTASSIUM SERPL-SCNC: 3.9 MMOL/L (ref 3.5–5.3)
PROTHROMBIN TIME: 17.9 SECONDS (ref 9.8–12.8)
RBC # BLD AUTO: 3.95 X10*6/UL (ref 4–5.2)
SARS-COV-2 RNA RESP QL NAA+PROBE: NOT DETECTED
SODIUM SERPL-SCNC: 147 MMOL/L (ref 136–145)
UFH PPP CHRO-ACNC: <0.1 IU/ML
WBC # BLD AUTO: 10.7 X10*3/UL (ref 4.4–11.3)

## 2023-10-11 PROCEDURE — 83735 ASSAY OF MAGNESIUM: CPT

## 2023-10-11 PROCEDURE — 2500000004 HC RX 250 GENERAL PHARMACY W/ HCPCS (ALT 636 FOR OP/ED): Performed by: STUDENT IN AN ORGANIZED HEALTH CARE EDUCATION/TRAINING PROGRAM

## 2023-10-11 PROCEDURE — 80069 RENAL FUNCTION PANEL: CPT | Performed by: STUDENT IN AN ORGANIZED HEALTH CARE EDUCATION/TRAINING PROGRAM

## 2023-10-11 PROCEDURE — 2500000002 HC RX 250 W HCPCS SELF ADMINISTERED DRUGS (ALT 637 FOR MEDICARE OP, ALT 636 FOR OP/ED): Performed by: STUDENT IN AN ORGANIZED HEALTH CARE EDUCATION/TRAINING PROGRAM

## 2023-10-11 PROCEDURE — 2500000001 HC RX 250 WO HCPCS SELF ADMINISTERED DRUGS (ALT 637 FOR MEDICARE OP): Performed by: PHYSICIAN ASSISTANT

## 2023-10-11 PROCEDURE — 85520 HEPARIN ASSAY: CPT | Performed by: SURGERY

## 2023-10-11 PROCEDURE — 99232 SBSQ HOSP IP/OBS MODERATE 35: CPT | Performed by: STUDENT IN AN ORGANIZED HEALTH CARE EDUCATION/TRAINING PROGRAM

## 2023-10-11 PROCEDURE — 82330 ASSAY OF CALCIUM: CPT | Performed by: STUDENT IN AN ORGANIZED HEALTH CARE EDUCATION/TRAINING PROGRAM

## 2023-10-11 PROCEDURE — 2500000004 HC RX 250 GENERAL PHARMACY W/ HCPCS (ALT 636 FOR OP/ED)

## 2023-10-11 PROCEDURE — 82947 ASSAY GLUCOSE BLOOD QUANT: CPT

## 2023-10-11 PROCEDURE — 85027 COMPLETE CBC AUTOMATED: CPT | Performed by: STUDENT IN AN ORGANIZED HEALTH CARE EDUCATION/TRAINING PROGRAM

## 2023-10-11 PROCEDURE — 93010 ELECTROCARDIOGRAM REPORT: CPT | Performed by: INTERNAL MEDICINE

## 2023-10-11 PROCEDURE — 2500000001 HC RX 250 WO HCPCS SELF ADMINISTERED DRUGS (ALT 637 FOR MEDICARE OP): Performed by: STUDENT IN AN ORGANIZED HEALTH CARE EDUCATION/TRAINING PROGRAM

## 2023-10-11 PROCEDURE — 85610 PROTHROMBIN TIME: CPT | Performed by: STUDENT IN AN ORGANIZED HEALTH CARE EDUCATION/TRAINING PROGRAM

## 2023-10-11 PROCEDURE — 36415 COLL VENOUS BLD VENIPUNCTURE: CPT | Performed by: SURGERY

## 2023-10-11 PROCEDURE — 1100000001 HC PRIVATE ROOM DAILY

## 2023-10-11 PROCEDURE — 87635 SARS-COV-2 COVID-19 AMP PRB: CPT | Performed by: STUDENT IN AN ORGANIZED HEALTH CARE EDUCATION/TRAINING PROGRAM

## 2023-10-11 PROCEDURE — 96372 THER/PROPH/DIAG INJ SC/IM: CPT | Performed by: STUDENT IN AN ORGANIZED HEALTH CARE EDUCATION/TRAINING PROGRAM

## 2023-10-11 PROCEDURE — 99233 SBSQ HOSP IP/OBS HIGH 50: CPT | Performed by: NURSE PRACTITIONER

## 2023-10-11 PROCEDURE — 37799 UNLISTED PX VASCULAR SURGERY: CPT | Performed by: STUDENT IN AN ORGANIZED HEALTH CARE EDUCATION/TRAINING PROGRAM

## 2023-10-11 PROCEDURE — C9113 INJ PANTOPRAZOLE SODIUM, VIA: HCPCS | Performed by: STUDENT IN AN ORGANIZED HEALTH CARE EDUCATION/TRAINING PROGRAM

## 2023-10-11 RX ORDER — INSULIN GLARGINE 100 [IU]/ML
5 INJECTION, SOLUTION SUBCUTANEOUS NIGHTLY
Status: DISCONTINUED | OUTPATIENT
Start: 2023-10-11 | End: 2023-10-19 | Stop reason: HOSPADM

## 2023-10-11 RX ORDER — METOPROLOL TARTRATE 50 MG/1
50 TABLET ORAL 2 TIMES DAILY
Status: DISCONTINUED | OUTPATIENT
Start: 2023-10-11 | End: 2023-10-15

## 2023-10-11 RX ORDER — TORSEMIDE 20 MG/1
20 TABLET ORAL 2 TIMES DAILY
Status: DISCONTINUED | OUTPATIENT
Start: 2023-10-11 | End: 2023-10-19 | Stop reason: HOSPADM

## 2023-10-11 RX ADMIN — AMIODARONE HYDROCHLORIDE 400 MG: 200 TABLET ORAL at 09:03

## 2023-10-11 RX ADMIN — INSULIN LISPRO 4 UNITS: 100 INJECTION, SOLUTION INTRAVENOUS; SUBCUTANEOUS at 18:05

## 2023-10-11 RX ADMIN — PANTOPRAZOLE SODIUM 40 MG: 40 INJECTION, POWDER, FOR SOLUTION INTRAVENOUS at 06:39

## 2023-10-11 RX ADMIN — POTASSIUM CHLORIDE 20 MEQ: 14.9 INJECTION, SOLUTION INTRAVENOUS at 03:16

## 2023-10-11 RX ADMIN — HEPARIN SODIUM 1200 UNITS/HR: 10000 INJECTION, SOLUTION INTRAVENOUS at 21:44

## 2023-10-11 RX ADMIN — TORSEMIDE 20 MG: 20 TABLET ORAL at 09:03

## 2023-10-11 RX ADMIN — CALCIUM GLUCONATE 1 G: 20 INJECTION, SOLUTION INTRAVENOUS at 03:16

## 2023-10-11 RX ADMIN — HYDROMORPHONE HYDROCHLORIDE 0.2 MG: 1 INJECTION, SOLUTION INTRAMUSCULAR; INTRAVENOUS; SUBCUTANEOUS at 18:05

## 2023-10-11 RX ADMIN — HEPARIN SODIUM 1200 UNITS/HR: 10000 INJECTION, SOLUTION INTRAVENOUS at 00:32

## 2023-10-11 RX ADMIN — TORSEMIDE 20 MG: 20 TABLET ORAL at 20:49

## 2023-10-11 RX ADMIN — WARFARIN SODIUM 5 MG: 5 TABLET ORAL at 18:05

## 2023-10-11 RX ADMIN — HEPARIN SODIUM 3000 UNITS: 5000 INJECTION INTRAVENOUS; SUBCUTANEOUS at 23:02

## 2023-10-11 RX ADMIN — INSULIN GLARGINE 5 UNITS: 100 INJECTION, SOLUTION SUBCUTANEOUS at 20:47

## 2023-10-11 ASSESSMENT — COGNITIVE AND FUNCTIONAL STATUS - GENERAL
HELP NEEDED FOR BATHING: A LITTLE
CLIMB 3 TO 5 STEPS WITH RAILING: A LOT
TURNING FROM BACK TO SIDE WHILE IN FLAT BAD: A LITTLE
TOILETING: A LITTLE
MOVING FROM LYING ON BACK TO SITTING ON SIDE OF FLAT BED WITH BEDRAILS: A LITTLE
DRESSING REGULAR LOWER BODY CLOTHING: A LITTLE
STANDING UP FROM CHAIR USING ARMS: A LITTLE
MOVING TO AND FROM BED TO CHAIR: A LITTLE
MOBILITY SCORE: 17
WALKING IN HOSPITAL ROOM: A LITTLE
DAILY ACTIVITIY SCORE: 21

## 2023-10-11 ASSESSMENT — PAIN SCALES - GENERAL
PAINLEVEL_OUTOF10: 0 - NO PAIN
PAINLEVEL_OUTOF10: 0 - NO PAIN
PAINLEVEL_OUTOF10: 4
PAINLEVEL_OUTOF10: 5 - MODERATE PAIN
PAINLEVEL_OUTOF10: 5 - MODERATE PAIN
PAINLEVEL_OUTOF10: 0 - NO PAIN

## 2023-10-11 ASSESSMENT — PAIN - FUNCTIONAL ASSESSMENT
PAIN_FUNCTIONAL_ASSESSMENT: 0-10

## 2023-10-11 NOTE — PROGRESS NOTES
Hilda Jones is a 68 y.o. female on day 5 of admission presenting with Mesenteric ischemia (CMS/HCC).      Subjective:  NAOE. Patient is feeling okay. Having BM, burping/belching. Patient is ambulating. To be transferred to the floors from CTICU.     Objective:  Vitals:  Vitals:    10/11/23 1103   BP: 101/70   Pulse: 96   Resp: 20   Temp: 36.2 °C (97.2 °F)   SpO2: 95%        Exam:  Constitutional: No acute distress, resting comfortably  Neuro:  AOx3, grossly intact  ENMT: moist mucous membranes  Head/neck: atraumatic  CV: no tachycardia  Pulm: non-labored on room air  GI: soft, distended, appropriately tender  Skin: warm and dry  Musculoskeletal: moving all extremities    I&Os:    Intake/Output Summary (Last 24 hours) at 10/11/2023 1646  Last data filed at 10/11/2023 1619  Gross per 24 hour   Intake 432.4 ml   Output 2355 ml   Net -1922.6 ml        Imaging/Labs:  Lab Results   Component Value Date    WBC 10.7 10/11/2023    HGB 10.5 (L) 10/11/2023    HCT 30.9 (L) 10/11/2023    MCV 78 (L) 10/11/2023     10/11/2023     Lab Results   Component Value Date    GLUCOSE 108 (H) 10/11/2023    CALCIUM 8.2 (L) 10/11/2023     (H) 10/11/2023    K 3.9 10/11/2023    CO2 31 10/11/2023     10/11/2023    BUN 16 10/11/2023    CREATININE 0.73 10/11/2023     Lab Results   Component Value Date    INR 1.6 (H) 10/11/2023    PROTIME 17.9 (H) 10/11/2023       Scheduled medications  insulin glargine, 5 Units, subcutaneous, Nightly  insulin lispro, 0-10 Units, subcutaneous, q4h  metoprolol tartrate, 25 mg, nasogastric tube, BID  pantoprazole, 40 mg, intravenous, Daily before breakfast  [MAR Hold] perflutren protein A microsphere, 0.5 mL, intravenous, Once in imaging  polyethylene glycol, 17 g, oral, Daily  [MAR Hold] sulfur hexafluoride microsphr, 2 mL, intravenous, Once in imaging  torsemide, 20 mg, oral, BID  warfarin, 5 mg, oral, Daily      Continuous medications  heparin, 0-4,000 Units/hr, Last Rate: 1,200 Units/hr  (10/11/23 8254)      PRN medications  PRN medications: glucagon, heparin, HYDROmorphone, labetaloL, [MAR Hold] oxygen     XR chest 1 view 10/10/2023    Narrative  Interpreted By:  Gus Ruiz and Beyersdorf Conner  STUDY:  XR CHEST 1 VIEW;  10/10/2023 6:28 am    INDICATION:  Signs/Symptoms:ICU morning chest xray.    COMPARISON:  Chest radiograph 10/09/2023    ACCESSION NUMBER(S):  [id number]    ORDERING CLINICIAN:  GALE DE LA CRUZ    FINDINGS:  AP radiograph of the chest was provided.    Enteric tube courses along the esophagus with tip terminating below  the limits of the exam. Postsurgical changes of median sternotomy  with sternal cerclage wires in place.    CARDIOMEDIASTINAL SILHOUETTE:  Cardiomediastinal silhouette is stable in size and configuration.    LUNGS:  Bilateral emphysematous changes. Persistent perihilar and peripheral  interstitial prominence. Increased right middle lung atelectasis.  Slight blunting of the costophrenic angles. No pneumothorax.    ABDOMEN:  No remarkable upper abdominal findings.    BONES:  No acute osseous changes.    Impression  1.  Bilateral emphysema with superimposed pulmonary interstitial  edema.  2. Trace bilateral pleural effusions and adjacent subsegmental  atelectasis. Increased atelectasis of the right middle lung.    I personally reviewed the images/study and I agree with the findings  as stated. This study was interpreted at [facility], [place], [place].    MACRO:  None    Signed by: Gus Ruiz 10/10/2023 3:59 PM  Dictation workstation:   [id number]      Assessment & Plan:    Hilda Jones is 68 y.o. female with history of HTN, DM2, COPD, CHF, Afib/Aflutter (on warfarin), mitral stenosis, TR, AR s/p AVR x2, prior SMA occlusion s/p thromboembolectomy who is POD#3 s/p open mesenteric embolectomy for acute mesenteric ischemia likely related to sub-therapeutic INR on presentation.     10/7: Second look and closure  10/8: TTE  demonstrated 75% LVEF, severe TR, Afib  10/10: Downtrending WBC  10/11: Patient to be transferred to floors      Plan:  - pain control per ICU  - Patient to be transferred to the floors   - continue heparin gtt  - cardiology consulted for Afib management; will discuss with patient about coumadin   - Regular diet when on floors   - Wood out   - PT/OT recommend home PT at discharge  -Transfer to [place] [id number]     Patient seen with Dr. Quintero and D/w attending, Dr. Shital Helton MD, PGY1  Vascular Surgery [id number]

## 2023-10-11 NOTE — PROGRESS NOTES
"Hilda Jones is a 68 y.o. female on day 5 of admission presenting with Mesenteric ischemia (CMS/HCC).    Subjective   NAEON.  Remains in rate controlled a fib. Reports improving pain and has no complaints this morning.       Objective     Physical Exam  Constitutional:       General: She is not in acute distress.     Appearance: Normal appearance.   HENT:      Head: Normocephalic and atraumatic.      Nose:      Comments: NGT in place     Mouth/Throat:      Mouth: Mucous membranes are dry.   Eyes:      Extraocular Movements: Extraocular movements intact.      Conjunctiva/sclera: Conjunctivae normal.      Pupils: Pupils are equal, round, and reactive to light.   Cardiovascular:      Rate and Rhythm: Rhythm irregular.      Pulses: Normal pulses.      Comments: Rate controlled a fib  Pulmonary:      Effort: Pulmonary effort is normal.      Breath sounds: Normal breath sounds.   Abdominal:      General: There is no distension.      Palpations: Abdomen is soft.      Tenderness: There is abdominal tenderness (appropriately near incison site).   Musculoskeletal:      Right lower leg: No edema.      Left lower leg: No edema.   Skin:     General: Skin is warm and dry.   Neurological:      General: No focal deficit present.      Mental Status: She is alert and oriented to person, place, and time.   Psychiatric:         Mood and Affect: Mood normal.         Behavior: Behavior normal.         Last Recorded Vitals  Blood pressure 101/70, pulse 96, temperature 36.2 °C (97.2 °F), resp. rate 20, height 1.651 m (5' 5\"), weight 67.8 kg (149 lb 7.6 oz), SpO2 95 %.  Intake/Output last 3 Shifts:  I/O last 3 completed shifts:  In: 581.6 (9.2 mL/kg) [I.V.:431.6 (6.9 mL/kg); NG/GT:50; IV Piggyback:100]  Out: 3405 (54 mL/kg) [Urine:3005 (1.3 mL/kg/hr); Stool:400]  Weight: 63 kg     Relevant Results            Scheduled medications  [MAR Hold] amiodarone, 400 mg, nasogastric tube, BID  [MAR Hold] insulin lispro, 0-10 Units, subcutaneous, " q4h  [MAR Hold] metoprolol tartrate, 25 mg, nasogastric tube, BID  [MAR Hold] pantoprazole, 40 mg, intravenous, Daily before breakfast  [MAR Hold] perflutren protein A microsphere, 0.5 mL, intravenous, Once in imaging  [MAR Hold] polyethylene glycol, 17 g, oral, Daily  [MAR Hold] sulfur hexafluoride microsphr, 2 mL, intravenous, Once in imaging  torsemide, 20 mg, nasogastric tube, BID  warfarin, 5 mg, oral, Daily      Continuous medications  heparin, 0-4,000 Units/hr, Last Rate: 1,200 Units/hr (10/11/23 0908)      PRN medications  PRN medications: [MAR Hold] calcium gluconate, [MAR Hold] calcium gluconate, [MAR Hold] dextrose 10 % in water (D10W), [MAR Hold] dextrose, [MAR Hold] glucagon, [MAR Hold] heparin, [MAR Hold] HYDROmorphone, [MAR Hold] labetaloL, [MAR Hold] magnesium sulfate, [MAR Hold] magnesium sulfate, [MAR Hold] oxygen, [MAR Hold] potassium chloride, [MAR Hold] potassium chloride  Results for orders placed or performed during the hospital encounter of 10/06/23 (from the past 24 hour(s))   POCT GLUCOSE   Result Value Ref Range    POCT Glucose 111 (H) 74 - 99 mg/dL   C. difficile, PCR    Specimen: Stool   Result Value Ref Range    C. difficile, PCR Not Detected Not Detected   POCT GLUCOSE   Result Value Ref Range    POCT Glucose 109 (H) 74 - 99 mg/dL   Calcium, Ionized   Result Value Ref Range    POCT Calcium, Ionized 1.09 (L) 1.1 - 1.33 mmol/L   CBC   Result Value Ref Range    WBC 10.7 4.4 - 11.3 x10*3/uL    nRBC 0.0 0.0 - 0.0 /100 WBCs    RBC 3.95 (L) 4.00 - 5.20 x10*6/uL    Hemoglobin 10.5 (L) 12.0 - 16.0 g/dL    Hematocrit 30.9 (L) 36.0 - 46.0 %    MCV 78 (L) 80 - 100 fL    MCH 26.6 26.0 - 34.0 pg    MCHC 34.0 32.0 - 36.0 g/dL    RDW 16.2 (H) 11.5 - 14.5 %    Platelets 214 150 - 450 x10*3/uL    MPV 11.9 (H) 7.5 - 11.5 fL   Coagulation Screen   Result Value Ref Range    Protime 17.9 (H) 9.8 - 12.8 seconds    INR 1.6 (H) 0.9 - 1.1    aPTT 29 27 - 38 seconds   Renal Function Panel   Result Value Ref  Range    Glucose 108 (H) 74 - 99 mg/dL    Sodium 147 (H) 136 - 145 mmol/L    Potassium 3.9 3.5 - 5.3 mmol/L    Chloride 106 98 - 107 mmol/L    Bicarbonate 31 21 - 32 mmol/L    Anion Gap 14 10 - 20 mmol/L    Urea Nitrogen 16 6 - 23 mg/dL    Creatinine 0.73 0.50 - 1.05 mg/dL    eGFR 89 >60 mL/min/1.73m*2    Calcium 8.2 (L) 8.6 - 10.6 mg/dL    Phosphorus 3.1 2.5 - 4.9 mg/dL    Albumin 3.2 (L) 3.4 - 5.0 g/dL   Magnesium   Result Value Ref Range    Magnesium 1.84 1.60 - 2.40 mg/dL   SARS-CoV-2 RT PCR   Result Value Ref Range    Coronavirus 2019, PCR Not Detected Not Detected   POCT GLUCOSE   Result Value Ref Range    POCT Glucose 94 74 - 99 mg/dL   POCT GLUCOSE   Result Value Ref Range    POCT Glucose >600 (H) 74 - 99 mg/dL   POCT GLUCOSE   Result Value Ref Range    POCT Glucose 200 (H) 74 - 99 mg/dL       This patient has a urinary catheter   Reason for the urinary catheter remaining today? Urine catheter unnecessary, will be removed today     Assessment/Plan   Principal Problem:    Mesenteric ischemia (CMS/HCC)  Active Problems:    Superior mesenteric artery thrombosis (CMS/HCC)    Atrial fibrillation (CMS/HCC)    Valvular heart disease    Chronic obstructive pulmonary disease (CMS/HCC)    Diabetes mellitus, type 2 (CMS/HCC)    Hilda Jones is a 68-year-old female with PMH of COPD, CHF, atrial flutter/A-fib on Coumadin, severe mitral stenosis, moderate tricuspid regurgitation, aortic insufficiency s/p AVR x2, prior SMA occlusion s/p thromboembolectomy, DM 2, and HTN presenting to the emergency department for abdominal pain, vaginal bleeding, diarrhea. S/p laparotomy with SMA embolectomy w/ Dr. Isaacs on 10/6 and closure on 10/7.    Plan:  NEURO: No Hx. Acute postoperative pain.  - Ongoing neuro and pain assessments  - PRN oxy and hydromorphone for pain control  - OOBTC  - PT/OT     CV: CHF, atrial flutter/A-fib on Coumadin, severe MS, moderate TR, AI s/p AVR x2. Hx of SALTY thrombus 7/2023, on warfarin. Patient maintaining  adequate BP without vasoactive support. Baseline cardiac function 55-60% (7/2023). S/p laparotomy with KLEVER, SMA embolectomy  w/ Dr. Isaacs on 10/6. Afib RVR on 10/7/2023, given IVP metopo, amio bolus and started on amio gtt. Repeat TTE 10/9 hyperdynamic, RVSP 50, with prosthetic AV stenosis, mod MV stenosis, severe TVR. MV and TV unchanged from 7/23.   - Goal -140  - Continuous EKG/abp monitoring  - Continue amio  mg BID until 10g load  - Continue metop 25 mg BID  - Cardiology consult for AC management per [facility] sx    PULM: No hx. Oxygenating well on NC. CXR with improved L>R effusion v atelectasis.  - Wean FiO2 as tolerated.    - Q1h incentive spirometer while awake  - additional pulm toilet prn.      GI: Regular diet  - discontinue PPI    : No history of renal disease. Baseline creatinine 0.6-1.0  - Volume resuscitation as needed  - Maintain U/O >0.5ml/kg/hr  - Check renal function panel post op and daily  - Replete electrolytes to goal K>4, Mg>2, Phos>2.5, ionized Ca>1.10.  - Continue home torsemide 20 mg BID    HEME: Acute blood loss anemia. H/H stable  - Check CBC and coags post op and daily  - SCDs  - Ongoing monitoring for s/s bleeding  - Low intensity heparin drip   - continue bridging to warfarin 5mg until two repeated INR btw 2-3    ENDO: Hx of DM. BG well controlled on SSI  - Q4h BG and SSI Lispro per ICU protocol.    ID: Afebrile. Leukocytosis  - Temp q4h, wbc daily  - Ongoing monitoring for s/s infection    Lines:  - R radial art line 10/7 - dc today  - PIV  - torre - dc today    Dispo: transfer to [facility]  Discussed with SICU intensivist, Dr. Jimenez Blanco MD

## 2023-10-12 ENCOUNTER — APPOINTMENT (OUTPATIENT)
Dept: RADIOLOGY | Facility: HOSPITAL | Age: 68
DRG: 336 | End: 2023-10-12
Payer: MEDICARE

## 2023-10-12 ENCOUNTER — APPOINTMENT (OUTPATIENT)
Dept: VASCULAR MEDICINE | Facility: HOSPITAL | Age: 68
DRG: 336 | End: 2023-10-12
Payer: MEDICARE

## 2023-10-12 LAB
ALBUMIN SERPL BCP-MCNC: 2.4 G/DL (ref 3.4–5)
ANION GAP SERPL CALC-SCNC: 19 MMOL/L (ref 10–20)
BUN SERPL-MCNC: 16 MG/DL (ref 6–23)
C DIF TOX TCDA+TCDB STL QL NAA+PROBE: NOT DETECTED
CALCIUM SERPL-MCNC: 6.9 MG/DL (ref 8.6–10.6)
CHLORIDE SERPL-SCNC: 105 MMOL/L (ref 98–107)
CO2 SERPL-SCNC: 25 MMOL/L (ref 21–32)
CREAT SERPL-MCNC: 0.72 MG/DL (ref 0.5–1.05)
ERYTHROCYTE [DISTWIDTH] IN BLOOD BY AUTOMATED COUNT: 16.1 % (ref 11.5–14.5)
GFR SERPL CREATININE-BSD FRML MDRD: >90 ML/MIN/1.73M*2
GLUCOSE BLD MANUAL STRIP-MCNC: 125 MG/DL (ref 74–99)
GLUCOSE BLD MANUAL STRIP-MCNC: 127 MG/DL (ref 74–99)
GLUCOSE BLD MANUAL STRIP-MCNC: 133 MG/DL (ref 74–99)
GLUCOSE BLD MANUAL STRIP-MCNC: 142 MG/DL (ref 74–99)
GLUCOSE BLD MANUAL STRIP-MCNC: 144 MG/DL (ref 74–99)
GLUCOSE BLD MANUAL STRIP-MCNC: 161 MG/DL (ref 74–99)
GLUCOSE SERPL-MCNC: 120 MG/DL (ref 74–99)
HCT VFR BLD AUTO: 29.7 % (ref 36–46)
HGB BLD-MCNC: 10 G/DL (ref 12–16)
INR PPP: 7.2 (ref 0.9–1.1)
LACTATE SERPL-SCNC: 0.8 MMOL/L (ref 0.4–2)
MCH RBC QN AUTO: 26.5 PG (ref 26–34)
MCHC RBC AUTO-ENTMCNC: 33.7 G/DL (ref 32–36)
MCV RBC AUTO: 79 FL (ref 80–100)
NRBC BLD-RTO: 0.4 /100 WBCS (ref 0–0)
PHOSPHATE SERPL-MCNC: 2.1 MG/DL (ref 2.5–4.9)
PLATELET # BLD AUTO: 191 X10*3/UL (ref 150–450)
PMV BLD AUTO: 11 FL (ref 7.5–11.5)
POTASSIUM SERPL-SCNC: 3.6 MMOL/L (ref 3.5–5.3)
PROTHROMBIN TIME: 82.6 SECONDS (ref 9.8–12.8)
RBC # BLD AUTO: 3.77 X10*6/UL (ref 4–5.2)
SODIUM SERPL-SCNC: 145 MMOL/L (ref 136–145)
UFH PPP CHRO-ACNC: 1.1 IU/ML
UFH PPP CHRO-ACNC: 1.3 IU/ML
UFH PPP CHRO-ACNC: <0.1 IU/ML
WBC # BLD AUTO: 10.4 X10*3/UL (ref 4.4–11.3)

## 2023-10-12 PROCEDURE — 93971 EXTREMITY STUDY: CPT | Performed by: INTERNAL MEDICINE

## 2023-10-12 PROCEDURE — 93971 EXTREMITY STUDY: CPT

## 2023-10-12 PROCEDURE — 2500000004 HC RX 250 GENERAL PHARMACY W/ HCPCS (ALT 636 FOR OP/ED)

## 2023-10-12 PROCEDURE — 99222 1ST HOSP IP/OBS MODERATE 55: CPT | Performed by: STUDENT IN AN ORGANIZED HEALTH CARE EDUCATION/TRAINING PROGRAM

## 2023-10-12 PROCEDURE — 80069 RENAL FUNCTION PANEL: CPT | Performed by: STUDENT IN AN ORGANIZED HEALTH CARE EDUCATION/TRAINING PROGRAM

## 2023-10-12 PROCEDURE — 83605 ASSAY OF LACTIC ACID: CPT | Performed by: NURSE PRACTITIONER

## 2023-10-12 PROCEDURE — 82947 ASSAY GLUCOSE BLOOD QUANT: CPT

## 2023-10-12 PROCEDURE — 2500000001 HC RX 250 WO HCPCS SELF ADMINISTERED DRUGS (ALT 637 FOR MEDICARE OP)

## 2023-10-12 PROCEDURE — 2500000004 HC RX 250 GENERAL PHARMACY W/ HCPCS (ALT 636 FOR OP/ED): Performed by: STUDENT IN AN ORGANIZED HEALTH CARE EDUCATION/TRAINING PROGRAM

## 2023-10-12 PROCEDURE — 2500000004 HC RX 250 GENERAL PHARMACY W/ HCPCS (ALT 636 FOR OP/ED): Performed by: NURSE PRACTITIONER

## 2023-10-12 PROCEDURE — 71045 X-RAY EXAM CHEST 1 VIEW: CPT | Mod: FY

## 2023-10-12 PROCEDURE — 2500000001 HC RX 250 WO HCPCS SELF ADMINISTERED DRUGS (ALT 637 FOR MEDICARE OP): Performed by: NURSE PRACTITIONER

## 2023-10-12 PROCEDURE — 85610 PROTHROMBIN TIME: CPT | Mod: CMCLAB | Performed by: STUDENT IN AN ORGANIZED HEALTH CARE EDUCATION/TRAINING PROGRAM

## 2023-10-12 PROCEDURE — 2580000001 HC RX 258 IV SOLUTIONS

## 2023-10-12 PROCEDURE — 85520 HEPARIN ASSAY: CPT | Performed by: SURGERY

## 2023-10-12 PROCEDURE — 87506 IADNA-DNA/RNA PROBE TQ 6-11: CPT | Performed by: NURSE PRACTITIONER

## 2023-10-12 PROCEDURE — 99233 SBSQ HOSP IP/OBS HIGH 50: CPT | Performed by: INTERNAL MEDICINE

## 2023-10-12 PROCEDURE — 71045 X-RAY EXAM CHEST 1 VIEW: CPT | Performed by: RADIOLOGY

## 2023-10-12 PROCEDURE — 2580000001 HC RX 258 IV SOLUTIONS: Performed by: NURSE PRACTITIONER

## 2023-10-12 PROCEDURE — 1100000001 HC PRIVATE ROOM DAILY

## 2023-10-12 PROCEDURE — C9113 INJ PANTOPRAZOLE SODIUM, VIA: HCPCS

## 2023-10-12 PROCEDURE — 99233 SBSQ HOSP IP/OBS HIGH 50: CPT | Performed by: NURSE PRACTITIONER

## 2023-10-12 PROCEDURE — 85027 COMPLETE CBC AUTOMATED: CPT | Performed by: STUDENT IN AN ORGANIZED HEALTH CARE EDUCATION/TRAINING PROGRAM

## 2023-10-12 PROCEDURE — 36415 COLL VENOUS BLD VENIPUNCTURE: CPT | Performed by: SURGERY

## 2023-10-12 PROCEDURE — 87493 C DIFF AMPLIFIED PROBE: CPT | Performed by: STUDENT IN AN ORGANIZED HEALTH CARE EDUCATION/TRAINING PROGRAM

## 2023-10-12 RX ORDER — OXYCODONE HYDROCHLORIDE 5 MG/1
10 TABLET ORAL EVERY 4 HOURS PRN
Status: DISCONTINUED | OUTPATIENT
Start: 2023-10-12 | End: 2023-10-19 | Stop reason: HOSPADM

## 2023-10-12 RX ORDER — MULTIVIT-MIN/IRON FUM/FOLIC AC 7.5 MG-4
1 TABLET ORAL DAILY
Status: DISCONTINUED | OUTPATIENT
Start: 2023-10-12 | End: 2023-10-19 | Stop reason: HOSPADM

## 2023-10-12 RX ORDER — ACETAMINOPHEN 325 MG/1
650 TABLET ORAL EVERY 6 HOURS
Status: DISCONTINUED | OUTPATIENT
Start: 2023-10-12 | End: 2023-10-19 | Stop reason: HOSPADM

## 2023-10-12 RX ORDER — ASPIRIN 81 MG/1
81 TABLET ORAL DAILY
Status: DISCONTINUED | OUTPATIENT
Start: 2023-10-12 | End: 2023-10-19 | Stop reason: HOSPADM

## 2023-10-12 RX ORDER — POTASSIUM CHLORIDE 20 MEQ/1
40 TABLET, EXTENDED RELEASE ORAL ONCE
Status: COMPLETED | OUTPATIENT
Start: 2023-10-12 | End: 2023-10-12

## 2023-10-12 RX ORDER — SODIUM CHLORIDE, SODIUM LACTATE, POTASSIUM CHLORIDE, CALCIUM CHLORIDE 600; 310; 30; 20 MG/100ML; MG/100ML; MG/100ML; MG/100ML
50 INJECTION, SOLUTION INTRAVENOUS CONTINUOUS
Status: DISCONTINUED | OUTPATIENT
Start: 2023-10-12 | End: 2023-10-19

## 2023-10-12 RX ORDER — FLUOXETINE HYDROCHLORIDE 20 MG/1
20 CAPSULE ORAL DAILY
Status: DISCONTINUED | OUTPATIENT
Start: 2023-10-12 | End: 2023-10-19 | Stop reason: HOSPADM

## 2023-10-12 RX ORDER — ATORVASTATIN CALCIUM 40 MG/1
40 TABLET, FILM COATED ORAL NIGHTLY
Status: DISCONTINUED | OUTPATIENT
Start: 2023-10-12 | End: 2023-10-19 | Stop reason: HOSPADM

## 2023-10-12 RX ORDER — OXYCODONE HYDROCHLORIDE 5 MG/1
5 TABLET ORAL EVERY 4 HOURS PRN
Status: DISCONTINUED | OUTPATIENT
Start: 2023-10-12 | End: 2023-10-19 | Stop reason: HOSPADM

## 2023-10-12 RX ORDER — DONEPEZIL HYDROCHLORIDE 5 MG/1
5 TABLET, FILM COATED ORAL NIGHTLY
Status: DISCONTINUED | OUTPATIENT
Start: 2023-10-12 | End: 2023-10-19 | Stop reason: HOSPADM

## 2023-10-12 RX ORDER — LOPERAMIDE HYDROCHLORIDE 2 MG/1
2 CAPSULE ORAL 4 TIMES DAILY PRN
Status: DISCONTINUED | OUTPATIENT
Start: 2023-10-12 | End: 2023-10-19 | Stop reason: HOSPADM

## 2023-10-12 RX ADMIN — POTASSIUM CHLORIDE 40 MEQ: 1500 TABLET, EXTENDED RELEASE ORAL at 15:36

## 2023-10-12 RX ADMIN — ATORVASTATIN CALCIUM 40 MG: 40 TABLET, FILM COATED ORAL at 21:07

## 2023-10-12 RX ADMIN — WARFARIN SODIUM 5 MG: 5 TABLET ORAL at 17:21

## 2023-10-12 RX ADMIN — INSULIN GLARGINE 5 UNITS: 100 INJECTION, SOLUTION SUBCUTANEOUS at 21:12

## 2023-10-12 RX ADMIN — ACETAMINOPHEN 650 MG: 325 TABLET ORAL at 21:07

## 2023-10-12 RX ADMIN — PANTOPRAZOLE SODIUM 40 MG: 40 INJECTION, POWDER, FOR SOLUTION INTRAVENOUS at 06:31

## 2023-10-12 RX ADMIN — ACETAMINOPHEN 650 MG: 325 TABLET ORAL at 10:00

## 2023-10-12 RX ADMIN — HEPARIN SODIUM 1400 UNITS/HR: 10000 INJECTION, SOLUTION INTRAVENOUS at 17:22

## 2023-10-12 RX ADMIN — ASPIRIN 81 MG: 81 TABLET, COATED ORAL at 10:00

## 2023-10-12 RX ADMIN — METOPROLOL TARTRATE 50 MG: 50 TABLET, FILM COATED ORAL at 10:00

## 2023-10-12 RX ADMIN — SODIUM CHLORIDE, SODIUM LACTATE, POTASSIUM CHLORIDE, AND CALCIUM CHLORIDE 500 ML: 600; 310; 30; 20 INJECTION, SOLUTION INTRAVENOUS at 11:45

## 2023-10-12 RX ADMIN — LOPERAMIDE HYDROCHLORIDE 2 MG: 2 CAPSULE ORAL at 21:07

## 2023-10-12 RX ADMIN — SODIUM CHLORIDE, POTASSIUM CHLORIDE, SODIUM LACTATE AND CALCIUM CHLORIDE 50 ML/HR: 600; 310; 30; 20 INJECTION, SOLUTION INTRAVENOUS at 17:21

## 2023-10-12 RX ADMIN — SODIUM CHLORIDE, POTASSIUM CHLORIDE, SODIUM LACTATE AND CALCIUM CHLORIDE 50 ML/HR: 600; 310; 30; 20 INJECTION, SOLUTION INTRAVENOUS at 05:21

## 2023-10-12 RX ADMIN — LOPERAMIDE HYDROCHLORIDE 2 MG: 2 CAPSULE ORAL at 15:36

## 2023-10-12 RX ADMIN — FLUOXETINE 20 MG: 20 CAPSULE ORAL at 10:00

## 2023-10-12 RX ADMIN — DONEPEZIL HYDROCHLORIDE 5 MG: 5 TABLET ORAL at 21:07

## 2023-10-12 RX ADMIN — HEPARIN SODIUM 1600 UNITS/HR: 10000 INJECTION, SOLUTION INTRAVENOUS at 03:59

## 2023-10-12 RX ADMIN — Medication 1 TABLET: at 09:45

## 2023-10-12 RX ADMIN — HEPARIN SODIUM 3000 UNITS: 5000 INJECTION INTRAVENOUS; SUBCUTANEOUS at 03:59

## 2023-10-12 RX ADMIN — ACETAMINOPHEN 650 MG: 325 TABLET ORAL at 15:36

## 2023-10-12 ASSESSMENT — ACTIVITIES OF DAILY LIVING (ADL): LACK_OF_TRANSPORTATION: PATIENT DECLINED

## 2023-10-12 ASSESSMENT — PAIN SCALES - WONG BAKER: WONGBAKER_NUMERICALRESPONSE: NO HURT

## 2023-10-12 ASSESSMENT — COGNITIVE AND FUNCTIONAL STATUS - GENERAL
MOVING FROM LYING ON BACK TO SITTING ON SIDE OF FLAT BED WITH BEDRAILS: A LITTLE
MOBILITY SCORE: 20
TURNING FROM BACK TO SIDE WHILE IN FLAT BAD: A LITTLE
DAILY ACTIVITIY SCORE: 21
DRESSING REGULAR LOWER BODY CLOTHING: A LITTLE
HELP NEEDED FOR BATHING: A LITTLE
WALKING IN HOSPITAL ROOM: A LITTLE
DRESSING REGULAR UPPER BODY CLOTHING: A LITTLE
CLIMB 3 TO 5 STEPS WITH RAILING: A LITTLE

## 2023-10-12 ASSESSMENT — PAIN - FUNCTIONAL ASSESSMENT: PAIN_FUNCTIONAL_ASSESSMENT: 0-10

## 2023-10-12 ASSESSMENT — PAIN SCALES - GENERAL: PAINLEVEL_OUTOF10: 0 - NO PAIN

## 2023-10-12 NOTE — SIGNIFICANT EVENT
Rapid Response RN Note    Rapid response RN at bedside for RADAR score 7 due to the following VS: T 36.3 °C;  ; RR 18; BP 97/67; SPO2 95% on 4L NC    Reviewed above VS with bedside RN.  Patient with a history of Afib & vital signs at baseline; last EKG done 10/6/23 per chart review; pt also is on a heparin drip; no concerns @ this time per bedside RN    Staff to page rapid response for any concerns or acute change in condition/VS.

## 2023-10-12 NOTE — PROGRESS NOTES
"Hilda Jones is a 68 y.o. female on day 6 of admission presenting with Mesenteric ischemia (CMS/HCC).    Subjective   Still c/o frequent diarrhea. C-diff negative on 10/11. No significant abdominal pain. Slightly hypotensive and tachycardic. Remains on heparin drip bridging to warfarin.        Objective     Physical Exam  Constitutional: No acute distress, resting comfortably  Neuro:  AOx3, grossly intact  ENMT: moist mucous membranes  Head/neck: atraumatic  CV: no tachycardia  Pulm: non-labored on supplemental oxygen via NC   GI: soft, distended, appropriately tender  Skin: warm and dry  Musculoskeletal: moving all extremities    Last Recorded Vitals  Blood pressure 97/63, pulse (!) 125, temperature 36.5 °C (97.7 °F), resp. rate 18, height 1.651 m (5' 5\"), weight 68.9 kg (151 lb 12.8 oz), SpO2 98 %.  Intake/Output last 3 Shifts:  I/O last 3 completed shifts:  In: 570.1 (8.3 mL/kg) [P.O.:120; I.V.:450.1 (6.5 mL/kg)]  Out: 2505 (36.4 mL/kg) [Urine:2005 (0.8 mL/kg/hr); Stool:500]  Weight: 68.9 kg       Scheduled medications  atorvastatin, 40 mg, oral, Nightly  insulin glargine, 5 Units, subcutaneous, Nightly  insulin lispro, 0-10 Units, subcutaneous, q4h  metoprolol tartrate, 50 mg, oral, BID  pantoprazole, 40 mg, intravenous, Daily before breakfast  [MAR Hold] perflutren protein A microsphere, 0.5 mL, intravenous, Once in imaging  [MAR Hold] sulfur hexafluoride microsphr, 2 mL, intravenous, Once in imaging  [Held by provider] torsemide, 20 mg, oral, BID  warfarin, 5 mg, oral, Daily      Continuous medications  heparin, 0-4,000 Units/hr, Last Rate: 1,600 Units/hr (10/12/23 0631)  lactated Ringer's, 50 mL/hr, Last Rate: 50 mL/hr (10/12/23 0521)      PRN medications  PRN medications: glucagon, heparin, HYDROmorphone, labetaloL, [MAR Hold] oxygen    Results for orders placed or performed during the hospital encounter of 10/06/23 (from the past 24 hour(s))   POCT GLUCOSE   Result Value Ref Range    POCT Glucose >600 (H) 74 " - 99 mg/dL   POCT GLUCOSE   Result Value Ref Range    POCT Glucose 200 (H) 74 - 99 mg/dL   POCT GLUCOSE   Result Value Ref Range    POCT Glucose 250 (H) 74 - 99 mg/dL   POCT GLUCOSE   Result Value Ref Range    POCT Glucose 101 (H) 74 - 99 mg/dL   Heparin Assay   Result Value Ref Range    Heparin Unfractionated <0.1 See Comment Below for Therapeutic Ranges IU/mL   POCT GLUCOSE   Result Value Ref Range    POCT Glucose 125 (H) 74 - 99 mg/dL   Heparin Assay   Result Value Ref Range    Heparin Unfractionated <0.1 See Comment Below for Therapeutic Ranges IU/mL   POCT GLUCOSE   Result Value Ref Range    POCT Glucose 133 (H) 74 - 99 mg/dL   POCT GLUCOSE   Result Value Ref Range    POCT Glucose 142 (H) 74 - 99 mg/dL     Relevant Results  XR chest 1 view 10/12/2023 ([facility]et Read)  This result has not been signed. Information might be incomplete.    Narrative  STUDY:  [XR CHEST 1 VIEW];  [10/12/2023 5:45 am]    INDICATION:  [Signs/Symptoms:o2 requirements].    COMPARISON:  [Chest radiograph 10/10/2023]    ACCESSION NUMBER(S):  [[id number]]    ORDERING CLINICIAN:  [BAKARI ESCALONA]    FINDINGS:  [AP radiograph of the chest was provided.]        []        CARDIOMEDIASTINAL SILHOUETTE:    [Cardiomediastinal silhouette is stable in size and configuration.]        LUNGS:    [Lung aeration appears improved compared to prior radiograph. Re-demonstration of perihilar and interstitial prominence. Left lower lung lobe costophrenic angle blunting suggestive of trace pleural with atelectasis. No pneumothorax. Redemonstration of emphysematous changes.]        ABDOMEN:    [No remarkable upper abdominal findings.]        BONES:    [Status post median sternotomy with intact sternal wires. No acute osseous changes.]    Impression  [Slight interval improvement of lung aeration compared to prior imaging.]    [Possible trace left-sided pleural effusion with atelectasis.]    Emphysematous changes.    No pneumothorax.     CT angio abdomen pelvis w and  or wo IV IV contrast 10/06/2023    Narrative  Interpreted By:  Júnior Stacy,  STUDY:  CT ANGIO ABDOMEN PELVIS W AND/OR WO IV IV CONTRAST;  10/6/2023 2:12 pm    INDICATION:  Signs/Symptoms:c/f SMA thrombus on contrasted CT.    COMPARISON:  None.    ACCESSION NUMBER(S):  [id number]    ORDERING CLINICIAN:  ASIF VARGAS    TECHNIQUE:  Axial CT images of the abdomen and pelvis after intravenous  administration of 75 mL Omnipaque 350 using CT angiographic  technique. Coronal and sagittal images are reconstructed.  3D  reconstructions were obtained at a separate workstation.    FINDINGS:  VASCULAR:  Again seen is a thrombus involving the distal SMA covering multiple  jejunal and ileal branches, extending into the ileocolic artery.  While there is complete occlusion of the SMA at this level,  collateralized filling many or all of the ileal and jejunal branches  is observed. No definite superior mesenteric vein thrombus.  Incomplete opacification of the distal SMV may result from inflow  artifacts versus thrombosis.    The aorta is widely patent with mild scattered atherosclerotic  disease. Unenhanced images demonstrate no evidence mural hematoma.  The celiac artery is widely patent. SMA findings as above. The HEATHER is  widely patent. Right and left renal arteries are widely patent.    Bilateral common iliac arteries, internal, and external iliac  arteries demonstrate scattered atherosclerotic change without  significant stenosis. There is chronic occlusion of the right SFA.    LOWER CHEST:    HEART: Stable enlargement. No pericardial effusion.  LUNG, PLEURA, LARGE AIRWAYS: Included images of the lower lungs  demonstrate partially calcified right pleural plaques. There are  chronic changes involving the major fissure these are consistent with  scarring or atelectasis. There is no pleural effusion.    ABDOMEN/PELVIS:    LIVER: Normal attenuation and contour. Scattered subcentimeter  hypoattenuating lesions are too small  to characterize, statistically  these are most likely small cysts or hemangiomas and demonstrate  stability over time. BILE DUCTS: No significant dilation.  GALLBLADDER: The gallbladder contains gallstones. The appearance is  unchanged. There is no wall thickening or pericholecystic fluid.  SPLEEN: Unremarkable. PANCREAS: Unremarkable.  ADRENALS:  Unremarkable.  KIDNEYS, URETERS AND BLADDER: Symmetric renal enhancement. No  hydronephrosis or perinephric fluid collection. Evidence of prior  cortical infarct again noted. Simple cysts are seen throughout both  kidneys and are described on the exam performed earlier this day. The  bladder is unremarkable, although there is extrinsic compression from  the uterus.    REPRODUCTIVE ORGANS: Again seen is a large heterogeneously enhancing  mural mass of the fundus.    ABDOMINAL WALL: Evidence of prior midline incision.    BOWEL: There is mild wall thickening and dilation of fluid-filled  small bowel loops, more so in the left hemiabdomen. There is no  pneumatosis, free air, or portal venous gas. PERITONEUM: No ascites  or free air, no fluid collection. RETROPERITONEUM: Within normal  limits.    BONES: No acute osseous abnormality.    Impression  1. Persistent occlusion of the distal SMA at the level of the 1st  jejunal branch. The thrombus covers jejunal and ileal branches,  extending to the ileocolic artery. Some ileal and jejunal branches  are filled via celiac and HEATHER collaterals, as is the distal ileocolic  artery. There is mild, but worsening dilation and wall thickening of  fluid-filled loops of small bowel, consistent with a degree of  hypoperfusion, but no pneumatosis, portal venous gas, or free air.  2. Incomplete opacification of SMV branches may be related to  unopacified in filling vessels and not necessarily thrombus, although  nonocclusive thrombus can not be excluded.  3. Other intra-abdominal findings as per the 09:35 a.m. 10/06/2023 CT  abdomen and  pelvis.    Signed by: Júnior Stacy 10/6/2023 3:11 PM  Dictation workstation:   [id number]        Assessment/Plan   Hilda Jones is 68 y.o. female with history of HTN, DM2, COPD, CHF, Afib/Aflutter (on warfarin), mitral stenosis, TR, AR s/p AVR x2, prior SMA occlusion s/p thromboembolectomy who is POD#3 s/p open mesenteric embolectomy for acute mesenteric ischemia likely related to sub-therapeutic INR on presentation.     10/7: Second look and closure  10/8: TTE demonstrated 75% LVEF, severe TR, Afib  10/10: Downtrending WBC  10/11: Transferred to [facility] with tele   10/12: tonight is dose #3 of warfarin, GI consult for diarrhea, obtain lactate & stool panel     Plan:   Neuro: acute postoperative pain, insomnia, dementia   - continue pain control with tylenol & PRN oxy   - continue NV checks q4h   - holding home trazodone & gabapentin   - continue home prozac & donepezil   - continue multivitamin     Cardiac: HTN, CHF, afib/aflutter (home warfarin), mitral stenosis, TR, AR, SMA occlusion, acute mesenteric ischemia x2, SALTY thrombus   - maintain blood pressure control  - continue home metoprolol with home parameters   - holding home torsemide & spironolactone   - continue heparin drip with bridge to warfarin (day #3 of warfarin)   - continue aspirin & statin   - appreciate cardiology recommendations: metop 50 BID, continue AC     Resp: COPD, tobacco dependency   - IS hourly   - OOB as tolerated   - wean oxygen as tolerated   - smoking cessation     FENGI: recurrent acute mesenteric ischemia likely 2/2 subtherapeutic INR, hypoalbuminemia, diarrhea, hypomagnesemia, hypokalemia, overweight   - continue DM diet with oral supplements   - obtain lactate  - obtain stool panel   - continue mIVF   - GI consult   - c-diff negative 10/11  - discontinue bowel regimen   - replete electrolytes as needed to maintain K>4, Mg>2   - RFP as clinically indicated     Endo: T2DM   - continue SSI ACHS   - continue lantus 5u (home dose 10u)    - DM diet   - holding home jardiance     ID:   - trend temp q4h   - CBC as clinically indicated     Heme: acute blood loss anemia   - monitor for s/sx bleeding   - CBC as clinically indicated     Dispo:   - RNF with tele   - PT recs home with PT     PUJA Rivers-CNP

## 2023-10-12 NOTE — PROGRESS NOTES
"Hilda Jones is a 68 y.o. female on day 6 of admission presenting with Mesenteric ischemia (CMS/HCC).    Subjective   A flutter V rate 90s  Denies CP/ SOB/dizziness, palpitations        Objective     Physical Exam  General: Sitting up in bed, 4L nc. No acute distress.   Skin:  warm and dry  HEENT: EOMI. MMM  Cardiovascular: Tachycardia at time of exam. No JVD at 45 degrees   Respiratory: CTAB  Gastrointestinal: soft, non-distended  Genitourinary: torre with yellow urine  Extremities: no edema  Neurological: no gross focal deficits  Psychiatric: appropriate mood and affect     Last Recorded Vitals  Blood pressure 84/53, pulse 88, temperature 36 °C (96.8 °F), resp. rate 18, height 1.651 m (5' 5\"), weight 68.9 kg (151 lb 12.8 oz), SpO2 98 %.  Intake/Output last 3 Shifts:  I/O last 3 completed shifts:  In: 570.1 (8.3 mL/kg) [P.O.:120; I.V.:450.1 (6.5 mL/kg)]  Out: 2505 (36.4 mL/kg) [Urine:2005 (0.8 mL/kg/hr); Stool:500]  Weight: 68.9 kg       Assessment/Plan   Principal Problem:    Mesenteric ischemia (CMS/HCC)  Active Problems:    Superior mesenteric artery thrombosis (CMS/HCC)    Atrial fibrillation (CMS/HCC)    Valvular heart disease    Chronic obstructive pulmonary disease (CMS/HCC)    Diabetes mellitus, type 2 (CMS/HCC)    Impression:  #HF 2/2 rheumatic heart disease with symptomatic mitral stenosis and severe TR  #AFL  #AF with 2 prior thromboemboli  #s/p AVR 2004, 2016 with #25 Freestyle  #SALTY thrombus 7/2023     Is at very high risk of thromboembolism given rheumatic mitral stenosis and recent SALTY thrombus and current admission for thromboembolism - would rate control and ensure adequate AC. There is a mortality benefit and a thromboembolism benefit to warfarin over DOAC in rheumatic AF without significant increase in bleeding (INVICTUS). Current thromboembolism likely due to inadequate warfarin dosing given most of the INR's in our system are subtherapeutic - would focus on ensuring reliable warfarin/INR " follow-up given very high thromboembolic risk, and avoid rhythm control at this time given high risk of thromboembolism.     Recommendations:  - stop amiodarone  - c/w Metoprolol tartrate increased to 50 bid (home dose)  - HR last 24 hours    - agree with hep gtt, would bridge with heparin or LMWH 1 mg/kg bid until 2 therapeutic INR's in a row  - c/w warfarin to target INR 2.0 - 3.0 per primary team   - will need careful outpatient INR follow-up  - 10/10 Warfarin started at 1800  - INR daily pending 1.6 (1.6)    Cardiology will follow,    Mauricio Dudley DNP, APRN-CNP  Cardiology Consults    Please call with any questions  Pager [phone] M-F 8a-5p; Saturday 8a-2p  Pager [phone] all other times

## 2023-10-13 LAB
ALBUMIN SERPL BCP-MCNC: 2.8 G/DL (ref 3.4–5)
ANION GAP SERPL CALC-SCNC: 10 MMOL/L (ref 10–20)
APTT PPP: >200 SECONDS (ref 27–38)
BUN SERPL-MCNC: 14 MG/DL (ref 6–23)
C COLI+JEJ+UPSA DNA STL QL NAA+PROBE: NOT DETECTED
CALCIUM SERPL-MCNC: 7.6 MG/DL (ref 8.6–10.6)
CHLORIDE SERPL-SCNC: 104 MMOL/L (ref 98–107)
CO2 SERPL-SCNC: 33 MMOL/L (ref 21–32)
CREAT SERPL-MCNC: 0.71 MG/DL (ref 0.5–1.05)
EC STX1 GENE STL QL NAA+PROBE: NOT DETECTED
EC STX2 GENE STL QL NAA+PROBE: NOT DETECTED
ERYTHROCYTE [DISTWIDTH] IN BLOOD BY AUTOMATED COUNT: 16.8 % (ref 11.5–14.5)
GFR SERPL CREATININE-BSD FRML MDRD: >90 ML/MIN/1.73M*2
GLIADIN PEPTIDE IGA SER IA-ACNC: <1 U/ML
GLIADIN PEPTIDE IGG SER IA-ACNC: <1 U/ML
GLUCOSE BLD MANUAL STRIP-MCNC: 105 MG/DL (ref 74–99)
GLUCOSE BLD MANUAL STRIP-MCNC: 109 MG/DL (ref 74–99)
GLUCOSE BLD MANUAL STRIP-MCNC: 121 MG/DL (ref 74–99)
GLUCOSE BLD MANUAL STRIP-MCNC: 141 MG/DL (ref 74–99)
GLUCOSE BLD MANUAL STRIP-MCNC: 85 MG/DL (ref 74–99)
GLUCOSE BLD MANUAL STRIP-MCNC: 93 MG/DL (ref 74–99)
GLUCOSE SERPL-MCNC: 95 MG/DL (ref 74–99)
HCT VFR BLD AUTO: 27.7 % (ref 36–46)
HGB BLD-MCNC: 9.4 G/DL (ref 12–16)
IGA SERPL-MCNC: 242 MG/DL (ref 70–400)
INR PPP: 1.5 (ref 0.9–1.1)
INR PPP: 1.9 (ref 0.9–1.1)
INR PPP: ABNORMAL
LABORATORY COMMENT REPORT: NORMAL
MAGNESIUM SERPL-MCNC: 1.19 MG/DL (ref 1.6–2.4)
MCH RBC QN AUTO: 26.6 PG (ref 26–34)
MCHC RBC AUTO-ENTMCNC: 33.9 G/DL (ref 32–36)
MCV RBC AUTO: 78 FL (ref 80–100)
NOROVIRUS GI + GII RNA STL NAA+PROBE: NOT DETECTED
NRBC BLD-RTO: 0.3 /100 WBCS (ref 0–0)
PATH REPORT.FINAL DX SPEC: NORMAL
PATH REPORT.GROSS SPEC: NORMAL
PATH REPORT.RELEVANT HX SPEC: NORMAL
PATH REPORT.TOTAL CANCER: NORMAL
PHOSPHATE SERPL-MCNC: 2.5 MG/DL (ref 2.5–4.9)
PLATELET # BLD AUTO: 195 X10*3/UL (ref 150–450)
PMV BLD AUTO: 10.7 FL (ref 7.5–11.5)
POTASSIUM SERPL-SCNC: 3.4 MMOL/L (ref 3.5–5.3)
PROTHROMBIN TIME: 16.7 SECONDS (ref 9.8–12.8)
PROTHROMBIN TIME: 21.8 SECONDS (ref 9.8–12.8)
PROTHROMBIN TIME: >100 SECONDS (ref 9.8–12.8)
RBC # BLD AUTO: 3.54 X10*6/UL (ref 4–5.2)
RV RNA STL NAA+PROBE: NOT DETECTED
SALMONELLA DNA STL QL NAA+PROBE: NOT DETECTED
SHIGELLA DNA SPEC QL NAA+PROBE: NOT DETECTED
SODIUM SERPL-SCNC: 144 MMOL/L (ref 136–145)
T4 FREE SERPL-MCNC: 1.31 NG/DL (ref 0.78–1.48)
TSH SERPL-ACNC: 4.35 MIU/L (ref 0.44–3.98)
TTG IGA SER IA-ACNC: <1 U/ML
TTG IGG SER IA-ACNC: <1 U/ML
UFH PPP CHRO-ACNC: 0.4 IU/ML
UFH PPP CHRO-ACNC: 1.1 IU/ML
V CHOLERAE DNA STL QL NAA+PROBE: NOT DETECTED
WBC # BLD AUTO: 11.3 X10*3/UL (ref 4.4–11.3)
Y ENTEROCOL DNA STL QL NAA+PROBE: NOT DETECTED

## 2023-10-13 PROCEDURE — 85610 PROTHROMBIN TIME: CPT | Mod: CMCLAB | Performed by: STUDENT IN AN ORGANIZED HEALTH CARE EDUCATION/TRAINING PROGRAM

## 2023-10-13 PROCEDURE — 2500000004 HC RX 250 GENERAL PHARMACY W/ HCPCS (ALT 636 FOR OP/ED)

## 2023-10-13 PROCEDURE — 2500000004 HC RX 250 GENERAL PHARMACY W/ HCPCS (ALT 636 FOR OP/ED): Performed by: NURSE PRACTITIONER

## 2023-10-13 PROCEDURE — 36415 COLL VENOUS BLD VENIPUNCTURE: CPT | Mod: CMCLAB | Performed by: SURGERY

## 2023-10-13 PROCEDURE — 82784 ASSAY IGA/IGD/IGG/IGM EACH: CPT | Mod: CMCLAB | Performed by: STUDENT IN AN ORGANIZED HEALTH CARE EDUCATION/TRAINING PROGRAM

## 2023-10-13 PROCEDURE — 99233 SBSQ HOSP IP/OBS HIGH 50: CPT | Performed by: STUDENT IN AN ORGANIZED HEALTH CARE EDUCATION/TRAINING PROGRAM

## 2023-10-13 PROCEDURE — 86364 TISS TRNSGLTMNASE EA IG CLAS: CPT | Mod: CMCLAB | Performed by: STUDENT IN AN ORGANIZED HEALTH CARE EDUCATION/TRAINING PROGRAM

## 2023-10-13 PROCEDURE — 1100000001 HC PRIVATE ROOM DAILY

## 2023-10-13 PROCEDURE — 36415 COLL VENOUS BLD VENIPUNCTURE: CPT | Performed by: STUDENT IN AN ORGANIZED HEALTH CARE EDUCATION/TRAINING PROGRAM

## 2023-10-13 PROCEDURE — 85520 HEPARIN ASSAY: CPT | Mod: CMCLAB | Performed by: SURGERY

## 2023-10-13 PROCEDURE — 82947 ASSAY GLUCOSE BLOOD QUANT: CPT

## 2023-10-13 PROCEDURE — 85027 COMPLETE CBC AUTOMATED: CPT | Mod: CMCLAB | Performed by: STUDENT IN AN ORGANIZED HEALTH CARE EDUCATION/TRAINING PROGRAM

## 2023-10-13 PROCEDURE — 99233 SBSQ HOSP IP/OBS HIGH 50: CPT | Performed by: INTERNAL MEDICINE

## 2023-10-13 PROCEDURE — 2500000004 HC RX 250 GENERAL PHARMACY W/ HCPCS (ALT 636 FOR OP/ED): Performed by: STUDENT IN AN ORGANIZED HEALTH CARE EDUCATION/TRAINING PROGRAM

## 2023-10-13 PROCEDURE — 85610 PROTHROMBIN TIME: CPT | Mod: CMCLAB | Performed by: NURSE PRACTITIONER

## 2023-10-13 PROCEDURE — 83735 ASSAY OF MAGNESIUM: CPT

## 2023-10-13 PROCEDURE — 84439 ASSAY OF FREE THYROXINE: CPT | Mod: CMCLAB | Performed by: STUDENT IN AN ORGANIZED HEALTH CARE EDUCATION/TRAINING PROGRAM

## 2023-10-13 PROCEDURE — C9113 INJ PANTOPRAZOLE SODIUM, VIA: HCPCS

## 2023-10-13 PROCEDURE — 80069 RENAL FUNCTION PANEL: CPT | Mod: CMCLAB | Performed by: STUDENT IN AN ORGANIZED HEALTH CARE EDUCATION/TRAINING PROGRAM

## 2023-10-13 PROCEDURE — 84443 ASSAY THYROID STIM HORMONE: CPT | Mod: CMCLAB | Performed by: STUDENT IN AN ORGANIZED HEALTH CARE EDUCATION/TRAINING PROGRAM

## 2023-10-13 PROCEDURE — 2500000001 HC RX 250 WO HCPCS SELF ADMINISTERED DRUGS (ALT 637 FOR MEDICARE OP): Performed by: NURSE PRACTITIONER

## 2023-10-13 RX ORDER — HEPARIN SODIUM 5000 [USP'U]/ML
INJECTION, SOLUTION INTRAVENOUS; SUBCUTANEOUS EVERY 4 HOURS PRN
Status: DISCONTINUED | OUTPATIENT
Start: 2023-10-13 | End: 2023-10-18

## 2023-10-13 RX ORDER — HEPARIN SODIUM 5000 [USP'U]/ML
4000 INJECTION, SOLUTION INTRAVENOUS; SUBCUTANEOUS ONCE
Status: COMPLETED | OUTPATIENT
Start: 2023-10-13 | End: 2023-10-13

## 2023-10-13 RX ORDER — MAGNESIUM SULFATE HEPTAHYDRATE 40 MG/ML
2 INJECTION, SOLUTION INTRAVENOUS ONCE
Status: COMPLETED | OUTPATIENT
Start: 2023-10-13 | End: 2023-10-13

## 2023-10-13 RX ORDER — HEPARIN SODIUM 10000 [USP'U]/100ML
0-4000 INJECTION, SOLUTION INTRAVENOUS CONTINUOUS
Status: DISCONTINUED | OUTPATIENT
Start: 2023-10-13 | End: 2023-10-18

## 2023-10-13 RX ORDER — POTASSIUM CHLORIDE 20 MEQ/1
20 TABLET, EXTENDED RELEASE ORAL ONCE
Status: COMPLETED | OUTPATIENT
Start: 2023-10-13 | End: 2023-10-13

## 2023-10-13 RX ADMIN — ATORVASTATIN CALCIUM 40 MG: 40 TABLET, FILM COATED ORAL at 20:42

## 2023-10-13 RX ADMIN — LOPERAMIDE HYDROCHLORIDE 2 MG: 2 CAPSULE ORAL at 08:47

## 2023-10-13 RX ADMIN — MAGNESIUM SULFATE HEPTAHYDRATE 2 G: 40 INJECTION, SOLUTION INTRAVENOUS at 14:40

## 2023-10-13 RX ADMIN — Medication 1 TABLET: at 09:00

## 2023-10-13 RX ADMIN — HEPARIN SODIUM 1300 UNITS/HR: 10000 INJECTION, SOLUTION INTRAVENOUS at 22:01

## 2023-10-13 RX ADMIN — DONEPEZIL HYDROCHLORIDE 5 MG: 5 TABLET ORAL at 20:42

## 2023-10-13 RX ADMIN — PANTOPRAZOLE SODIUM 40 MG: 40 INJECTION, POWDER, FOR SOLUTION INTRAVENOUS at 08:48

## 2023-10-13 RX ADMIN — ASPIRIN 81 MG: 81 TABLET, COATED ORAL at 08:47

## 2023-10-13 RX ADMIN — HEPARIN SODIUM 4000 UNITS: 5000 INJECTION, SOLUTION INTRAVENOUS; SUBCUTANEOUS at 22:01

## 2023-10-13 RX ADMIN — PHYTONADIONE 5 MG: 10 INJECTION, EMULSION INTRAMUSCULAR; INTRAVENOUS; SUBCUTANEOUS at 05:30

## 2023-10-13 RX ADMIN — FLUOXETINE 20 MG: 20 CAPSULE ORAL at 08:47

## 2023-10-13 RX ADMIN — PHYTONADIONE 5 MG: 10 INJECTION, EMULSION INTRAMUSCULAR; INTRAVENOUS; SUBCUTANEOUS at 14:44

## 2023-10-13 RX ADMIN — INSULIN GLARGINE 5 UNITS: 100 INJECTION, SOLUTION SUBCUTANEOUS at 20:43

## 2023-10-13 RX ADMIN — ACETAMINOPHEN 650 MG: 325 TABLET ORAL at 08:47

## 2023-10-13 RX ADMIN — POTASSIUM CHLORIDE 20 MEQ: 1500 TABLET, EXTENDED RELEASE ORAL at 14:41

## 2023-10-13 ASSESSMENT — COGNITIVE AND FUNCTIONAL STATUS - GENERAL
CLIMB 3 TO 5 STEPS WITH RAILING: A LITTLE
WALKING IN HOSPITAL ROOM: A LITTLE
DAILY ACTIVITIY SCORE: 21
DRESSING REGULAR UPPER BODY CLOTHING: A LITTLE
MOVING FROM LYING ON BACK TO SITTING ON SIDE OF FLAT BED WITH BEDRAILS: A LITTLE
TURNING FROM BACK TO SIDE WHILE IN FLAT BAD: A LITTLE
MOBILITY SCORE: 20
DRESSING REGULAR LOWER BODY CLOTHING: A LITTLE
HELP NEEDED FOR BATHING: A LITTLE

## 2023-10-13 ASSESSMENT — PAIN SCALES - GENERAL: PAINLEVEL_OUTOF10: 2

## 2023-10-13 NOTE — PROGRESS NOTES
Gastroenterology Consult Service Progress Note  Department of Gastroenterology & Hepatology  Digestive [facility]    [facility]  Date of Service  October 13, 2023   Patient: Hilda Jones    Medical Record: [id number]  Reason for Initial Consult: diarrhea  Requesting Service: vascular     Interval History: C diff negative, GI PCR negative. TSH slightly elevated, free T4 wnls. Lactate wnls. Other stool studies not yet sent.     Physical Exam:    Vital Signs:    Vitals:    10/13/23 0007 10/13/23 0412 10/13/23 0754 10/13/23 1134   BP: 93/64 104/61 92/58 85/55   Pulse: 75 74 73 74   Resp: 16 17 18 16   Temp: 36.5 °C (97.7 °F)  36.7 °C (98.1 °F) 36.8 °C (98.2 °F)   TempSrc:       SpO2: 100% 100% 100% 91%   Weight:       Height:             Intake/Output Summary (Last 24 hours) at 10/13/2023 1455  Last data filed at 10/13/2023 1445  Gross per 24 hour   Intake 1082.7 ml   Output 7 ml   Net 1075.7 ml       General: chronically ill appearing, no acute distress  HEENT: PERRLA, EOM intact, no scleral icterus, moist MM  Respiratory: on 4L NC, CTA bilaterally, normal work of breathing  Cardiovascular: RRR, no murmurs/rubs/gallops  Abdomen: Soft, tenderness around incision site, nondistended, bowel sounds present  Extremities: no edema, no asterixis  Neuro: alert and oriented, CNII-XII grossly intact, moves all 4 extremities with no focal deficits    Labs:  Lab Results   Component Value Date    WBC 11.3 10/13/2023    HGB 9.4 (L) 10/13/2023    HCT 27.7 (L) 10/13/2023    MCV 78 (L) 10/13/2023     10/13/2023     Lab Results   Component Value Date    GLUCOSE 95 10/13/2023    CALCIUM 7.6 (L) 10/13/2023     10/13/2023    K 3.4 (L) 10/13/2023    CO2 33 (H) 10/13/2023     10/13/2023    BUN 14 10/13/2023    CREATININE 0.71 10/13/2023     Assessment:       Hilda Jones is a 68 y.o. female with HTN, T2DM, COPD, CHF, AF (previously on coumadin), AR s/p AVR x2, hx of MSA occlusoin s/p  thromboembolectomy admitted with acute mesenteric ischemia s/p open mesenteric embolectomy. GI Consulted for workup of acute diarrhea and consideration of ischemic colitis.      Patient has had chronic, watery diarrhea for about 1 year now. Workup thus far negative for C diff, negative for enteric panel (though tested several months ago). Very large differential for chronic diarrhea, will start with workup as below to rule out hypothyroidism, celiac dx, infectious diarrhea, inflammatory diarrhea. In terms of question of ischemic colitis, she is certainly at risk, typically is associated with abd cramping and hematochezia, neither of which she has. If clinical suspicion is high, can consider lactate and/or CT imaging with contrast.     Workup thus far: TSH slightly elevated, T4 low. GI PCR negative, C diff negative. Lactate wnls.      - lab testing: celiac panel, serum IgA  - stool studies: Stool Ova and parasites, stool giardia, fecal calprotectin, fecal lactoferrin, fecal fat  - patient would benefit from outpatient endoscopic evaluation  - please order patient for OP GI clinic follow up upon discharge (with Dr. Soto)      Patient to be seen and staffed with Dr. Graham.  Thank you for the consultation. Gastroenterology will continue to the follow the patient.   Please do not hesitate to contact me or page [phone] if there are any further questions between the weekday hours of 7 AM - 5 PM.   If there is an urgent concern during the weekend, after-hours, or holidays; then please page the [place] GI fellow at [phone]. Thank you.    SIGNATURE: Hermelinda Rodríguez MD PATIENT NAME: Hilda Jones   DATE: October 13, 2023 MRN: [id number]

## 2023-10-13 NOTE — SIGNIFICANT EVENT
Rapid Response Note    Radar auto-page received with a score of 6 for the following vital signs: 36.8, 74, 16, 85/55, 91%.  Reviewed vital signs with RN who does not have any concerns at this time.  Will reach out to primary team regarding fluid resuscitation.  RN will contact rapid response with any concerns or with patient deterioration.

## 2023-10-13 NOTE — CARE PLAN
The patient's goals for the shift include      The clinical goals for the shift include patient will have control of bowels and get some rest    Over the shift, the patient did make progress toward the following goals. Patient is incontinent to bowels, so there is potential skin breakdown since she of often wet. She calls when she goes and is cleaned up.

## 2023-10-13 NOTE — PROGRESS NOTES
SpO2 90% on radar. SpO2 88% on RA when I go to room. Patient placed back on 4 L NC which she had been on previously according to documentation and o2 order. SpO2 now 96%.  Oxygen order held at this time for some unknown reason and will not allow me to document liter flow.

## 2023-10-13 NOTE — PROGRESS NOTES
"Hilda Jones is a 68 y.o. female on day 7 of admission presenting with Mesenteric ischemia (CMS/HCC).    Subjective   NSR 70s this am        Objective     Physical Exam  General: Sitting up in bed, 4L nc. No acute distress.   Skin:  warm and dry  HEENT: EOMI. MMM  Cardiovascular: Tachycardia at time of exam. No JVD at 45 degrees   Respiratory: CTAB  Gastrointestinal: soft, non-distended  Genitourinary: torre with yellow urine  Extremities: no edema  Neurological: no gross focal deficits  Psychiatric: appropriate mood and affect     Last Recorded Vitals  Blood pressure 85/55, pulse 74, temperature 36.8 °C (98.2 °F), resp. rate 16, height 1.651 m (5' 5\"), weight 68.9 kg (151 lb 12.8 oz), SpO2 91 %.  Intake/Output last 3 Shifts:  I/O last 3 completed shifts:  In: 1273.2 (18.5 mL/kg) [I.V.:1273.2 (18.5 mL/kg)]  Out: 706 (10.3 mL/kg) [Urine:700 (0.3 mL/kg/hr); Stool:6]  Weight: 68.9 kg       Assessment/Plan   Principal Problem:    Mesenteric ischemia (CMS/HCC)  Active Problems:    Superior mesenteric artery thrombosis (CMS/HCC)    Atrial fibrillation (CMS/HCC)    Valvular heart disease    Chronic obstructive pulmonary disease (CMS/HCC)    Diabetes mellitus, type 2 (CMS/HCC)    Impression:  #HF 2/2 rheumatic heart disease with symptomatic mitral stenosis and severe TR  #AFL  #AF with 2 prior thromboemboli  #s/p AVR 2004, 2016 with #25 Freestyle  #SALTY thrombus 7/2023     Is at very high risk of thromboembolism given rheumatic mitral stenosis and recent SALTY thrombus and current admission for thromboembolism - would rate control and ensure adequate AC. There is a mortality benefit and a thromboembolism benefit to warfarin over DOAC in rheumatic AF without significant increase in bleeding (INVICTUS). Current thromboembolism likely due to inadequate warfarin dosing given most of the INR's in our system are subtherapeutic - would focus on ensuring reliable warfarin/INR follow-up given very high thromboembolic risk, and avoid " rhythm control at this time given high risk of thromboembolism.     Recommendations:  - stop amiodarone  - c/w Metoprolol tartrate increased to 50 bid (home dose)  - If patient is unable to tolerate metop d/t SBP less than 90,  decrease dose   - ensure adeuqate oral intake/optimzie volume status  - patient appears hypovolemic on exam, has poor intake d/t issues with diarrhea and fear of eating as it causes diarrhea for her, GI following, appreciate recs   - HR last 24 hours    - agree with hep gtt, would bridge with heparin or LMWH 1 mg/kg bid until 2 therapeutic INR's in a row  - c/w warfarin to target INR 2.0 - 3.0 per primary team   - will need careful outpatient INR follow-up  - 10/10 Warfarin started at 1800  - INR daily 7.2 (1.6,1.6)  - patient received IV vitamin K 5mg  - could consider eliquis 5mg BID if the patients insurance covers  - this would be off label for rheumatic afib, but as patient states she was not going to coumadin clinic due to her diarrhea/fear of leaving the house,  this may be appropriate     Cardiology will follow,    Seen and discussed with Dr. Jayleen Dudley DNP, APRN-CNP  Cardiology Consults    Please call with any questions  Pager [phone] M-F 8a-5p; Saturday 8a-2p  Pager [phone] all other times

## 2023-10-13 NOTE — PROGRESS NOTES
"Hilda Jones is a 68 y.o. female on day 7 of admission presenting with Mesenteric ischemia (CMS/HCC).    Subjective   Still c/o frequent diarrhea. C-diff negative on 10/11. No significant abdominal pain. Slightly hypotensive and tachycardic. Remains on heparin drip bridging to warfarin.        Objective     Physical Exam  Constitutional: No acute distress, lying in bed  Neuro:  AOx3, grossly intact  ENMT: moist mucous membranes  Head/neck: atraumatic  CV: no tachycardia  Pulm: non-labored on supplemental oxygen via NC   GI: soft, distended, appropriately tender  Skin: warm and dry  Musculoskeletal: moving all extremities    Last Recorded Vitals  Blood pressure 85/55, pulse 74, temperature 36.8 °C (98.2 °F), resp. rate 16, height 1.651 m (5' 5\"), weight 68.9 kg (151 lb 12.8 oz), SpO2 91 %.  Intake/Output last 3 Shifts:  I/O last 3 completed shifts:  In: 1273.2 (18.5 mL/kg) [I.V.:1273.2 (18.5 mL/kg)]  Out: 706 (10.3 mL/kg) [Urine:700 (0.3 mL/kg/hr); Stool:6]  Weight: 68.9 kg       Scheduled medications  acetaminophen, 650 mg, oral, q6h  aspirin, 81 mg, oral, Daily  atorvastatin, 40 mg, oral, Nightly  donepezil, 5 mg, oral, Nightly  FLUoxetine, 20 mg, oral, Daily  insulin glargine, 5 Units, subcutaneous, Nightly  insulin lispro, 0-10 Units, subcutaneous, q4h  magnesium sulfate, 2 g, intravenous, Once  metoprolol tartrate, 50 mg, oral, BID  multivitamin with minerals, 1 tablet, oral, Daily  pantoprazole, 40 mg, intravenous, Daily before breakfast  [MAR Hold] perflutren protein A microsphere, 0.5 mL, intravenous, Once in imaging  phytonadione, 5 mg, intravenous, Once  potassium chloride CR, 20 mEq, oral, Once  [MAR Hold] sulfur hexafluoride microsphr, 2 mL, intravenous, Once in imaging  [Held by provider] torsemide, 20 mg, oral, BID      Continuous medications  lactated Ringer's, 50 mL/hr, Last Rate: 50 mL/hr (10/12/23 1266)      PRN medications  PRN medications: glucagon, HYDROmorphone, labetaloL, loperamide, oxyCODONE, " oxyCODONE, [MAR Hold] oxygen    Results for orders placed or performed during the hospital encounter of 10/06/23 (from the past 24 hour(s))   POCT GLUCOSE   Result Value Ref Range    POCT Glucose 127 (H) 74 - 99 mg/dL   Protime-INR   Result Value Ref Range    Protime 82.6 (HH) 9.8 - 12.8 seconds    INR 7.2 (HH) 0.9 - 1.1   Heparin Assay   Result Value Ref Range    Heparin Unfractionated 1.3 (HH) See Comment Below for Therapeutic Ranges IU/mL   POCT GLUCOSE   Result Value Ref Range    POCT Glucose 144 (H) 74 - 99 mg/dL   POCT GLUCOSE   Result Value Ref Range    POCT Glucose 105 (H) 74 - 99 mg/dL   Heparin Assay   Result Value Ref Range    Heparin Unfractionated 0.4 See Comment Below for Therapeutic Ranges IU/mL   POCT GLUCOSE   Result Value Ref Range    POCT Glucose 93 74 - 99 mg/dL   CBC   Result Value Ref Range    WBC 11.3 4.4 - 11.3 x10*3/uL    nRBC 0.3 (H) 0.0 - 0.0 /100 WBCs    RBC 3.54 (L) 4.00 - 5.20 x10*6/uL    Hemoglobin 9.4 (L) 12.0 - 16.0 g/dL    Hematocrit 27.7 (L) 36.0 - 46.0 %    MCV 78 (L) 80 - 100 fL    MCH 26.6 26.0 - 34.0 pg    MCHC 33.9 32.0 - 36.0 g/dL    RDW 16.8 (H) 11.5 - 14.5 %    Platelets 195 150 - 450 x10*3/uL    MPV 10.7 7.5 - 11.5 fL   Renal Function Panel   Result Value Ref Range    Glucose 95 74 - 99 mg/dL    Sodium 144 136 - 145 mmol/L    Potassium 3.4 (L) 3.5 - 5.3 mmol/L    Chloride 104 98 - 107 mmol/L    Bicarbonate 33 (H) 21 - 32 mmol/L    Anion Gap 10 10 - 20 mmol/L    Urea Nitrogen 14 6 - 23 mg/dL    Creatinine 0.71 0.50 - 1.05 mg/dL    eGFR >90 >60 mL/min/1.73m*2    Calcium 7.6 (L) 8.6 - 10.6 mg/dL    Phosphorus 2.5 2.5 - 4.9 mg/dL    Albumin 2.8 (L) 3.4 - 5.0 g/dL   Coagulation Screen   Result Value Ref Range    Protime >100.0 () 9.8 - 12.8 seconds    INR      aPTT >200 () 27 - 38 seconds   Magnesium   Result Value Ref Range    Magnesium 1.19 (L) 1.60 - 2.40 mg/dL   Heparin Assay   Result Value Ref Range    Heparin Unfractionated 1.1 () See Comment Below for Therapeutic  Ranges IU/mL   TSH   Result Value Ref Range    Thyroid Stimulating Hormone 4.35 (H) 0.44 - 3.98 mIU/L   T4, free   Result Value Ref Range    Thyroxine, Free 1.31 0.78 - 1.48 ng/dL   POCT GLUCOSE   Result Value Ref Range    POCT Glucose 85 74 - 99 mg/dL   POCT GLUCOSE   Result Value Ref Range    POCT Glucose 109 (H) 74 - 99 mg/dL     Relevant Results  XR chest 1 view 10/12/2023 (Wet Read)  This result has not been signed. Information might be incomplete.    Narrative  STUDY:  [XR CHEST 1 VIEW];  [10/12/2023 5:45 am]    INDICATION:  [Signs/Symptoms:o2 requirements].    COMPARISON:  [Chest radiograph 10/10/2023]    ACCESSION NUMBER(S):  [[id number]]    ORDERING CLINICIAN:  [BAKARI ESCALONA]    FINDINGS:  [AP radiograph of the chest was provided.]        []        CARDIOMEDIASTINAL SILHOUETTE:    [Cardiomediastinal silhouette is stable in size and configuration.]        LUNGS:    [Lung aeration appears improved compared to prior radiograph. Re-demonstration of perihilar and interstitial prominence. Left lower lung lobe costophrenic angle blunting suggestive of trace pleural with atelectasis. No pneumothorax. Redemonstration of emphysematous changes.]        ABDOMEN:    [No remarkable upper abdominal findings.]        BONES:    [Status post median sternotomy with intact sternal wires. No acute osseous changes.]    Impression  [Slight interval improvement of lung aeration compared to prior imaging.]    [Possible trace left-sided pleural effusion with atelectasis.]    Emphysematous changes.    No pneumothorax.     CT angio abdomen pelvis w and or wo IV IV contrast 10/06/2023    Narrative  Interpreted By:  Júnior Stacy,  STUDY:  CT ANGIO ABDOMEN PELVIS W AND/OR WO IV IV CONTRAST;  10/6/2023 2:12 pm    INDICATION:  Signs/Symptoms:c/f SMA thrombus on contrasted CT.    COMPARISON:  None.    ACCESSION NUMBER(S):  [id number]    ORDERING CLINICIAN:  ASIF VARGAS    TECHNIQUE:  Axial CT images of the abdomen and pelvis after  intravenous  administration of 75 mL Omnipaque 350 using CT angiographic  technique. Coronal and sagittal images are reconstructed.  3D  reconstructions were obtained at a separate workstation.    FINDINGS:  VASCULAR:  Again seen is a thrombus involving the distal SMA covering multiple  jejunal and ileal branches, extending into the ileocolic artery.  While there is complete occlusion of the SMA at this level,  collateralized filling many or all of the ileal and jejunal branches  is observed. No definite superior mesenteric vein thrombus.  Incomplete opacification of the distal SMV may result from inflow  artifacts versus thrombosis.    The aorta is widely patent with mild scattered atherosclerotic  disease. Unenhanced images demonstrate no evidence mural hematoma.  The celiac artery is widely patent. SMA findings as above. The HEATHER is  widely patent. Right and left renal arteries are widely patent.    Bilateral common iliac arteries, internal, and external iliac  arteries demonstrate scattered atherosclerotic change without  significant stenosis. There is chronic occlusion of the right SFA.    LOWER CHEST:    HEART: Stable enlargement. No pericardial effusion.  LUNG, PLEURA, LARGE AIRWAYS: Included images of the lower lungs  demonstrate partially calcified right pleural plaques. There are  chronic changes involving the major fissure these are consistent with  scarring or atelectasis. There is no pleural effusion.    ABDOMEN/PELVIS:    LIVER: Normal attenuation and contour. Scattered subcentimeter  hypoattenuating lesions are too small to characterize, statistically  these are most likely small cysts or hemangiomas and demonstrate  stability over time. BILE DUCTS: No significant dilation.  GALLBLADDER: The gallbladder contains gallstones. The appearance is  unchanged. There is no wall thickening or pericholecystic fluid.  SPLEEN: Unremarkable. PANCREAS: Unremarkable.  ADRENALS:  Unremarkable.  KIDNEYS, URETERS AND  BLADDER: Symmetric renal enhancement. No  hydronephrosis or perinephric fluid collection. Evidence of prior  cortical infarct again noted. Simple cysts are seen throughout both  kidneys and are described on the exam performed earlier this day. The  bladder is unremarkable, although there is extrinsic compression from  the uterus.    REPRODUCTIVE ORGANS: Again seen is a large heterogeneously enhancing  mural mass of the fundus.    ABDOMINAL WALL: Evidence of prior midline incision.    BOWEL: There is mild wall thickening and dilation of fluid-filled  small bowel loops, more so in the left hemiabdomen. There is no  pneumatosis, free air, or portal venous gas. PERITONEUM: No ascites  or free air, no fluid collection. RETROPERITONEUM: Within normal  limits.    BONES: No acute osseous abnormality.    Impression  1. Persistent occlusion of the distal SMA at the level of the 1st  jejunal branch. The thrombus covers jejunal and ileal branches,  extending to the ileocolic artery. Some ileal and jejunal branches  are filled via celiac and HEATHER collaterals, as is the distal ileocolic  artery. There is mild, but worsening dilation and wall thickening of  fluid-filled loops of small bowel, consistent with a degree of  hypoperfusion, but no pneumatosis, portal venous gas, or free air.  2. Incomplete opacification of SMV branches may be related to  unopacified in filling vessels and not necessarily thrombus, although  nonocclusive thrombus can not be excluded.  3. Other intra-abdominal findings as per the 09:35 a.m. 10/06/2023 CT  abdomen and pelvis.    Signed by: Júnior Stacy 10/6/2023 3:11 PM  Dictation workstation:   [id number]        Assessment/Plan   Hilda Jones is 68 y.o. female with history of HTN, DM2, COPD, CHF, Afib/Aflutter (on warfarin), mitral stenosis, TR, AR s/p AVR x2, prior SMA occlusion s/p thromboembolectomy who is POD#3 s/p open mesenteric embolectomy for acute mesenteric ischemia likely related to  sub-therapeutic INR on presentation.     10/7: Second look and closure  10/8: TTE demonstrated 75% LVEF, severe TR, Afib  10/10: Downtrending WBC  10/11: Transferred to [facility] with tele   10/12: tonight is dose #3 of warfarin, GI consult for diarrhea, obtain lactate & stool panel   10/13: stool studies negative    Plan:   Neuro: acute postoperative pain, insomnia, dementia   - continue pain control with tylenol & PRN oxy   - continue NV checks q4h   - holding home trazodone & gabapentin   - continue home prozac & donepezil   - continue multivitamin     Cardiac: HTN, CHF, afib/aflutter (home warfarin), mitral stenosis, TR, AR, SMA occlusion, acute mesenteric ischemia x2, SALTY thrombus   - maintain blood pressure control  - continue home metoprolol with home parameters   - holding home torsemide & spironolactone   - continue heparin drip with bridge to warfarin (day #3 of warfarin)   - continue aspirin & statin   - appreciate cardiology recommendations: metop 50 BID, continue AC     Resp: COPD, tobacco dependency   - IS hourly   - OOB as tolerated   - wean oxygen as tolerated   - smoking cessation     FENGI: recurrent acute mesenteric ischemia likely 2/2 subtherapeutic INR, hypoalbuminemia, diarrhea, hypomagnesemia, hypokalemia, overweight   - continue DM diet with oral supplements   - continue mIVF   - GI consult   - c-diff negative 10/11  - discontinue bowel regimen   - replete electrolytes as needed to maintain K>4, Mg>2   - RFP as clinically indicated     Endo: T2DM   - continue SSI ACHS   - continue lantus 5u (home dose 10u)   - DM diet   - holding home jardiance     ID:   - trend temp q4h   - CBC as clinically indicated     Heme: acute blood loss anemia   - monitor for s/sx bleeding   - CBC as clinically indicated     Dispo:   - [facility] with tele   - PT recommends home with PT     Calli Bolaños APRN-CNP

## 2023-10-13 NOTE — PROGRESS NOTES
10/13/2023 pt  has  Abraham Bowman.  Call to [facility] [phone] to update.  Faxed clinical to [facility] [phone].  [facility] aware  that HHC will be ordered at discharge.

## 2023-10-14 ENCOUNTER — APPOINTMENT (OUTPATIENT)
Dept: RADIOLOGY | Facility: HOSPITAL | Age: 68
DRG: 336 | End: 2023-10-14
Payer: MEDICARE

## 2023-10-14 LAB
ALBUMIN SERPL BCP-MCNC: 2.6 G/DL (ref 3.4–5)
ALBUMIN SERPL BCP-MCNC: 2.7 G/DL (ref 3.4–5)
ANION GAP SERPL CALC-SCNC: 13 MMOL/L (ref 10–20)
ANION GAP SERPL CALC-SCNC: 15 MMOL/L (ref 10–20)
APTT PPP: 57 SECONDS (ref 27–38)
APTT PPP: 60 SECONDS (ref 27–38)
BASOPHILS # BLD AUTO: 0.08 X10*3/UL (ref 0–0.1)
BASOPHILS NFR BLD AUTO: 0.4 %
BUN SERPL-MCNC: 10 MG/DL (ref 6–23)
BUN SERPL-MCNC: 10 MG/DL (ref 6–23)
CALCIUM SERPL-MCNC: 7.5 MG/DL (ref 8.6–10.6)
CALCIUM SERPL-MCNC: 7.6 MG/DL (ref 8.6–10.6)
CHLORIDE SERPL-SCNC: 102 MMOL/L (ref 98–107)
CHLORIDE SERPL-SCNC: 103 MMOL/L (ref 98–107)
CO2 SERPL-SCNC: 26 MMOL/L (ref 21–32)
CO2 SERPL-SCNC: 28 MMOL/L (ref 21–32)
CREAT SERPL-MCNC: 0.71 MG/DL (ref 0.5–1.05)
CREAT SERPL-MCNC: 0.71 MG/DL (ref 0.5–1.05)
EOSINOPHIL # BLD AUTO: 0.64 X10*3/UL (ref 0–0.7)
EOSINOPHIL NFR BLD AUTO: 3.4 %
ERYTHROCYTE [DISTWIDTH] IN BLOOD BY AUTOMATED COUNT: 17 % (ref 11.5–14.5)
ERYTHROCYTE [DISTWIDTH] IN BLOOD BY AUTOMATED COUNT: 17.1 % (ref 11.5–14.5)
ERYTHROCYTE [DISTWIDTH] IN BLOOD BY AUTOMATED COUNT: 17.2 % (ref 11.5–14.5)
GFR SERPL CREATININE-BSD FRML MDRD: >90 ML/MIN/1.73M*2
GFR SERPL CREATININE-BSD FRML MDRD: >90 ML/MIN/1.73M*2
GLUCOSE BLD MANUAL STRIP-MCNC: 109 MG/DL (ref 74–99)
GLUCOSE BLD MANUAL STRIP-MCNC: 113 MG/DL (ref 74–99)
GLUCOSE BLD MANUAL STRIP-MCNC: 113 MG/DL (ref 74–99)
GLUCOSE BLD MANUAL STRIP-MCNC: 129 MG/DL (ref 74–99)
GLUCOSE BLD MANUAL STRIP-MCNC: 99 MG/DL (ref 74–99)
GLUCOSE SERPL-MCNC: 108 MG/DL (ref 74–99)
GLUCOSE SERPL-MCNC: 117 MG/DL (ref 74–99)
HCT VFR BLD AUTO: 26.2 % (ref 36–46)
HCT VFR BLD AUTO: 27.8 % (ref 36–46)
HCT VFR BLD AUTO: 28.9 % (ref 36–46)
HGB BLD-MCNC: 8.8 G/DL (ref 12–16)
HGB BLD-MCNC: 9.3 G/DL (ref 12–16)
HGB BLD-MCNC: 9.4 G/DL (ref 12–16)
IMM GRANULOCYTES # BLD AUTO: 0.4 X10*3/UL (ref 0–0.7)
IMM GRANULOCYTES NFR BLD AUTO: 2.1 % (ref 0–0.9)
INR PPP: 1.2 (ref 0.9–1.1)
LYMPHOCYTES # BLD AUTO: 1.63 X10*3/UL (ref 1.2–4.8)
LYMPHOCYTES NFR BLD AUTO: 8.7 %
MAGNESIUM SERPL-MCNC: 1.61 MG/DL (ref 1.6–2.4)
MAGNESIUM SERPL-MCNC: 2.03 MG/DL (ref 1.6–2.4)
MCH RBC QN AUTO: 25.8 PG (ref 26–34)
MCH RBC QN AUTO: 26.3 PG (ref 26–34)
MCH RBC QN AUTO: 26.3 PG (ref 26–34)
MCHC RBC AUTO-ENTMCNC: 32.5 G/DL (ref 32–36)
MCHC RBC AUTO-ENTMCNC: 33.5 G/DL (ref 32–36)
MCHC RBC AUTO-ENTMCNC: 33.6 G/DL (ref 32–36)
MCV RBC AUTO: 78 FL (ref 80–100)
MCV RBC AUTO: 79 FL (ref 80–100)
MCV RBC AUTO: 79 FL (ref 80–100)
MONOCYTES # BLD AUTO: 2.1 X10*3/UL (ref 0.1–1)
MONOCYTES NFR BLD AUTO: 11.3 %
NEUTROPHILS # BLD AUTO: 13.78 X10*3/UL (ref 1.2–7.7)
NEUTROPHILS NFR BLD AUTO: 74.1 %
NRBC BLD-RTO: 0.3 /100 WBCS (ref 0–0)
PHOSPHATE SERPL-MCNC: 2.8 MG/DL (ref 2.5–4.9)
PHOSPHATE SERPL-MCNC: 3.2 MG/DL (ref 2.5–4.9)
PLATELET # BLD AUTO: 244 X10*3/UL (ref 150–450)
PLATELET # BLD AUTO: 268 X10*3/UL (ref 150–450)
PLATELET # BLD AUTO: 268 X10*3/UL (ref 150–450)
PMV BLD AUTO: 11.2 FL (ref 7.5–11.5)
PMV BLD AUTO: 11.7 FL (ref 7.5–11.5)
PMV BLD AUTO: 11.9 FL (ref 7.5–11.5)
POTASSIUM SERPL-SCNC: 3.6 MMOL/L (ref 3.5–5.3)
POTASSIUM SERPL-SCNC: 4 MMOL/L (ref 3.5–5.3)
PROTHROMBIN TIME: 13.8 SECONDS (ref 9.8–12.8)
RBC # BLD AUTO: 3.34 X10*6/UL (ref 4–5.2)
RBC # BLD AUTO: 3.53 X10*6/UL (ref 4–5.2)
RBC # BLD AUTO: 3.64 X10*6/UL (ref 4–5.2)
SODIUM SERPL-SCNC: 139 MMOL/L (ref 136–145)
SODIUM SERPL-SCNC: 140 MMOL/L (ref 136–145)
UFH PPP CHRO-ACNC: 0.5 IU/ML
UFH PPP CHRO-ACNC: 0.6 IU/ML
WBC # BLD AUTO: 15 X10*3/UL (ref 4.4–11.3)
WBC # BLD AUTO: 18.6 X10*3/UL (ref 4.4–11.3)
WBC # BLD AUTO: 20.3 X10*3/UL (ref 4.4–11.3)

## 2023-10-14 PROCEDURE — 82947 ASSAY GLUCOSE BLOOD QUANT: CPT | Mod: CMCLAB

## 2023-10-14 PROCEDURE — 93010 ELECTROCARDIOGRAM REPORT: CPT | Performed by: INTERNAL MEDICINE

## 2023-10-14 PROCEDURE — 85025 COMPLETE CBC W/AUTO DIFF WBC: CPT | Performed by: STUDENT IN AN ORGANIZED HEALTH CARE EDUCATION/TRAINING PROGRAM

## 2023-10-14 PROCEDURE — 89125 SPECIMEN FAT STAIN: CPT | Mod: CMCLAB | Performed by: STUDENT IN AN ORGANIZED HEALTH CARE EDUCATION/TRAINING PROGRAM

## 2023-10-14 PROCEDURE — 85730 THROMBOPLASTIN TIME PARTIAL: CPT | Mod: CMCLAB

## 2023-10-14 PROCEDURE — 1100000001 HC PRIVATE ROOM DAILY

## 2023-10-14 PROCEDURE — 85730 THROMBOPLASTIN TIME PARTIAL: CPT | Performed by: STUDENT IN AN ORGANIZED HEALTH CARE EDUCATION/TRAINING PROGRAM

## 2023-10-14 PROCEDURE — 36415 COLL VENOUS BLD VENIPUNCTURE: CPT | Mod: CMCLAB | Performed by: SURGERY

## 2023-10-14 PROCEDURE — 85027 COMPLETE CBC AUTOMATED: CPT | Mod: CMCLAB | Performed by: STUDENT IN AN ORGANIZED HEALTH CARE EDUCATION/TRAINING PROGRAM

## 2023-10-14 PROCEDURE — C9113 INJ PANTOPRAZOLE SODIUM, VIA: HCPCS

## 2023-10-14 PROCEDURE — 80069 RENAL FUNCTION PANEL: CPT | Performed by: STUDENT IN AN ORGANIZED HEALTH CARE EDUCATION/TRAINING PROGRAM

## 2023-10-14 PROCEDURE — 83993 ASSAY FOR CALPROTECTIN FECAL: CPT | Mod: CMCLAB | Performed by: STUDENT IN AN ORGANIZED HEALTH CARE EDUCATION/TRAINING PROGRAM

## 2023-10-14 PROCEDURE — 83735 ASSAY OF MAGNESIUM: CPT | Performed by: STUDENT IN AN ORGANIZED HEALTH CARE EDUCATION/TRAINING PROGRAM

## 2023-10-14 PROCEDURE — 83630 LACTOFERRIN FECAL (QUAL): CPT | Mod: CMCLAB | Performed by: STUDENT IN AN ORGANIZED HEALTH CARE EDUCATION/TRAINING PROGRAM

## 2023-10-14 PROCEDURE — 36415 COLL VENOUS BLD VENIPUNCTURE: CPT | Mod: CMCLAB | Performed by: STUDENT IN AN ORGANIZED HEALTH CARE EDUCATION/TRAINING PROGRAM

## 2023-10-14 PROCEDURE — 2500000004 HC RX 250 GENERAL PHARMACY W/ HCPCS (ALT 636 FOR OP/ED): Performed by: STUDENT IN AN ORGANIZED HEALTH CARE EDUCATION/TRAINING PROGRAM

## 2023-10-14 PROCEDURE — 85520 HEPARIN ASSAY: CPT | Performed by: SURGERY

## 2023-10-14 PROCEDURE — 2500000001 HC RX 250 WO HCPCS SELF ADMINISTERED DRUGS (ALT 637 FOR MEDICARE OP)

## 2023-10-14 PROCEDURE — 87329 GIARDIA AG IA: CPT | Mod: CMCLAB | Performed by: STUDENT IN AN ORGANIZED HEALTH CARE EDUCATION/TRAINING PROGRAM

## 2023-10-14 PROCEDURE — 85027 COMPLETE CBC AUTOMATED: CPT

## 2023-10-14 PROCEDURE — 70450 CT HEAD/BRAIN W/O DYE: CPT

## 2023-10-14 PROCEDURE — 70450 CT HEAD/BRAIN W/O DYE: CPT | Performed by: RADIOLOGY

## 2023-10-14 PROCEDURE — 2500000004 HC RX 250 GENERAL PHARMACY W/ HCPCS (ALT 636 FOR OP/ED)

## 2023-10-14 PROCEDURE — 2500000001 HC RX 250 WO HCPCS SELF ADMINISTERED DRUGS (ALT 637 FOR MEDICARE OP): Performed by: NURSE PRACTITIONER

## 2023-10-14 PROCEDURE — 80069 RENAL FUNCTION PANEL: CPT | Mod: CMCLAB | Performed by: STUDENT IN AN ORGANIZED HEALTH CARE EDUCATION/TRAINING PROGRAM

## 2023-10-14 PROCEDURE — 82947 ASSAY GLUCOSE BLOOD QUANT: CPT

## 2023-10-14 PROCEDURE — 83735 ASSAY OF MAGNESIUM: CPT | Mod: CMCLAB

## 2023-10-14 RX ORDER — MAGNESIUM SULFATE HEPTAHYDRATE 40 MG/ML
4 INJECTION, SOLUTION INTRAVENOUS ONCE
Status: COMPLETED | OUTPATIENT
Start: 2023-10-14 | End: 2023-10-15

## 2023-10-14 RX ORDER — POTASSIUM CHLORIDE 20 MEQ/1
40 TABLET, EXTENDED RELEASE ORAL ONCE
Status: COMPLETED | OUTPATIENT
Start: 2023-10-14 | End: 2023-10-14

## 2023-10-14 RX ADMIN — LOPERAMIDE HYDROCHLORIDE 2 MG: 2 CAPSULE ORAL at 22:30

## 2023-10-14 RX ADMIN — ACETAMINOPHEN 650 MG: 325 TABLET ORAL at 08:27

## 2023-10-14 RX ADMIN — PANTOPRAZOLE SODIUM 40 MG: 40 INJECTION, POWDER, FOR SOLUTION INTRAVENOUS at 08:19

## 2023-10-14 RX ADMIN — POTASSIUM CHLORIDE 40 MEQ: 1500 TABLET, EXTENDED RELEASE ORAL at 17:56

## 2023-10-14 RX ADMIN — DONEPEZIL HYDROCHLORIDE 5 MG: 5 TABLET ORAL at 20:17

## 2023-10-14 RX ADMIN — ACETAMINOPHEN 650 MG: 325 TABLET ORAL at 16:31

## 2023-10-14 RX ADMIN — ATORVASTATIN CALCIUM 40 MG: 40 TABLET, FILM COATED ORAL at 20:17

## 2023-10-14 RX ADMIN — METOPROLOL TARTRATE 50 MG: 50 TABLET, FILM COATED ORAL at 08:21

## 2023-10-14 RX ADMIN — ASPIRIN 81 MG: 81 TABLET, COATED ORAL at 08:21

## 2023-10-14 RX ADMIN — HEPARIN SODIUM 830 UNITS/HR: 10000 INJECTION, SOLUTION INTRAVENOUS at 22:46

## 2023-10-14 RX ADMIN — LOPERAMIDE HYDROCHLORIDE 2 MG: 2 CAPSULE ORAL at 03:19

## 2023-10-14 RX ADMIN — FLUOXETINE 20 MG: 20 CAPSULE ORAL at 08:21

## 2023-10-14 RX ADMIN — MAGNESIUM SULFATE HEPTAHYDRATE 4 G: 40 INJECTION, SOLUTION INTRAVENOUS at 20:17

## 2023-10-14 RX ADMIN — INSULIN GLARGINE 5 UNITS: 100 INJECTION, SOLUTION SUBCUTANEOUS at 20:17

## 2023-10-14 RX ADMIN — Medication 1 TABLET: at 09:00

## 2023-10-14 ASSESSMENT — COGNITIVE AND FUNCTIONAL STATUS - GENERAL
TURNING FROM BACK TO SIDE WHILE IN FLAT BAD: A LITTLE
DRESSING REGULAR LOWER BODY CLOTHING: A LITTLE
DAILY ACTIVITIY SCORE: 21
WALKING IN HOSPITAL ROOM: A LOT
MOBILITY SCORE: 18
TURNING FROM BACK TO SIDE WHILE IN FLAT BAD: A LITTLE
PERSONAL GROOMING: A LITTLE
MOVING FROM LYING ON BACK TO SITTING ON SIDE OF FLAT BED WITH BEDRAILS: A LITTLE
DRESSING REGULAR UPPER BODY CLOTHING: A LITTLE
DRESSING REGULAR UPPER BODY CLOTHING: A LITTLE
WALKING IN HOSPITAL ROOM: A LITTLE
HELP NEEDED FOR BATHING: A LITTLE
MOBILITY SCORE: 20
HELP NEEDED FOR BATHING: A LITTLE
EATING MEALS: A LITTLE
DRESSING REGULAR LOWER BODY CLOTHING: A LITTLE
MOVING FROM LYING ON BACK TO SITTING ON SIDE OF FLAT BED WITH BEDRAILS: A LITTLE
CLIMB 3 TO 5 STEPS WITH RAILING: A LOT
CLIMB 3 TO 5 STEPS WITH RAILING: A LITTLE
DAILY ACTIVITIY SCORE: 17
TOILETING: A LOT

## 2023-10-14 ASSESSMENT — PAIN SCALES - GENERAL
PAINLEVEL_OUTOF10: 0 - NO PAIN
PAINLEVEL_OUTOF10: 1
PAINLEVEL_OUTOF10: 0 - NO PAIN

## 2023-10-14 ASSESSMENT — PAIN SCALES - WONG BAKER: WONGBAKER_NUMERICALRESPONSE: NO HURT

## 2023-10-14 NOTE — PROGRESS NOTES
Hilda Jones is a 68 y.o. female on day 8 of admission presenting with Mesenteric ischemia (CMS/HCC).    VASCULAR SURGERY PROGRESS NOTE  Subjective   NAOE. Patient not feeling very comfortable due to diarrhea. Didn't notice any blood in stool. Had diarrhea before her hospital stay. Endorses pain being okay. Patient is ambulating.     Objective   Vitals:    10/14/23 0747   BP: 108/70   Pulse: 87   Resp: 18   Temp: 36.6 °C (97.9 °F)   SpO2: 98%      Exam:  Physical Exam  Constitutional: No acute distress, lying in bed  Neuro:  AOx3, grossly intact  ENMT: moist mucous membranes  Head/neck: atraumatic  CV: no tachycardia  Pulm: non-labored on supplemental oxygen via NC   GI: soft, distended, appropriately tender  Skin: warm and dry  Musculoskeletal: moving all extremities      Intake/Output Summary (Last 24 hours) at 10/14/2023 0831  Last data filed at 10/13/2023 2345  Gross per 24 hour   Intake --   Output 3 ml   Net -3 ml        Labs:  Results from last 7 days   Lab Units 10/14/23  0636 10/13/23  0444 10/12/23  0844   WBC AUTO x10*3/uL 15.0* 11.3 10.4   HEMOGLOBIN g/dL 9.4* 9.4* 10.0*   PLATELETS AUTO x10*3/uL 244 195 191      Results from last 7 days   Lab Units 10/14/23  0636 10/13/23  0444 10/12/23  1013   SODIUM mmol/L 140 144 145   POTASSIUM mmol/L 4.0 3.4* 3.6   CHLORIDE mmol/L 103 104 105   CO2 mmol/L 26 33* 25   BUN mg/dL 10 14 16   CREATININE mg/dL 0.71 0.71 0.72   GLUCOSE mg/dL 117* 95 120*   MAGNESIUM mg/dL 2.03 1.19*  --    PHOSPHORUS mg/dL 3.2 2.5 2.1*      Results from last 7 days   Lab Units 10/14/23  0636 10/13/23  1945 10/13/23  1512   INR  1.2* 1.5* 1.9*   PROTIME seconds 13.8* 16.7* 21.8*   APTT seconds 60*  --   --      Scheduled medications  acetaminophen, 650 mg, oral, q6h  aspirin, 81 mg, oral, Daily  atorvastatin, 40 mg, oral, Nightly  donepezil, 5 mg, oral, Nightly  FLUoxetine, 20 mg, oral, Daily  insulin glargine, 5 Units, subcutaneous, Nightly  insulin lispro, 0-10 Units, subcutaneous,  q4h  metoprolol tartrate, 50 mg, oral, BID  multivitamin with minerals, 1 tablet, oral, Daily  pantoprazole, 40 mg, intravenous, Daily before breakfast  [MAR Hold] perflutren protein A microsphere, 0.5 mL, intravenous, Once in imaging  [MAR Hold] sulfur hexafluoride microsphr, 2 mL, intravenous, Once in imaging  [Held by provider] torsemide, 20 mg, oral, BID      Continuous medications  heparin, 0-4,000 Units/hr, Last Rate: 830 Units/hr (10/13/23 2300)  lactated Ringer's, 50 mL/hr, Last Rate: 50 mL/hr (10/12/23 2211)      PRN medications  PRN medications: glucagon, heparin, HYDROmorphone, labetaloL, loperamide, oxyCODONE, oxyCODONE, [MAR Hold] oxygen     Vascular US upper extremity venous duplex left    Result Date: 10/13/2023            [place] [id number]   Tel [phone] and Fax [phone]  Vascular Lab Report Upper Venous Duplex Ultrasound  Patient Name:      HONG Diggs Physician:  [id number] Jaylene Holloway MD, RPVI Study Date:        10/12/2023           Ordering Provider:  [id number] SARINA ESCALONA MRN/PID:           [id number]             Technologist:       Shreyas Couch RVT, RDMS Accession#:        [id number]         Technologist 2: Date of Birth/Age: 1955 / 68 years Encounter#:         [id number] Gender:            F Admission Status:  Inpatient            Location Performed: [facility]  Diagnosis/ICD: Generalized edema (anasarca)-R60.1 Indication:    Limb edema  CONCLUSIONS:  Right Upper Venous: The subclavian vein demonstrates a normal spontaneous and phasic flow. Left Upper Venous: No evidence of acute deep vein thrombus visualized in the left upper extremity.  Imaging & Doppler Findings:  Right             Flow Subclavian Spontaneous/Phasic  Left                Compress Thrombus        Flow Internal Jugular       Yes      None   Spontaneous/Phasic Subclavian            Yes      None   Spontaneous/Phasic Subclavian Proximal   Yes      None Subclavian Mid        Yes      None Subclavian Distal     Yes      None Axillary              Yes      None   Spontaneous/Phasic Brachial              Yes      None Cephalic              Yes      None Basilic               Yes      None  [id number] Jaylene Holloway MD, RPVI Electronically signed by [id number] Jaylene Holloway MD, RPVI on 10/13/2023 at 1:18:58 PM  ** Final **       Assessment/Plan   Hilda Jones is 68 y.o. female with history of HTN, DM2, COPD, CHF, Afib/Aflutter (on warfarin), mitral stenosis, TR, AR s/p AVR x2, prior SMA occlusion s/p thromboembolectomy who is POD#3 s/p open mesenteric embolectomy for acute mesenteric ischemia likely related to sub-therapeutic INR on presentation.     10/7: Second look and closure  10/8: TTE demonstrated 75% LVEF, severe TR, Afib  10/10: Downtrending WBC  10/11: Transferred to [facility] with tele   10/12: tonight is dose #3 of warfarin, GI consult for diarrhea, obtain lactate & stool panel   10/13: stool studies negative     Plan:   Neuro: acute postoperative pain, insomnia, dementia   - continue pain control with tylenol & PRN oxy   - continue NV checks q4h   - holding home trazodone & gabapentin   - continue home prozac & donepezil   - continue multivitamin      Cardiac: HTN, CHF, afib/aflutter (home warfarin), mitral stenosis, TR, AR, SMA occlusion, acute mesenteric ischemia x2, SALTY thrombus   - maintain blood pressure control  - continue home metoprolol with home parameters   - holding home torsemide & spironolactone   - continue heparin drip with bridge to warfarin (day #3 of warfarin)   - continue aspirin & statin   - appreciate cardiology recommendations: metop 50 BID, continue AC      Resp: COPD, tobacco dependency   - IS hourly   - OOB as tolerated   - wean oxygen as tolerated   - smoking cessation      FENGI: recurrent acute mesenteric ischemia  likely 2/2 subtherapeutic INR, hypoalbuminemia, diarrhea, hypomagnesemia, hypokalemia, overweight   - continue DM diet with oral supplements   - continue mIVF   - GI consult   - c-diff negative 10/11  - discontinue bowel regimen   - replete electrolytes as needed to maintain K>4, Mg>2   - RFP as clinically indicated      Endo: T2DM   - continue SSI ACHS   - continue lantus 5u (home dose 10u)   - DM diet   - holding home jardiance      ID:   - trend temp q4h   - CBC as clinically indicated      Heme: acute blood loss anemia   - monitor for s/sx bleeding   - CBC as clinically indicated      Dispo:   - RNF with tele   - PT recommends home with PT      Patient seen with Fellow Dr. Dubose and attending Dr. Prieto Helton MD, PGY1   Vascular Surgery [id number]

## 2023-10-14 NOTE — CARE PLAN
Problem: Diabetes  Goal: Achieve decreasing blood glucose levels by end of shift  10/14/2023 0657 by Avani Ascencio RN  Outcome: Progressing  10/14/2023 0656 by Avani Ascencio RN  Outcome: Progressing  Goal: Increase stability of blood glucose readings by end of shift  10/14/2023 0657 by Avani Ascencio RN  Outcome: Progressing  10/14/2023 0656 by Avani Ascencio RN  Outcome: Progressing  Goal: Decrease in ketones present in urine by end of shift  10/14/2023 0657 by Avani Ascencio RN  Outcome: Progressing  10/14/2023 0656 by Avani Ascencio RN  Outcome: Progressing  Goal: Maintain electrolyte levels within acceptable range throughout shift  10/14/2023 0657 by Avani Ascencio RN  Outcome: Progressing  10/14/2023 0656 by Avani Ascencio RN  Outcome: Progressing  Goal: No changes in neurological exam by end of shift  10/14/2023 0657 by Avani Ascencio RN  Outcome: Progressing  10/14/2023 0656 by Avani Ascencio RN  Outcome: Progressing  Goal: Learn about and adhere to nutrition recommendations by end of shift  10/14/2023 0657 by Avani Ascencio RN  Outcome: Progressing  10/14/2023 0656 by Avani Ascencio RN  Outcome: Progressing  Goal: Increase self care and/or family involovement by end of shift  10/14/2023 0657 by Avani Ascencio RN  Outcome: Progressing  10/14/2023 0656 by Avani Ascencio RN  Outcome: Progressing  Goal: Receive DSME education by end of shift  10/14/2023 0657 by Avani Ascencio RN  Outcome: Progressing  10/14/2023 0656 by Avani Ascencio RN  Outcome: Progressing     Problem: Safety - Medical Restraint  Goal: Remains free of injury from restraints (Restraint for Interference with Medical Device)  10/14/2023 0657 by Avani Ascencio RN  Outcome: Progressing  10/14/2023 0656 by Avani Ascencio RN  Outcome: Progressing  Goal: Free from restraint(s) (Restraint for Interference with Medical Device)  10/14/2023 0657 by Avani Ascencio RN  Outcome:  Progressing  10/14/2023 0656 by Avani Ascencio RN  Outcome: Progressing     Problem: DVT/VTE Prevention/Activity  Goal: No decrease in circulation/sensation  10/14/2023 0657 by Avani Ascencio RN  Outcome: Progressing  10/14/2023 0656 by Avani Ascencio RN  Outcome: Progressing  Goal: Return to preop oxygenation status  10/14/2023 0657 by Avani Ascencio RN  Outcome: Progressing  10/14/2023 0656 by Avani Ascencio RN  Outcome: Progressing  Goal: Tolerates optimal activity  10/14/2023 0657 by Avani Ascencio RN  Outcome: Progressing  10/14/2023 0656 by Avani Ascencio RN  Outcome: Progressing  Goal: Increase self care and/or family involvement in 24 hrs.  10/14/2023 0657 by Avani Ascencio RN  Outcome: Progressing  10/14/2023 0656 by Avani Ascencio RN  Outcome: Progressing     Problem: Wound care/infection prevention  Goal: No signs of infection in 24 hrs.  10/14/2023 0657 by Avani Ascencio RN  Outcome: Progressing  10/14/2023 0656 by Avani Ascencio RN  Outcome: Progressing  Goal: No unexpected bleeding from incision this shift  10/14/2023 0657 by Avain Ascencio RN  Outcome: Progressing  10/14/2023 0656 by Avani Ascencio RN  Outcome: Progressing     Problem: Diet/fluid balance  Goal: Adequate urinary output  10/14/2023 0657 by Avani Ascencio RN  Outcome: Progressing  10/14/2023 0656 by Avani Ascencio RN  Outcome: Progressing  Goal: Free from nausea/vomiting  10/14/2023 0657 by Avani Ascencio RN  Outcome: Progressing  10/14/2023 0656 by Avani Ascencio RN  Outcome: Progressing  Goal: Return in bowel function  10/14/2023 0657 by Avani Ascencio RN  Outcome: Progressing  10/14/2023 0656 by Avani Ascencio RN  Outcome: Progressing  Goal: Tolerates prescribed diet  10/14/2023 0657 by Avani Ascencio RN  Outcome: Progressing  10/14/2023 0656 by Avani Ascencio RN  Outcome: Progressing     Problem: Other goals  Goal: No change in neurological  status  10/14/2023 0657 by Avani Ascencio RN  Outcome: Progressing  10/14/2023 0656 by Avani Ascencio RN  Outcome: Progressing  Goal: Stabilize vital signs (return to 10% of baseline)  10/14/2023 0657 by Avani Ascencio RN  Outcome: Progressing  10/14/2023 0656 by Avani Ascencio RN  Outcome: Progressing     Problem: Skin  Goal: Decreased wound size/increased tissue granulation at next dressing change  10/14/2023 0657 by Avani Ascencio RN  Outcome: Progressing  10/14/2023 0656 by Avani Ascencio RN  Outcome: Progressing  Goal: Participates in plan/prevention/treatment measures  10/14/2023 0657 by Avani Ascencio RN  Outcome: Progressing  10/14/2023 0656 by Avani Ascencio RN  Outcome: Progressing  Goal: Prevent/manage excess moisture  10/14/2023 0657 by Avani Ascencio RN  Outcome: Progressing  10/14/2023 0656 by Avani Ascencio RN  Outcome: Progressing  Goal: Prevent/minimize sheer/friction injuries  10/14/2023 0657 by Avani Ascencio RN  Outcome: Progressing  10/14/2023 0656 by Avani Ascencio RN  Outcome: Progressing  Goal: Promote/optimize nutrition  10/14/2023 0657 by Avani Ascencio RN  Outcome: Progressing  10/14/2023 0656 by Avani Ascencio RN  Outcome: Progressing  Goal: Promote skin healing  10/14/2023 0657 by Avani Ascencio RN  Outcome: Progressing  10/14/2023 0656 by Avani Ascencio RN  Outcome: Progressing   The patient's goals for the shift include      The clinical goals for the shift include pt will have control of bowels and get atleast 6 hours of sleep by the end of shift

## 2023-10-14 NOTE — NURSING NOTE
0700- Assumed care of patient at this time.  No concerns noted.      1522- V-tach noted on telemetry.  Patient is asymptomatic.  Physician paged for notification.  1538- Vascular team notified, no new orders at this time.

## 2023-10-14 NOTE — SIGNIFICANT EVENT
RAPID RESPONSE - DACR    Paged for rapid response for in-hospital fall. Per patient, she was getting up to go to the bathroom when she tripped over her feet and fell and hit her head. Denies any other symptoms other than some R frontotemporal pain.    Tele reviewed, one non-sustained polymorphic wide complex tachyarrhythmia noted earlier today at approx 13:55 which self-terminated in <30 seconds. K 4, Mg 2.03 today. No tele events correlating with time of fall. Vitals WNL, at baseline.    Patient A&Ox3, able to give accurate history. Strength intact BUE/BLE. Small, tender fluid collection present just above R eye. No orbital involvement on exam.    Suspect fall was mechanical given reported history. Will give 40 Kcl, 4 IV mg given earlier tele event, but suspect this was not contributing to fall. Check EKG for updated Qtc. Repeat labs sent. CT head ordered given head strike and heparin gtt.    INTERVENTIONS:  -EKG to check Qtc as tele event concerning for torsades  -40 PO K, 4 IV Mg  -CBC, RFP, Mg, Coags  -CT head STAT  -Heparin gtt held    Primary team notified of above management, they will follow up CT head result and determine further anticoagulation management. Also discussed concern for torsades event on tele with primary team.

## 2023-10-14 NOTE — SIGNIFICANT EVENT
Rapid Response Note    RN alerted RRT RN about concern for patient's non-sustained ventricular rhythm and patient's fall later in the shift.  Patient assessed.  Alert and Oriented x 4.  Following commands.  Patient endorses hitting head.  Dime size hematoma noted on right frontal skull bone.  DACR Dr. Aydin Pittman notified as Primary Care Physician unable to be present.  CT of head ordered.  Labs sent.  EKG done.  Patient in Sinus Rhythm.  VSS.  Will remain on division at present time.  RN encouraged to page Rapid Response if further concerns in patient condition arise.

## 2023-10-14 NOTE — PROGRESS NOTES
Subjective Data:  Feels ok, no dyspnea or chest pain, converted to NSR 10/12       Objective Data:  Last Recorded Vitals:  Vitals:    10/14/23 0600 10/14/23 0747 10/14/23 1155 10/14/23 1552   BP:  108/70 90/56 101/63   BP Location:       Patient Position:       Pulse:  87 64 69   Resp:  18 18 18   Temp:  36.6 °C (97.9 °F) 36.4 °C (97.5 °F) 36.7 °C (98.1 °F)   TempSrc:       SpO2:  98% 97% 93%   Weight: 68.2 kg (150 lb 5.7 oz)      Height:           Last Labs:  CBC - 10/14/2023: 11:46 AM  20.3 9.3 268    27.8      CMP - 10/14/2023:  6:36 AM  7.5 7.4 53 --- 0.6   3.2 2.7 18 107      PTT - 10/14/2023: 11:46 AM  1.2   13.8 57     TROPHS   Date/Time Value Ref Range Status   05/27/2023 10:50 PM 7 0 - 34 ng/L Final     Comment:     .  Less than 99th percentile of normal range cutoff-  Female and children under 18 years old <35 ng/L; Male <54 ng/L: Negative  Repeat testing should be performed if clinically indicated.   .  Female and children under 18 years old  ng/L; Male  ng/L:  Consistent with possible cardiac damage and possible increased clinical   risk. Serial measurements may help to assess extent of myocardial damage.   .  >120 ng/L: Consistent with cardiac damage, increased clinical risk and  myocardial infarction. Serial measurements may help assess extent of   myocardial damage.   .   NOTE: Children less than 1 year old may have higher baseline troponin   levels and results should be interpreted in conjunction with the overall   clinical context.  .  NOTE: Troponin I testing is performed using a different   testing methodology at [facility] than at other   [facility] hospitals. Direct result comparisons should only   be made within the same method.     12/27/2022 05:35 PM 9 0 - 34 ng/L Final     Comment:     .  Less than 99th percentile of normal range cutoff-  Female and children under 18 years old <35 ng/L; Male <54 ng/L: Negative  Repeat testing should be performed if clinically indicated.    .  Female and children under 18 years old  ng/L; Male  ng/L:  Consistent with possible cardiac damage and possible increased clinical   risk. Serial measurements may help to assess extent of myocardial damage.   .  >120 ng/L: Consistent with cardiac damage, increased clinical risk and  myocardial infarction. Serial measurements may help assess extent of   myocardial damage.   .   NOTE: Children less than 1 year old may have higher baseline troponin   levels and results should be interpreted in conjunction with the overall   clinical context.  .  NOTE: Troponin I testing is performed using a different   testing methodology at [facility] than at other   [facility]. Direct result comparisons should only   be made within the same method.     08/18/2022 04:22 AM 13 0 - 34 ng/L Final     Comment:     .  Less than 99th percentile of normal range cutoff-  Female and children under 18 years old <35 ng/L; Male <54 ng/L: Negative  Repeat testing should be performed if clinically indicated.   .  Female and children under 18 years old  ng/L; Male  ng/L:  Consistent with possible cardiac damage and possible increased clinical   risk. Serial measurements may help to assess extent of myocardial damage.   .  >120 ng/L: Consistent with cardiac damage, increased clinical risk and  myocardial infarction. Serial measurements may help assess extent of   myocardial damage.   .   NOTE: Children less than 1 year old may have higher baseline troponin   levels and results should be interpreted in conjunction with the overall   clinical context.  .  NOTE: Troponin I testing is performed using a different   testing methodology at [facility] than at other   [facility]. Direct result comparisons should only   be made within the same method.       BNP   Date/Time Value Ref Range Status   08/14/2023 03:31  0 - 99 pg/mL Final     Comment:     .  <100 pg/mL - Heart failure  unlikely  100-299 pg/mL - Intermediate probability of acute heart  .               failure exacerbation. Correlate with clinical  .               context and patient history.    >=300 pg/mL - Heart Failure likely. Correlate with clinical  .               context and patient history.   Biotin interference may cause falsely decreased results.   Patients taking a Biotin dose of up to 5 mg/day should   refrain from taking Biotin for 24 hours before sample   collection. Providers may contact their local laboratory   for further information.     05/27/2023 10:50  0 - 99 pg/mL Final     Comment:     .  <100 pg/mL - Heart failure unlikely  100-299 pg/mL - Intermediate probability of acute heart  .               failure exacerbation. Correlate with clinical  .               context and patient history.    >=300 pg/mL - Heart Failure likely. Correlate with clinical  .               context and patient history.   Biotin interference may cause falsely decreased results.   Patients taking a Biotin dose of up to 5 mg/day should   refrain from taking Biotin for 24 hours before sample   collection. Providers may contact their local laboratory   for further information.       HGBA1C   Date/Time Value Ref Range Status   07/25/2023 11:15 AM 5.9 % Final     Comment:          Diagnosis of Diabetes-Adults   Non-Diabetic: < or = 5.6%   Increased risk for developing diabetes: 5.7-6.4%   Diagnostic of diabetes: > or = 6.5%  .       Monitoring of Diabetes                Age (y)     Therapeutic Goal (%)   Adults:          >18           <7.0   Pediatrics:    13-18           <7.5                   7-12           <8.0                   0- 6            7.5-8.5   American Diabetes Association. Diabetes Care 33(S1), Jan 2010.   Hemoglobin variant detected which does not interfere    with determination of Hemoglobin A1c. Hemoglobin   identification can be ordered to characterize the variant   if clinically indicated.     05/29/2023 06:28 PM  "6.8 % Final     Comment:          Diagnosis of Diabetes-Adults   Non-Diabetic: < or = 5.6%   Increased risk for developing diabetes: 5.7-6.4%   Diagnostic of diabetes: > or = 6.5%  .       Monitoring of Diabetes                Age (y)     Therapeutic Goal (%)   Adults:          >18           <7.0   Pediatrics:    13-18           <7.5                   7-12           <8.0                   0- 6            7.5-8.5   American Diabetes Association. Diabetes Care 33(S1), Jan 2010.   Hemoglobin variant detected which does not interfere    with determination of Hemoglobin A1c. Hemoglobin   identification can be ordered to characterize the variant   if clinically indicated.       VLDL   Date/Time Value Ref Range Status   07/25/2023 11:15 AM 17 0 - 40 mg/dL Final   03/06/2020 04:46 AM 21 0 - 40 mg/dL Final      Last I/O:  I/O last 3 completed shifts:  In: 1082.7 (15.9 mL/kg) [I.V.:1082.7 (15.9 mL/kg)]  Out: 9 (0.1 mL/kg) [Urine:1 (0 mL/kg/hr); Stool:8]  Weight: 68.2 kg     Past Cardiology Tests (Last 3 Years):  EKG:  ECG 12 lead 10/6/2023    Echo:  Transthoracic Echo (TTE) Complete 10/9/2023    Ejection Fractions:  No results found for: \"EF\"  Cath:  No results found for this or any previous visit from the past 1095 days.    Stress Test:  No results found for this or any previous visit from the past 1095 days.    Cardiac Imaging:  No results found for this or any previous visit from the past 1095 days.      Inpatient Medications:  Scheduled medications   Medication Dose Route Frequency    acetaminophen  650 mg oral q6h    aspirin  81 mg oral Daily    atorvastatin  40 mg oral Nightly    donepezil  5 mg oral Nightly    FLUoxetine  20 mg oral Daily    insulin glargine  5 Units subcutaneous Nightly    insulin lispro  0-10 Units subcutaneous q4h    magnesium sulfate  4 g intravenous Once    metoprolol tartrate  50 mg oral BID    multivitamin with minerals  1 tablet oral Daily    pantoprazole  40 mg intravenous Daily before " breakfast    [MAR Hold] perflutren protein A microsphere  0.5 mL intravenous Once in imaging    potassium chloride CR  40 mEq oral Once    [MAR Hold] sulfur hexafluoride microsphr  2 mL intravenous Once in imaging    [Held by provider] torsemide  20 mg oral BID     PRN medications   Medication    glucagon    heparin    HYDROmorphone    labetaloL    loperamide    oxyCODONE    oxyCODONE    [MAR Hold] oxygen     Continuous Medications   Medication Dose Last Rate    heparin  0-4,000 Units/hr 830 Units/hr (10/14/23 1228)    lactated Ringer's  50 mL/hr 50 mL/hr (10/12/23 2211)       Physical Exam:      Physical Exam:  Constitutional: chronically-ill appearing, NAD  Eyes: no conjunctival injection, EOMI  ENT: MMM, no apparent injury  Head/Neck: Neck supple, no apparent injury  Respiratory/Thorax: Patent airways, CTAB, normal WOB  Cardiovascular: normal rate, regular rhythm, no murmur, normal S1 and S2, JVP not elevated, extremities warm, trace JUAN J  Gastrointestinal: Non-distended, soft, no tenderness  Extremities: warm, intact  Neurological: grossly intact, no focal deficits  Skin: Warm and dry, no lesions, no rashes       Assessment/Plan       Impression:  #HF 2/2 rheumatic heart disease with symptomatic mitral stenosis and severe TR  #AFL  #AF with 2 prior thromboemboli  #s/p AVR 2004, 2016 with #25 Freestyle  #SALTY thrombus 7/2023     Is at very high risk of thromboembolism given rheumatic mitral stenosis and recent SALTY thrombus and current admission for thromboembolism - would rate control and ensure adequate AC. There is a mortality benefit and a thromboembolism benefit to warfarin over DOAC in rheumatic AF without significant increase in bleeding (INVICTUS) so would ideally do this or LMWH over DOAC if at all possible. Current thromboembolism likely due to inadequate warfarin dosing given most of the INR's in our system are subtherapeutic - would focus on ensuring reliable warfarin/INR follow-up given very high  thromboembolic risk, and avoid rhythm control at this time given high risk of thromboembolism.     Recommendations:  - switch metoprolol to succinate 100 daily. If SBP <80 or sustained HR <50, decrease dose to 50 daily  - ideally would AC with, in ranked order:  1) warfarin adjusted to INR 2.0 to 3.0, defer specifics to priry service - this is the standard of care  2) LMWH 1 mg/kg bid (usually is price prohibitive)  3) (if the first 2 are unachievable) DOAC apixaban 5 bid. If this is chosen, would need to  patient that there is increased mortality to DOAC over warfarin, would only choose this if there is a strict contraindication to the first two choices  -outaptient follow-up with outpatient cardiology in 4 weeks and structural heart service in 4-8 weeks to discuss mitral stenosis    Peripheral IV 10/06/23 20 G Right;Anterior Forearm (Active)   Site Assessment Clean;Dry;Intact 10/14/23 0800   Dressing Status Dry;Clean 10/14/23 0800   Number of days: 8       Peripheral IV 10/08/23 20 G Right Antecubital (Active)   Site Assessment Clean;Dry;Intact 10/14/23 0800   Dressing Status Clean;Dry 10/14/23 0800   Number of days: 6       Code Status:  Full Code    I spent 25 minutes in the professional and overall care of this patient.        Donal Santiago MD

## 2023-10-14 NOTE — CARE PLAN
The patient's goals for the shift include      The clinical goals for the shift include pt will have control of bowels and get atleast 6 hours of sleep by the end of shift

## 2023-10-15 LAB
ALBUMIN SERPL BCP-MCNC: 2.6 G/DL (ref 3.4–5)
ANION GAP SERPL CALC-SCNC: 10 MMOL/L (ref 10–20)
APTT PPP: 56 SECONDS (ref 27–38)
BUN SERPL-MCNC: 10 MG/DL (ref 6–23)
CALCIUM SERPL-MCNC: 7.6 MG/DL (ref 8.6–10.6)
CHLORIDE SERPL-SCNC: 105 MMOL/L (ref 98–107)
CO2 SERPL-SCNC: 28 MMOL/L (ref 21–32)
CREAT SERPL-MCNC: 0.82 MG/DL (ref 0.5–1.05)
ERYTHROCYTE [DISTWIDTH] IN BLOOD BY AUTOMATED COUNT: 17.5 % (ref 11.5–14.5)
GFR SERPL CREATININE-BSD FRML MDRD: 78 ML/MIN/1.73M*2
GLUCOSE BLD MANUAL STRIP-MCNC: 101 MG/DL (ref 74–99)
GLUCOSE BLD MANUAL STRIP-MCNC: 117 MG/DL (ref 74–99)
GLUCOSE BLD MANUAL STRIP-MCNC: 140 MG/DL (ref 74–99)
GLUCOSE BLD MANUAL STRIP-MCNC: 147 MG/DL (ref 74–99)
GLUCOSE BLD MANUAL STRIP-MCNC: 95 MG/DL (ref 74–99)
GLUCOSE BLD MANUAL STRIP-MCNC: 97 MG/DL (ref 74–99)
GLUCOSE BLD MANUAL STRIP-MCNC: 97 MG/DL (ref 74–99)
GLUCOSE SERPL-MCNC: 77 MG/DL (ref 74–99)
HCT VFR BLD AUTO: 26.7 % (ref 36–46)
HGB BLD-MCNC: 8.8 G/DL (ref 12–16)
INR PPP: 1.2 (ref 0.9–1.1)
MAGNESIUM SERPL-MCNC: 2.86 MG/DL (ref 1.6–2.4)
MCH RBC QN AUTO: 26.4 PG (ref 26–34)
MCHC RBC AUTO-ENTMCNC: 33 G/DL (ref 32–36)
MCV RBC AUTO: 80 FL (ref 80–100)
NRBC BLD-RTO: 0.4 /100 WBCS (ref 0–0)
PHOSPHATE SERPL-MCNC: 2.9 MG/DL (ref 2.5–4.9)
PLATELET # BLD AUTO: 255 X10*3/UL (ref 150–450)
PMV BLD AUTO: 10.4 FL (ref 7.5–11.5)
POTASSIUM SERPL-SCNC: 4.2 MMOL/L (ref 3.5–5.3)
PROTHROMBIN TIME: 13.9 SECONDS (ref 9.8–12.8)
RBC # BLD AUTO: 3.33 X10*6/UL (ref 4–5.2)
SODIUM SERPL-SCNC: 139 MMOL/L (ref 136–145)
UFH PPP CHRO-ACNC: 0.1 IU/ML
UFH PPP CHRO-ACNC: 0.1 IU/ML
UFH PPP CHRO-ACNC: 0.2 IU/ML
UFH PPP CHRO-ACNC: 0.2 IU/ML
UFH PPP CHRO-ACNC: 0.4 IU/ML
UFH PPP CHRO-ACNC: 0.4 IU/ML
WBC # BLD AUTO: 12.3 X10*3/UL (ref 4.4–11.3)

## 2023-10-15 PROCEDURE — 36415 COLL VENOUS BLD VENIPUNCTURE: CPT | Mod: CMCLAB | Performed by: SURGERY

## 2023-10-15 PROCEDURE — 85520 HEPARIN ASSAY: CPT | Performed by: SURGERY

## 2023-10-15 PROCEDURE — 80069 RENAL FUNCTION PANEL: CPT

## 2023-10-15 PROCEDURE — 2500000001 HC RX 250 WO HCPCS SELF ADMINISTERED DRUGS (ALT 637 FOR MEDICARE OP): Performed by: NURSE PRACTITIONER

## 2023-10-15 PROCEDURE — C9113 INJ PANTOPRAZOLE SODIUM, VIA: HCPCS

## 2023-10-15 PROCEDURE — 85027 COMPLETE CBC AUTOMATED: CPT | Mod: CMCLAB

## 2023-10-15 PROCEDURE — 36415 COLL VENOUS BLD VENIPUNCTURE: CPT | Mod: CMCLAB

## 2023-10-15 PROCEDURE — 2500000004 HC RX 250 GENERAL PHARMACY W/ HCPCS (ALT 636 FOR OP/ED): Performed by: SURGERY

## 2023-10-15 PROCEDURE — 85730 THROMBOPLASTIN TIME PARTIAL: CPT | Mod: CMCLAB | Performed by: STUDENT IN AN ORGANIZED HEALTH CARE EDUCATION/TRAINING PROGRAM

## 2023-10-15 PROCEDURE — 83735 ASSAY OF MAGNESIUM: CPT | Mod: CMCLAB

## 2023-10-15 PROCEDURE — 82947 ASSAY GLUCOSE BLOOD QUANT: CPT | Mod: CMCLAB

## 2023-10-15 PROCEDURE — 1100000001 HC PRIVATE ROOM DAILY

## 2023-10-15 PROCEDURE — 2500000004 HC RX 250 GENERAL PHARMACY W/ HCPCS (ALT 636 FOR OP/ED)

## 2023-10-15 PROCEDURE — 2500000001 HC RX 250 WO HCPCS SELF ADMINISTERED DRUGS (ALT 637 FOR MEDICARE OP)

## 2023-10-15 PROCEDURE — 36415 COLL VENOUS BLD VENIPUNCTURE: CPT | Performed by: SURGERY

## 2023-10-15 RX ORDER — ACETAMINOPHEN 500 MG
5 TABLET ORAL NIGHTLY
Status: DISCONTINUED | OUTPATIENT
Start: 2023-10-15 | End: 2023-10-19 | Stop reason: HOSPADM

## 2023-10-15 RX ORDER — WARFARIN SODIUM 5 MG/1
5 TABLET ORAL DAILY
Status: DISCONTINUED | OUTPATIENT
Start: 2023-10-15 | End: 2023-10-17

## 2023-10-15 RX ORDER — METOPROLOL SUCCINATE 50 MG/1
100 TABLET, EXTENDED RELEASE ORAL DAILY
Status: DISCONTINUED | OUTPATIENT
Start: 2023-10-15 | End: 2023-10-19 | Stop reason: HOSPADM

## 2023-10-15 RX ADMIN — Medication 1 TABLET: at 11:59

## 2023-10-15 RX ADMIN — HEPARIN SODIUM 1500 UNITS: 5000 INJECTION, SOLUTION INTRAVENOUS; SUBCUTANEOUS at 23:34

## 2023-10-15 RX ADMIN — DONEPEZIL HYDROCHLORIDE 5 MG: 5 TABLET ORAL at 20:36

## 2023-10-15 RX ADMIN — ACETAMINOPHEN 650 MG: 325 TABLET ORAL at 17:51

## 2023-10-15 RX ADMIN — HEPARIN SODIUM 1500 UNITS: 5000 INJECTION, SOLUTION INTRAVENOUS; SUBCUTANEOUS at 04:00

## 2023-10-15 RX ADMIN — INSULIN GLARGINE 5 UNITS: 100 INJECTION, SOLUTION SUBCUTANEOUS at 20:37

## 2023-10-15 RX ADMIN — HEPARIN SODIUM 1500 UNITS: 5000 INJECTION, SOLUTION INTRAVENOUS; SUBCUTANEOUS at 19:51

## 2023-10-15 RX ADMIN — HEPARIN SODIUM 1500 UNITS: 5000 INJECTION, SOLUTION INTRAVENOUS; SUBCUTANEOUS at 13:56

## 2023-10-15 RX ADMIN — FLUOXETINE 20 MG: 20 CAPSULE ORAL at 11:57

## 2023-10-15 RX ADMIN — ACETAMINOPHEN 650 MG: 325 TABLET ORAL at 11:57

## 2023-10-15 RX ADMIN — Medication 5 MG: at 22:25

## 2023-10-15 RX ADMIN — METOPROLOL TARTRATE 50 MG: 50 TABLET, FILM COATED ORAL at 11:57

## 2023-10-15 RX ADMIN — ATORVASTATIN CALCIUM 40 MG: 40 TABLET, FILM COATED ORAL at 20:36

## 2023-10-15 RX ADMIN — WARFARIN SODIUM 5 MG: 5 TABLET ORAL at 20:36

## 2023-10-15 RX ADMIN — HEPARIN SODIUM 1130 UNITS/HR: 10000 INJECTION, SOLUTION INTRAVENOUS at 22:25

## 2023-10-15 RX ADMIN — ASPIRIN 81 MG: 81 TABLET, COATED ORAL at 11:56

## 2023-10-15 RX ADMIN — PANTOPRAZOLE SODIUM 40 MG: 40 INJECTION, POWDER, FOR SOLUTION INTRAVENOUS at 11:58

## 2023-10-15 ASSESSMENT — COGNITIVE AND FUNCTIONAL STATUS - GENERAL
TOILETING: A LITTLE
DRESSING REGULAR UPPER BODY CLOTHING: A LITTLE
MOBILITY SCORE: 18
DAILY ACTIVITIY SCORE: 18
HELP NEEDED FOR BATHING: A LITTLE
CLIMB 3 TO 5 STEPS WITH RAILING: A LOT
PERSONAL GROOMING: A LITTLE
MOVING FROM LYING ON BACK TO SITTING ON SIDE OF FLAT BED WITH BEDRAILS: A LITTLE
EATING MEALS: A LITTLE
TURNING FROM BACK TO SIDE WHILE IN FLAT BAD: A LITTLE
DRESSING REGULAR LOWER BODY CLOTHING: A LITTLE
WALKING IN HOSPITAL ROOM: A LOT

## 2023-10-15 ASSESSMENT — PAIN SCALES - GENERAL
PAINLEVEL_OUTOF10: 0 - NO PAIN

## 2023-10-15 ASSESSMENT — PAIN - FUNCTIONAL ASSESSMENT: PAIN_FUNCTIONAL_ASSESSMENT: 0-10

## 2023-10-15 NOTE — SIGNIFICANT EVENT
Called to see patient given concern for torsades on 10/14. On review of telemetry, on 10/14 there are several episodes of telemetry artifact and there is no evidence of ventricular arrhythmia.        Impression:  #HF 2/2 rheumatic heart disease with symptomatic mitral stenosis and severe TR  #AFL  #AF with 2 prior thromboemboli, admitted for acute ischemic colitis s/p open thromboembolectomy 10/6  #s/p AVR 2004, 2016 with #25 Freestyle  #SALTY thrombus 7/2023     Is at very high risk of thromboembolism given rheumatic mitral stenosis and recent SALTY thrombus and current admission for thromboembolism - would rate control and ensure adequate AC. There is a mortality benefit and a thromboembolism benefit to warfarin over DOAC in rheumatic AF without significant increase in bleeding (INVICTUS) so would ideally do this or LMWH over DOAC if at all possible. Current thromboembolism likely due to inadequate warfarin dosing given most of the INR's in our system are subtherapeutic - would focus on ensuring reliable warfarin/INR follow-up given very high thromboembolic risk, and avoid rhythm control at this time given high risk of thromboembolism.    Please note that warfarin is not ordered at this time - if this is the planned anticoagulation approach, it will be difficult to achieve therapeutic INR without ordering warfarin.     Recommendations:  - switch metoprolol to succinate 100 daily. If SBP <80 or sustained HR <50, decrease dose to 50 daily  - ideally would AC with, in ranked order:  1) warfarin adjusted to INR 2.0 to 3.0, defer specifics to priamry service - this is the standard of care  2) LMWH 1 mg/kg bid (usually is price prohibitive)  3) (if the first 2 are unachievable) DOAC apixaban 5 bid. If this is chosen, would need to  patient that there is increased mortality to DOAC over warfarin, would only choose this if there is a strict contraindication to the first two choices  -outaptient follow-up with  outpatient cardiology in 4 weeks and structural heart service in 4-8 weeks to discuss mitral stenosis        Thank you for the opportunity to contribute to the care of this patient. Above recommendations discussed with Dr. Acosta. If further questions arise, please page the general cardiology consult pager at [phone] on weekdays 7AM - 6PM and weekends 7AM - 2PM, or at [phone] at all other times.

## 2023-10-15 NOTE — PROGRESS NOTES
Hilda Jones is a 68 y.o. female on day 9 of admission presenting with Mesenteric ischemia (CMS/HCC).    VASCULAR SURGERY PROGRESS NOTE  Subjective   Patient feel yesterday while using bathroom. Got stat CT head, no evidence of hemorrhage. Hep gtt restarted. Patient seen on rounds. Endorses feeling okay, no abdominal pain. Only pain when pressing on incision site. Having BM. Denies N/V/F/C.     Confirmed in PM fellow Dr. Mateusz Dubose spoke with daughter Carmen about patient fall that occurred on 10/14.    Objective   Vitals:    10/15/23 0813   BP: 97/59   Pulse: 72   Resp: 18   Temp: 36.9 °C (98.4 °F)   SpO2: 97%      Exam:  Physical Exam  Constitutional: No acute distress, lying in bed  Neuro:  AOx3, grossly intact  ENMT: moist mucous membranes  Head/neck: atraumatic  CV: no tachycardia  Pulm: non-labored on supplemental oxygen via NC   GI: soft, distended, appropriately tender  Skin: warm and dry  Musculoskeletal: moving all extremities      Intake/Output Summary (Last 24 hours) at 10/15/2023 0904  Last data filed at 10/15/2023 0705  Gross per 24 hour   Intake --   Output 502 ml   Net -502 ml      Labs:  Results from last 7 days   Lab Units 10/15/23  0636 10/14/23  1700 10/14/23  1146   WBC AUTO x10*3/uL 12.3* 18.6* 20.3*   HEMOGLOBIN g/dL 8.8* 8.8* 9.3*   PLATELETS AUTO x10*3/uL 255 268 268      Results from last 7 days   Lab Units 10/15/23  0636 10/14/23  1700 10/14/23  0636   SODIUM mmol/L 139 139 140   POTASSIUM mmol/L 4.2 3.6 4.0   CHLORIDE mmol/L 105 102 103   CO2 mmol/L 28 28 26   BUN mg/dL 10 10 10   CREATININE mg/dL 0.82 0.71 0.71   GLUCOSE mg/dL 77 108* 117*   MAGNESIUM mg/dL 2.86* 1.61 2.03   PHOSPHORUS mg/dL 2.9 2.8 3.2      Results from last 7 days   Lab Units 10/15/23  0636 10/14/23  1146 10/14/23  0636 10/13/23  1945   INR  1.2*  --  1.2* 1.5*   PROTIME seconds 13.9*  --  13.8* 16.7*   APTT seconds 56*   < > 60*  --     < > = values in this interval not displayed.     Scheduled  medications  acetaminophen, 650 mg, oral, q6h  aspirin, 81 mg, oral, Daily  atorvastatin, 40 mg, oral, Nightly  donepezil, 5 mg, oral, Nightly  FLUoxetine, 20 mg, oral, Daily  insulin glargine, 5 Units, subcutaneous, Nightly  insulin lispro, 0-10 Units, subcutaneous, q4h  metoprolol tartrate, 50 mg, oral, BID  multivitamin with minerals, 1 tablet, oral, Daily  pantoprazole, 40 mg, intravenous, Daily before breakfast  [MAR Hold] perflutren protein A microsphere, 0.5 mL, intravenous, Once in imaging  [MAR Hold] sulfur hexafluoride microsphr, 2 mL, intravenous, Once in imaging  [Held by provider] torsemide, 20 mg, oral, BID      Continuous medications  heparin, 0-4,000 Units/hr, Last Rate: 930 Units/hr (10/15/23 0400)  lactated Ringer's, 50 mL/hr, Last Rate: 50 mL/hr (10/12/23 2211)      PRN medications  PRN medications: glucagon, heparin, HYDROmorphone, labetaloL, loperamide, oxyCODONE, oxyCODONE, [MAR Hold] oxygen     CT head wo IV contrast    Result Date: 10/14/2023  Interpreted By:  Edenilson Arias and Tippareddy Charit STUDY: CT HEAD WO IV CONTRAST;  10/14/2023 7:04 pm   INDICATION: Signs/Symptoms:Fall, head trauma, r/o bleed. On heparin gtt.   COMPARISON: CT 12/29/2022   ACCESSION NUMBER(S): [id number]   ORDERING CLINICIAN: MICK CHURCH   TECHNIQUE: Noncontrast enhanced CT was performed from the vertex to the skullbase. Multiplanar reconstructions were made.   FINDINGS: Parenchyma:  Nonspecific hypodensities in the white matter which can be seen in the setting of chronic small vessel ischemic changes.The grey-white differentiation is intact. There is no mass effect or midline shift.  There is no acute intracranial hemorrhage.   CSF Spaces:The ventricles, sulci and basal cisterns are age concordant. There is no abnormal extraaxial fluid collection.   Stable mild prominence of the CSF space within the superior cerebellar cistern.   Calvarium: Stable mild diffuse sclerosis with areas of lucency involving the  calvarium compared to the CT head from 2022.   Paranasal sinuses and mastoids: Visualized paranasal sinuses and mastoids are clear.       No evidence of acute infarct, intracranial hemorrhage, or mass effect.   I personally reviewed the images/study and I agree with the findings as stated. This study was interpreted at [facility], [place], [place].   MACRO: None.   Signed by: Edenilson Arias 10/14/2023 7:51 PM Dictation workstation:   [id number]          Assessment/Plan   Principal Problem:    Mesenteric ischemia (CMS/HCC)  Active Problems:    Superior mesenteric artery thrombosis (CMS/HCC)    Atrial fibrillation (CMS/HCC)    Valvular heart disease    Chronic obstructive pulmonary disease (CMS/HCC)    Diabetes mellitus, type 2 (CMS/HCC)    Hilda Jones is 68 y.o. female with history of HTN, DM2, COPD, CHF, Afib/Aflutter (on warfarin), mitral stenosis, TR, AR s/p AVR x2, prior SMA occlusion s/p thromboembolectomy who is POD#3 s/p open mesenteric embolectomy for acute mesenteric ischemia likely related to sub-therapeutic INR on presentation.     10/7: Second look and closure  10/8: TTE demonstrated 75% LVEF, severe TR, Afib  10/10: Downtrending WBC  10/11: Transferred to [facility] with tele   10/12: tonight is dose #3 of warfarin, GI consult for diarrhea, obtain lactate & stool panel   10/13: stool studies negative     Plan:   Neuro: acute postoperative pain, insomnia, dementia   - continue pain control with tylenol & PRN oxy   - continue NV checks q4h   - holding home trazodone & gabapentin   - continue home prozac & donepezil   - continue multivitamin      Cardiac: HTN, CHF, afib/aflutter (home warfarin), mitral stenosis, TR, AR, SMA occlusion, acute mesenteric ischemia x2, SALTY thrombus   - maintain blood pressure control  - continue home metoprolol with home parameters   - holding home torsemide & spironolactone   - continue heparin drip with bridge to warfarin (day #3 of warfarin)   -  continue aspirin & statin   - appreciate cardiology recommendations: metop 50 BID, continue AC      Resp: COPD, tobacco dependency   - IS hourly   - OOB as tolerated   - wean oxygen as tolerated   - smoking cessation      FENGI: recurrent acute mesenteric ischemia likely 2/2 subtherapeutic INR, hypoalbuminemia, diarrhea, hypomagnesemia, hypokalemia, overweight   - continue DM diet with oral supplements   - continue mIVF   - GI consult   - c-diff negative 10/11  - discontinue bowel regimen   - replete electrolytes as needed to maintain K>4, Mg>2   - RFP as clinically indicated      Endo: T2DM   - continue SSI ACHS   - continue lantus 5u (home dose 10u)   - DM diet   - holding home jardiance      ID:   - trend temp q4h   - CBC as clinically indicated      Heme: acute blood loss anemia   - monitor for s/sx bleeding   - CBC as clinically indicated      Dispo:   - RNF with tele   - PT recommends home with PT      Patient seen with Fellow Dr. Dubose and attending Dr. Prieto Helton MD, PGY1   Vascular Surgery [id number]

## 2023-10-15 NOTE — CARE PLAN
Problem: Diabetes  Goal: Achieve decreasing blood glucose levels by end of shift  10/15/2023 0647 by Avani Ascencio RN  Outcome: Progressing  10/15/2023 0646 by Avani Ascencio RN  Outcome: Progressing  10/15/2023 0644 by Avani Ascencio RN  Outcome: Progressing  Goal: Increase stability of blood glucose readings by end of shift  10/15/2023 0647 by Avani Ascencio RN  Outcome: Progressing  10/15/2023 0646 by Avani Ascencio RN  Outcome: Progressing  10/15/2023 0644 by Avani Ascencio RN  Outcome: Progressing  Goal: Decrease in ketones present in urine by end of shift  10/15/2023 0647 by Avani Ascencio RN  Outcome: Progressing  10/15/2023 0646 by Avani Ascencio RN  Outcome: Progressing  10/15/2023 0644 by Avani Ascencio RN  Outcome: Progressing  Goal: Maintain electrolyte levels within acceptable range throughout shift  10/15/2023 0647 by Avani Ascencio RN  Outcome: Progressing  10/15/2023 0646 by Avani Ascencio RN  Outcome: Progressing  10/15/2023 0644 by Avani Ascencio RN  Outcome: Progressing  Goal: No changes in neurological exam by end of shift  10/15/2023 0647 by Avani Ascencio RN  Outcome: Progressing  10/15/2023 0646 by Avani Ascencio RN  Outcome: Progressing  10/15/2023 0644 by Avani Ascencio RN  Outcome: Progressing  Goal: Learn about and adhere to nutrition recommendations by end of shift  10/15/2023 0647 by Avani Ascencio RN  Outcome: Progressing  10/15/2023 0646 by Avani Ascencio RN  Outcome: Progressing  10/15/2023 0644 by Avani Ascencio RN  Outcome: Progressing  Goal: Increase self care and/or family involovement by end of shift  10/15/2023 0647 by Avani Ascencio RN  Outcome: Progressing  10/15/2023 0646 by Avani Ascencio RN  Outcome: Progressing  10/15/2023 0644 by Avani Ascencio RN  Outcome: Progressing  Goal: Receive DSME education by end of shift  10/15/2023 0647 by Avani Ascencio RN  Outcome:  Progressing  10/15/2023 0646 by Avani Ascencio RN  Outcome: Progressing  10/15/2023 0644 by Avani Ascencio RN  Outcome: Progressing     Problem: Safety - Medical Restraint  Goal: Remains free of injury from restraints (Restraint for Interference with Medical Device)  10/15/2023 0647 by Avani Ascencio RN  Outcome: Progressing  10/15/2023 0646 by Avani Ascencio RN  Outcome: Progressing  10/15/2023 0644 by Avani Ascencio RN  Outcome: Progressing  Goal: Free from restraint(s) (Restraint for Interference with Medical Device)  10/15/2023 0647 by Avani Ascencio RN  Outcome: Progressing  10/15/2023 0646 by Avani Ascencio RN  Outcome: Progressing  10/15/2023 0644 by Avani Ascencio RN  Outcome: Progressing     Problem: DVT/VTE Prevention/Activity  Goal: No decrease in circulation/sensation  10/15/2023 0647 by Avani Ascencio, RN  Outcome: Progressing  10/15/2023 0646 by Avani Ascencio RN  Outcome: Progressing  10/15/2023 0644 by Avani Ascencio RN  Outcome: Progressing  Goal: Return to preop oxygenation status  10/15/2023 0647 by Avani Ascencio RN  Outcome: Progressing  10/15/2023 0646 by Avani Ascencio, RN  Outcome: Progressing  10/15/2023 0644 by Avani Ascencio RN  Outcome: Progressing  Goal: Tolerates optimal activity  10/15/2023 0647 by Avani Ascencio, RN  Outcome: Progressing  10/15/2023 0646 by Avani Ascencio RN  Outcome: Progressing  10/15/2023 0644 by Avani Ascencio RN  Outcome: Progressing  Goal: Increase self care and/or family involvement in 24 hrs.  10/15/2023 0647 by Avani Ascencio RN  Outcome: Progressing  10/15/2023 0646 by Avani Ascencio, RN  Outcome: Progressing  10/15/2023 0644 by Avani Ascencio RN  Outcome: Progressing     Problem: Wound care/infection prevention  Goal: No signs of infection in 24 hrs.  10/15/2023 0647 by Avani Ascencio RN  Outcome: Progressing  10/15/2023 0646 by Avani Ascencio RN  Outcome:  Progressing  10/15/2023 0644 by Avani Ascencio RN  Outcome: Progressing  Goal: No unexpected bleeding from incision this shift  10/15/2023 0647 by Avani Ascencio, RN  Outcome: Progressing  10/15/2023 0646 by Avani Ascencio RN  Outcome: Progressing  10/15/2023 0644 by Avani Ascencio, RN  Outcome: Progressing     Problem: Diet/fluid balance  Goal: Adequate urinary output  10/15/2023 0647 by Avani Ascencio, SOFÍA  Outcome: Progressing  10/15/2023 0646 by Avani Ascencio RN  Outcome: Progressing  10/15/2023 0644 by Avani Ascencio RN  Outcome: Progressing  Goal: Free from nausea/vomiting  10/15/2023 0647 by Avani Ascencio RN  Outcome: Progressing  10/15/2023 0646 by Avani Ascencio, RN  Outcome: Progressing  10/15/2023 0644 by Avani Ascencio RN  Outcome: Progressing  Goal: Return in bowel function  10/15/2023 0647 by Avani Ascencio RN  Outcome: Progressing  10/15/2023 0646 by Avani Ascencio RN  Outcome: Progressing  10/15/2023 0644 by Avani Ascencio RN  Outcome: Progressing  Goal: Tolerates prescribed diet  10/15/2023 0647 by Avani Ascencio, RN  Outcome: Progressing  10/15/2023 0646 by Avani Ascencio RN  Outcome: Progressing  10/15/2023 0644 by Avani Ascencio RN  Outcome: Progressing     Problem: Other goals  Goal: No change in neurological status  10/15/2023 0647 by Avani Ascencio RN  Outcome: Progressing  10/15/2023 0646 by Avani Ascencio RN  Outcome: Progressing  10/15/2023 0644 by Avani Ascencio RN  Outcome: Progressing  Goal: Stabilize vital signs (return to 10% of baseline)  10/15/2023 0647 by Avani Ascencio, SOFÍA  Outcome: Progressing  10/15/2023 0646 by Avani Ascencio RN  Outcome: Progressing  10/15/2023 0644 by Avani Ascencio RN  Outcome: Progressing     Problem: Skin  Goal: Decreased wound size/increased tissue granulation at next dressing change  10/15/2023 0647 by Avani Ascencio RN  Outcome: Progressing  10/15/2023 0646 by Avani  BRIJESH Ascencio RN  Outcome: Progressing  10/15/2023 0644 by Avani Ascencio RN  Outcome: Progressing  Goal: Participates in plan/prevention/treatment measures  10/15/2023 0647 by Avani Ascencio RN  Outcome: Progressing  10/15/2023 0646 by Avani Ascencio RN  Outcome: Progressing  10/15/2023 0644 by Avani Ascencio RN  Outcome: Progressing  Goal: Prevent/manage excess moisture  10/15/2023 0647 by Avani Ascencio RN  Outcome: Progressing  10/15/2023 0646 by Avani Ascencio RN  Outcome: Progressing  10/15/2023 0644 by Avani Ascencio RN  Outcome: Progressing  Goal: Prevent/minimize sheer/friction injuries  10/15/2023 0647 by Avani Ascencio RN  Outcome: Progressing  10/15/2023 0646 by Avani Ascencio, RN  Outcome: Progressing  10/15/2023 0644 by Avani Ascencio RN  Outcome: Progressing  Goal: Promote/optimize nutrition  10/15/2023 0647 by Avani Ascencio RN  Outcome: Progressing  10/15/2023 0646 by Avani Ascencio RN  Outcome: Progressing  10/15/2023 0644 by Avani Ascencio RN  Outcome: Progressing  Goal: Promote skin healing  10/15/2023 0647 by Avani Ascencio RN  Outcome: Progressing  10/15/2023 0646 by Avani Ascencio, RN  Outcome: Progressing  10/15/2023 0644 by Avani Ascencio RN  Outcome: Progressing   The patient's goals for the shift include      The clinical goals for the shift include pt will have control BM

## 2023-10-15 NOTE — CARE PLAN
The patient's goals for the shift include      The clinical goals for the shift include pt will have control BM

## 2023-10-15 NOTE — PROGRESS NOTES
Gastroenterology Consult Service Progress Note  Department of Gastroenterology & Hepatology  [facility]    [facility]  Date of Service  October 15, 2023   Patient: Hilda Jones    Medical Record: [id number]  Reason for Initial Consult: diarrhea  Requesting Service: vascular     Interval History: celiac panel negative. Still w same stooling as per home (watery diarrhea 4-5 times per day)    Physical Exam:    Vital Signs:    Vitals:    10/14/23 2348 10/15/23 0437 10/15/23 0813 10/15/23 1116   BP: 94/56 96/56 97/59 100/66   BP Location:       Patient Position:       Pulse: 65 70 72 75   Resp: 18 19 18 18   Temp: 36.9 °C (98.4 °F) 36.6 °C (97.9 °F) 36.9 °C (98.4 °F) 36.3 °C (97.3 °F)   TempSrc:       SpO2: 97% 99% 97% 97%   Weight:       Height:             Intake/Output Summary (Last 24 hours) at 10/15/2023 1138  Last data filed at 10/15/2023 0705  Gross per 24 hour   Intake --   Output 502 ml   Net -502 ml         General: chronically ill appearing, no acute distress  HEENT: PERRLA, EOM intact, no scleral icterus, moist MM  Respiratory: on 4L NC, CTA bilaterally, normal work of breathing  Cardiovascular: RRR, no murmurs/rubs/gallops  Abdomen: Soft, tenderness around incision site, nondistended, bowel sounds present  Extremities: no edema, no asterixis  Neuro: alert and oriented, CNII-XII grossly intact, moves all 4 extremities with no focal deficits    Labs:  Lab Results   Component Value Date    WBC 12.3 (H) 10/15/2023    HGB 8.8 (L) 10/15/2023    HCT 26.7 (L) 10/15/2023    MCV 80 10/15/2023     10/15/2023     Lab Results   Component Value Date    GLUCOSE 77 10/15/2023    CALCIUM 7.6 (L) 10/15/2023     10/15/2023    K 4.2 10/15/2023    CO2 28 10/15/2023     10/15/2023    BUN 10 10/15/2023    CREATININE 0.82 10/15/2023     Assessment:       Hilda Jones is a 68 y.o. female with HTN, T2DM, COPD, CHF, AF (previously on coumadin), AR s/p AVR x2, hx of MSA  occlusoin s/p thromboembolectomy admitted with acute mesenteric ischemia s/p open mesenteric embolectomy. GI Consulted for workup of acute diarrhea and consideration of ischemic colitis.      Patient has had chronic, watery diarrhea for about 1 year now. Workup thus far negative for C diff, negative for enteric panel (though tested several months ago). Very large differential for chronic diarrhea, will start with workup as below to rule out hypothyroidism, celiac dx, infectious diarrhea, inflammatory diarrhea. In terms of question of ischemic colitis, she is certainly at risk, typically is associated with abd cramping and hematochezia, neither of which she has. If clinical suspicion is high, can consider lactate and/or CT imaging with contrast.     Workup thus far: TSH slightly elevated, T4 low. GI PCR negative, C diff negative. Lactate wnls. Celiac panel negative.      - stool studies: Stool Ova and parasites, stool giardia, fecal calprotectin, fecal lactoferrin, fecal fat- all still pending  - can start loperamide 4 mg, followed by 2 mg with each loose stool (max 16 mg/day) to aid with chronic diarrhea  - patient would benefit from outpatient endoscopic evaluation  - please order patient for OP GI clinic follow up upon discharge (with Dr. Soto)      Patient to be seen and staffed with Dr. Graham.  Thank you for the consultation.  The consulting team will sign off now.  Please do not hesitate to contact us again on by paging the consultation team again between the weekday hours of 7 AM - 5 PM.  If there is an urgent concern during the weekend, after-hours, or holidays; then please page the on-call GI fellow at [phone]. Thank you.    SIGNATURE: Hermelinda Rodríguez MD PATIENT NAME: Hilda Jones   DATE: October 15, 2023 MRN: [id number]

## 2023-10-16 LAB
ALBUMIN SERPL BCP-MCNC: 2.5 G/DL (ref 3.4–5)
ANION GAP SERPL CALC-SCNC: 11 MMOL/L (ref 10–20)
APTT PPP: 98 SECONDS (ref 27–38)
BUN SERPL-MCNC: 8 MG/DL (ref 6–23)
CALCIUM SERPL-MCNC: 7.5 MG/DL (ref 8.6–10.6)
CHLORIDE SERPL-SCNC: 105 MMOL/L (ref 98–107)
CO2 SERPL-SCNC: 28 MMOL/L (ref 21–32)
CREAT SERPL-MCNC: 0.72 MG/DL (ref 0.5–1.05)
ERYTHROCYTE [DISTWIDTH] IN BLOOD BY AUTOMATED COUNT: 17.4 % (ref 11.5–14.5)
FAT STL QL: ABNORMAL
GFR SERPL CREATININE-BSD FRML MDRD: >90 ML/MIN/1.73M*2
GLUCOSE BLD MANUAL STRIP-MCNC: 113 MG/DL (ref 74–99)
GLUCOSE BLD MANUAL STRIP-MCNC: 135 MG/DL (ref 74–99)
GLUCOSE BLD MANUAL STRIP-MCNC: 85 MG/DL (ref 74–99)
GLUCOSE BLD MANUAL STRIP-MCNC: 92 MG/DL (ref 74–99)
GLUCOSE BLD MANUAL STRIP-MCNC: 93 MG/DL (ref 74–99)
GLUCOSE BLD MANUAL STRIP-MCNC: 95 MG/DL (ref 74–99)
GLUCOSE SERPL-MCNC: 110 MG/DL (ref 74–99)
HCT VFR BLD AUTO: 26.4 % (ref 36–46)
HGB BLD-MCNC: 8.7 G/DL (ref 12–16)
INR PPP: 1.6 (ref 0.9–1.1)
LACTOFERRIN STL QL IA: POSITIVE
MAGNESIUM SERPL-MCNC: 2.17 MG/DL (ref 1.6–2.4)
MCH RBC QN AUTO: 26.2 PG (ref 26–34)
MCHC RBC AUTO-ENTMCNC: 33 G/DL (ref 32–36)
MCV RBC AUTO: 80 FL (ref 80–100)
NEUTRAL FAT STL QL: NORMAL
NRBC BLD-RTO: 0.4 /100 WBCS (ref 0–0)
PHOSPHATE SERPL-MCNC: 3.4 MG/DL (ref 2.5–4.9)
PLATELET # BLD AUTO: 301 X10*3/UL (ref 150–450)
PMV BLD AUTO: 11.3 FL (ref 7.5–11.5)
POTASSIUM SERPL-SCNC: 3.8 MMOL/L (ref 3.5–5.3)
PROTHROMBIN TIME: 18.3 SECONDS (ref 9.8–12.8)
RBC # BLD AUTO: 3.32 X10*6/UL (ref 4–5.2)
SODIUM SERPL-SCNC: 140 MMOL/L (ref 136–145)
UFH PPP CHRO-ACNC: 0.2 IU/ML
UFH PPP CHRO-ACNC: 0.5 IU/ML
UFH PPP CHRO-ACNC: 0.6 IU/ML
UFH PPP CHRO-ACNC: 0.6 IU/ML
WBC # BLD AUTO: 11.5 X10*3/UL (ref 4.4–11.3)

## 2023-10-16 PROCEDURE — 85730 THROMBOPLASTIN TIME PARTIAL: CPT | Mod: CMCLAB | Performed by: STUDENT IN AN ORGANIZED HEALTH CARE EDUCATION/TRAINING PROGRAM

## 2023-10-16 PROCEDURE — 83993 ASSAY FOR CALPROTECTIN FECAL: CPT | Mod: CMCLAB

## 2023-10-16 PROCEDURE — 93010 ELECTROCARDIOGRAM REPORT: CPT | Performed by: INTERNAL MEDICINE

## 2023-10-16 PROCEDURE — 2500000001 HC RX 250 WO HCPCS SELF ADMINISTERED DRUGS (ALT 637 FOR MEDICARE OP)

## 2023-10-16 PROCEDURE — 80069 RENAL FUNCTION PANEL: CPT

## 2023-10-16 PROCEDURE — 83630 LACTOFERRIN FECAL (QUAL): CPT | Mod: CMCLAB

## 2023-10-16 PROCEDURE — 89125 SPECIMEN FAT STAIN: CPT | Mod: CMCLAB

## 2023-10-16 PROCEDURE — 2500000001 HC RX 250 WO HCPCS SELF ADMINISTERED DRUGS (ALT 637 FOR MEDICARE OP): Performed by: NURSE PRACTITIONER

## 2023-10-16 PROCEDURE — 1100000001 HC PRIVATE ROOM DAILY

## 2023-10-16 PROCEDURE — C9113 INJ PANTOPRAZOLE SODIUM, VIA: HCPCS

## 2023-10-16 PROCEDURE — 36415 COLL VENOUS BLD VENIPUNCTURE: CPT | Mod: CMCLAB

## 2023-10-16 PROCEDURE — 85520 HEPARIN ASSAY: CPT | Mod: CMCLAB

## 2023-10-16 PROCEDURE — 2500000004 HC RX 250 GENERAL PHARMACY W/ HCPCS (ALT 636 FOR OP/ED): Performed by: NURSE PRACTITIONER

## 2023-10-16 PROCEDURE — 97116 GAIT TRAINING THERAPY: CPT | Mod: GP,CQ

## 2023-10-16 PROCEDURE — 2500000004 HC RX 250 GENERAL PHARMACY W/ HCPCS (ALT 636 FOR OP/ED)

## 2023-10-16 PROCEDURE — 83735 ASSAY OF MAGNESIUM: CPT | Mod: CMCLAB

## 2023-10-16 PROCEDURE — 82947 ASSAY GLUCOSE BLOOD QUANT: CPT

## 2023-10-16 PROCEDURE — 97110 THERAPEUTIC EXERCISES: CPT | Mod: GP,CQ

## 2023-10-16 PROCEDURE — 85520 HEPARIN ASSAY: CPT | Performed by: SURGERY

## 2023-10-16 PROCEDURE — 99232 SBSQ HOSP IP/OBS MODERATE 35: CPT | Performed by: NURSE PRACTITIONER

## 2023-10-16 PROCEDURE — 85027 COMPLETE CBC AUTOMATED: CPT

## 2023-10-16 PROCEDURE — 85610 PROTHROMBIN TIME: CPT | Mod: CMCLAB | Performed by: STUDENT IN AN ORGANIZED HEALTH CARE EDUCATION/TRAINING PROGRAM

## 2023-10-16 PROCEDURE — 99233 SBSQ HOSP IP/OBS HIGH 50: CPT | Performed by: NURSE PRACTITIONER

## 2023-10-16 PROCEDURE — 87328 CRYPTOSPORIDIUM AG IA: CPT | Mod: CMCLAB

## 2023-10-16 PROCEDURE — 87329 GIARDIA AG IA: CPT | Mod: CMCLAB

## 2023-10-16 PROCEDURE — 2580000001 HC RX 258 IV SOLUTIONS

## 2023-10-16 PROCEDURE — 2500000004 HC RX 250 GENERAL PHARMACY W/ HCPCS (ALT 636 FOR OP/ED): Performed by: SURGERY

## 2023-10-16 RX ORDER — POTASSIUM CHLORIDE 20 MEQ/1
20 TABLET, EXTENDED RELEASE ORAL ONCE
Status: COMPLETED | OUTPATIENT
Start: 2023-10-16 | End: 2023-10-16

## 2023-10-16 RX ADMIN — ACETAMINOPHEN 650 MG: 325 TABLET ORAL at 20:44

## 2023-10-16 RX ADMIN — ATORVASTATIN CALCIUM 40 MG: 40 TABLET, FILM COATED ORAL at 20:44

## 2023-10-16 RX ADMIN — HEPARIN SODIUM 1330 UNITS/HR: 10000 INJECTION, SOLUTION INTRAVENOUS at 14:37

## 2023-10-16 RX ADMIN — Medication 1 TABLET: at 09:00

## 2023-10-16 RX ADMIN — ACETAMINOPHEN 650 MG: 325 TABLET ORAL at 15:14

## 2023-10-16 RX ADMIN — HEPARIN SODIUM 1500 UNITS: 5000 INJECTION, SOLUTION INTRAVENOUS; SUBCUTANEOUS at 04:49

## 2023-10-16 RX ADMIN — INSULIN GLARGINE 5 UNITS: 100 INJECTION, SOLUTION SUBCUTANEOUS at 20:45

## 2023-10-16 RX ADMIN — ACETAMINOPHEN 650 MG: 325 TABLET ORAL at 09:15

## 2023-10-16 RX ADMIN — ASPIRIN 81 MG: 81 TABLET, COATED ORAL at 09:15

## 2023-10-16 RX ADMIN — Medication 5 MG: at 20:45

## 2023-10-16 RX ADMIN — FLUOXETINE 20 MG: 20 CAPSULE ORAL at 09:16

## 2023-10-16 RX ADMIN — POTASSIUM CHLORIDE 20 MEQ: 1500 TABLET, EXTENDED RELEASE ORAL at 15:15

## 2023-10-16 RX ADMIN — POTASSIUM CHLORIDE 20 MEQ: 1500 TABLET, EXTENDED RELEASE ORAL at 17:43

## 2023-10-16 RX ADMIN — PANTOPRAZOLE SODIUM 40 MG: 40 INJECTION, POWDER, FOR SOLUTION INTRAVENOUS at 09:17

## 2023-10-16 RX ADMIN — METOPROLOL SUCCINATE 100 MG: 50 TABLET, EXTENDED RELEASE ORAL at 09:16

## 2023-10-16 RX ADMIN — DONEPEZIL HYDROCHLORIDE 5 MG: 5 TABLET ORAL at 20:45

## 2023-10-16 RX ADMIN — WARFARIN SODIUM 5 MG: 5 TABLET ORAL at 17:43

## 2023-10-16 RX ADMIN — SODIUM CHLORIDE, POTASSIUM CHLORIDE, SODIUM LACTATE AND CALCIUM CHLORIDE 50 ML/HR: 600; 310; 30; 20 INJECTION, SOLUTION INTRAVENOUS at 07:04

## 2023-10-16 ASSESSMENT — COGNITIVE AND FUNCTIONAL STATUS - GENERAL
MOVING FROM LYING ON BACK TO SITTING ON SIDE OF FLAT BED WITH BEDRAILS: A LITTLE
STANDING UP FROM CHAIR USING ARMS: A LITTLE
DAILY ACTIVITIY SCORE: 18
MOBILITY SCORE: 17
TOILETING: A LITTLE
CLIMB 3 TO 5 STEPS WITH RAILING: A LOT
PERSONAL GROOMING: A LITTLE
DRESSING REGULAR LOWER BODY CLOTHING: A LITTLE
HELP NEEDED FOR BATHING: A LITTLE
DRESSING REGULAR LOWER BODY CLOTHING: A LITTLE
STANDING UP FROM CHAIR USING ARMS: A LITTLE
EATING MEALS: A LITTLE
STANDING UP FROM CHAIR USING ARMS: A LITTLE
MOVING TO AND FROM BED TO CHAIR: A LITTLE
EATING MEALS: A LITTLE
MOBILITY SCORE: 17
MOVING FROM LYING ON BACK TO SITTING ON SIDE OF FLAT BED WITH BEDRAILS: A LITTLE
CLIMB 3 TO 5 STEPS WITH RAILING: A LOT
PERSONAL GROOMING: A LITTLE
TURNING FROM BACK TO SIDE WHILE IN FLAT BAD: A LITTLE
MOVING FROM LYING ON BACK TO SITTING ON SIDE OF FLAT BED WITH BEDRAILS: A LITTLE
HELP NEEDED FOR BATHING: A LITTLE
DRESSING REGULAR UPPER BODY CLOTHING: A LITTLE
TURNING FROM BACK TO SIDE WHILE IN FLAT BAD: A LITTLE
WALKING IN HOSPITAL ROOM: A LITTLE
WALKING IN HOSPITAL ROOM: A LITTLE
MOVING TO AND FROM BED TO CHAIR: A LITTLE
MOBILITY SCORE: 17
DRESSING REGULAR UPPER BODY CLOTHING: A LITTLE
WALKING IN HOSPITAL ROOM: A LITTLE
TURNING FROM BACK TO SIDE WHILE IN FLAT BAD: A LITTLE
MOVING TO AND FROM BED TO CHAIR: A LITTLE
TOILETING: A LITTLE
CLIMB 3 TO 5 STEPS WITH RAILING: A LOT
DAILY ACTIVITIY SCORE: 18

## 2023-10-16 ASSESSMENT — PAIN SCALES - WONG BAKER: WONGBAKER_NUMERICALRESPONSE: HURTS WORST

## 2023-10-16 ASSESSMENT — PAIN - FUNCTIONAL ASSESSMENT
PAIN_FUNCTIONAL_ASSESSMENT: 0-10

## 2023-10-16 ASSESSMENT — PAIN SCALES - GENERAL
PAINLEVEL_OUTOF10: 0 - NO PAIN

## 2023-10-16 NOTE — SIGNIFICANT EVENT
Called to see patient given concern for torsades on 10/14. On review of telemetry, on 10/14 there are several episodes of telemetry artifact and there is no evidence of ventricular arrhythmia.        Impression:  #HF 2/2 rheumatic heart disease with symptomatic mitral stenosis and severe TR  #AFL  #AF with 2 prior thromboemboli, admitted for acute ischemic colitis s/p open thromboembolectomy 10/6  #s/p AVR 2004, 2016 with #25 Freestyle  #SALTY thrombus 7/2023     Is at very high risk of thromboembolism given rheumatic mitral stenosis and recent SALTY thrombus and current admission for thromboembolism - would rate control and ensure adequate AC. There is a mortality benefit and a thromboembolism benefit to warfarin over DOAC in rheumatic AF without significant increase in bleeding (INVICTUS) so would ideally do this or LMWH over DOAC if at all possible. Current thromboembolism likely due to inadequate warfarin dosing given most of the INR's in our system are subtherapeutic - would focus on ensuring reliable warfarin/INR follow-up given very high thromboembolic risk, and avoid rhythm control at this time given high risk of thromboembolism.    Please note that warfarin is not ordered at this time - if this is the planned anticoagulation approach, it will be difficult to achieve therapeutic INR without ordering warfarin.     Recommendations:  - continue metoprolol succinate 100 daily. If SBP <80 or sustained HR <50, decrease dose to 50 daily  - continue warfarin 5mg to maintain INR between 2.0-3.0  - INR 1.6  - outaptient follow-up with outpatient cardiology in 4 weeks and structural heart service in 4-8 weeks to discuss mitral stenosis        Thank you for the opportunity to contribute to the care of this patient. Above recommendations discussed with Dr. Acosta. If further questions arise, please page the general cardiology consult pager at [phone] on weekdays 7AM - 6PM and weekends 7AM - 2PM, or at [phone] at all other times.

## 2023-10-16 NOTE — SIGNIFICANT EVENT
HUMPHREY   10/16/23 1706   Onset Documentation   Rapid Response Initiated By Radar auto page   Pager Time [phone]   Arrival Time 1725   Event End Time 1735   Primary Reason for Call Radar auto page     RADAR page auto generated from VS -see documented VS. Call to floor and spoke with nurse- BP recheck and SBP in the 90's-near baseline for pt. No concerns at this time. Pt ok to remain on the floor for continued monitoring and will call with any concerns.

## 2023-10-16 NOTE — PROGRESS NOTES
Subjective Data:  No acute events overnight        Objective Data:  Last Recorded Vitals:  Vitals:    10/16/23 0500 10/16/23 0700 10/16/23 0859 10/16/23 1329   BP: 92/60  101/59 95/59   BP Location: Left arm      Patient Position: Lying      Pulse: 63  66 57   Resp: 18  17 15   Temp: 36.5 °C (97.7 °F)  37 °C (98.6 °F) 36.5 °C (97.7 °F)   TempSrc: Tympanic      SpO2: 96%  90% 100%   Weight:  68 kg (149 lb 14.6 oz)     Height:           Last Labs:  CBC - 10/16/2023: 10:55 AM  11.5 8.7 301    26.4      CMP - 10/16/2023: 10:55 AM  7.5 7.4 53 --- 0.6   3.4 2.5 18 107      PTT - 10/16/2023: 10:55 AM  1.6   18.3 98          Last I/O:  I/O last 3 completed shifts:  In: 740 (10.9 mL/kg) [P.O.:740]  Out: 502 (7.4 mL/kg) [Urine:500 (0.2 mL/kg/hr); Stool:2]  Weight: 68.2 kg         Inpatient Medications:  Scheduled medications   Medication Dose Route Frequency    acetaminophen  650 mg oral q6h    aspirin  81 mg oral Daily    atorvastatin  40 mg oral Nightly    donepezil  5 mg oral Nightly    FLUoxetine  20 mg oral Daily    insulin glargine  5 Units subcutaneous Nightly    insulin lispro  0-10 Units subcutaneous q4h    melatonin  5 mg oral Nightly    metoprolol succinate XL  100 mg oral Daily    multivitamin with minerals  1 tablet oral Daily    pantoprazole  40 mg intravenous Daily before breakfast    [MAR Hold] perflutren protein A microsphere  0.5 mL intravenous Once in imaging    potassium chloride CR  20 mEq oral Once    [MAR Hold] sulfur hexafluoride microsphr  2 mL intravenous Once in imaging    [Held by provider] torsemide  20 mg oral BID    warfarin  5 mg oral Daily     PRN medications   Medication    glucagon    heparin    HYDROmorphone    labetaloL    loperamide    oxyCODONE    oxyCODONE    [MAR Hold] oxygen     Continuous Medications   Medication Dose Last Rate    heparin  0-4,000 Units/hr 1,330 Units/hr (10/16/23 1437)    lactated Ringer's  50 mL/hr 50 mL/hr (10/16/23 1300)       Physical Exam:      Physical  Exam:  Constitutional: chronically-ill appearing, NAD  Eyes: no conjunctival injection, EOMI  ENT: MMM, no apparent injury  Head/Neck: Neck supple, no apparent injury  Respiratory/Thorax: Patent airways, CTAB, normal WOB  Cardiovascular: normal rate, regular rhythm, no murmur, normal S1 and S2, JVP not elevated, extremities warm, trace JUAN J  Gastrointestinal: Non-distended, soft, no tenderness  Extremities: warm, intact  Neurological: grossly intact, no focal deficits  Skin: Warm and dry, no lesions, no rashes       Assessment/Plan   Impression:  #HF 2/2 rheumatic heart disease with symptomatic mitral stenosis and severe TR  #AFL  #AF with 2 prior thromboemboli  #s/p AVR 2004, 2016 with #25 Freestyle  #SALTY thrombus 7/2023     Is at very high risk of thromboembolism given rheumatic mitral stenosis and recent SALTY thrombus and current admission for thromboembolism - would rate control and ensure adequate AC. There is a mortality benefit and a thromboembolism benefit to warfarin over DOAC in rheumatic AF without significant increase in bleeding (INVICTUS) so would ideally do this or LMWH over DOAC if at all possible. Current thromboembolism likely due to inadequate warfarin dosing given most of the INR's in our system are subtherapeutic - would focus on ensuring reliable warfarin/INR follow-up given very high thromboembolic risk, and avoid rhythm control at this time given high risk of thromboembolism.     Recommendations:  - switch metoprolol to succinate 100 daily. If SBP <80 or sustained HR <50, decrease dose to 50 daily  - ideally would AC with, in ranked order:  1) warfarin adjusted to INR 2.0 to 3.0, defer specifics to priry service - this is the standard of care  2) LMWH 1 mg/kg bid (usually is price prohibitive)  3) (if the first 2 are unachievable) DOAC apixaban 5 bid. If this is chosen, would need to  patient that there is increased mortality to DOAC over warfarin, would only choose this if there is a  strict contraindication to the first two choices  - c/w coumadin 5mg daily  - INR 1.6 (1.2,1.2)  -please arrange for outpatient follow-up with outpatient cardiology in 4 weeks and structural heart service in 4-8 weeks to discuss mitral stenosis    Discussed with Dr. Dave Dudley DNP, APRN-CNP  Cardiology Consults  Please call with any questions  Pager [phone] M-F 8a-5p; Saturday 8a-2p  Pager [phone] all other times    Code Status:  Full Code

## 2023-10-16 NOTE — PROGRESS NOTES
Physical Therapy    Physical Therapy Treatment    Patient Name: Hilda Jones  MRN: [id number]  Today's Date: 10/16/2023  Time Calculation  Start Time: 1251  Stop Time: 1317  Time Calculation (min): 26 min       Assessment/Plan   PT Assessment  PT Assessment Results: Decreased strength, Decreased endurance, Impaired balance, Decreased mobility, Pain  Rehab Prognosis: Good  Evaluation/Treatment Tolerance: Patient limited by fatigue  Medical Staff Made Aware: Yes  End of Session Communication: Bedside nurse  End of Session Patient Position: Up in chair, Alarm on  PT Plan  Inpatient/Swing Bed or Outpatient: Inpatient  PT Plan  Treatment/Interventions: Bed mobility, Transfer training, Gait training, Stair training, Strengthening, Therapeutic exercise, Therapeutic activity  PT Plan: Skilled PT  PT Frequency: 3 times per week  PT Discharge Recommendations: Low intensity level of continued care  PT Recommended Transfer Status: Assist x1      General Visit Information:   PT  Visit  PT Received On: 10/16/23  Response to Previous Treatment: Patient with no complaints from previous session.  General  Prior to Session Communication: Bedside nurse  Patient Position Received: Bed, 3 rail up, Alarm on  General Comment:  (pt pleasant and cooperative. pt continues to tolerate short ambulation distances noting fatigue as her limiting factor.)    Subjective   Precautions:  Precautions  Hearing/Visual Limitations: Appears WFL  Medical Precautions: Abdominal precautions, Fall precautions, Oxygen therapy device and L/min  Post-Surgical Precautions: Abdominal surgery precautions  Precautions Comment: -140    Objective   Pain:  Pain Assessment  Pain Assessment: 0-10  Pain Score: 0 - No pain  Mena-Baker FACES Pain Rating: Hurts worst  Pain Frequency: Intermittent (pt states no pain at rest however has increase in pain)  Effect of Pain on Daily Activities:  (did not limit participation)  Pain Interventions: Repositioned  Response to  Interventions:  (resting comfortability)  Cognition:  Cognition  Overall Cognitive Status: Within Functional Limits  Orientation Level: Oriented X4    Activity Tolerance:  Activity Tolerance  Endurance: Tolerates 10 - 20 min exercise with multiple rests  Treatments:  Therapeutic Exercise  Therapeutic Exercise Performed: Yes  Therapeutic Exercise Activity 1:  (supine AP, QS, GS, Heel Slides, Hip abduction. AROM x 10 reps Seated LAQ AROM x 10 reps)    Bed Mobility  Bed Mobility: Yes  Bed Mobility 1  Bed Mobility 1: Supine to sitting, Sitting to supine  Level of Assistance 1: Distant supervision  Bed Mobility Comments 1:  (HOB elevated. Cued for log roll to decrease pain in abdoment with mobility.)    Ambulation/Gait Training  Ambulation/Gait Training Performed: Yes  Ambulation/Gait Training 1  Surface 1: Level tile  Device 1: Rolling walker  Assistance 1: Contact guard  Quality of Gait 1: Inconsistent stride length (slow franki,)  Comments/Distance (ft) 1: 35'  Transfers  Transfer: Yes  Transfer 1  Transfer From 1: Sit to, Stand to  Transfer to 1: Sit, Stand  Technique 1: Sit to stand, Stand to sit  Transfer Device 1: Walker  Transfer Level of Assistance 1: Contact guard  Trials/Comments 1:  (cues for hand placement and safety.)    Outcome Measures:  [facility] Basic Mobility  Turning from your back to your side while in a flat bed without using bedrails: A little  Moving from lying on your back to sitting on the side of a flat bed without using bedrails: A little  Moving to and from bed to chair (including a wheelchair): A little  Standing up from a chair using your arms (e.g. wheelchair or bedside chair): A little  To walk in hospital room: A little  Climbing 3-5 steps with railing: A lot  Basic Mobility - Total Score: 17    Education Documentation  Precautions, taught by Matt Brooks PTA at 10/16/2023  3:01 PM.  Learner: Patient  Readiness: Acceptance  Method: Explanation  Response: Verbalizes  Understanding    Mobility Training, taught by Matt Brooks PTA at 10/16/2023  3:01 PM.  Learner: Patient  Readiness: Acceptance  Method: Explanation  Response: Verbalizes Understanding    Education Comments  No comments found.        OP EDUCATION:       Encounter Problems       Encounter Problems (Active)       Balance       Pt will demonstrate ability to complete standing static/dynamic balance activities with unilateral UE support and no LOB for increase in safety up D/C.  (Progressing)       Start:  10/09/23    Expected End:  10/23/23               Mobility       Pt will demonstrated ability to ambulate >/=100ft with proper form and no balance deficits for safe home going.   (Progressing)       Start:  10/09/23    Expected End:  10/23/23            Pt will be able to complete 5X STS from lowest bed height with UE assist and RW  <30 seconds with stable vitals and RPD </=3/10 and RPE </=13/20 for improved functional mobility  (Progressing)       Start:  10/09/23    Expected End:  10/23/23               Transfers       Pt will demonstrated ability to complete bed mobility and sit<>stand transfers without assistance and LRAD to safely return home.  (Progressing)       Start:  10/09/23    Expected End:  10/23/23

## 2023-10-16 NOTE — PROGRESS NOTES
"Hilda Jones is a 68 y.o. female on day 10 of admission presenting with Mesenteric ischemia (CMS/HCC).    Subjective   No issues overnight. Diarrhea slowing down. No abdominal pain.      Objective     Physical Exam  Constitutional: No acute distress, lying in bed  Neuro:  AOx3, grossly intact  ENMT: moist mucous membranes  Head/neck: atraumatic  CV: no tachycardia  Pulm: non-labored breathing   GI: soft, distended, appropriately tender, midline incision with staples c/d/i  Skin: warm and dry  Musculoskeletal: moving all extremities    Last Recorded Vitals  Blood pressure 92/52, pulse 56, temperature 36 °C (96.8 °F), resp. rate 18, height 1.651 m (5' 5\"), weight 68 kg (149 lb 14.6 oz), SpO2 92 %.  Intake/Output last 3 Shifts:  I/O last 3 completed shifts:  In: 740 (10.9 mL/kg) [P.O.:740]  Out: 502 (7.4 mL/kg) [Urine:500 (0.2 mL/kg/hr); Stool:2]  Weight: 68.2 kg     Relevant Results     Scheduled medications  acetaminophen, 650 mg, oral, q6h  aspirin, 81 mg, oral, Daily  atorvastatin, 40 mg, oral, Nightly  donepezil, 5 mg, oral, Nightly  FLUoxetine, 20 mg, oral, Daily  insulin glargine, 5 Units, subcutaneous, Nightly  insulin lispro, 0-10 Units, subcutaneous, q4h  melatonin, 5 mg, oral, Nightly  metoprolol succinate XL, 100 mg, oral, Daily  multivitamin with minerals, 1 tablet, oral, Daily  pantoprazole, 40 mg, intravenous, Daily before breakfast  [MAR Hold] perflutren protein A microsphere, 0.5 mL, intravenous, Once in imaging  potassium chloride CR, 20 mEq, oral, Once  [MAR Hold] sulfur hexafluoride microsphr, 2 mL, intravenous, Once in imaging  [Held by provider] torsemide, 20 mg, oral, BID  warfarin, 5 mg, oral, Daily      Continuous medications  heparin, 0-4,000 Units/hr, Last Rate: 1,330 Units/hr (10/16/23 1437)  lactated Ringer's, 50 mL/hr, Last Rate: 50 mL/hr (10/16/23 1300)      PRN medications  PRN medications: glucagon, heparin, HYDROmorphone, labetaloL, loperamide, oxyCODONE, oxyCODONE, [MAR Hold] " oxygen    Results for orders placed or performed during the hospital encounter of 10/06/23 (from the past 24 hour(s))   Heparin Assay   Result Value Ref Range    Heparin Unfractionated 0.1 See Comment Below for Therapeutic Ranges IU/mL   POCT GLUCOSE   Result Value Ref Range    POCT Glucose 140 (H) 74 - 99 mg/dL   Heparin Assay   Result Value Ref Range    Heparin Unfractionated 0.2 See Comment Below for Therapeutic Ranges IU/mL   POCT GLUCOSE   Result Value Ref Range    POCT Glucose 93 74 - 99 mg/dL   POCT GLUCOSE   Result Value Ref Range    POCT Glucose 92 74 - 99 mg/dL   Heparin Assay, UFH   Result Value Ref Range    Heparin Unfractionated 0.2 See Comment Below for Therapeutic Ranges IU/mL   POCT GLUCOSE   Result Value Ref Range    POCT Glucose 95 74 - 99 mg/dL   Heparin Assay, UFH   Result Value Ref Range    Heparin Unfractionated 0.5 See Comment Below for Therapeutic Ranges IU/mL   Coagulation Screen   Result Value Ref Range    Protime 18.3 (H) 9.8 - 12.8 seconds    INR 1.6 (H) 0.9 - 1.1    aPTT 98 (H) 27 - 38 seconds   Magnesium   Result Value Ref Range    Magnesium 2.17 1.60 - 2.40 mg/dL   Renal function panel   Result Value Ref Range    Glucose 110 (H) 74 - 99 mg/dL    Sodium 140 136 - 145 mmol/L    Potassium 3.8 3.5 - 5.3 mmol/L    Chloride 105 98 - 107 mmol/L    Bicarbonate 28 21 - 32 mmol/L    Anion Gap 11 10 - 20 mmol/L    Urea Nitrogen 8 6 - 23 mg/dL    Creatinine 0.72 0.50 - 1.05 mg/dL    eGFR >90 >60 mL/min/1.73m*2    Calcium 7.5 (L) 8.6 - 10.6 mg/dL    Phosphorus 3.4 2.5 - 4.9 mg/dL    Albumin 2.5 (L) 3.4 - 5.0 g/dL   CBC   Result Value Ref Range    WBC 11.5 (H) 4.4 - 11.3 x10*3/uL    nRBC 0.4 (H) 0.0 - 0.0 /100 WBCs    RBC 3.32 (L) 4.00 - 5.20 x10*6/uL    Hemoglobin 8.7 (L) 12.0 - 16.0 g/dL    Hematocrit 26.4 (L) 36.0 - 46.0 %    MCV 80 80 - 100 fL    MCH 26.2 26.0 - 34.0 pg    MCHC 33.0 32.0 - 36.0 g/dL    RDW 17.4 (H) 11.5 - 14.5 %    Platelets 301 150 - 450 x10*3/uL    MPV 11.3 7.5 - 11.5 fL    Heparin Assay   Result Value Ref Range    Heparin Unfractionated 0.6 See Comment Below for Therapeutic Ranges IU/mL   POCT GLUCOSE   Result Value Ref Range    POCT Glucose 113 (H) 74 - 99 mg/dL   Heparin Assay, UFH   Result Value Ref Range    Heparin Unfractionated 0.6 See Comment Below for Therapeutic Ranges IU/mL   POCT GLUCOSE   Result Value Ref Range    POCT Glucose 135 (H) 74 - 99 mg/dL     CT head wo IV contrast 10/14/2023    Narrative  Interpreted By:  Edenilson Arias and Tippareddy Charit  STUDY:  CT HEAD WO IV CONTRAST;  10/14/2023 7:04 pm    INDICATION:  Signs/Symptoms:Fall, head trauma, r/o bleed. On heparin gtt.    COMPARISON:  CT 12/29/2022    ACCESSION NUMBER(S):  [id number]    ORDERING CLINICIAN:  MICK CHURCH    TECHNIQUE:  Noncontrast enhanced CT was performed from the vertex to the  skullbase. Multiplanar reconstructions were made.    FINDINGS:  Parenchyma:  Nonspecific hypodensities in the white matter which can  be seen in the setting of chronic small vessel ischemic changes.The  grey-white differentiation is intact. There is no mass effect or  midline shift.  There is no acute intracranial hemorrhage.    CSF Spaces:The ventricles, sulci and basal cisterns are age  concordant. There is no abnormal extraaxial fluid collection.    Stable mild prominence of the CSF space within the superior  cerebellar cistern.    Calvarium: Stable mild diffuse sclerosis with areas of lucency  involving the calvarium compared to the CT head from 2022.    Paranasal sinuses and mastoids: Visualized paranasal sinuses and  mastoids are clear.    Impression  No evidence of acute infarct, intracranial hemorrhage, or mass effect.    I personally reviewed the images/study and I agree with the findings  as stated. This study was interpreted at [place], [place].    MACRO:  None.    Signed by: Edenilson Arias 10/14/2023 7:51 PM  Dictation workstation:   [id number]       Assessment/Plan   68 y.o. female with history of HTN, DM2, COPD, CHF, Afib/Aflutter (on warfarin), mitral stenosis, TR, AR s/p AVR x2, prior SMA occlusion s/p thromboembolectomy who is POD#3 s/p open mesenteric embolectomy for acute mesenteric ischemia likely related to sub-therapeutic INR on presentation.     10/7: Second look and closure  10/8: TTE demonstrated 75% LVEF, severe TR, Afib  10/10: Downtrending WBC  10/11: Transferred to [facility] with tele   10/12: tonight is dose #3 of warfarin, GI consult for diarrhea, obtain lactate & stool panel   10/13: stool studies negative  10/14: fell going to bathroom - [facility] without acute findings        Plan:   Neuro: acute postoperative pain, insomnia, dementia   - continue pain control with tylenol & PRN oxy   - continue NV checks q4h   - holding home trazodone & gabapentin   - continue home prozac & donepezil   - continue multivitamin      Cardiac: HTN, CHF, afib/aflutter (home warfarin), mitral stenosis, TR, AR, SMA occlusion, acute mesenteric ischemia x2, SALTY thrombus   - maintain blood pressure control  - continue home metoprolol with hold parameters   - holding home torsemide & spironolactone   - continue heparin drip with bridge to warfarin  - continue aspirin & statin   - appreciate cardiology recommendations: metop 50 BID, continue AC      Resp: COPD, tobacco dependency   - IS hourly   - OOB as tolerated   - wean oxygen as tolerated   - smoking cessation      FENGI: recurrent acute mesenteric ischemia likely 2/2 subtherapeutic INR, hypoalbuminemia, diarrhea, hypomagnesemia, hypokalemia, overweight   - continue DM diet with oral supplements   - continue mIVF   - GI consult: continue loperamide, outpatient FU   - c-diff negative 10/11  - replete electrolytes as needed to maintain K>4, Mg>2   - continue PPI   - RFP as clinically indicated      Endo: T2DM   - continue SSI ACHS   - continue lantus 5u (home dose 10u)   - DM diet   - holding home jardiance      ID:   - trend  temp q4h   - CBC as clinically indicated      Heme: acute blood loss anemia   - monitor for s/sx bleeding   - CBC as clinically indicated      Dispo:   - [facility]NF with tele   - PT re-eval after fall     PUJA Rivers-CNP

## 2023-10-17 ENCOUNTER — APPOINTMENT (OUTPATIENT)
Dept: SLEEP MEDICINE | Facility: CLINIC | Age: 68
End: 2023-10-17
Payer: MEDICARE

## 2023-10-17 LAB
ALBUMIN SERPL BCP-MCNC: 2.4 G/DL (ref 3.4–5)
ANION GAP SERPL CALC-SCNC: 10 MMOL/L (ref 10–20)
APTT PPP: >200 SECONDS (ref 27–38)
BUN SERPL-MCNC: 7 MG/DL (ref 6–23)
CALCIUM SERPL-MCNC: 7.5 MG/DL (ref 8.6–10.6)
CALPROTECTIN STL-MCNT: 881 UG/G
CHLORIDE SERPL-SCNC: 105 MMOL/L (ref 98–107)
CO2 SERPL-SCNC: 27 MMOL/L (ref 21–32)
CREAT SERPL-MCNC: 0.64 MG/DL (ref 0.5–1.05)
ERYTHROCYTE [DISTWIDTH] IN BLOOD BY AUTOMATED COUNT: 17.7 % (ref 11.5–14.5)
G LAMBLIA AG STL QL IA: NEGATIVE
GFR SERPL CREATININE-BSD FRML MDRD: >90 ML/MIN/1.73M*2
GLUCOSE BLD MANUAL STRIP-MCNC: 111 MG/DL (ref 74–99)
GLUCOSE BLD MANUAL STRIP-MCNC: 118 MG/DL (ref 74–99)
GLUCOSE BLD MANUAL STRIP-MCNC: 123 MG/DL (ref 74–99)
GLUCOSE BLD MANUAL STRIP-MCNC: 137 MG/DL (ref 74–99)
GLUCOSE BLD MANUAL STRIP-MCNC: 88 MG/DL (ref 74–99)
GLUCOSE BLD MANUAL STRIP-MCNC: 97 MG/DL (ref 74–99)
GLUCOSE BLD MANUAL STRIP-MCNC: 99 MG/DL (ref 74–99)
GLUCOSE SERPL-MCNC: 79 MG/DL (ref 74–99)
HCT VFR BLD AUTO: 27.1 % (ref 36–46)
HGB BLD-MCNC: 8.9 G/DL (ref 12–16)
INR PPP: 2.8 (ref 0.9–1.1)
MAGNESIUM SERPL-MCNC: 2.07 MG/DL (ref 1.6–2.4)
MCH RBC QN AUTO: 26 PG (ref 26–34)
MCHC RBC AUTO-ENTMCNC: 32.8 G/DL (ref 32–36)
MCV RBC AUTO: 79 FL (ref 80–100)
NRBC BLD-RTO: 0.3 /100 WBCS (ref 0–0)
PHOSPHATE SERPL-MCNC: 3.4 MG/DL (ref 2.5–4.9)
PLATELET # BLD AUTO: 324 X10*3/UL (ref 150–450)
PMV BLD AUTO: 11.2 FL (ref 7.5–11.5)
POTASSIUM SERPL-SCNC: 4.2 MMOL/L (ref 3.5–5.3)
PROTHROMBIN TIME: 32.3 SECONDS (ref 9.8–12.8)
RBC # BLD AUTO: 3.42 X10*6/UL (ref 4–5.2)
SODIUM SERPL-SCNC: 138 MMOL/L (ref 136–145)
UFH PPP CHRO-ACNC: 0.6 IU/ML
WBC # BLD AUTO: 14.4 X10*3/UL (ref 4.4–11.3)

## 2023-10-17 PROCEDURE — 1100000001 HC PRIVATE ROOM DAILY

## 2023-10-17 PROCEDURE — 2580000001 HC RX 258 IV SOLUTIONS

## 2023-10-17 PROCEDURE — 2500000001 HC RX 250 WO HCPCS SELF ADMINISTERED DRUGS (ALT 637 FOR MEDICARE OP): Performed by: NURSE PRACTITIONER

## 2023-10-17 PROCEDURE — 2500000004 HC RX 250 GENERAL PHARMACY W/ HCPCS (ALT 636 FOR OP/ED)

## 2023-10-17 PROCEDURE — 82947 ASSAY GLUCOSE BLOOD QUANT: CPT

## 2023-10-17 PROCEDURE — 36415 COLL VENOUS BLD VENIPUNCTURE: CPT | Performed by: SURGERY

## 2023-10-17 PROCEDURE — 99233 SBSQ HOSP IP/OBS HIGH 50: CPT | Performed by: NURSE PRACTITIONER

## 2023-10-17 PROCEDURE — 2500000001 HC RX 250 WO HCPCS SELF ADMINISTERED DRUGS (ALT 637 FOR MEDICARE OP)

## 2023-10-17 PROCEDURE — 83735 ASSAY OF MAGNESIUM: CPT

## 2023-10-17 PROCEDURE — 85520 HEPARIN ASSAY: CPT | Performed by: SURGERY

## 2023-10-17 PROCEDURE — 85027 COMPLETE CBC AUTOMATED: CPT

## 2023-10-17 PROCEDURE — 85730 THROMBOPLASTIN TIME PARTIAL: CPT | Performed by: STUDENT IN AN ORGANIZED HEALTH CARE EDUCATION/TRAINING PROGRAM

## 2023-10-17 PROCEDURE — C9113 INJ PANTOPRAZOLE SODIUM, VIA: HCPCS

## 2023-10-17 PROCEDURE — 82310 ASSAY OF CALCIUM: CPT | Mod: CMCLAB

## 2023-10-17 PROCEDURE — 36415 COLL VENOUS BLD VENIPUNCTURE: CPT | Mod: CMCLAB | Performed by: STUDENT IN AN ORGANIZED HEALTH CARE EDUCATION/TRAINING PROGRAM

## 2023-10-17 RX ORDER — WARFARIN 2 MG/1
2 TABLET ORAL DAILY
Status: COMPLETED | OUTPATIENT
Start: 2023-10-17 | End: 2023-10-17

## 2023-10-17 RX ADMIN — Medication 5 MG: at 20:48

## 2023-10-17 RX ADMIN — SODIUM CHLORIDE, POTASSIUM CHLORIDE, SODIUM LACTATE AND CALCIUM CHLORIDE 50 ML/HR: 600; 310; 30; 20 INJECTION, SOLUTION INTRAVENOUS at 09:04

## 2023-10-17 RX ADMIN — HEPARIN SODIUM 1330 UNITS/HR: 10000 INJECTION, SOLUTION INTRAVENOUS at 09:04

## 2023-10-17 RX ADMIN — INSULIN GLARGINE 5 UNITS: 100 INJECTION, SOLUTION SUBCUTANEOUS at 20:49

## 2023-10-17 RX ADMIN — LOPERAMIDE HYDROCHLORIDE 2 MG: 2 CAPSULE ORAL at 20:48

## 2023-10-17 RX ADMIN — LOPERAMIDE HYDROCHLORIDE 2 MG: 2 CAPSULE ORAL at 08:50

## 2023-10-17 RX ADMIN — Medication 1 TABLET: at 09:00

## 2023-10-17 RX ADMIN — METOPROLOL SUCCINATE 100 MG: 50 TABLET, EXTENDED RELEASE ORAL at 08:51

## 2023-10-17 RX ADMIN — ASPIRIN 81 MG: 81 TABLET, COATED ORAL at 08:50

## 2023-10-17 RX ADMIN — WARFARIN SODIUM 2 MG: 2 TABLET ORAL at 18:30

## 2023-10-17 RX ADMIN — FLUOXETINE 20 MG: 20 CAPSULE ORAL at 08:50

## 2023-10-17 RX ADMIN — ACETAMINOPHEN 650 MG: 325 TABLET ORAL at 20:49

## 2023-10-17 RX ADMIN — ACETAMINOPHEN 650 MG: 325 TABLET ORAL at 08:51

## 2023-10-17 RX ADMIN — PANTOPRAZOLE SODIUM 40 MG: 40 INJECTION, POWDER, FOR SOLUTION INTRAVENOUS at 06:35

## 2023-10-17 RX ADMIN — DONEPEZIL HYDROCHLORIDE 5 MG: 5 TABLET ORAL at 20:48

## 2023-10-17 RX ADMIN — ATORVASTATIN CALCIUM 40 MG: 40 TABLET, FILM COATED ORAL at 20:48

## 2023-10-17 ASSESSMENT — COGNITIVE AND FUNCTIONAL STATUS - GENERAL
MOVING FROM LYING ON BACK TO SITTING ON SIDE OF FLAT BED WITH BEDRAILS: A LITTLE
DRESSING REGULAR UPPER BODY CLOTHING: A LITTLE
STANDING UP FROM CHAIR USING ARMS: A LITTLE
TOILETING: A LITTLE
HELP NEEDED FOR BATHING: A LITTLE
EATING MEALS: A LITTLE
TURNING FROM BACK TO SIDE WHILE IN FLAT BAD: A LITTLE
MOBILITY SCORE: 18
HELP NEEDED FOR BATHING: A LITTLE
MOBILITY SCORE: 18
DAILY ACTIVITIY SCORE: 18
TOILETING: A LITTLE
PERSONAL GROOMING: A LITTLE
TURNING FROM BACK TO SIDE WHILE IN FLAT BAD: A LITTLE
EATING MEALS: A LITTLE
DRESSING REGULAR LOWER BODY CLOTHING: A LITTLE
MOVING FROM LYING ON BACK TO SITTING ON SIDE OF FLAT BED WITH BEDRAILS: A LITTLE
WALKING IN HOSPITAL ROOM: A LITTLE
DAILY ACTIVITIY SCORE: 18
CLIMB 3 TO 5 STEPS WITH RAILING: A LITTLE
STANDING UP FROM CHAIR USING ARMS: A LITTLE
MOVING TO AND FROM BED TO CHAIR: A LITTLE
WALKING IN HOSPITAL ROOM: A LITTLE
DRESSING REGULAR LOWER BODY CLOTHING: A LITTLE
PERSONAL GROOMING: A LITTLE
CLIMB 3 TO 5 STEPS WITH RAILING: A LITTLE
DRESSING REGULAR UPPER BODY CLOTHING: A LITTLE
MOVING TO AND FROM BED TO CHAIR: A LITTLE

## 2023-10-17 ASSESSMENT — PAIN SCALES - GENERAL: PAINLEVEL_OUTOF10: 5 - MODERATE PAIN

## 2023-10-17 ASSESSMENT — PAIN - FUNCTIONAL ASSESSMENT: PAIN_FUNCTIONAL_ASSESSMENT: 0-10

## 2023-10-17 NOTE — PROGRESS NOTES
"Hilda Jones is a 68 y.o. female on day 11 of admission presenting with Mesenteric ischemia (CMS/HCC).    Subjective   No issues overnight. Diarrhea improving. No abdominal pain.      Objective     Physical Exam  Constitutional: No acute distress, lying in bed  Neuro:  AOx3, grossly intact  ENMT: moist mucous membranes  Head/neck: atraumatic  CV: no tachycardia  Pulm: non-labored breathing   GI: soft, distended, appropriately tender, midline incision with staples c/d/i  Skin: warm and dry  Musculoskeletal: moving all extremities    Last Recorded Vitals  Blood pressure 89/56, pulse 54, temperature 36.3 °C (97.3 °F), resp. rate 18, height 1.651 m (5' 5\"), weight 70.6 kg (155 lb 9.6 oz), SpO2 94 %.  Intake/Output last 3 Shifts:  I/O last 3 completed shifts:  In: 4550.7 (64.5 mL/kg) [P.O.:1100; I.V.:3450.7 (48.9 mL/kg)]  Out: 103 (1.5 mL/kg) [Urine:100 (0 mL/kg/hr); Stool:3]  Weight: 70.6 kg     Relevant Results     Scheduled medications  acetaminophen, 650 mg, oral, q6h  aspirin, 81 mg, oral, Daily  atorvastatin, 40 mg, oral, Nightly  donepezil, 5 mg, oral, Nightly  FLUoxetine, 20 mg, oral, Daily  insulin glargine, 5 Units, subcutaneous, Nightly  insulin lispro, 0-10 Units, subcutaneous, q4h  melatonin, 5 mg, oral, Nightly  metoprolol succinate XL, 100 mg, oral, Daily  multivitamin with minerals, 1 tablet, oral, Daily  pantoprazole, 40 mg, intravenous, Daily before breakfast  [MAR Hold] perflutren protein A microsphere, 0.5 mL, intravenous, Once in imaging  [MAR Hold] sulfur hexafluoride microsphr, 2 mL, intravenous, Once in imaging  [Held by provider] torsemide, 20 mg, oral, BID  warfarin, 2 mg, oral, Daily      Continuous medications  heparin, 0-4,000 Units/hr, Last Rate: 1,330 Units/hr (10/17/23 1036)  lactated Ringer's, 50 mL/hr, Last Rate: 50 mL/hr (10/17/23 0904)      PRN medications  PRN medications: glucagon, heparin, HYDROmorphone, labetaloL, loperamide, oxyCODONE, oxyCODONE, [MAR Hold] oxygen    Results for " orders placed or performed during the hospital encounter of 10/06/23 (from the past 24 hour(s))   POCT GLUCOSE   Result Value Ref Range    POCT Glucose 113 (H) 74 - 99 mg/dL   Heparin Assay, UFH   Result Value Ref Range    Heparin Unfractionated 0.6 See Comment Below for Therapeutic Ranges IU/mL   POCT GLUCOSE   Result Value Ref Range    POCT Glucose 135 (H) 74 - 99 mg/dL   POCT GLUCOSE   Result Value Ref Range    POCT Glucose 85 74 - 99 mg/dL   POCT GLUCOSE   Result Value Ref Range    POCT Glucose 111 (H) 74 - 99 mg/dL   POCT GLUCOSE   Result Value Ref Range    POCT Glucose 97 74 - 99 mg/dL   Coagulation Screen   Result Value Ref Range    Protime 32.3 (H) 9.8 - 12.8 seconds    INR 2.8 (H) 0.9 - 1.1    aPTT >200 (HH) 27 - 38 seconds   CBC   Result Value Ref Range    WBC 14.4 (H) 4.4 - 11.3 x10*3/uL    nRBC 0.3 (H) 0.0 - 0.0 /100 WBCs    RBC 3.42 (L) 4.00 - 5.20 x10*6/uL    Hemoglobin 8.9 (L) 12.0 - 16.0 g/dL    Hematocrit 27.1 (L) 36.0 - 46.0 %    MCV 79 (L) 80 - 100 fL    MCH 26.0 26.0 - 34.0 pg    MCHC 32.8 32.0 - 36.0 g/dL    RDW 17.7 (H) 11.5 - 14.5 %    Platelets 324 150 - 450 x10*3/uL    MPV 11.2 7.5 - 11.5 fL   Renal Function Panel   Result Value Ref Range    Glucose 79 74 - 99 mg/dL    Sodium 138 136 - 145 mmol/L    Potassium 4.2 3.5 - 5.3 mmol/L    Chloride 105 98 - 107 mmol/L    Bicarbonate 27 21 - 32 mmol/L    Anion Gap 10 10 - 20 mmol/L    Urea Nitrogen 7 6 - 23 mg/dL    Creatinine 0.64 0.50 - 1.05 mg/dL    eGFR >90 >60 mL/min/1.73m*2    Calcium 7.5 (L) 8.6 - 10.6 mg/dL    Phosphorus 3.4 2.5 - 4.9 mg/dL    Albumin 2.4 (L) 3.4 - 5.0 g/dL   Magnesium   Result Value Ref Range    Magnesium 2.07 1.60 - 2.40 mg/dL   POCT GLUCOSE   Result Value Ref Range    POCT Glucose 88 74 - 99 mg/dL   Heparin Assay   Result Value Ref Range    Heparin Unfractionated 0.6 See Comment Below for Therapeutic Ranges IU/mL   POCT GLUCOSE   Result Value Ref Range    POCT Glucose 99 74 - 99 mg/dL     CT head wo IV contrast  10/14/2023    Narrative  Interpreted By:  Edenilson Arias and Tippareddy Charit  STUDY:  CT HEAD WO IV CONTRAST;  10/14/2023 7:04 pm    INDICATION:  Signs/Symptoms:Fall, head trauma, r/o bleed. On heparin gtt.    COMPARISON:  CT 12/29/2022    ACCESSION NUMBER(S):  [id number]    ORDERING CLINICIAN:  MICK CHURCH    TECHNIQUE:  Noncontrast enhanced CT was performed from the vertex to the  skullbase. Multiplanar reconstructions were made.    FINDINGS:  Parenchyma:  Nonspecific hypodensities in the white matter which can  be seen in the setting of chronic small vessel ischemic changes.The  grey-white differentiation is intact. There is no mass effect or  midline shift.  There is no acute intracranial hemorrhage.    CSF Spaces:The ventricles, sulci and basal cisterns are age  concordant. There is no abnormal extraaxial fluid collection.    Stable mild prominence of the CSF space within the superior  cerebellar cistern.    Calvarium: Stable mild diffuse sclerosis with areas of lucency  involving the calvarium compared to the CT head from 2022.    Paranasal sinuses and mastoids: Visualized paranasal sinuses and  mastoids are clear.    Impression  No evidence of acute infarct, intracranial hemorrhage, or mass effect.    I personally reviewed the images/study and I agree with the findings  as stated. This study was interpreted at [place], [place].    MACRO:  None.    Signed by: Edenilson Arias 10/14/2023 7:51 PM  Dictation workstation:   [id number]      Assessment/Plan   68 y.o. female with history of HTN, DM2, COPD, CHF, Afib/Aflutter (on warfarin), mitral stenosis, TR, AR s/p AVR x2, prior SMA occlusion s/p thromboembolectomy who is POD#3 s/p open mesenteric embolectomy for acute mesenteric ischemia likely related to sub-therapeutic INR on presentation.     10/7: Second look and closure  10/8: TTE demonstrated 75% LVEF, severe TR, Afib  10/10: Downtrending WBC  10/11:  Transferred to [facility] with tele   10/12: tonight is dose #3 of warfarin, GI consult for diarrhea, obtain lactate & stool panel   10/13: stool studies negative  10/14: fell going to bathroom - [place] [facility] without acute findings   10/17: INR 2.8      Plan:   Neuro: acute postoperative pain, insomnia, dementia   - continue pain control with tylenol & PRN oxy   - continue NV checks q4h   - holding home trazodone & gabapentin   - continue home prozac & donepezil   - continue multivitamin      Cardiac: HTN, CHF, afib/aflutter (home warfarin), mitral stenosis, TR, AR, SMA occlusion, acute mesenteric ischemia x2, SALTY thrombus   - maintain blood pressure control  - continue home metoprolol with hold parameters   - holding home torsemide & spironolactone   - continue heparin drip while bridging back to coumadin  -Give 2 mg coumadin tonight  - continue aspirin & statin   - appreciate cardiology recommendations: metop 50 BID, continue AC      Resp: COPD, tobacco dependency   - IS hourly   - OOB as tolerated   - wean oxygen as tolerated   - smoking cessation      FENGI: recurrent acute mesenteric ischemia likely 2/2 subtherapeutic INR, hypoalbuminemia, diarrhea, hypomagnesemia, hypokalemia, overweight   - continue DM diet with oral supplements   - continue mIVF   - GI consult: continue loperamide, outpatient FU   - c-diff negative 10/11  - replete electrolytes as needed to maintain K>4, Mg>2   - continue PPI   - RFP as clinically indicated      Endo: T2DM   - continue SSI ACHS   - continue lantus 5u (home dose 10u)   - DM diet   - holding home jardiance      ID: Leukocytosis  - trend temp q4h   - CBC as clinically indicated      Heme: acute blood loss anemia   - monitor for s/sx bleeding   - CBC as clinically indicated      Dispo:   - [facility] with tele   - PT re-eval after fall     Calli Bolaños, APRN-CNP

## 2023-10-17 NOTE — PROGRESS NOTES
Subjective Data:  No acute events overnight SB 50s. No ectopy       Objective Data:  Last Recorded Vitals:  Vitals:    10/17/23 0530 10/17/23 0651 10/17/23 0815 10/17/23 1157   BP:  95/60 95/60 89/56   BP Location:  Left arm     Patient Position:  Lying     Pulse:  59  54   Resp:  18     Temp:  36.5 °C (97.7 °F) 36.4 °C (97.5 °F) 36.3 °C (97.3 °F)   TempSrc:  Temporal     SpO2:  99%  94%   Weight: 70.6 kg (155 lb 9.6 oz)      Height:           Last Labs:  CBC - 10/17/2023:  7:49 AM  14.4 8.9 324    27.1      CMP - 10/17/2023:  7:49 AM  7.5 7.4 53 --- 0.6   3.4 2.4 18 107      PTT - 10/17/2023:  7:49 AM  2.8   32.3 >200          Last I/O:  I/O last 3 completed shifts:  In: 4550.7 (64.5 mL/kg) [P.O.:1100; I.V.:3450.7 (48.9 mL/kg)]  Out: 103 (1.5 mL/kg) [Urine:100 (0 mL/kg/hr); Stool:3]  Weight: 70.6 kg         Inpatient Medications:  Scheduled medications   Medication Dose Route Frequency    acetaminophen  650 mg oral q6h    aspirin  81 mg oral Daily    atorvastatin  40 mg oral Nightly    donepezil  5 mg oral Nightly    FLUoxetine  20 mg oral Daily    insulin glargine  5 Units subcutaneous Nightly    insulin lispro  0-10 Units subcutaneous q4h    melatonin  5 mg oral Nightly    metoprolol succinate XL  100 mg oral Daily    multivitamin with minerals  1 tablet oral Daily    pantoprazole  40 mg intravenous Daily before breakfast    [MAR Hold] perflutren protein A microsphere  0.5 mL intravenous Once in imaging    [MAR Hold] sulfur hexafluoride microsphr  2 mL intravenous Once in imaging    [Held by provider] torsemide  20 mg oral BID    warfarin  2 mg oral Daily     PRN medications   Medication    glucagon    heparin    HYDROmorphone    labetaloL    loperamide    oxyCODONE    oxyCODONE    [MAR Hold] oxygen     Continuous Medications   Medication Dose Last Rate    heparin  0-4,000 Units/hr 1,330 Units/hr (10/17/23 1036)    lactated Ringer's  50 mL/hr 50 mL/hr (10/17/23 0904)       Physical Exam:      Physical  Exam:  Constitutional: chronically-ill appearing, NAD  Eyes: no conjunctival injection, EOMI  ENT: MMM, no apparent injury  Head/Neck: Neck supple, no apparent injury  Respiratory/Thorax: Patent airways, CTAB, normal WOB  Cardiovascular: normal rate, regular rhythm, no murmur, normal S1 and S2, JVP not elevated, extremities warm, trace JUAN J  Gastrointestinal: Non-distended, soft, no tenderness  Extremities: warm, intact  Neurological: grossly intact, no focal deficits  Skin: Warm and dry     Assessment/Plan   Impression:  #HF 2/2 rheumatic heart disease with symptomatic mitral stenosis and severe TR  #AFL  #AF with 2 prior thromboemboli  #s/p AVR 2004, 2016 with #25 Freestyle  #SALTY thrombus 7/2023     Is at very high risk of thromboembolism given rheumatic mitral stenosis and recent SALTY thrombus and current admission for thromboembolism - would rate control and ensure adequate AC. Current thromboembolism likely due to inadequate warfarin dosing given most of the INR's in our system are subtherapeutic - would focus on ensuring reliable warfarin/INR follow-up given very high thromboembolic risk, and avoid rhythm control at this time given high risk of thromboembolism.     Recommendations:  - continue metoprolol succinate 100 daily. If SBP <80 or sustained HR <50, decrease dose to 50 daily  - ideally would AC with, in ranked order:  1) warfarin adjusted to INR 2.0 to 3.0, defer specifics to priry service - this is the standard of care  2) LMWH 1 mg/kg bid (usually is price prohibitive)  3) (if the first 2 are unachievable) DOAC apixaban 5 bid. If this is chosen, would need to  patient that there is increased mortality to DOAC over warfarin, would only choose this if there is a strict contraindication to the first two choices  - agree with reducing Coumadin dose   - INR 2.8 (1.6, 1.2,1.2)  -please arrange for outpatient follow-up with outpatient cardiology in 4 weeks and structural heart service in 4-8 weeks to  discuss mitral stenosis    Discussed with Dr. Dave Murry, APRN-CNP  Cardiology Consults  Please call with any questions  Pager [phone] M-F 8a-5p; Saturday 8a-2p  Pager [phone] all other times    Code Status:  Full Code

## 2023-10-18 ENCOUNTER — APPOINTMENT (OUTPATIENT)
Dept: CARDIOLOGY | Facility: HOSPITAL | Age: 68
DRG: 336 | End: 2023-10-18
Payer: MEDICARE

## 2023-10-18 ENCOUNTER — APPOINTMENT (OUTPATIENT)
Dept: RADIOLOGY | Facility: HOSPITAL | Age: 68
DRG: 336 | End: 2023-10-18
Payer: MEDICARE

## 2023-10-18 LAB
ALBUMIN SERPL BCP-MCNC: 2.3 G/DL (ref 3.4–5)
ANION GAP SERPL CALC-SCNC: 14 MMOL/L (ref 10–20)
APTT PPP: >200 SECONDS (ref 27–38)
ATRIAL RATE: 56 BPM
BUN SERPL-MCNC: 7 MG/DL (ref 6–23)
CALCIUM SERPL-MCNC: 7 MG/DL (ref 8.6–10.6)
CHLORIDE SERPL-SCNC: 108 MMOL/L (ref 98–107)
CO2 SERPL-SCNC: 25 MMOL/L (ref 21–32)
CREAT SERPL-MCNC: 0.7 MG/DL (ref 0.5–1.05)
ERYTHROCYTE [DISTWIDTH] IN BLOOD BY AUTOMATED COUNT: 17.8 % (ref 11.5–14.5)
FAT STL QL: NORMAL
GFR SERPL CREATININE-BSD FRML MDRD: >90 ML/MIN/1.73M*2
GLUCOSE BLD MANUAL STRIP-MCNC: 135 MG/DL (ref 74–99)
GLUCOSE BLD MANUAL STRIP-MCNC: 77 MG/DL (ref 74–99)
GLUCOSE BLD MANUAL STRIP-MCNC: 82 MG/DL (ref 74–99)
GLUCOSE BLD MANUAL STRIP-MCNC: 85 MG/DL (ref 74–99)
GLUCOSE BLD MANUAL STRIP-MCNC: 99 MG/DL (ref 74–99)
GLUCOSE SERPL-MCNC: 68 MG/DL (ref 74–99)
HCT VFR BLD AUTO: 26.5 % (ref 36–46)
HGB BLD-MCNC: 8.7 G/DL (ref 12–16)
INR PPP: 4 (ref 0.9–1.1)
MAGNESIUM SERPL-MCNC: 1.92 MG/DL (ref 1.6–2.4)
MCH RBC QN AUTO: 26.5 PG (ref 26–34)
MCHC RBC AUTO-ENTMCNC: 32.8 G/DL (ref 32–36)
MCV RBC AUTO: 81 FL (ref 80–100)
NEUTRAL FAT STL QL: NORMAL
NRBC BLD-RTO: 0.4 /100 WBCS (ref 0–0)
P AXIS: 59 DEGREES
P OFFSET: 144 MS
P ONSET: 84 MS
PHOSPHATE SERPL-MCNC: 3.5 MG/DL (ref 2.5–4.9)
PLATELET # BLD AUTO: 321 X10*3/UL (ref 150–450)
PMV BLD AUTO: 10.5 FL (ref 7.5–11.5)
POTASSIUM SERPL-SCNC: 4.3 MMOL/L (ref 3.5–5.3)
PR INTERVAL: 204 MS
PROTHROMBIN TIME: 45.3 SECONDS (ref 9.8–12.8)
Q ONSET: 186 MS
QRS COUNT: 10 BEATS
QRS DURATION: 152 MS
QT INTERVAL: 514 MS
QTC CALCULATION(BAZETT): 496 MS
QTC FREDERICIA: 502 MS
R AXIS: 41 DEGREES
RBC # BLD AUTO: 3.28 X10*6/UL (ref 4–5.2)
SODIUM SERPL-SCNC: 143 MMOL/L (ref 136–145)
T AXIS: 57 DEGREES
T OFFSET: 443 MS
UFH PPP CHRO-ACNC: 0.9 IU/ML
VENTRICULAR RATE: 56 BPM
WBC # BLD AUTO: 18.7 X10*3/UL (ref 4.4–11.3)

## 2023-10-18 PROCEDURE — 2580000001 HC RX 258 IV SOLUTIONS

## 2023-10-18 PROCEDURE — 83735 ASSAY OF MAGNESIUM: CPT | Mod: CMCLAB

## 2023-10-18 PROCEDURE — 71045 X-RAY EXAM CHEST 1 VIEW: CPT | Performed by: RADIOLOGY

## 2023-10-18 PROCEDURE — 2500000001 HC RX 250 WO HCPCS SELF ADMINISTERED DRUGS (ALT 637 FOR MEDICARE OP)

## 2023-10-18 PROCEDURE — 82947 ASSAY GLUCOSE BLOOD QUANT: CPT

## 2023-10-18 PROCEDURE — 2500000004 HC RX 250 GENERAL PHARMACY W/ HCPCS (ALT 636 FOR OP/ED)

## 2023-10-18 PROCEDURE — 2500000001 HC RX 250 WO HCPCS SELF ADMINISTERED DRUGS (ALT 637 FOR MEDICARE OP): Performed by: NURSE PRACTITIONER

## 2023-10-18 PROCEDURE — 85610 PROTHROMBIN TIME: CPT

## 2023-10-18 PROCEDURE — 85520 HEPARIN ASSAY: CPT | Performed by: SURGERY

## 2023-10-18 PROCEDURE — 85027 COMPLETE CBC AUTOMATED: CPT | Mod: CMCLAB

## 2023-10-18 PROCEDURE — 36415 COLL VENOUS BLD VENIPUNCTURE: CPT

## 2023-10-18 PROCEDURE — 74018 RADEX ABDOMEN 1 VIEW: CPT | Performed by: RADIOLOGY

## 2023-10-18 PROCEDURE — 99233 SBSQ HOSP IP/OBS HIGH 50: CPT | Performed by: NURSE PRACTITIONER

## 2023-10-18 PROCEDURE — 71045 X-RAY EXAM CHEST 1 VIEW: CPT | Mod: FY

## 2023-10-18 PROCEDURE — 84100 ASSAY OF PHOSPHORUS: CPT | Mod: CMCLAB

## 2023-10-18 PROCEDURE — C9113 INJ PANTOPRAZOLE SODIUM, VIA: HCPCS

## 2023-10-18 PROCEDURE — 74018 RADEX ABDOMEN 1 VIEW: CPT | Mod: FY

## 2023-10-18 PROCEDURE — 1100000001 HC PRIVATE ROOM DAILY

## 2023-10-18 PROCEDURE — 93005 ELECTROCARDIOGRAM TRACING: CPT

## 2023-10-18 RX ORDER — WARFARIN 2 MG/1
2 TABLET ORAL DAILY
Status: COMPLETED | OUTPATIENT
Start: 2023-10-18 | End: 2023-10-18

## 2023-10-18 RX ADMIN — Medication 1 TABLET: at 09:00

## 2023-10-18 RX ADMIN — SODIUM CHLORIDE, POTASSIUM CHLORIDE, SODIUM LACTATE AND CALCIUM CHLORIDE 50 ML/HR: 600; 310; 30; 20 INJECTION, SOLUTION INTRAVENOUS at 04:23

## 2023-10-18 RX ADMIN — HEPARIN SODIUM 1330 UNITS/HR: 10000 INJECTION, SOLUTION INTRAVENOUS at 04:23

## 2023-10-18 RX ADMIN — PANTOPRAZOLE SODIUM 40 MG: 40 INJECTION, POWDER, FOR SOLUTION INTRAVENOUS at 06:04

## 2023-10-18 RX ADMIN — FLUOXETINE 20 MG: 20 CAPSULE ORAL at 08:27

## 2023-10-18 RX ADMIN — INSULIN GLARGINE 5 UNITS: 100 INJECTION, SOLUTION SUBCUTANEOUS at 21:09

## 2023-10-18 RX ADMIN — ATORVASTATIN CALCIUM 40 MG: 40 TABLET, FILM COATED ORAL at 21:10

## 2023-10-18 RX ADMIN — DONEPEZIL HYDROCHLORIDE 5 MG: 5 TABLET ORAL at 21:11

## 2023-10-18 RX ADMIN — ASPIRIN 81 MG: 81 TABLET, COATED ORAL at 08:27

## 2023-10-18 RX ADMIN — ACETAMINOPHEN 650 MG: 325 TABLET ORAL at 08:30

## 2023-10-18 RX ADMIN — WARFARIN SODIUM 2 MG: 2 TABLET ORAL at 19:03

## 2023-10-18 RX ADMIN — Medication 5 MG: at 21:10

## 2023-10-18 RX ADMIN — METOPROLOL SUCCINATE 100 MG: 50 TABLET, EXTENDED RELEASE ORAL at 08:27

## 2023-10-18 RX ADMIN — ACETAMINOPHEN 650 MG: 325 TABLET ORAL at 21:10

## 2023-10-18 ASSESSMENT — COGNITIVE AND FUNCTIONAL STATUS - GENERAL
PERSONAL GROOMING: A LITTLE
DAILY ACTIVITIY SCORE: 18
MOBILITY SCORE: 18
CLIMB 3 TO 5 STEPS WITH RAILING: A LITTLE
TOILETING: A LITTLE
TURNING FROM BACK TO SIDE WHILE IN FLAT BAD: A LITTLE
WALKING IN HOSPITAL ROOM: A LITTLE
HELP NEEDED FOR BATHING: A LITTLE
MOVING FROM LYING ON BACK TO SITTING ON SIDE OF FLAT BED WITH BEDRAILS: A LITTLE
MOVING TO AND FROM BED TO CHAIR: A LITTLE
DRESSING REGULAR LOWER BODY CLOTHING: A LITTLE
STANDING UP FROM CHAIR USING ARMS: A LITTLE
DRESSING REGULAR UPPER BODY CLOTHING: A LITTLE
EATING MEALS: A LITTLE

## 2023-10-18 ASSESSMENT — PAIN SCALES - GENERAL
PAINLEVEL_OUTOF10: 0 - NO PAIN

## 2023-10-18 NOTE — PROGRESS NOTES
"Hilda Jones is a 68 y.o. female on day 12 of admission presenting with Mesenteric ischemia (CMS/HCC).    Subjective   No issues overnight. Diarrhea improving. No abdominal pain.      Objective     Physical Exam  Constitutional: No acute distress, sitting up in bed  Neuro:  AOx3, grossly intact  ENMT: moist mucous membranes  Head/neck: atraumatic  CV: no tachycardia  Pulm: non-labored breathing   GI: soft, distended, appropriately tender to palpation, midline incision with staples c/d/I, no signs of erythema or drainage  Skin: warm and dry  Musculoskeletal: moving all extremities    Last Recorded Vitals  Blood pressure 97/60, pulse 54, temperature 36 °C (96.8 °F), temperature source Temporal, resp. rate 18, height 1.651 m (5' 5\"), weight 70.6 kg (155 lb 11.2 oz), SpO2 95 %.  Intake/Output last 3 Shifts:  I/O last 3 completed shifts:  In: 4714.4 (66.8 mL/kg) [P.O.:60; I.V.:4654.4 (65.9 mL/kg)]  Out: 3 (0 mL/kg) [Stool:3]  Weight: 70.6 kg     Relevant Results     Scheduled medications  acetaminophen, 650 mg, oral, q6h  aspirin, 81 mg, oral, Daily  atorvastatin, 40 mg, oral, Nightly  donepezil, 5 mg, oral, Nightly  FLUoxetine, 20 mg, oral, Daily  insulin glargine, 5 Units, subcutaneous, Nightly  insulin lispro, 0-10 Units, subcutaneous, q4h  melatonin, 5 mg, oral, Nightly  metoprolol succinate XL, 100 mg, oral, Daily  multivitamin with minerals, 1 tablet, oral, Daily  pantoprazole, 40 mg, intravenous, Daily before breakfast  [MAR Hold] perflutren protein A microsphere, 0.5 mL, intravenous, Once in imaging  [MAR Hold] sulfur hexafluoride microsphr, 2 mL, intravenous, Once in imaging  [Held by provider] torsemide, 20 mg, oral, BID      Continuous medications  lactated Ringer's, 50 mL/hr, Last Rate: 50 mL/hr (10/18/23 0423)      PRN medications  PRN medications: glucagon, HYDROmorphone, labetaloL, loperamide, oxyCODONE, oxyCODONE, [MAR Hold] oxygen    Results for orders placed or performed during the hospital encounter of " 10/06/23 (from the past 24 hour(s))   POCT GLUCOSE   Result Value Ref Range    POCT Glucose 137 (H) 74 - 99 mg/dL   POCT GLUCOSE   Result Value Ref Range    POCT Glucose 123 (H) 74 - 99 mg/dL   POCT GLUCOSE   Result Value Ref Range    POCT Glucose 118 (H) 74 - 99 mg/dL   POCT GLUCOSE   Result Value Ref Range    POCT Glucose 85 74 - 99 mg/dL   CBC   Result Value Ref Range    WBC 18.7 (H) 4.4 - 11.3 x10*3/uL    nRBC 0.4 (H) 0.0 - 0.0 /100 WBCs    RBC 3.28 (L) 4.00 - 5.20 x10*6/uL    Hemoglobin 8.7 (L) 12.0 - 16.0 g/dL    Hematocrit 26.5 (L) 36.0 - 46.0 %    MCV 81 80 - 100 fL    MCH 26.5 26.0 - 34.0 pg    MCHC 32.8 32.0 - 36.0 g/dL    RDW 17.8 (H) 11.5 - 14.5 %    Platelets 321 150 - 450 x10*3/uL    MPV 10.5 7.5 - 11.5 fL   Renal Function Panel   Result Value Ref Range    Glucose 68 (L) 74 - 99 mg/dL    Sodium 143 136 - 145 mmol/L    Potassium 4.3 3.5 - 5.3 mmol/L    Chloride 108 (H) 98 - 107 mmol/L    Bicarbonate 25 21 - 32 mmol/L    Anion Gap 14 10 - 20 mmol/L    Urea Nitrogen 7 6 - 23 mg/dL    Creatinine 0.70 0.50 - 1.05 mg/dL    eGFR >90 >60 mL/min/1.73m*2    Calcium 7.0 (L) 8.6 - 10.6 mg/dL    Phosphorus 3.5 2.5 - 4.9 mg/dL    Albumin 2.3 (L) 3.4 - 5.0 g/dL   Magnesium   Result Value Ref Range    Magnesium 1.92 1.60 - 2.40 mg/dL   Coagulation Screen   Result Value Ref Range    Protime 45.3 (H) 9.8 - 12.8 seconds    INR 4.0 (H) 0.9 - 1.1    aPTT >200 (HH) 27 - 38 seconds   Heparin Assay   Result Value Ref Range    Heparin Unfractionated 0.9 See Comment Below for Therapeutic Ranges IU/mL   POCT GLUCOSE   Result Value Ref Range    POCT Glucose 82 74 - 99 mg/dL   POCT GLUCOSE   Result Value Ref Range    POCT Glucose 77 74 - 99 mg/dL     CT head wo IV contrast 10/14/2023    Narrative  Interpreted By:  Edenilson Arias and Tippareddy Charit  STUDY:  CT HEAD WO IV CONTRAST;  10/14/2023 7:04 pm    INDICATION:  Signs/Symptoms:Fall, head trauma, r/o bleed. On heparin gtt.    COMPARISON:  CT 12/29/2022    ACCESSION  NUMBER(S):  [id number]    ORDERING CLINICIAN:  MICK CHURCH    TECHNIQUE:  Noncontrast enhanced CT was performed from the vertex to the  skullbase. Multiplanar reconstructions were made.    FINDINGS:  Parenchyma:  Nonspecific hypodensities in the white matter which can  be seen in the setting of chronic small vessel ischemic changes.The  grey-white differentiation is intact. There is no mass effect or  midline shift.  There is no acute intracranial hemorrhage.    CSF Spaces:The ventricles, sulci and basal cisterns are age  concordant. There is no abnormal extraaxial fluid collection.    Stable mild prominence of the CSF space within the superior  cerebellar cistern.    Calvarium: Stable mild diffuse sclerosis with areas of lucency  involving the calvarium compared to the CT head from 2022.    Paranasal sinuses and mastoids: Visualized paranasal sinuses and  mastoids are clear.    Impression  No evidence of acute infarct, intracranial hemorrhage, or mass effect.    I personally reviewed the images/study and I agree with the findings  as stated. This study was interpreted at [place], [place].    MACRO:  None.    Signed by: Edenilson Arias 10/14/2023 7:51 PM  Dictation workstation:   [id number]      Assessment/Plan   68 y.o. female with history of HTN, DM2, COPD, CHF, Afib/Aflutter (on warfarin), mitral stenosis, TR, AR s/p AVR x2, prior SMA occlusion s/p thromboembolectomy who is POD#3 s/p open mesenteric embolectomy for acute mesenteric ischemia likely related to sub-therapeutic INR on presentation.     10/7: Second look and closure  10/8: TTE demonstrated 75% LVEF, severe TR, Afib  10/10: Downtrending WBC  10/11: Transferred to [facility] with tele   10/12: tonight is dose #3 of warfarin, GI consult for diarrhea, obtain lactate & stool panel   10/13: stool studies negative  10/14: fell going to bathroom - [facility] without acute findings   10/17: INR 2.8  10/18: INR 4 WBC  18.7      Plan:   Neuro: acute postoperative pain, insomnia, dementia   - continue pain control with tylenol & PRN oxy   - continue NV checks q4h   - holding home trazodone & gabapentin   - continue home prozac & donepezil   - continue multivitamin      Cardiac: HTN, CHF, afib/aflutter (home warfarin), mitral stenosis, TR, AR, SMA occlusion, acute mesenteric ischemia x2, SALTY thrombus   - maintain blood pressure control  - continue home metoprolol with hold parameters   - holding home torsemide & spironolactone   - discontinue heparin drip  -Continue 2 mg coumadin given INR of 4  - continue aspirin & statin   - appreciate cardiology recommendations: metop 50 BID, continue AC      Resp: COPD, tobacco dependency   - IS hourly   - OOB as tolerated   - wean oxygen as tolerated   - smoking cessation      FENGI: recurrent acute mesenteric ischemia likely 2/2 subtherapeutic INR, hypoalbuminemia, diarrhea, hypomagnesemia, hypokalemia, overweight   - continue DM diet with oral supplements   - continue mIVF   - GI consult: continue loperamide, outpatient FU   - c-diff negative 10/11  - replete electrolytes as needed to maintain K>4, Mg>2   - continue PPI   - RFP as clinically indicated      Endo: T2DM   - continue SSI ACHS   - continue lantus 5u (home dose 10u)   - DM diet   - holding home jardiance      ID: Leukocytosis (now 18.7)  -Afebrile  - trend temp q4h   -Complete infectious workup: UA, CXR, and KUB  - Trend WBC     Heme: acute blood loss anemia   - monitor for s/sx bleeding   - CBC as clinically indicated      Dispo:   - Continue regular nursing floor      PUJA Crisostomo-CNP

## 2023-10-18 NOTE — PROGRESS NOTES
Subjective Data:  SB 50-60s  INR 4  WBC 18  CXR with RUL ?PNA       Objective Data:  Last Recorded Vitals:  Vitals:    10/17/23 2357 10/18/23 0600 10/18/23 0800 10/18/23 1223   BP: 94/60  88/56 97/60   BP Location: Left arm  Left arm    Patient Position: Lying  Lying    Pulse: 62  57 54   Resp: 18 18 18   Temp: 36.5 °C (97.7 °F)  36.7 °C (98.1 °F) 36 °C (96.8 °F)   TempSrc: Temporal  Temporal Temporal   SpO2: 94%  99% 95%   Weight:  70.6 kg (155 lb 11.2 oz)     Height:           Last Labs:  CBC - 10/18/2023:  6:43 AM  18.7 8.7 321    26.5      CMP - 10/18/2023:  6:43 AM  7.0 7.4 53 --- 0.6   3.5 2.3 18 107      PTT - 10/18/2023:  6:43 AM  4.0   45.3 >200          Last I/O:  I/O last 3 completed shifts:  In: 4714.4 (66.8 mL/kg) [P.O.:60; I.V.:4654.4 (65.9 mL/kg)]  Out: 3 (0 mL/kg) [Stool:3]  Weight: 70.6 kg         Inpatient Medications:  Scheduled medications   Medication Dose Route Frequency    acetaminophen  650 mg oral q6h    aspirin  81 mg oral Daily    atorvastatin  40 mg oral Nightly    donepezil  5 mg oral Nightly    FLUoxetine  20 mg oral Daily    insulin glargine  5 Units subcutaneous Nightly    insulin lispro  0-10 Units subcutaneous q4h    melatonin  5 mg oral Nightly    metoprolol succinate XL  100 mg oral Daily    multivitamin with minerals  1 tablet oral Daily    pantoprazole  40 mg intravenous Daily before breakfast    [MAR Hold] perflutren protein A microsphere  0.5 mL intravenous Once in imaging    [MAR Hold] sulfur hexafluoride microsphr  2 mL intravenous Once in imaging    [Held by provider] torsemide  20 mg oral BID     PRN medications   Medication    glucagon    HYDROmorphone    labetaloL    loperamide    oxyCODONE    oxyCODONE    [MAR Hold] oxygen     Continuous Medications   Medication Dose Last Rate    lactated Ringer's  50 mL/hr 50 mL/hr (10/18/23 0423)       Physical Exam:  General: Sitting up  in bed, room air, NAD   Skin:  warm and dry  HEENT: EOMI. MMM  Cardiovascular: RRR. No JVD at 45  degrees   Respiratory: Reduced A/E throughout  Gastrointestinal: soft, non-distended  Extremities: no edema  Neurological: no gross focal deficits  Psychiatric: appropriate mood and affect       Assessment/Plan   Impression:  #HF 2/2 rheumatic heart disease with symptomatic mitral stenosis and severe TR  #AFL  #AF with 2 prior thromboemboli  #s/p AVR 2004, 2016 with #25 Freestyle  #SALTY thrombus 7/2023  #Leukocytosis ?RUL PNA     Is at very high risk of thromboembolism given rheumatic mitral stenosis and recent SALTY thrombus and current admission for thromboembolism - would rate control and ensure adequate AC. Current thromboembolism likely due to inadequate warfarin dosing given most of the INR's in our system are subtherapeutic - would focus on ensuring reliable warfarin/INR follow-up given very high thromboembolic risk, and avoid rhythm control at this time given high risk of thromboembolism.     Recommendations:  - continue metoprolol succinate 100 daily. If SBP <80 or sustained HR <50, decrease dose to 50 daily  - ideally would continue anticoagulation with warfarin vs DOAC (which would be sub optimal)  - warfarin currently on hold for supra therapeutic INR  - INR 4.0 (2.8)   - She has a follow up appointment scheduled with her cardiologist Dr. Carlee Salmeron 11/3/23   -please arrange for outpatient structural heart service in 4-8 weeks to discuss mitral stenosis  - Recommend home monitoring INR: Please place a [facility] skilled nursing order for INR testing. Place a comment for RN to report INRs to  [facility]  option #2    Discussed with Dr. Dave Murry, PUJA-CNP  Cardiology Consults  Please call with any questions  Pager [phone] M-F 8a-5p; Saturday 8a-2p  Pager [phone] all other times    Code Status:  Full Code

## 2023-10-19 ENCOUNTER — PHARMACY VISIT (OUTPATIENT)
Dept: PHARMACY | Facility: CLINIC | Age: 68
End: 2023-10-19

## 2023-10-19 ENCOUNTER — HOME HEALTH ADMISSION (OUTPATIENT)
Dept: HOME HEALTH SERVICES | Facility: HOME HEALTH | Age: 68
End: 2023-10-19
Payer: MEDICARE

## 2023-10-19 VITALS
HEART RATE: 53 BPM | WEIGHT: 147.49 LBS | SYSTOLIC BLOOD PRESSURE: 95 MMHG | HEIGHT: 65 IN | BODY MASS INDEX: 24.57 KG/M2 | DIASTOLIC BLOOD PRESSURE: 59 MMHG | OXYGEN SATURATION: 98 % | RESPIRATION RATE: 18 BRPM | TEMPERATURE: 97.9 F

## 2023-10-19 LAB
ALBUMIN SERPL BCP-MCNC: 2.4 G/DL (ref 3.4–5)
ANION GAP SERPL CALC-SCNC: 10 MMOL/L (ref 10–20)
APTT PPP: 42 SECONDS (ref 27–38)
BUN SERPL-MCNC: 7 MG/DL (ref 6–23)
CALCIUM SERPL-MCNC: 7.6 MG/DL (ref 8.6–10.6)
CHLORIDE SERPL-SCNC: 107 MMOL/L (ref 98–107)
CO2 SERPL-SCNC: 27 MMOL/L (ref 21–32)
CREAT SERPL-MCNC: 0.68 MG/DL (ref 0.5–1.05)
CRYPTOSP AG STL QL IA: NEGATIVE
ERYTHROCYTE [DISTWIDTH] IN BLOOD BY AUTOMATED COUNT: 18.7 % (ref 11.5–14.5)
G LAMBLIA AG STL QL IA: NEGATIVE
GFR SERPL CREATININE-BSD FRML MDRD: >90 ML/MIN/1.73M*2
GLUCOSE BLD MANUAL STRIP-MCNC: 72 MG/DL (ref 74–99)
GLUCOSE BLD MANUAL STRIP-MCNC: 77 MG/DL (ref 74–99)
GLUCOSE BLD MANUAL STRIP-MCNC: 81 MG/DL (ref 74–99)
GLUCOSE BLD MANUAL STRIP-MCNC: 94 MG/DL (ref 74–99)
GLUCOSE SERPL-MCNC: 71 MG/DL (ref 74–99)
HCT VFR BLD AUTO: 26.9 % (ref 36–46)
HGB BLD-MCNC: 9.1 G/DL (ref 12–16)
INR PPP: 3.4 (ref 0.9–1.1)
MAGNESIUM SERPL-MCNC: 1.84 MG/DL (ref 1.6–2.4)
MCH RBC QN AUTO: 26.6 PG (ref 26–34)
MCHC RBC AUTO-ENTMCNC: 33.8 G/DL (ref 32–36)
MCV RBC AUTO: 79 FL (ref 80–100)
NRBC BLD-RTO: 0.6 /100 WBCS (ref 0–0)
PHOSPHATE SERPL-MCNC: 3.5 MG/DL (ref 2.5–4.9)
PLATELET # BLD AUTO: 341 X10*3/UL (ref 150–450)
PMV BLD AUTO: 10.4 FL (ref 7.5–11.5)
POTASSIUM SERPL-SCNC: 4.4 MMOL/L (ref 3.5–5.3)
PROTHROMBIN TIME: 38.4 SECONDS (ref 9.8–12.8)
RBC # BLD AUTO: 3.42 X10*6/UL (ref 4–5.2)
SODIUM SERPL-SCNC: 140 MMOL/L (ref 136–145)
WBC # BLD AUTO: 12.7 X10*3/UL (ref 4.4–11.3)

## 2023-10-19 PROCEDURE — 36415 COLL VENOUS BLD VENIPUNCTURE: CPT | Mod: CMCLAB

## 2023-10-19 PROCEDURE — 83735 ASSAY OF MAGNESIUM: CPT | Mod: CMCLAB

## 2023-10-19 PROCEDURE — C9113 INJ PANTOPRAZOLE SODIUM, VIA: HCPCS

## 2023-10-19 PROCEDURE — 2500000001 HC RX 250 WO HCPCS SELF ADMINISTERED DRUGS (ALT 637 FOR MEDICARE OP): Performed by: NURSE PRACTITIONER

## 2023-10-19 PROCEDURE — 80069 RENAL FUNCTION PANEL: CPT

## 2023-10-19 PROCEDURE — 82947 ASSAY GLUCOSE BLOOD QUANT: CPT

## 2023-10-19 PROCEDURE — 99233 SBSQ HOSP IP/OBS HIGH 50: CPT | Performed by: NURSE PRACTITIONER

## 2023-10-19 PROCEDURE — 2500000004 HC RX 250 GENERAL PHARMACY W/ HCPCS (ALT 636 FOR OP/ED)

## 2023-10-19 PROCEDURE — 2500000001 HC RX 250 WO HCPCS SELF ADMINISTERED DRUGS (ALT 637 FOR MEDICARE OP)

## 2023-10-19 PROCEDURE — 85730 THROMBOPLASTIN TIME PARTIAL: CPT

## 2023-10-19 PROCEDURE — 85027 COMPLETE CBC AUTOMATED: CPT

## 2023-10-19 RX ORDER — ASPIRIN 81 MG/1
81 TABLET ORAL DAILY
Qty: 90 TABLET | Refills: 3 | Status: SHIPPED | OUTPATIENT
Start: 2023-10-20 | End: 2024-10-19

## 2023-10-19 RX ORDER — ATORVASTATIN CALCIUM 40 MG/1
40 TABLET, FILM COATED ORAL NIGHTLY
Qty: 30 TABLET | Refills: 11 | Status: SHIPPED | OUTPATIENT
Start: 2023-10-19 | End: 2024-10-13

## 2023-10-19 RX ORDER — OXYCODONE HYDROCHLORIDE 5 MG/1
5 TABLET ORAL EVERY 4 HOURS PRN
Qty: 15 TABLET | Refills: 0 | Status: SHIPPED | OUTPATIENT
Start: 2023-10-19 | End: 2023-10-24

## 2023-10-19 RX ORDER — LOPERAMIDE HYDROCHLORIDE 2 MG/1
2 CAPSULE ORAL 4 TIMES DAILY PRN
Qty: 30 CAPSULE | Refills: 0 | Status: SHIPPED | OUTPATIENT
Start: 2023-10-19

## 2023-10-19 RX ORDER — WARFARIN 2.5 MG/1
TABLET ORAL
Qty: 30 TABLET | Refills: 1 | Status: ON HOLD | OUTPATIENT
Start: 2023-10-19 | End: 2023-12-20 | Stop reason: ENTERED-IN-ERROR

## 2023-10-19 RX ORDER — METOPROLOL SUCCINATE 100 MG/1
100 TABLET, EXTENDED RELEASE ORAL DAILY
Qty: 30 TABLET | Refills: 1 | Status: SHIPPED | OUTPATIENT
Start: 2023-10-20 | End: 2023-12-23 | Stop reason: HOSPADM

## 2023-10-19 RX ADMIN — Medication 1 TABLET: at 09:00

## 2023-10-19 RX ADMIN — ACETAMINOPHEN 650 MG: 325 TABLET ORAL at 15:34

## 2023-10-19 RX ADMIN — FLUOXETINE 20 MG: 20 CAPSULE ORAL at 09:12

## 2023-10-19 RX ADMIN — ASPIRIN 81 MG: 81 TABLET, COATED ORAL at 09:12

## 2023-10-19 RX ADMIN — ACETAMINOPHEN 650 MG: 325 TABLET ORAL at 09:11

## 2023-10-19 RX ADMIN — PANTOPRAZOLE SODIUM 40 MG: 40 INJECTION, POWDER, FOR SOLUTION INTRAVENOUS at 09:15

## 2023-10-19 RX ADMIN — METOPROLOL SUCCINATE 100 MG: 50 TABLET, EXTENDED RELEASE ORAL at 09:12

## 2023-10-19 ASSESSMENT — PAIN SCALES - WONG BAKER: WONGBAKER_NUMERICALRESPONSE: HURTS LITTLE BIT

## 2023-10-19 ASSESSMENT — PAIN SCALES - GENERAL
PAINLEVEL_OUTOF10: 0 - NO PAIN
PAINLEVEL_OUTOF10: 3

## 2023-10-19 ASSESSMENT — PAIN - FUNCTIONAL ASSESSMENT
PAIN_FUNCTIONAL_ASSESSMENT: 0-10
PAIN_FUNCTIONAL_ASSESSMENT: 0-10

## 2023-10-19 NOTE — PROGRESS NOTES
[facility] does not accept [employer]. Left message for Macey at [employer] and faxed face sheet and discharge orders to [phone]. Awaiting call back  to confirm homecare. TCC aware.

## 2023-10-19 NOTE — DISCHARGE SUMMARY
Discharge Diagnosis  Mesenteric ischemia (CMS/HCC)    Issues Requiring Follow-Up  Mitral stenosis needs to be evaluated by structural heart team in 4-8 weeks.   INR needs to be followed up and Warfarin dosage adjusted by the [facility]madin [facility]    Test Results Pending At Discharge  Pending Labs       Order Current Status    Calprotectin Stool In process    Cryptosporidium Antigen, Stool In process    Giardia Antigen In process    Lactoferrin, fecal, quantitative In process    Ova and Parasite Examination In process    Ova/Para + Giardia/Cryptosporidium Antigen In process            Hospital Course   68 y.o. female admitted to the hospital after undergoing a redo open SMA embolectomy. Postoperatively she had diarrhea which contributed to prolonged stay. Infectious work-up yielded no infectious cause of diarrhea. Improved with Loperamide. It also took time to get her INR therapeutic postoperatively. Cardiology saw the patient as she likely had another cardiac embolism. She will need to be evaluated by the structural heart team as an outpatient. She was discharged with vascular surgery follow-up, INR checks at home with home health, and a referral to the structural heart team in 4-8 weeks.     Pertinent Physical Exam At Time of Discharge  Constitutional: No acute distress, sitting up in bed  Neuro:  AOx3, grossly intact  ENMT: moist mucous membranes  Head/neck: atraumatic  CV: no tachycardia  Pulm: non-labored breathing   GI: soft, distended, appropriately tender to palpation, midline incision with staples c/d/I, no signs of erythema or drainage  Skin: warm and dry  Musculoskeletal: moving all extremities    Home Medications     Medication List      START taking these medications     aspirin 81 mg EC tablet; Take 1 tablet (81 mg) by mouth once daily. Do   not start before October 20, 2023.; Start taking on: October 20, 2023   loperamide 2 mg capsule; Commonly known as: Imodium; Take 1 capsule (2   mg) by mouth 4 times a  day as needed for diarrhea.   metoprolol succinate  mg 24 hr tablet; Commonly known as:   Toprol-XL; Take 1 tablet (100 mg) by mouth once daily. Do not crush or   chew. Do not start before October 20, 2023.; Start taking on: October 20, 2023   oxyCODONE 5 mg immediate release tablet; Commonly known as: Roxicodone;   Take 1 tablet (5 mg) by mouth every 4 hours if needed for severe pain (7 -   10) or moderate pain (4 - 6) for up to 5 days.     CHANGE how you take these medications     atorvastatin 40 mg tablet; Commonly known as: Lipitor; Take 1 tablet (40   mg) by mouth once daily at bedtime.; What changed: medication strength,   how much to take, when to take this   warfarin 2.5 mg tablet; Commonly known as: Coumadin; Take as directed   per After Visit Summary.; What changed: medication strength, how much to   take, how to take this, additional instructions     CONTINUE taking these medications     albuterol 90 mcg/actuation inhaler; INHALE 1 PUFF BY MOUTH EVERY 4 HOURS   AS NEEDED   Breo Ellipta 100-25 mcg/dose inhaler; Generic drug: fluticasone   furoate-vilanteroL; INHALE 1 PUFF BY MOUTH ONCE DAILY   cholecalciferol 50 MCG (2000 UT) tablet; Commonly known as: Vitamin D-3;   TAKE 1 TABLET BY MOUTH ONCE DAILY   donepezil 5 mg tablet; Commonly known as: Aricept; TAKE 1 TABLET BY   MOUTH AT BEDTIME   FLUoxetine 20 mg capsule; Commonly known as: PROzac; TAKE 1 CAPSULE BY   MOUTH ONCE DAILY   gabapentin 100 mg capsule; Commonly known as: Neurontin; TAKE 1 CAPSULE   BY MOUTH AT BEDTIME   Jardiance 10 mg; Generic drug: empagliflozin; TAKE 1 TABLET BY MOUTH   ONCE DAILY   Lantus Solostar U-100 Insulin 100 unit/mL (3 mL) pen; Generic drug:   insulin glargine; INJECT 10 UNITS UNDER THE SKIN AT BEDTIME   melatonin 5 mg tablet; TAKE 1 TABLET BY MOUTH AT BEDTIME AS NEEDED FOR   INSOMNIA   metoprolol tartrate 50 mg tablet; Commonly known as: Lopressor; TAKE 1   TABLET BY MOUTH TWO TIMES A DAY   multivitamin with  minerals tablet; TAKE 1 TABLET BY MOUTH ONCE DAILY   nicotine 14 mg/24 hr patch; Commonly known as: Nicoderm CQ; APPLY 1   PATCH TO SKIN EVERY 24 HOURS   potassium chloride CR 20 mEq ER tablet; Commonly known as: Klor-Con M20;   TAKE 1 TABLET BY MOUTH ONCE DAILY   spironolactone 25 mg tablet; Commonly known as: Aldactone; TAKE 1 TABLET   BY MOUTH ONCE DAILY   torsemide 20 mg tablet; Commonly known as: Demadex; TAKE 2 TABLETS BY   MOUTH ONCE DAILY   traZODone 50 mg tablet; Commonly known as: Desyrel; TAKE 1 TABLET BY   MOUTH AT BEDTIME AS NEEDED     STOP taking these medications     acetaminophen 325 mg tablet; Commonly known as: Tylenol   enoxaparin 60 mg/0.6 mL syringe; Commonly known as: Lovenox       Outpatient Follow-Up  Future Appointments   Date Time Provider Department Center   10/26/2023 10:00 AM Janice Lacey MD MPH [id number] Academic   11/1/2023 10:30 AM Tonja Quintero APRN-CNP [id number] Academic   11/3/2023 11:20 AM Carlee Salmeron MD PhD [id number] Academic   11/8/2023  2:30 PM Cherry Washburn, OD [id number] Academic       Bahman Dubose MD

## 2023-10-19 NOTE — PROGRESS NOTES
Subjective Data:  SB 60s  INR 3.4  Denies CP/ SOB/dizziness, palpitations          Objective Data:  Last Recorded Vitals:  Vitals:    10/19/23 0258 10/19/23 0300 10/19/23 0808 10/19/23 1139   BP: 97/55  98/56 95/59   Pulse: 62  61 53   Resp: 18  17 18   Temp: 36.6 °C (97.9 °F)  37 °C (98.6 °F) 36.6 °C (97.9 °F)   TempSrc:       SpO2: 92%  94% 98%   Weight:  66.9 kg (147 lb 7.8 oz)     Height:           Last Labs:  CBC - 10/19/2023:  7:33 AM  12.7 9.1 341    26.9      CMP - 10/19/2023:  7:33 AM  7.6 7.4 53 --- 0.6   3.5 2.4 18 107      PTT - 10/19/2023:  7:33 AM  3.4   38.4 42          Last I/O:  I/O last 3 completed shifts:  In: 1004.5 (15 mL/kg) [I.V.:1004.5 (15 mL/kg)]  Out: - (0 mL/kg)   Weight: 66.9 kg         Inpatient Medications:  Scheduled medications   Medication Dose Route Frequency    acetaminophen  650 mg oral q6h    aspirin  81 mg oral Daily    atorvastatin  40 mg oral Nightly    donepezil  5 mg oral Nightly    FLUoxetine  20 mg oral Daily    insulin glargine  5 Units subcutaneous Nightly    insulin lispro  0-10 Units subcutaneous q4h    melatonin  5 mg oral Nightly    metoprolol succinate XL  100 mg oral Daily    multivitamin with minerals  1 tablet oral Daily    pantoprazole  40 mg intravenous Daily before breakfast    [MAR Hold] perflutren protein A microsphere  0.5 mL intravenous Once in imaging    [MAR Hold] sulfur hexafluoride microsphr  2 mL intravenous Once in imaging    [Held by provider] torsemide  20 mg oral BID     PRN medications   Medication    glucagon    HYDROmorphone    labetaloL    loperamide    oxyCODONE    oxyCODONE    [MAR Hold] oxygen     Continuous Medications   Medication Dose Last Rate       Physical Exam:  General: Sitting up  in bed, room air, NAD   Skin:  warm and dry  HEENT: EOMI. MMM  Cardiovascular: RRR. No JVD at 45 degrees   Respiratory: Reduced A/E throughout  Gastrointestinal: soft, non-distended  Extremities: no edema  Neurological: no gross focal  deficits  Psychiatric: appropriate mood and affect       Assessment/Plan   Impression:  #HF 2/2 rheumatic heart disease with symptomatic mitral stenosis and severe TR  #AFL  #AF with 2 prior thromboemboli  #s/p AVR 2004, 2016 with #25 Freestyle  #SALTY thrombus 7/2023  #Leukocytosis ?RUL PNA     Is at very high risk of thromboembolism given rheumatic mitral stenosis and recent SALTY thrombus and current admission for thromboembolism - would rate control and ensure adequate AC. Current thromboembolism likely due to inadequate warfarin dosing given most of the INR's in our system are subtherapeutic - would focus on ensuring reliable warfarin/INR follow-up given very high thromboembolic risk, and avoid rhythm control at this time given high risk of thromboembolism.     Recommendations:  - continue metoprolol succinate 100 daily. If SBP <80 or sustained HR <50, decrease dose to 50 daily  - ideally would continue anticoagulation with warfarin vs DOAC (which would be sub optimal)  - recommend restarting warfarin 2mg daily  - INR 3.4   - She has a follow up appointment scheduled with her cardiologist Dr. Carlee Salmeron 11/3/23   -please arrange for outpatient structural heart service in 4-8 weeks to discuss mitral stenosis  - Recommend home monitoring INR: Please place a [facility] skilled nursing order for INR testing. Place a comment for RN to report INRs to  [facility]  option #2    Cardiology will sign off  Discussed with PUJA Magana-CNP  Cardiology Consults  Please call with any questions  Pager [phone] M-F 8a-5p; Saturday 8a-2p  Pager [phone] all other times    Code Status:  Full Code

## 2023-10-19 NOTE — CARE PLAN
Problem: Diabetes  Goal: Achieve decreasing blood glucose levels by end of shift  Outcome: Progressing  Goal: Increase stability of blood glucose readings by end of shift  Outcome: Progressing  Goal: Decrease in ketones present in urine by end of shift  Outcome: Progressing  Goal: Maintain electrolyte levels within acceptable range throughout shift  Outcome: Progressing  Goal: No changes in neurological exam by end of shift  Outcome: Progressing  Goal: Learn about and adhere to nutrition recommendations by end of shift  Outcome: Progressing  Goal: Increase self care and/or family involovement by end of shift  Outcome: Progressing  Goal: Receive DSME education by end of shift  Outcome: Progressing     Problem: Safety - Medical Restraint  Goal: Remains free of injury from restraints (Restraint for Interference with Medical Device)  Outcome: Progressing  Goal: Free from restraint(s) (Restraint for Interference with Medical Device)  Outcome: Progressing     Problem: DVT/VTE Prevention/Activity  Goal: No decrease in circulation/sensation  Outcome: Progressing  Goal: Return to preop oxygenation status  Outcome: Progressing  Goal: Tolerates optimal activity  Outcome: Progressing  Goal: Increase self care and/or family involvement in 24 hrs.  Outcome: Progressing     Problem: Wound care/infection prevention  Goal: No signs of infection in 24 hrs.  Outcome: Progressing  Goal: No unexpected bleeding from incision this shift  Outcome: Progressing     Problem: Diet/fluid balance  Goal: Adequate urinary output  Outcome: Progressing  Goal: Free from nausea/vomiting  Outcome: Progressing  Goal: Return in bowel function  Outcome: Progressing  Goal: Tolerates prescribed diet  Outcome: Progressing     Problem: Other goals  Goal: No change in neurological status  Outcome: Progressing  Goal: Stabilize vital signs (return to 10% of baseline)  Outcome: Progressing     Problem: Skin  Goal: Decreased wound size/increased tissue  granulation at next dressing change  Outcome: Progressing  Goal: Participates in plan/prevention/treatment measures  Outcome: Progressing  Goal: Prevent/manage excess moisture  Outcome: Progressing  Goal: Prevent/minimize sheer/friction injuries  10/18/2023 2328 by Deo Sanchez RN  Flowsheets (Taken 10/18/2023 2328)  Prevent/minimize sheer/friction injuries:   Use pull sheet   Turn/reposition every 2 hours/use positioning/transfer devices  10/18/2023 2328 by Deo Sanchez RN  Outcome: Progressing  Flowsheets (Taken 10/18/2023 2328)  Prevent/minimize sheer/friction injuries:   Use pull sheet   Turn/reposition every 2 hours/use positioning/transfer devices  Goal: Promote/optimize nutrition  Outcome: Progressing  Goal: Promote skin healing  Outcome: Progressing   The patient's goals for the shift include      The clinical goals for the shift include get a minimum of 4 hours of sleep

## 2023-10-19 NOTE — PROGRESS NOTES
Physical Therapy                 Therapy Communication Note    Patient Name: Hilda Jones  MRN: [id number]  Today's Date: 10/19/2023     Discipline: Physical Therapy    Missed Visit Reason: Missed Visit Reason:  (Pt. complaining of non stop diarrhea- alerted RN who stated that the pt. could have Immodium.)    Missed Time: Attempt    Comment:

## 2023-10-19 NOTE — CARE PLAN
The patient's goals for the shift include      The clinical goals for the shift include get a minimum of 4 hours of sleep      Problem: Diabetes  Goal: Achieve decreasing blood glucose levels by end of shift  Outcome: Progressing  Goal: Increase stability of blood glucose readings by end of shift  Outcome: Progressing  Goal: Decrease in ketones present in urine by end of shift  Outcome: Progressing  Goal: Maintain electrolyte levels within acceptable range throughout shift  Outcome: Progressing  Goal: No changes in neurological exam by end of shift  Outcome: Progressing  Goal: Learn about and adhere to nutrition recommendations by end of shift  Outcome: Progressing  Goal: Increase self care and/or family involovement by end of shift  Outcome: Progressing  Goal: Receive DSME education by end of shift  Outcome: Progressing     Problem: Safety - Medical Restraint  Goal: Remains free of injury from restraints (Restraint for Interference with Medical Device)  Outcome: Progressing  Goal: Free from restraint(s) (Restraint for Interference with Medical Device)  Outcome: Progressing     Problem: DVT/VTE Prevention/Activity  Goal: No decrease in circulation/sensation  Outcome: Progressing  Goal: Return to preop oxygenation status  Outcome: Progressing  Goal: Tolerates optimal activity  Outcome: Progressing  Goal: Increase self care and/or family involvement in 24 hrs.  Outcome: Progressing     Problem: Wound care/infection prevention  Goal: No signs of infection in 24 hrs.  Outcome: Progressing  Goal: No unexpected bleeding from incision this shift  Outcome: Progressing     Problem: Diet/fluid balance  Goal: Adequate urinary output  Outcome: Progressing  Goal: Free from nausea/vomiting  Outcome: Progressing  Goal: Return in bowel function  Outcome: Progressing  Goal: Tolerates prescribed diet  Outcome: Progressing     Problem: Other goals  Goal: No change in neurological status  Outcome: Progressing  Goal: Stabilize vital  signs (return to 10% of baseline)  Outcome: Progressing     Problem: Skin  Goal: Decreased wound size/increased tissue granulation at next dressing change  Outcome: Progressing  Goal: Participates in plan/prevention/treatment measures  Outcome: Progressing  Goal: Prevent/manage excess moisture  Outcome: Progressing  Goal: Prevent/minimize sheer/friction injuries  Outcome: Progressing  Goal: Promote/optimize nutrition  Outcome: Progressing  Goal: Promote skin healing  Outcome: Progressing

## 2023-10-19 NOTE — PROGRESS NOTES
Hilda Jones is a 68 y.o. female on day 13 of admission presenting with Mesenteric ischemia (CMS/HCC).    10/19/2023 Crae coordination, call to Macey at [facility], she has the fax from earlier.  Will forward  Rx to [facility] team.  Macey will coorinate PT/OT for Pt.  CNP will visit as well.

## 2023-10-21 LAB — LACTOFERRIN STL QL IA: POSITIVE

## 2023-10-23 LAB — CALPROTECTIN STL-MCNT: >3000 UG/G

## 2023-10-24 ENCOUNTER — HOSPITAL ENCOUNTER (OUTPATIENT)
Dept: CARDIOLOGY | Facility: HOSPITAL | Age: 68
Discharge: HOME | End: 2023-10-24
Payer: MEDICARE

## 2023-10-24 LAB
ATRIAL RATE: 122 BPM
Q ONSET: 195 MS
QRS COUNT: 20 BEATS
QRS DURATION: 132 MS
QT INTERVAL: 374 MS
QTC CALCULATION(BAZETT): 521 MS
QTC FREDERICIA: 467 MS
R AXIS: 207 DEGREES
T AXIS: 33 DEGREES
T OFFSET: 382 MS
VENTRICULAR RATE: 117 BPM

## 2023-10-26 ENCOUNTER — HOSPITAL ENCOUNTER (OUTPATIENT)
Dept: CARDIOLOGY | Facility: HOSPITAL | Age: 68
Discharge: HOME | End: 2023-10-26
Payer: MEDICARE

## 2023-10-26 LAB
ATRIAL RATE: 277 BPM
ATRIAL RATE: 68 BPM
P AXIS: 78 DEGREES
P OFFSET: 147 MS
P ONSET: 85 MS
PR INTERVAL: 202 MS
Q ONSET: 184 MS
Q ONSET: 186 MS
QRS COUNT: 11 BEATS
QRS COUNT: 15 BEATS
QRS DURATION: 154 MS
QRS DURATION: 154 MS
QT INTERVAL: 462 MS
QT INTERVAL: 518 MS
QTC CALCULATION(BAZETT): 550 MS
QTC CALCULATION(BAZETT): 562 MS
QTC FREDERICIA: 526 MS
QTC FREDERICIA: 540 MS
R AXIS: -19 DEGREES
R AXIS: 68 DEGREES
T AXIS: 113 DEGREES
T AXIS: 53 DEGREES
T OFFSET: 415 MS
T OFFSET: 445 MS
VENTRICULAR RATE: 68 BPM
VENTRICULAR RATE: 89 BPM

## 2023-10-26 PROCEDURE — 93005 ELECTROCARDIOGRAM TRACING: CPT

## 2023-10-27 ENCOUNTER — PHARMACY VISIT (OUTPATIENT)
Dept: PHARMACY | Facility: CLINIC | Age: 68
End: 2023-10-27

## 2023-10-30 ENCOUNTER — TELEMEDICINE CLINICAL SUPPORT (OUTPATIENT)
Dept: CARDIAC REHAB | Facility: HOSPITAL | Age: 68
End: 2023-10-30
Payer: MEDICARE

## 2023-10-30 NOTE — PROGRESS NOTES
Name: Hilda Jones  Medical Record Number: [id number]  YOB: 1955    Today’s Date: 10/30/2023  Primary Care Physician: Janice Lacey MD MPH

## 2023-11-01 ENCOUNTER — OFFICE VISIT (OUTPATIENT)
Dept: VASCULAR SURGERY | Facility: HOSPITAL | Age: 68
End: 2023-11-01
Payer: MEDICARE

## 2023-11-01 VITALS
WEIGHT: 145 LBS | DIASTOLIC BLOOD PRESSURE: 59 MMHG | OXYGEN SATURATION: 91 % | HEART RATE: 84 BPM | SYSTOLIC BLOOD PRESSURE: 97 MMHG | BODY MASS INDEX: 24.13 KG/M2

## 2023-11-01 DIAGNOSIS — K55.059 ACUTE MESENTERIC ISCHEMIA (MULTI): Primary | ICD-10-CM

## 2023-11-01 PROCEDURE — 3008F BODY MASS INDEX DOCD: CPT | Performed by: NURSE PRACTITIONER

## 2023-11-01 PROCEDURE — 3044F HG A1C LEVEL LT 7.0%: CPT | Performed by: NURSE PRACTITIONER

## 2023-11-01 PROCEDURE — 99212 OFFICE O/P EST SF 10 MIN: CPT | Performed by: NURSE PRACTITIONER

## 2023-11-01 PROCEDURE — 1126F AMNT PAIN NOTED NONE PRSNT: CPT | Performed by: NURSE PRACTITIONER

## 2023-11-01 PROCEDURE — 1159F MED LIST DOCD IN RCRD: CPT | Performed by: NURSE PRACTITIONER

## 2023-11-01 PROCEDURE — 3074F SYST BP LT 130 MM HG: CPT | Performed by: NURSE PRACTITIONER

## 2023-11-01 PROCEDURE — 4004F PT TOBACCO SCREEN RCVD TLK: CPT | Performed by: NURSE PRACTITIONER

## 2023-11-01 PROCEDURE — 1111F DSCHRG MED/CURRENT MED MERGE: CPT | Performed by: NURSE PRACTITIONER

## 2023-11-01 PROCEDURE — 3078F DIAST BP <80 MM HG: CPT | Performed by: NURSE PRACTITIONER

## 2023-11-02 NOTE — PROGRESS NOTES
Vascular Surgery Clinic Note    CC: POV     History Of Present Illness:   Hilda Jones is a 68 y.o. female here postoperatively. She underwent an open SMA embolectomy with lysis of adhesions on 10/6/2023 by Dr. Isaacs for acute mesenteric ischemia. Of note, she underwent an open SMA embolectomy in August 2022 by Dr. Oropeza for acute mesenteric ischemia. She has a history of HR 2/2 rheumatic heart disease with mitral stenosis, atrial fibrillation/flutter and SALTY thrombus (7/2023), currently on warfarin. She presented to the ED with a subtherapeutic INR. The cause of her recurrent thromboembolism is likely cardiac in nature.     She is recovering at home with home care. She remains on warfarin, but is unsure what her last INR was. She states it was checked last week at her PCP's office ([place]). She denies postprandial pain but does report a decreased appetite. She has chronic diarrhea and takes imodium.     Medical History:  Patient Active Problem List   Diagnosis    Mesenteric ischemia (CMS/HCC)    Superior mesenteric artery thrombosis (CMS/HCC)    Atrial fibrillation (CMS/HCC)    Valvular heart disease    Chronic obstructive pulmonary disease (CMS/HCC)    Diabetes mellitus, type 2 (CMS/HCC)    Anxiety    Chronic back pain    Cigarette smoker    History of aortic valve replacement    Major depressive disorder    PAD (peripheral artery disease) (CMS/HCC)    Systemic lupus erythematosus, unspecified (CMS/HCC)    Polyarthritis        SH:    Social Determinants of Health     Tobacco Use: High Risk (10/7/2023)    Patient History     Smoking Tobacco Use: Every Day     Smokeless Tobacco Use: Current     Passive Exposure: Not on file   Alcohol Use: Not At Risk (10/8/2023)    AUDIT-C     Frequency of Alcohol Consumption: Never     Average Number of Drinks: Patient does not drink     Frequency of Binge Drinking: Never   Financial Resource Strain: Unknown (10/12/2023)    Overall Financial Resource Strain (CARDIA)     Difficulty  of Paying Living Expenses: Patient refused   Food Insecurity: Not on file   Transportation Needs: Unknown (10/12/2023)    PRAPARE - Transportation     Lack of Transportation (Medical): Patient refused     Lack of Transportation (Non-Medical): Patient refused   Physical Activity: Not on file   Stress: Not on file   Social Connections: Not on file   Intimate Partner Violence: Not on file   Depression: Not at risk (10/8/2023)    PHQ-2     PHQ-2 Score: 0   Housing Stability: Unknown (10/12/2023)    Housing Stability Vital Sign     Unable to Pay for Housing in the Last Year: Patient refused     Number of Places Lived in the Last Year: 1     Unstable Housing in the Last Year: Patient refused   Utilities: Not on file   Digital Equ[employer]: Not on file        FH:  Family History   Family history unknown: Yes        Allergies:   Allergies   Allergen Reactions    Flexeril [Cyclobenzaprine] Unknown       ROS:  All systems were reviewed and noted to be negative, other than described above.     Objective:  Last Recorded Vitals  Blood pressure 97/59, pulse 84, weight 65.8 kg (145 lb), SpO2 91 %.    Meds:   Current Outpatient Medications   Medication Instructions    albuterol 90 mcg/actuation inhaler INHALE 1 PUFF BY MOUTH EVERY 4 HOURS AS NEEDED    aspirin 81 mg EC tablet Take 1 tablet (81 mg) by mouth once daily. Do not start before October 20, 2023.    atorvastatin (LIPITOR) 40 mg, oral, Nightly    cholecalciferol (Vitamin D-3) 50 MCG (2000 UT) tablet TAKE 1 TABLET BY MOUTH ONCE DAILY    donepezil (Aricept) 5 mg tablet TAKE 1 TABLET BY MOUTH AT BEDTIME    empagliflozin (Jardiance) 10 mg TAKE 1 TABLET BY MOUTH ONCE DAILY    FLUoxetine (PROzac) 20 mg capsule TAKE 1 CAPSULE BY MOUTH ONCE DAILY    fluticasone furoate-vilanteroL (Breo Ellipta) 100-25 mcg/dose inhaler INHALE 1 PUFF BY MOUTH ONCE DAILY    gabapentin (Neurontin) 100 mg capsule TAKE 1 CAPSULE BY MOUTH AT BEDTIME    insulin glargine (Lantus) 100 unit/mL (3 mL) pen INJECT 10  UNITS UNDER THE SKIN AT BEDTIME    loperamide (IMODIUM) 2 mg, oral, 4 times daily PRN    melatonin 5 mg tablet TAKE 1 TABLET BY MOUTH AT BEDTIME AS NEEDED FOR INSOMNIA    metoprolol succinate XL (Toprol-XL) 100 mg 24 hr tablet Take 1 tablet (100 mg) by mouth once daily. Do not crush or chew. Do not start before October 20, 2023.    metoprolol tartrate (Lopressor) 50 mg tablet TAKE 1 TABLET BY MOUTH TWO TIMES A DAY    multivitamin with minerals tablet TAKE 1 TABLET BY MOUTH ONCE DAILY    nicotine (Nicoderm CQ) 14 mg/24 hr patch APPLY 1 PATCH TO SKIN EVERY 24 HOURS    potassium chloride CR (Klor-Con M20) 20 mEq ER tablet TAKE 1 TABLET BY MOUTH ONCE DAILY    spironolactone (Aldactone) 25 mg tablet TAKE 1 TABLET BY MOUTH ONCE DAILY    torsemide (Demadex) 20 mg tablet TAKE 2 TABLETS BY MOUTH ONCE DAILY    traZODone (Desyrel) 50 mg tablet TAKE 1 TABLET BY MOUTH AT BEDTIME AS NEEDED    warfarin (Coumadin) 2.5 mg tablet Take 1 tablet daily or as directed.       Exam:  Constitutional: Well appearing, NAD   PSYCH: Appropriate mood and affect  Eyes: Sclera clear   Neck: Supple   CV: No tachycardia   RESP: Unlabored breathing   GI: Soft, nontender, non-distended. ML incision with staples intact - removed and replaced with steri-strips.   NEURO: No focal deficits noted.   EXTREMITIES: Warm & well perfused.   PULSES: Palpable DP pulse bilaterally.     Assessment & Plan:  1. Acute mesenteric ischemia (CMS/HCC)  Vascular US mesenteric artery duplex complete        S/p SMA embolectomy 10/6/2023 with history of SMA embolectomy in 2022.   Likely cardioembolic given hx of afib - patient's INR was subtherapeutic on presentation.   - continue warfarin, aspirin & statin  - increase protein intake  - follow up with cardiology  - follow up with GI for chronic diarrhea   - RTC in 1 month with mesenteric duplex     Follow-up:  Follow up in about 4 weeks (around 11/29/2023).    Tonja Quintero APRN-CNP

## 2023-11-08 ENCOUNTER — APPOINTMENT (OUTPATIENT)
Dept: OPHTHALMOLOGY | Facility: CLINIC | Age: 68
End: 2023-11-08
Payer: MEDICARE

## 2023-12-06 ENCOUNTER — APPOINTMENT (OUTPATIENT)
Dept: VASCULAR MEDICINE | Facility: HOSPITAL | Age: 68
End: 2023-12-06
Payer: MEDICARE

## 2023-12-06 ENCOUNTER — APPOINTMENT (OUTPATIENT)
Dept: VASCULAR SURGERY | Facility: HOSPITAL | Age: 68
End: 2023-12-06
Payer: MEDICARE

## 2023-12-06 PROBLEM — R53.1 WEAKNESS: Status: ACTIVE | Noted: 2018-10-11

## 2023-12-06 PROBLEM — R06.02 SHORTNESS OF BREATH: Status: ACTIVE | Noted: 2023-12-06

## 2023-12-06 PROBLEM — G56.02 CARPAL TUNNEL SYNDROME ON LEFT: Status: ACTIVE | Noted: 2019-04-17

## 2023-12-06 PROBLEM — I11.9 HYPERTENSIVE HEART DISEASE WITHOUT CONGESTIVE HEART FAILURE: Status: ACTIVE | Noted: 2023-12-06

## 2023-12-06 PROBLEM — M25.522 LEFT ELBOW PAIN: Status: ACTIVE | Noted: 2023-12-06

## 2023-12-06 PROBLEM — J18.9 PNEUMONIA: Status: ACTIVE | Noted: 2023-12-06

## 2023-12-06 PROBLEM — M16.0 PRIMARY OSTEOARTHRITIS OF BOTH HIPS: Status: ACTIVE | Noted: 2019-02-14

## 2023-12-06 PROBLEM — E78.2 MIXED HYPERLIPIDEMIA: Status: ACTIVE | Noted: 2023-12-06

## 2023-12-06 PROBLEM — I65.23 OCCLUSION AND STENOSIS OF BILATERAL CAROTID ARTERIES: Status: ACTIVE | Noted: 2023-12-06

## 2023-12-06 PROBLEM — R26.9 ABNORMAL GAIT: Status: ACTIVE | Noted: 2023-12-06

## 2023-12-06 PROBLEM — M79.632 PAIN IN LEFT FOREARM: Status: ACTIVE | Noted: 2018-10-11

## 2023-12-06 PROBLEM — R60.0 LOCALIZED EDEMA: Status: ACTIVE | Noted: 2023-12-06

## 2023-12-06 PROBLEM — R13.10 DYSPHAGIA: Status: ACTIVE | Noted: 2023-12-06

## 2023-12-06 PROBLEM — M79.645 PAIN OF FINGER OF LEFT HAND: Status: ACTIVE | Noted: 2018-10-11

## 2023-12-06 PROBLEM — M65.9 TENOSYNOVITIS, WRIST: Status: ACTIVE | Noted: 2018-05-01

## 2023-12-06 PROBLEM — I70.213 INTERMITTENT CLAUDICATION OF BOTH LOWER EXTREMITIES DUE TO ATHEROSCLEROSIS (CMS-HCC): Status: ACTIVE | Noted: 2023-12-06

## 2023-12-06 PROBLEM — F33.1 MODERATE EPISODE OF RECURRENT MAJOR DEPRESSIVE DISORDER (MULTI): Status: ACTIVE | Noted: 2019-05-31

## 2023-12-06 PROBLEM — R94.31 ELECTROCARDIOGRAM ABNORMAL: Status: ACTIVE | Noted: 2023-12-06

## 2023-12-06 PROBLEM — G93.40 ENCEPHALOPATHY: Status: ACTIVE | Noted: 2023-12-06

## 2023-12-06 PROBLEM — S39.012A STRAIN OF BACK: Status: ACTIVE | Noted: 2019-09-27

## 2023-12-06 PROBLEM — L91.0 HYPERTROPHIC SCAR: Status: ACTIVE | Noted: 2023-12-06

## 2023-12-06 PROBLEM — L30.9 DERMATITIS: Status: ACTIVE | Noted: 2023-12-06

## 2023-12-06 PROBLEM — M65.939 TENOSYNOVITIS, WRIST: Status: ACTIVE | Noted: 2018-05-01

## 2023-12-06 PROBLEM — R31.9 HEMATURIA: Status: ACTIVE | Noted: 2023-12-06

## 2023-12-06 RX ORDER — FUROSEMIDE 40 MG/1
TABLET ORAL EVERY 24 HOURS
Status: ON HOLD | COMMUNITY
End: 2023-12-20 | Stop reason: ENTERED-IN-ERROR

## 2023-12-06 RX ORDER — LANSOPRAZOLE 30 MG/1
30 CAPSULE, DELAYED RELEASE ORAL NIGHTLY
Status: ON HOLD | COMMUNITY
Start: 2023-07-06 | End: 2023-12-20 | Stop reason: ENTERED-IN-ERROR

## 2023-12-06 RX ORDER — FUROSEMIDE 20 MG/1
TABLET ORAL
Status: ON HOLD | COMMUNITY
Start: 2023-01-15 | End: 2023-12-20 | Stop reason: ENTERED-IN-ERROR

## 2023-12-06 RX ORDER — PRENATAL VIT 91/IRON/FOLIC/DHA 28-975-200
COMBINATION PACKAGE (EA) ORAL
COMMUNITY
Start: 2020-12-22

## 2023-12-06 RX ORDER — MULTIVITAMIN
1 TABLET ORAL DAILY
Status: ON HOLD | COMMUNITY
Start: 2022-02-08 | End: 2023-12-20 | Stop reason: ENTERED-IN-ERROR

## 2023-12-06 RX ORDER — FLUTICASONE PROPIONATE AND SALMETEROL 250; 50 UG/1; UG/1
1 POWDER RESPIRATORY (INHALATION) EVERY 12 HOURS
Status: ON HOLD | COMMUNITY
End: 2023-12-20 | Stop reason: ENTERED-IN-ERROR

## 2023-12-06 RX ORDER — INSULIN LISPRO 100 [IU]/ML
4 INJECTION, SOLUTION INTRAVENOUS; SUBCUTANEOUS
Status: ON HOLD | COMMUNITY
Start: 2022-11-17 | End: 2023-12-20 | Stop reason: ENTERED-IN-ERROR

## 2023-12-06 RX ORDER — CHLORHEXIDINE GLUCONATE ORAL RINSE 1.2 MG/ML
SOLUTION DENTAL
Status: ON HOLD | COMMUNITY
Start: 2023-07-11 | End: 2023-12-20 | Stop reason: ENTERED-IN-ERROR

## 2023-12-06 RX ORDER — OMEPRAZOLE 20 MG/1
20 CAPSULE, DELAYED RELEASE ORAL
Status: ON HOLD | COMMUNITY
Start: 2023-03-23 | End: 2023-12-20 | Stop reason: ENTERED-IN-ERROR

## 2023-12-06 RX ORDER — TIOTROPIUM BROMIDE 18 UG/1
CAPSULE ORAL; RESPIRATORY (INHALATION)
Status: ON HOLD | COMMUNITY
Start: 2020-03-26 | End: 2023-12-20 | Stop reason: ENTERED-IN-ERROR

## 2023-12-06 RX ORDER — INSULIN LISPRO 100 [IU]/ML
INJECTION, SOLUTION INTRAVENOUS; SUBCUTANEOUS
Status: ON HOLD | COMMUNITY
End: 2023-12-20 | Stop reason: ENTERED-IN-ERROR

## 2023-12-06 RX ORDER — WARFARIN 6 MG/1
7.5 TABLET ORAL
Status: ON HOLD | COMMUNITY
Start: 2023-08-22 | End: 2023-12-20 | Stop reason: ENTERED-IN-ERROR

## 2023-12-06 RX ORDER — FAMOTIDINE 20 MG/1
20 TABLET, FILM COATED ORAL EVERY 12 HOURS PRN
COMMUNITY
Start: 2023-08-05 | End: 2023-12-23 | Stop reason: HOSPADM

## 2023-12-18 ENCOUNTER — APPOINTMENT (OUTPATIENT)
Dept: RADIOLOGY | Facility: HOSPITAL | Age: 68
DRG: 378 | End: 2023-12-18
Payer: MEDICARE

## 2023-12-18 ENCOUNTER — HOSPITAL ENCOUNTER (OUTPATIENT)
Dept: CARDIOLOGY | Facility: HOSPITAL | Age: 68
Discharge: HOME | End: 2023-12-18
Payer: MEDICARE

## 2023-12-18 ENCOUNTER — ANCILLARY PROCEDURE (OUTPATIENT)
Dept: EMERGENCY MEDICINE | Facility: HOSPITAL | Age: 68
DRG: 378 | End: 2023-12-18
Payer: MEDICARE

## 2023-12-18 ENCOUNTER — HOSPITAL ENCOUNTER (INPATIENT)
Facility: HOSPITAL | Age: 68
LOS: 5 days | Discharge: HOME | DRG: 378 | End: 2023-12-23
Attending: EMERGENCY MEDICINE | Admitting: STUDENT IN AN ORGANIZED HEALTH CARE EDUCATION/TRAINING PROGRAM
Payer: MEDICARE

## 2023-12-18 DIAGNOSIS — K55.9 MESENTERIC ISCHEMIA (MULTI): ICD-10-CM

## 2023-12-18 DIAGNOSIS — K92.2 GASTROINTESTINAL HEMORRHAGE, UNSPECIFIED GASTROINTESTINAL HEMORRHAGE TYPE: Primary | ICD-10-CM

## 2023-12-18 DIAGNOSIS — M13.0 POLYARTHRITIS: ICD-10-CM

## 2023-12-18 DIAGNOSIS — J44.9 CHRONIC OBSTRUCTIVE PULMONARY DISEASE, UNSPECIFIED COPD TYPE (MULTI): ICD-10-CM

## 2023-12-18 DIAGNOSIS — I38 VALVULAR HEART DISEASE: ICD-10-CM

## 2023-12-18 DIAGNOSIS — D50.9 IRON DEFICIENCY ANEMIA, UNSPECIFIED IRON DEFICIENCY ANEMIA TYPE: ICD-10-CM

## 2023-12-18 LAB
ABO GROUP (TYPE) IN BLOOD: NORMAL
ALBUMIN SERPL BCP-MCNC: 3.3 G/DL (ref 3.4–5)
ALP SERPL-CCNC: 101 U/L (ref 33–136)
ALT SERPL W P-5'-P-CCNC: 11 U/L (ref 7–45)
ANION GAP BLDV CALCULATED.4IONS-SCNC: 10 MMOL/L (ref 10–25)
ANION GAP BLDV CALCULATED.4IONS-SCNC: 3 MMOL/L (ref 10–25)
ANION GAP SERPL CALC-SCNC: 15 MMOL/L (ref 10–20)
ANTIBODY SCREEN: NORMAL
AST SERPL W P-5'-P-CCNC: 19 U/L (ref 9–39)
BASE EXCESS BLDV CALC-SCNC: 10.8 MMOL/L (ref -2–3)
BASE EXCESS BLDV CALC-SCNC: 7.3 MMOL/L (ref -2–3)
BASOPHILS # BLD AUTO: 0.1 X10*3/UL (ref 0–0.1)
BASOPHILS # BLD AUTO: 0.12 X10*3/UL (ref 0–0.1)
BASOPHILS NFR BLD AUTO: 0.9 %
BASOPHILS NFR BLD AUTO: 1.1 %
BILIRUB SERPL-MCNC: 0.5 MG/DL (ref 0–1.2)
BODY TEMPERATURE: 37 DEGREES CELSIUS
BODY TEMPERATURE: 37 DEGREES CELSIUS
BUN SERPL-MCNC: 30 MG/DL (ref 6–23)
CA-I BLDV-SCNC: 1.11 MMOL/L (ref 1.1–1.33)
CA-I BLDV-SCNC: 1.13 MMOL/L (ref 1.1–1.33)
CALCIUM SERPL-MCNC: 9.1 MG/DL (ref 8.6–10.6)
CHLORIDE BLDV-SCNC: 102 MMOL/L (ref 98–107)
CHLORIDE BLDV-SCNC: 99 MMOL/L (ref 98–107)
CHLORIDE SERPL-SCNC: 98 MMOL/L (ref 98–107)
CO2 SERPL-SCNC: 29 MMOL/L (ref 21–32)
CREAT SERPL-MCNC: 1.24 MG/DL (ref 0.5–1.05)
EOSINOPHIL # BLD AUTO: 0.17 X10*3/UL (ref 0–0.7)
EOSINOPHIL # BLD AUTO: 0.19 X10*3/UL (ref 0–0.7)
EOSINOPHIL NFR BLD AUTO: 1.6 %
EOSINOPHIL NFR BLD AUTO: 1.8 %
ERYTHROCYTE [DISTWIDTH] IN BLOOD BY AUTOMATED COUNT: 18.7 % (ref 11.5–14.5)
ERYTHROCYTE [DISTWIDTH] IN BLOOD BY AUTOMATED COUNT: 18.9 % (ref 11.5–14.5)
GFR SERPL CREATININE-BSD FRML MDRD: 47 ML/MIN/1.73M*2
GLUCOSE BLDV-MCNC: 136 MG/DL (ref 74–99)
GLUCOSE BLDV-MCNC: 88 MG/DL (ref 74–99)
GLUCOSE SERPL-MCNC: 124 MG/DL (ref 74–99)
HCO3 BLDV-SCNC: 32 MMOL/L (ref 22–26)
HCO3 BLDV-SCNC: 35.1 MMOL/L (ref 22–26)
HCT VFR BLD AUTO: 25 % (ref 36–46)
HCT VFR BLD AUTO: 29.6 % (ref 36–46)
HCT VFR BLD EST: 25 % (ref 36–46)
HCT VFR BLD EST: 31 % (ref 36–46)
HGB BLD-MCNC: 10.2 G/DL (ref 12–16)
HGB BLD-MCNC: 8.7 G/DL (ref 12–16)
HGB BLDV-MCNC: 10.4 G/DL (ref 12–16)
HGB BLDV-MCNC: 8.3 G/DL (ref 12–16)
HOLD SPECIMEN: NORMAL
HOLD SPECIMEN: NORMAL
IMM GRANULOCYTES # BLD AUTO: 0.03 X10*3/UL (ref 0–0.7)
IMM GRANULOCYTES # BLD AUTO: 0.04 X10*3/UL (ref 0–0.7)
IMM GRANULOCYTES NFR BLD AUTO: 0.3 % (ref 0–0.9)
IMM GRANULOCYTES NFR BLD AUTO: 0.4 % (ref 0–0.9)
INHALED O2 CONCENTRATION: 21 %
INR PPP: 1.7 (ref 0.9–1.1)
LACTATE BLDV-SCNC: 0.8 MMOL/L (ref 0.4–2)
LACTATE BLDV-SCNC: 3.2 MMOL/L (ref 0.4–2)
LYMPHOCYTES # BLD AUTO: 3.1 X10*3/UL (ref 1.2–4.8)
LYMPHOCYTES # BLD AUTO: 3.47 X10*3/UL (ref 1.2–4.8)
LYMPHOCYTES NFR BLD AUTO: 28.3 %
LYMPHOCYTES NFR BLD AUTO: 32.3 %
MAGNESIUM SERPL-MCNC: 1.78 MG/DL (ref 1.6–2.4)
MCH RBC QN AUTO: 24.2 PG (ref 26–34)
MCH RBC QN AUTO: 24.3 PG (ref 26–34)
MCHC RBC AUTO-ENTMCNC: 34.5 G/DL (ref 32–36)
MCHC RBC AUTO-ENTMCNC: 34.8 G/DL (ref 32–36)
MCV RBC AUTO: 70 FL (ref 80–100)
MCV RBC AUTO: 71 FL (ref 80–100)
MONOCYTES # BLD AUTO: 1.15 X10*3/UL (ref 0.1–1)
MONOCYTES # BLD AUTO: 1.24 X10*3/UL (ref 0.1–1)
MONOCYTES NFR BLD AUTO: 10.7 %
MONOCYTES NFR BLD AUTO: 11.3 %
NEUTROPHILS # BLD AUTO: 5.79 X10*3/UL (ref 1.2–7.7)
NEUTROPHILS # BLD AUTO: 6.28 X10*3/UL (ref 1.2–7.7)
NEUTROPHILS NFR BLD AUTO: 54 %
NEUTROPHILS NFR BLD AUTO: 57.3 %
NRBC BLD-RTO: 0 /100 WBCS (ref 0–0)
NRBC BLD-RTO: 0 /100 WBCS (ref 0–0)
OXYHGB MFR BLDV: 18.3 % (ref 45–75)
OXYHGB MFR BLDV: 41.1 % (ref 45–75)
PCO2 BLDV: 45 MM HG (ref 41–51)
PCO2 BLDV: 45 MM HG (ref 41–51)
PH BLDV: 7.46 PH (ref 7.33–7.43)
PH BLDV: 7.5 PH (ref 7.33–7.43)
PHOSPHATE SERPL-MCNC: 4.6 MG/DL (ref 2.5–4.9)
PLATELET # BLD AUTO: 282 X10*3/UL (ref 150–450)
PLATELET # BLD AUTO: 367 X10*3/UL (ref 150–450)
PO2 BLDV: 24 MM HG (ref 35–45)
PO2 BLDV: 26 MM HG (ref 35–45)
POTASSIUM BLDV-SCNC: 3.5 MMOL/L (ref 3.5–5.3)
POTASSIUM BLDV-SCNC: 4.2 MMOL/L (ref 3.5–5.3)
POTASSIUM SERPL-SCNC: 4 MMOL/L (ref 3.5–5.3)
PROT SERPL-MCNC: 6.5 G/DL (ref 6.4–8.2)
PROTHROMBIN TIME: 18.7 SECONDS (ref 9.8–12.8)
RBC # BLD AUTO: 3.59 X10*6/UL (ref 4–5.2)
RBC # BLD AUTO: 4.2 X10*6/UL (ref 4–5.2)
RH FACTOR (ANTIGEN D): NORMAL
SAO2 % BLDV: 19 % (ref 45–75)
SAO2 % BLDV: 42 % (ref 45–75)
SODIUM BLDV-SCNC: 137 MMOL/L (ref 136–145)
SODIUM BLDV-SCNC: 137 MMOL/L (ref 136–145)
SODIUM SERPL-SCNC: 138 MMOL/L (ref 136–145)
WBC # BLD AUTO: 10.7 X10*3/UL (ref 4.4–11.3)
WBC # BLD AUTO: 11 X10*3/UL (ref 4.4–11.3)

## 2023-12-18 PROCEDURE — 99291 CRITICAL CARE FIRST HOUR: CPT | Performed by: EMERGENCY MEDICINE

## 2023-12-18 PROCEDURE — 96361 HYDRATE IV INFUSION ADD-ON: CPT

## 2023-12-18 PROCEDURE — 85025 COMPLETE CBC W/AUTO DIFF WBC: CPT | Performed by: STUDENT IN AN ORGANIZED HEALTH CARE EDUCATION/TRAINING PROGRAM

## 2023-12-18 PROCEDURE — 74174 CTA ABD&PLVS W/CONTRAST: CPT

## 2023-12-18 PROCEDURE — 36415 COLL VENOUS BLD VENIPUNCTURE: CPT

## 2023-12-18 PROCEDURE — C9113 INJ PANTOPRAZOLE SODIUM, VIA: HCPCS | Performed by: EMERGENCY MEDICINE

## 2023-12-18 PROCEDURE — 93308 TTE F-UP OR LMTD: CPT | Performed by: EMERGENCY MEDICINE

## 2023-12-18 PROCEDURE — 99291 CRITICAL CARE FIRST HOUR: CPT | Mod: 25 | Performed by: EMERGENCY MEDICINE

## 2023-12-18 PROCEDURE — 76705 ECHO EXAM OF ABDOMEN: CPT | Performed by: EMERGENCY MEDICINE

## 2023-12-18 PROCEDURE — 84132 ASSAY OF SERUM POTASSIUM: CPT

## 2023-12-18 PROCEDURE — 85610 PROTHROMBIN TIME: CPT | Performed by: STUDENT IN AN ORGANIZED HEALTH CARE EDUCATION/TRAINING PROGRAM

## 2023-12-18 PROCEDURE — 94762 N-INVAS EAR/PLS OXIMTRY CONT: CPT

## 2023-12-18 PROCEDURE — 74174 CTA ABD&PLVS W/CONTRAST: CPT | Performed by: RADIOLOGY

## 2023-12-18 PROCEDURE — 93010 ELECTROCARDIOGRAM REPORT: CPT | Performed by: EMERGENCY MEDICINE

## 2023-12-18 PROCEDURE — 84100 ASSAY OF PHOSPHORUS: CPT | Performed by: STUDENT IN AN ORGANIZED HEALTH CARE EDUCATION/TRAINING PROGRAM

## 2023-12-18 PROCEDURE — 84132 ASSAY OF SERUM POTASSIUM: CPT | Performed by: STUDENT IN AN ORGANIZED HEALTH CARE EDUCATION/TRAINING PROGRAM

## 2023-12-18 PROCEDURE — 99291 CRITICAL CARE FIRST HOUR: CPT

## 2023-12-18 PROCEDURE — 85730 THROMBOPLASTIN TIME PARTIAL: CPT | Performed by: STUDENT IN AN ORGANIZED HEALTH CARE EDUCATION/TRAINING PROGRAM

## 2023-12-18 PROCEDURE — 83605 ASSAY OF LACTIC ACID: CPT | Performed by: STUDENT IN AN ORGANIZED HEALTH CARE EDUCATION/TRAINING PROGRAM

## 2023-12-18 PROCEDURE — 93010 ELECTROCARDIOGRAM REPORT: CPT | Performed by: STUDENT IN AN ORGANIZED HEALTH CARE EDUCATION/TRAINING PROGRAM

## 2023-12-18 PROCEDURE — 83735 ASSAY OF MAGNESIUM: CPT | Performed by: STUDENT IN AN ORGANIZED HEALTH CARE EDUCATION/TRAINING PROGRAM

## 2023-12-18 PROCEDURE — 93005 ELECTROCARDIOGRAM TRACING: CPT

## 2023-12-18 PROCEDURE — 2500000004 HC RX 250 GENERAL PHARMACY W/ HCPCS (ALT 636 FOR OP/ED): Performed by: EMERGENCY MEDICINE

## 2023-12-18 PROCEDURE — 76705 ECHO EXAM OF ABDOMEN: CPT

## 2023-12-18 PROCEDURE — 36415 COLL VENOUS BLD VENIPUNCTURE: CPT | Performed by: STUDENT IN AN ORGANIZED HEALTH CARE EDUCATION/TRAINING PROGRAM

## 2023-12-18 PROCEDURE — 76775 US EXAM ABDO BACK WALL LIM: CPT | Performed by: EMERGENCY MEDICINE

## 2023-12-18 PROCEDURE — 2550000001 HC RX 255 CONTRASTS: Performed by: EMERGENCY MEDICINE

## 2023-12-18 PROCEDURE — 86901 BLOOD TYPING SEROLOGIC RH(D): CPT | Performed by: STUDENT IN AN ORGANIZED HEALTH CARE EDUCATION/TRAINING PROGRAM

## 2023-12-18 PROCEDURE — 2020000001 HC ICU ROOM DAILY

## 2023-12-18 PROCEDURE — 2500000004 HC RX 250 GENERAL PHARMACY W/ HCPCS (ALT 636 FOR OP/ED): Performed by: STUDENT IN AN ORGANIZED HEALTH CARE EDUCATION/TRAINING PROGRAM

## 2023-12-18 PROCEDURE — 96374 THER/PROPH/DIAG INJ IV PUSH: CPT | Mod: 59

## 2023-12-18 PROCEDURE — 96360 HYDRATION IV INFUSION INIT: CPT | Mod: 59

## 2023-12-18 RX ORDER — PANTOPRAZOLE SODIUM 40 MG/10ML
40 INJECTION, POWDER, LYOPHILIZED, FOR SOLUTION INTRAVENOUS 2 TIMES DAILY
Status: DISCONTINUED | OUTPATIENT
Start: 2023-12-19 | End: 2023-12-23 | Stop reason: HOSPADM

## 2023-12-18 RX ORDER — PANTOPRAZOLE SODIUM 40 MG/10ML
40 INJECTION, POWDER, LYOPHILIZED, FOR SOLUTION INTRAVENOUS ONCE
Status: COMPLETED | OUTPATIENT
Start: 2023-12-18 | End: 2023-12-18

## 2023-12-18 RX ORDER — PANTOPRAZOLE SODIUM 40 MG/10ML
INJECTION, POWDER, LYOPHILIZED, FOR SOLUTION INTRAVENOUS
Status: COMPLETED
Start: 2023-12-18 | End: 2023-12-18

## 2023-12-18 RX ADMIN — SODIUM CHLORIDE, POTASSIUM CHLORIDE, SODIUM LACTATE AND CALCIUM CHLORIDE 1000 ML: 600; 310; 30; 20 INJECTION, SOLUTION INTRAVENOUS at 16:36

## 2023-12-18 RX ADMIN — PANTOPRAZOLE SODIUM 40 MG: 40 INJECTION, POWDER, FOR SOLUTION INTRAVENOUS at 16:36

## 2023-12-18 RX ADMIN — IOHEXOL 75 ML: 350 INJECTION, SOLUTION INTRAVENOUS at 19:03

## 2023-12-18 RX ADMIN — SODIUM CHLORIDE, POTASSIUM CHLORIDE, SODIUM LACTATE AND CALCIUM CHLORIDE 1000 ML: 600; 310; 30; 20 INJECTION, SOLUTION INTRAVENOUS at 19:00

## 2023-12-18 RX ADMIN — PANTOPRAZOLE SODIUM 40 MG: 40 INJECTION, POWDER, LYOPHILIZED, FOR SOLUTION INTRAVENOUS at 16:36

## 2023-12-18 SDOH — SOCIAL STABILITY: SOCIAL INSECURITY: DO YOU FEEL UNSAFE GOING BACK TO THE PLACE WHERE YOU ARE LIVING?: NO

## 2023-12-18 SDOH — SOCIAL STABILITY: SOCIAL INSECURITY: ABUSE: ADULT

## 2023-12-18 SDOH — SOCIAL STABILITY: SOCIAL INSECURITY: ARE THERE ANY APPARENT SIGNS OF INJURIES/BEHAVIORS THAT COULD BE RELATED TO ABUSE/NEGLECT?: NO

## 2023-12-18 SDOH — SOCIAL STABILITY: SOCIAL INSECURITY: HAS ANYONE EVER THREATENED TO HURT YOUR FAMILY OR YOUR PETS?: NO

## 2023-12-18 SDOH — SOCIAL STABILITY: SOCIAL INSECURITY: DO YOU FEEL ANYONE HAS EXPLOITED OR TAKEN ADVANTAGE OF YOU FINANCIALLY OR OF YOUR PERSONAL PROPERTY?: NO

## 2023-12-18 SDOH — SOCIAL STABILITY: SOCIAL INSECURITY: HAVE YOU HAD THOUGHTS OF HARMING ANYONE ELSE?: NO

## 2023-12-18 SDOH — SOCIAL STABILITY: SOCIAL INSECURITY: ARE YOU OR HAVE YOU BEEN THREATENED OR ABUSED PHYSICALLY, EMOTIONALLY, OR SEXUALLY BY ANYONE?: NO

## 2023-12-18 SDOH — SOCIAL STABILITY: SOCIAL INSECURITY: DOES ANYONE TRY TO KEEP YOU FROM HAVING/CONTACTING OTHER FRIENDS OR DOING THINGS OUTSIDE YOUR HOME?: NO

## 2023-12-18 SDOH — SOCIAL STABILITY: SOCIAL INSECURITY: WERE YOU ABLE TO COMPLETE ALL THE BEHAVIORAL HEALTH SCREENINGS?: YES

## 2023-12-18 ASSESSMENT — LIFESTYLE VARIABLES
HAVE PEOPLE ANNOYED YOU BY CRITICIZING YOUR DRINKING: NO
HOW MANY STANDARD DRINKS CONTAINING ALCOHOL DO YOU HAVE ON A TYPICAL DAY: PATIENT DOES NOT DRINK
HOW OFTEN DO YOU HAVE A DRINK CONTAINING ALCOHOL: NEVER
AUDIT-C TOTAL SCORE: 0
EVER FELT BAD OR GUILTY ABOUT YOUR DRINKING: NO
AUDIT-C TOTAL SCORE: 0
EVER HAD A DRINK FIRST THING IN THE MORNING TO STEADY YOUR NERVES TO GET RID OF A HANGOVER: NO
REASON UNABLE TO ASSESS: NO
HAVE YOU EVER FELT YOU SHOULD CUT DOWN ON YOUR DRINKING: NO
SKIP TO QUESTIONS 9-10: 1
HOW OFTEN DO YOU HAVE 6 OR MORE DRINKS ON ONE OCCASION: NEVER

## 2023-12-18 ASSESSMENT — ACTIVITIES OF DAILY LIVING (ADL)
WALKS IN HOME: INDEPENDENT
PATIENT'S MEMORY ADEQUATE TO SAFELY COMPLETE DAILY ACTIVITIES?: YES
ADEQUATE_TO_COMPLETE_ADL: YES
HEARING - LEFT EAR: FUNCTIONAL
DRESSING YOURSELF: INDEPENDENT
JUDGMENT_ADEQUATE_SAFELY_COMPLETE_DAILY_ACTIVITIES: YES
GROOMING: INDEPENDENT
HEARING - RIGHT EAR: FUNCTIONAL
FEEDING YOURSELF: INDEPENDENT
LACK_OF_TRANSPORTATION: YES
BATHING: INDEPENDENT
TOILETING: INDEPENDENT

## 2023-12-18 ASSESSMENT — PATIENT HEALTH QUESTIONNAIRE - PHQ9
1. LITTLE INTEREST OR PLEASURE IN DOING THINGS: NOT AT ALL
SUM OF ALL RESPONSES TO PHQ9 QUESTIONS 1 & 2: 1
2. FEELING DOWN, DEPRESSED OR HOPELESS: SEVERAL DAYS

## 2023-12-18 ASSESSMENT — COGNITIVE AND FUNCTIONAL STATUS - GENERAL
MOVING FROM LYING ON BACK TO SITTING ON SIDE OF FLAT BED WITH BEDRAILS: A LITTLE
DRESSING REGULAR LOWER BODY CLOTHING: A LITTLE
TURNING FROM BACK TO SIDE WHILE IN FLAT BAD: A LITTLE
MOVING TO AND FROM BED TO CHAIR: A LITTLE
EATING MEALS: A LITTLE
MOBILITY SCORE: 18
STANDING UP FROM CHAIR USING ARMS: A LITTLE
PATIENT BASELINE BEDBOUND: NO
DAILY ACTIVITIY SCORE: 18
DRESSING REGULAR UPPER BODY CLOTHING: A LITTLE
TOILETING: A LITTLE
WALKING IN HOSPITAL ROOM: A LITTLE
HELP NEEDED FOR BATHING: A LITTLE
CLIMB 3 TO 5 STEPS WITH RAILING: A LITTLE
PERSONAL GROOMING: A LITTLE

## 2023-12-18 ASSESSMENT — PAIN SCALES - GENERAL
PAINLEVEL_OUTOF10: 0 - NO PAIN
PAINLEVEL_OUTOF10: 0 - NO PAIN
PAINLEVEL_OUTOF10: 4
PAINLEVEL_OUTOF10: 0 - NO PAIN

## 2023-12-18 ASSESSMENT — COLUMBIA-SUICIDE SEVERITY RATING SCALE - C-SSRS
2. HAVE YOU ACTUALLY HAD ANY THOUGHTS OF KILLING YOURSELF?: NO
6. HAVE YOU EVER DONE ANYTHING, STARTED TO DO ANYTHING, OR PREPARED TO DO ANYTHING TO END YOUR LIFE?: NO
4. HAVE YOU HAD THESE THOUGHTS AND HAD SOME INTENTION OF ACTING ON THEM?: NO
5. HAVE YOU STARTED TO WORK OUT OR WORKED OUT THE DETAILS OF HOW TO KILL YOURSELF? DO YOU INTEND TO CARRY OUT THIS PLAN?: NO
1. IN THE PAST MONTH, HAVE YOU WISHED YOU WERE DEAD OR WISHED YOU COULD GO TO SLEEP AND NOT WAKE UP?: NO

## 2023-12-18 ASSESSMENT — PAIN - FUNCTIONAL ASSESSMENT
PAIN_FUNCTIONAL_ASSESSMENT: 0-10
PAIN_FUNCTIONAL_ASSESSMENT: 0-10

## 2023-12-18 ASSESSMENT — PAIN DESCRIPTION - LOCATION: LOCATION: ABDOMEN

## 2023-12-19 LAB
ALBUMIN SERPL BCP-MCNC: 2.6 G/DL (ref 3.4–5)
ALBUMIN SERPL BCP-MCNC: 2.7 G/DL (ref 3.4–5)
ALP SERPL-CCNC: 86 U/L (ref 33–136)
ALT SERPL W P-5'-P-CCNC: 9 U/L (ref 7–45)
ANION GAP SERPL CALC-SCNC: 11 MMOL/L (ref 10–20)
ANION GAP SERPL CALC-SCNC: 12 MMOL/L (ref 10–20)
APTT PPP: 31 SECONDS (ref 27–38)
AST SERPL W P-5'-P-CCNC: 14 U/L (ref 9–39)
ATRIAL RATE: 93 BPM
BASOPHILS # BLD AUTO: 0.09 X10*3/UL (ref 0–0.1)
BASOPHILS # BLD AUTO: 0.11 X10*3/UL (ref 0–0.1)
BASOPHILS NFR BLD AUTO: 1 %
BASOPHILS NFR BLD AUTO: 1.1 %
BILIRUB SERPL-MCNC: 0.4 MG/DL (ref 0–1.2)
BUN SERPL-MCNC: 21 MG/DL (ref 6–23)
BUN SERPL-MCNC: 24 MG/DL (ref 6–23)
CALCIUM SERPL-MCNC: 8.2 MG/DL (ref 8.6–10.6)
CALCIUM SERPL-MCNC: 8.3 MG/DL (ref 8.6–10.6)
CHLORIDE SERPL-SCNC: 100 MMOL/L (ref 98–107)
CHLORIDE SERPL-SCNC: 103 MMOL/L (ref 98–107)
CO2 SERPL-SCNC: 32 MMOL/L (ref 21–32)
CO2 SERPL-SCNC: 33 MMOL/L (ref 21–32)
CREAT SERPL-MCNC: 0.8 MG/DL (ref 0.5–1.05)
CREAT SERPL-MCNC: 0.97 MG/DL (ref 0.5–1.05)
EOSINOPHIL # BLD AUTO: 0.23 X10*3/UL (ref 0–0.7)
EOSINOPHIL # BLD AUTO: 0.35 X10*3/UL (ref 0–0.7)
EOSINOPHIL NFR BLD AUTO: 2.2 %
EOSINOPHIL NFR BLD AUTO: 4.3 %
ERYTHROCYTE [DISTWIDTH] IN BLOOD BY AUTOMATED COUNT: 18.6 % (ref 11.5–14.5)
ERYTHROCYTE [DISTWIDTH] IN BLOOD BY AUTOMATED COUNT: 19 % (ref 11.5–14.5)
GFR SERPL CREATININE-BSD FRML MDRD: 63 ML/MIN/1.73M*2
GFR SERPL CREATININE-BSD FRML MDRD: 80 ML/MIN/1.73M*2
GLUCOSE BLD MANUAL STRIP-MCNC: 103 MG/DL (ref 74–99)
GLUCOSE BLD MANUAL STRIP-MCNC: 107 MG/DL (ref 74–99)
GLUCOSE BLD MANUAL STRIP-MCNC: 84 MG/DL (ref 74–99)
GLUCOSE BLD MANUAL STRIP-MCNC: 90 MG/DL (ref 74–99)
GLUCOSE BLD MANUAL STRIP-MCNC: 91 MG/DL (ref 74–99)
GLUCOSE SERPL-MCNC: 71 MG/DL (ref 74–99)
GLUCOSE SERPL-MCNC: 84 MG/DL (ref 74–99)
HCT VFR BLD AUTO: 21.7 % (ref 36–46)
HCT VFR BLD AUTO: 23.9 % (ref 36–46)
HGB BLD-MCNC: 7.1 G/DL (ref 12–16)
HGB BLD-MCNC: 8.2 G/DL (ref 12–16)
IMM GRANULOCYTES # BLD AUTO: 0.02 X10*3/UL (ref 0–0.7)
IMM GRANULOCYTES # BLD AUTO: 0.03 X10*3/UL (ref 0–0.7)
IMM GRANULOCYTES NFR BLD AUTO: 0.2 % (ref 0–0.9)
IMM GRANULOCYTES NFR BLD AUTO: 0.3 % (ref 0–0.9)
INR PPP: 1.5 (ref 0.9–1.1)
LACTATE SERPL-SCNC: 0.9 MMOL/L (ref 0.4–2)
LYMPHOCYTES # BLD AUTO: 3.28 X10*3/UL (ref 1.2–4.8)
LYMPHOCYTES # BLD AUTO: 4.18 X10*3/UL (ref 1.2–4.8)
LYMPHOCYTES NFR BLD AUTO: 39.3 %
LYMPHOCYTES NFR BLD AUTO: 40.3 %
MCH RBC QN AUTO: 23.5 PG (ref 26–34)
MCH RBC QN AUTO: 24 PG (ref 26–34)
MCHC RBC AUTO-ENTMCNC: 32.7 G/DL (ref 32–36)
MCHC RBC AUTO-ENTMCNC: 34.3 G/DL (ref 32–36)
MCV RBC AUTO: 70 FL (ref 80–100)
MCV RBC AUTO: 72 FL (ref 80–100)
MONOCYTES # BLD AUTO: 1.01 X10*3/UL (ref 0.1–1)
MONOCYTES # BLD AUTO: 1.23 X10*3/UL (ref 0.1–1)
MONOCYTES NFR BLD AUTO: 11.6 %
MONOCYTES NFR BLD AUTO: 12.4 %
NEUTROPHILS # BLD AUTO: 3.38 X10*3/UL (ref 1.2–7.7)
NEUTROPHILS # BLD AUTO: 4.85 X10*3/UL (ref 1.2–7.7)
NEUTROPHILS NFR BLD AUTO: 41.7 %
NEUTROPHILS NFR BLD AUTO: 45.6 %
NRBC BLD-RTO: 0 /100 WBCS (ref 0–0)
NRBC BLD-RTO: 0 /100 WBCS (ref 0–0)
P AXIS: 65 DEGREES
PHOSPHATE SERPL-MCNC: 3.4 MG/DL (ref 2.5–4.9)
PLATELET # BLD AUTO: 214 X10*3/UL (ref 150–450)
PLATELET # BLD AUTO: 294 X10*3/UL (ref 150–450)
POTASSIUM SERPL-SCNC: 3.6 MMOL/L (ref 3.5–5.3)
POTASSIUM SERPL-SCNC: 3.7 MMOL/L (ref 3.5–5.3)
PR INTERVAL: 224 MS
PROT SERPL-MCNC: 5.1 G/DL (ref 6.4–8.2)
PROTHROMBIN TIME: 17.2 SECONDS (ref 9.8–12.8)
Q ONSET: 228 MS
QRS COUNT: 12 BEATS
QRS DURATION: 142 MS
QT INTERVAL: 490 MS
QTC CALCULATION(BAZETT): 561 MS
QTC FREDERICIA: 537 MS
R AXIS: 54 DEGREES
RBC # BLD AUTO: 3.02 X10*6/UL (ref 4–5.2)
RBC # BLD AUTO: 3.42 X10*6/UL (ref 4–5.2)
SODIUM SERPL-SCNC: 140 MMOL/L (ref 136–145)
SODIUM SERPL-SCNC: 143 MMOL/L (ref 136–145)
T AXIS: 40 DEGREES
T OFFSET: 473 MS
VENTRICULAR RATE: 79 BPM
WBC # BLD AUTO: 10.6 X10*3/UL (ref 4.4–11.3)
WBC # BLD AUTO: 8.1 X10*3/UL (ref 4.4–11.3)

## 2023-12-19 PROCEDURE — 82947 ASSAY GLUCOSE BLOOD QUANT: CPT

## 2023-12-19 PROCEDURE — C9113 INJ PANTOPRAZOLE SODIUM, VIA: HCPCS

## 2023-12-19 PROCEDURE — 1100000001 HC PRIVATE ROOM DAILY

## 2023-12-19 PROCEDURE — 2500000004 HC RX 250 GENERAL PHARMACY W/ HCPCS (ALT 636 FOR OP/ED)

## 2023-12-19 PROCEDURE — 80069 RENAL FUNCTION PANEL: CPT

## 2023-12-19 PROCEDURE — 99291 CRITICAL CARE FIRST HOUR: CPT

## 2023-12-19 PROCEDURE — 2500000001 HC RX 250 WO HCPCS SELF ADMINISTERED DRUGS (ALT 637 FOR MEDICARE OP)

## 2023-12-19 PROCEDURE — 2500000004 HC RX 250 GENERAL PHARMACY W/ HCPCS (ALT 636 FOR OP/ED): Performed by: STUDENT IN AN ORGANIZED HEALTH CARE EDUCATION/TRAINING PROGRAM

## 2023-12-19 PROCEDURE — 85025 COMPLETE CBC W/AUTO DIFF WBC: CPT

## 2023-12-19 PROCEDURE — C9113 INJ PANTOPRAZOLE SODIUM, VIA: HCPCS | Performed by: STUDENT IN AN ORGANIZED HEALTH CARE EDUCATION/TRAINING PROGRAM

## 2023-12-19 PROCEDURE — 36415 COLL VENOUS BLD VENIPUNCTURE: CPT

## 2023-12-19 PROCEDURE — 99222 1ST HOSP IP/OBS MODERATE 55: CPT

## 2023-12-19 RX ORDER — ATORVASTATIN CALCIUM 40 MG/1
40 TABLET, FILM COATED ORAL NIGHTLY
Status: DISCONTINUED | OUTPATIENT
Start: 2023-12-19 | End: 2023-12-23 | Stop reason: HOSPADM

## 2023-12-19 RX ORDER — POLYETHYLENE GLYCOL 3350, SODIUM CHLORIDE, SODIUM BICARBONATE, POTASSIUM CHLORIDE 420; 11.2; 5.72; 1.48 G/4L; G/4L; G/4L; G/4L
4000 POWDER, FOR SOLUTION ORAL ONCE
Status: DISCONTINUED | OUTPATIENT
Start: 2023-12-19 | End: 2023-12-19

## 2023-12-19 RX ORDER — ONDANSETRON HYDROCHLORIDE 2 MG/ML
4 INJECTION, SOLUTION INTRAVENOUS EVERY 4 HOURS PRN
Status: DISCONTINUED | OUTPATIENT
Start: 2023-12-19 | End: 2023-12-19

## 2023-12-19 RX ORDER — GABAPENTIN 100 MG/1
100 CAPSULE ORAL NIGHTLY
Status: DISCONTINUED | OUTPATIENT
Start: 2023-12-19 | End: 2023-12-23 | Stop reason: HOSPADM

## 2023-12-19 RX ORDER — DEXTROSE MONOHYDRATE 100 MG/ML
0.3 INJECTION, SOLUTION INTRAVENOUS ONCE AS NEEDED
Status: DISCONTINUED | OUTPATIENT
Start: 2023-12-19 | End: 2023-12-23 | Stop reason: HOSPADM

## 2023-12-19 RX ORDER — POLYETHYLENE GLYCOL 3350, SODIUM CHLORIDE, SODIUM BICARBONATE, POTASSIUM CHLORIDE 420; 11.2; 5.72; 1.48 G/4L; G/4L; G/4L; G/4L
4000 POWDER, FOR SOLUTION ORAL ONCE
Status: COMPLETED | OUTPATIENT
Start: 2023-12-19 | End: 2023-12-19

## 2023-12-19 RX ORDER — DONEPEZIL HYDROCHLORIDE 5 MG/1
5 TABLET, FILM COATED ORAL NIGHTLY
Status: DISCONTINUED | OUTPATIENT
Start: 2023-12-19 | End: 2023-12-23 | Stop reason: HOSPADM

## 2023-12-19 RX ORDER — FLUTICASONE FUROATE AND VILANTEROL 100; 25 UG/1; UG/1
1 POWDER RESPIRATORY (INHALATION) DAILY
Status: DISCONTINUED | OUTPATIENT
Start: 2023-12-19 | End: 2023-12-23 | Stop reason: HOSPADM

## 2023-12-19 RX ORDER — ACETAMINOPHEN 500 MG
5 TABLET ORAL NIGHTLY PRN
Status: DISCONTINUED | OUTPATIENT
Start: 2023-12-18 | End: 2023-12-23 | Stop reason: HOSPADM

## 2023-12-19 RX ORDER — POLYETHYLENE GLYCOL 3350 17 G/17G
238 POWDER, FOR SOLUTION ORAL ONCE AS NEEDED
Status: DISCONTINUED | OUTPATIENT
Start: 2023-12-19 | End: 2023-12-23 | Stop reason: HOSPADM

## 2023-12-19 RX ORDER — PRENATAL VIT 91/IRON/FOLIC/DHA 28-975-200
COMBINATION PACKAGE (EA) ORAL 2 TIMES DAILY
Status: DISCONTINUED | OUTPATIENT
Start: 2023-12-19 | End: 2023-12-23 | Stop reason: HOSPADM

## 2023-12-19 RX ORDER — POLYETHYLENE GLYCOL 3350, SODIUM CHLORIDE, SODIUM BICARBONATE, POTASSIUM CHLORIDE 420; 11.2; 5.72; 1.48 G/4L; G/4L; G/4L; G/4L
2000 POWDER, FOR SOLUTION ORAL ONCE AS NEEDED
Status: DISCONTINUED | OUTPATIENT
Start: 2023-12-19 | End: 2023-12-23 | Stop reason: HOSPADM

## 2023-12-19 RX ORDER — FLUTICASONE FUROATE AND VILANTEROL 200; 25 UG/1; UG/1
1 POWDER RESPIRATORY (INHALATION)
Status: DISCONTINUED | OUTPATIENT
Start: 2023-12-19 | End: 2023-12-19

## 2023-12-19 RX ORDER — POLYETHYLENE GLYCOL 3350, SODIUM CHLORIDE, SODIUM BICARBONATE, POTASSIUM CHLORIDE 420; 11.2; 5.72; 1.48 G/4L; G/4L; G/4L; G/4L
2000 POWDER, FOR SOLUTION ORAL ONCE AS NEEDED
Status: DISCONTINUED | OUTPATIENT
Start: 2023-12-19 | End: 2023-12-19

## 2023-12-19 RX ORDER — DEXTROSE MONOHYDRATE 100 MG/ML
0.3 INJECTION, SOLUTION INTRAVENOUS ONCE AS NEEDED
Status: DISCONTINUED | OUTPATIENT
Start: 2023-12-19 | End: 2023-12-19

## 2023-12-19 RX ORDER — LOPERAMIDE HYDROCHLORIDE 2 MG/1
2 CAPSULE ORAL 4 TIMES DAILY PRN
Status: DISCONTINUED | OUTPATIENT
Start: 2023-12-18 | End: 2023-12-19

## 2023-12-19 RX ORDER — INSULIN GLARGINE 100 [IU]/ML
5 INJECTION, SOLUTION SUBCUTANEOUS NIGHTLY
Status: DISCONTINUED | OUTPATIENT
Start: 2023-12-19 | End: 2023-12-19

## 2023-12-19 RX ORDER — FLUOXETINE HYDROCHLORIDE 20 MG/1
20 CAPSULE ORAL DAILY
Status: DISCONTINUED | OUTPATIENT
Start: 2023-12-19 | End: 2023-12-20

## 2023-12-19 RX ORDER — INSULIN LISPRO 100 [IU]/ML
0-5 INJECTION, SOLUTION INTRAVENOUS; SUBCUTANEOUS
Status: DISCONTINUED | OUTPATIENT
Start: 2023-12-19 | End: 2023-12-23 | Stop reason: HOSPADM

## 2023-12-19 RX ORDER — POLYETHYLENE GLYCOL 3350 17 G/17G
238 POWDER, FOR SOLUTION ORAL ONCE AS NEEDED
Status: DISCONTINUED | OUTPATIENT
Start: 2023-12-19 | End: 2023-12-19

## 2023-12-19 RX ORDER — DEXTROSE 50 % IN WATER (D50W) INTRAVENOUS SYRINGE
25
Status: DISCONTINUED | OUTPATIENT
Start: 2023-12-19 | End: 2023-12-19

## 2023-12-19 RX ORDER — DEXTROSE 50 % IN WATER (D50W) INTRAVENOUS SYRINGE
25
Status: DISCONTINUED | OUTPATIENT
Start: 2023-12-19 | End: 2023-12-23 | Stop reason: HOSPADM

## 2023-12-19 RX ORDER — ALBUTEROL SULFATE 90 UG/1
1 AEROSOL, METERED RESPIRATORY (INHALATION) EVERY 4 HOURS PRN
Status: DISCONTINUED | OUTPATIENT
Start: 2023-12-18 | End: 2023-12-23 | Stop reason: HOSPADM

## 2023-12-19 RX ADMIN — SODIUM CHLORIDE, POTASSIUM CHLORIDE, SODIUM LACTATE AND CALCIUM CHLORIDE 500 ML: 600; 310; 30; 20 INJECTION, SOLUTION INTRAVENOUS at 01:20

## 2023-12-19 RX ADMIN — FLUOXETINE 20 MG: 20 CAPSULE ORAL at 09:20

## 2023-12-19 RX ADMIN — PANTOPRAZOLE SODIUM 40 MG: 40 INJECTION, POWDER, FOR SOLUTION INTRAVENOUS at 16:29

## 2023-12-19 RX ADMIN — GABAPENTIN 100 MG: 100 CAPSULE ORAL at 22:21

## 2023-12-19 RX ADMIN — PANTOPRAZOLE SODIUM 40 MG: 40 INJECTION, POWDER, FOR SOLUTION INTRAVENOUS at 04:12

## 2023-12-19 RX ADMIN — POLYETHYLENE GLYCOL 3350, SODIUM SULFATE ANHYDROUS, SODIUM BICARBONATE, SODIUM CHLORIDE, POTASSIUM CHLORIDE 4000 ML: 236; 22.74; 6.74; 5.86; 2.97 POWDER, FOR SOLUTION ORAL at 18:57

## 2023-12-19 RX ADMIN — ATORVASTATIN CALCIUM 40 MG: 40 TABLET, FILM COATED ORAL at 22:21

## 2023-12-19 SDOH — SOCIAL STABILITY: SOCIAL INSECURITY: WITHIN THE LAST YEAR, HAVE YOU BEEN AFRAID OF YOUR PARTNER OR EX-PARTNER?: NO

## 2023-12-19 SDOH — ECONOMIC STABILITY: FOOD INSECURITY: WITHIN THE PAST 12 MONTHS, YOU WORRIED THAT YOUR FOOD WOULD RUN OUT BEFORE YOU GOT MONEY TO BUY MORE.: NEVER TRUE

## 2023-12-19 SDOH — SOCIAL STABILITY: SOCIAL INSECURITY
WITHIN THE LAST YEAR, HAVE TO BEEN RAPED OR FORCED TO HAVE ANY KIND OF SEXUAL ACTIVITY BY YOUR PARTNER OR EX-PARTNER?: NO

## 2023-12-19 SDOH — SOCIAL STABILITY: SOCIAL INSECURITY: WITHIN THE LAST YEAR, HAVE YOU BEEN HUMILIATED OR EMOTIONALLY ABUSED IN OTHER WAYS BY YOUR PARTNER OR EX-PARTNER?: NO

## 2023-12-19 SDOH — SOCIAL STABILITY: SOCIAL INSECURITY
WITHIN THE LAST YEAR, HAVE YOU BEEN KICKED, HIT, SLAPPED, OR OTHERWISE PHYSICALLY HURT BY YOUR PARTNER OR EX-PARTNER?: NO

## 2023-12-19 SDOH — ECONOMIC STABILITY: INCOME INSECURITY: IN THE PAST 12 MONTHS, HAS THE ELECTRIC, GAS, OIL, OR WATER COMPANY THREATENED TO SHUT OFF SERVICE IN YOUR HOME?: NO

## 2023-12-19 SDOH — ECONOMIC STABILITY: HOUSING INSECURITY: IN THE LAST 12 MONTHS, HOW MANY PLACES HAVE YOU LIVED?: 1

## 2023-12-19 SDOH — ECONOMIC STABILITY: INCOME INSECURITY: HOW HARD IS IT FOR YOU TO PAY FOR THE VERY BASICS LIKE FOOD, HOUSING, MEDICAL CARE, AND HEATING?: NOT HARD AT ALL

## 2023-12-19 SDOH — ECONOMIC STABILITY: HOUSING INSECURITY
IN THE LAST 12 MONTHS, WAS THERE A TIME WHEN YOU DID NOT HAVE A STEADY PLACE TO SLEEP OR SLEPT IN A SHELTER (INCLUDING NOW)?: NO

## 2023-12-19 SDOH — ECONOMIC STABILITY: FOOD INSECURITY: WITHIN THE PAST 12 MONTHS, THE FOOD YOU BOUGHT JUST DIDN'T LAST AND YOU DIDN'T HAVE MONEY TO GET MORE.: NEVER TRUE

## 2023-12-19 SDOH — ECONOMIC STABILITY: INCOME INSECURITY: IN THE LAST 12 MONTHS, WAS THERE A TIME WHEN YOU WERE NOT ABLE TO PAY THE MORTGAGE OR RENT ON TIME?: NO

## 2023-12-19 SDOH — ECONOMIC STABILITY: TRANSPORTATION INSECURITY
IN THE PAST 12 MONTHS, HAS THE LACK OF TRANSPORTATION KEPT YOU FROM MEDICAL APPOINTMENTS OR FROM GETTING MEDICATIONS?: NO

## 2023-12-19 SDOH — ECONOMIC STABILITY: TRANSPORTATION INSECURITY
IN THE PAST 12 MONTHS, HAS LACK OF TRANSPORTATION KEPT YOU FROM MEETINGS, WORK, OR FROM GETTING THINGS NEEDED FOR DAILY LIVING?: NO

## 2023-12-19 ASSESSMENT — COGNITIVE AND FUNCTIONAL STATUS - GENERAL
MOVING TO AND FROM BED TO CHAIR: A LITTLE
MOBILITY SCORE: 20
DRESSING REGULAR UPPER BODY CLOTHING: A LITTLE
PERSONAL GROOMING: A LITTLE
CLIMB 3 TO 5 STEPS WITH RAILING: A LITTLE
MOBILITY SCORE: 20
STANDING UP FROM CHAIR USING ARMS: A LITTLE
DRESSING REGULAR LOWER BODY CLOTHING: A LITTLE
DRESSING REGULAR LOWER BODY CLOTHING: A LITTLE
HELP NEEDED FOR BATHING: A LITTLE
CLIMB 3 TO 5 STEPS WITH RAILING: A LITTLE
WALKING IN HOSPITAL ROOM: A LITTLE
HELP NEEDED FOR BATHING: A LITTLE
WALKING IN HOSPITAL ROOM: A LITTLE
MOVING TO AND FROM BED TO CHAIR: A LITTLE
STANDING UP FROM CHAIR USING ARMS: A LITTLE
DAILY ACTIVITIY SCORE: 19
DAILY ACTIVITIY SCORE: 20
TOILETING: A LITTLE
TOILETING: A LITTLE
DRESSING REGULAR UPPER BODY CLOTHING: A LITTLE

## 2023-12-19 ASSESSMENT — PAIN SCALES - GENERAL
PAINLEVEL_OUTOF10: 0 - NO PAIN

## 2023-12-19 ASSESSMENT — PAIN - FUNCTIONAL ASSESSMENT
PAIN_FUNCTIONAL_ASSESSMENT: 0-10

## 2023-12-19 NOTE — PROGRESS NOTES
SOCIAL WORK NOTE   Patient not available. SW spoke with daughter for assessment (please see flowsheets for further details). Patient normally lives home alone, but has HHA through [employer]. Daughter states that they aides provide help with IADLs. She is between HHA currently. She uses [employer] at home. Daughter requested information for [facility] complaint line due to patient having bed bugs. Non emergency complaint and pest control number given from website. Clinicals sent to BRIANNA NAIR left for ANTONY Choudhury. Social work to follow.  SUJATA Aviles, LISW-S ([id number])

## 2023-12-19 NOTE — H&P
History Of Present Illness  Ms. Jones is a 67-year-old with past medical history of atrial fibrillation, atrial flutter previously on Xarelto, COPD, diabetes mellitus, aortic valve insufficiency  status post aortic valve replacement, SMA occlusion status post thromboembolectomy who presents to the MICU for hemodynamic instability in the setting of lower GI bleed.     Patient was feeling well when waking up this morning, had breakfast and her morning medications but suddenly felt abdominal as well as general discomfort and some slight pain. Had the urge to pass a BM, passed 4 loose “dark stools”. At this point she called her daughter and was taken to the ED.     Upon arrival to the ED, there was concern for hemodynamic instability, patient was sent for CTA of the abdomen and pelvis. Showed active extravasation in the ascending colon.  Was given 2L of LR in the ED.    Impression:     1. Findings suggestive of active GI bleed originating in the  ascending colon.  2. Interval resolution of the superior mesenteric branch thrombus  with residual moderate stenosis of the SMA branch.  3. Enlarged uterus with a heterogeneous central mass, not  significantly changed in size dating back to 08/17/2022, most  consistent with a fibroid. Follow-up nonemergent pelvic ultrasound  should be considered for evaluation.  4. Additional stable chronic and incidental findings described above.         Importantly, patient underwent an open SMA embolectomy with lysis of adhesions on 10/6/2023 by Dr. Isaacs for acute mesenteric ischemia. Of note, she underwent an open SMA embolectomy in August 2022 by Dr. Oropeza for acute mesenteric ischemia. She has a history of HR 2/2 rheumatic heart disease with mitral stenosis, atrial fibrillation/flutter and SALTY thrombus (7/2023). Has hx of sub therapeutic INR readings.      Past Medical History  Past Medical History:   Diagnosis Date    Atrial fibrillation (CMS/HCC)     Awareness under anesthesia     CHF  "(congestive heart failure) (CMS/HCC)     COPD (chronic obstructive pulmonary disease) (CMS/HCC)     Diabetes (CMS/HCC)     GERD (gastroesophageal reflux disease)     HTN (hypertension)     Mitral insufficiency     PONV (postoperative nausea and vomiting)     S/P AVR        Surgical History  Past Surgical History:   Procedure Laterality Date    AORTIC ROOT REPLACEMENT      AORTIC VALVE REPLACEMENT      CT ABDOMEN PELVIS ANGIOGRAM W AND/OR WO IV CONTRAST  10/06/2023    CT ABDOMEN PELVIS ANGIOGRAM W AND/OR WO IV CONTRAST 10/6/2023 [facility]    CT ABDOMEN PELVIS ANGIOGRAM W AND/OR WO IV CONTRAST  12/18/2023    CT ABDOMEN PELVIS ANGIOGRAM W AND/OR WO IV CONTRAST 12/18/2023 [facility] CT    EMBOLECTOMY N/A 08/17/2022    SMA embolectomy via laparotomy    EMBOLECTOMY N/A 10/06/2023    Open SMA embolectomy        Social History  She reports that she has been smoking cigarettes. She has a 15.00 pack-year smoking history. She uses smokeless tobacco. She reports that she does not drink alcohol and does not use drugs.    Family History  Family History   Family history unknown: Yes        Allergies  Flexeril [cyclobenzaprine]    Review of Systems     Physical Exam  GEN: Patient is awake, alert, calm, cooperative  HEAD: Normocephalic and atraumatic.  EYES: Anicteric sclera.  MOUTH: Mucous membranes moist.  CV: Regular rate and rhythm. (+) s1/s2. No murmurs/rubs/gallops.  PULM: CTAB. No wheezes, rales, or crackles.  GI: Soft, mild tenderness to palpation of the abdomen, non-distended without rebound or guarding.  EXT: No deformities noted.   NEURO: Moves all extremities.   SKIN: Warm, dry. No erythema or ecchymosis.     Last Recorded Vitals  Blood pressure 91/56, pulse 56, temperature 36.4 °C (97.5 °F), temperature source Temporal, resp. rate 12, height 1.651 m (5' 5\"), weight 55.1 kg (121 lb 7.6 oz), SpO2 100 %.    Relevant Results  Results for orders placed or performed during the hospital encounter of 12/18/23 (from the past 24 hour(s)) "   Blood Gas Venous Full Panel Unsolicited   Result Value Ref Range    POCT pH, Venous 7.46 (H) 7.33 - 7.43 pH    POCT pCO2, Venous 45 41 - 51 mm Hg    POCT pO2, Venous 24 (L) 35 - 45 mm Hg    POCT SO2, Venous 19 (L) 45 - 75 %    POCT Oxy Hemoglobin, Venous 18.3 (L) 45.0 - 75.0 %    POCT Hematocrit Calculated, Venous 31.0 (L) 36.0 - 46.0 %    POCT Sodium, Venous 137 136 - 145 mmol/L    POCT Potassium, Venous 4.2 3.5 - 5.3 mmol/L    POCT Chloride, Venous 99 98 - 107 mmol/L    POCT Ionized Calicum, Venous 1.13 1.10 - 1.33 mmol/L    POCT Glucose, Venous 136 (H) 74 - 99 mg/dL    POCT Lactate, Venous 3.2 (H) 0.4 - 2.0 mmol/L    POCT Base Excess, Venous 7.3 (H) -2.0 - 3.0 mmol/L    POCT HCO3 Calculated, Venous 32.0 (H) 22.0 - 26.0 mmol/L    POCT Hemoglobin, Venous 10.4 (L) 12.0 - 16.0 g/dL    POCT Anion Gap, Venous 10.0 10.0 - 25.0 mmol/L    Patient Temperature 37.0 degrees Celsius   CBC and Auto Differential   Result Value Ref Range    WBC 11.0 4.4 - 11.3 x10*3/uL    nRBC 0.0 0.0 - 0.0 /100 WBCs    RBC 4.20 4.00 - 5.20 x10*6/uL    Hemoglobin 10.2 (L) 12.0 - 16.0 g/dL    Hematocrit 29.6 (L) 36.0 - 46.0 %    MCV 71 (L) 80 - 100 fL    MCH 24.3 (L) 26.0 - 34.0 pg    MCHC 34.5 32.0 - 36.0 g/dL    RDW 18.9 (H) 11.5 - 14.5 %    Platelets 367 150 - 450 x10*3/uL    Neutrophils % 57.3 40.0 - 80.0 %    Immature Granulocytes %, Automated 0.4 0.0 - 0.9 %    Lymphocytes % 28.3 13.0 - 44.0 %    Monocytes % 11.3 2.0 - 10.0 %    Eosinophils % 1.6 0.0 - 6.0 %    Basophils % 1.1 0.0 - 2.0 %    Neutrophils Absolute 6.28 1.20 - 7.70 x10*3/uL    Immature Granulocytes Absolute, Automated 0.04 0.00 - 0.70 x10*3/uL    Lymphocytes Absolute 3.10 1.20 - 4.80 x10*3/uL    Monocytes Absolute 1.24 (H) 0.10 - 1.00 x10*3/uL    Eosinophils Absolute 0.17 0.00 - 0.70 x10*3/uL    Basophils Absolute 0.12 (H) 0.00 - 0.10 x10*3/uL   Comprehensive metabolic panel   Result Value Ref Range    Glucose 124 (H) 74 - 99 mg/dL    Sodium 138 136 - 145 mmol/L    Potassium  4.0 3.5 - 5.3 mmol/L    Chloride 98 98 - 107 mmol/L    Bicarbonate 29 21 - 32 mmol/L    Anion Gap 15 10 - 20 mmol/L    Urea Nitrogen 30 (H) 6 - 23 mg/dL    Creatinine 1.24 (H) 0.50 - 1.05 mg/dL    eGFR 47 (L) >60 mL/min/1.73m*2    Calcium 9.1 8.6 - 10.6 mg/dL    Albumin 3.3 (L) 3.4 - 5.0 g/dL    Alkaline Phosphatase 101 33 - 136 U/L    Total Protein 6.5 6.4 - 8.2 g/dL    AST 19 9 - 39 U/L    Bilirubin, Total 0.5 0.0 - 1.2 mg/dL    ALT 11 7 - 45 U/L   Magnesium   Result Value Ref Range    Magnesium 1.78 1.60 - 2.40 mg/dL   Phosphorus   Result Value Ref Range    Phosphorus 4.6 2.5 - 4.9 mg/dL   Protime-INR   Result Value Ref Range    Protime 18.7 (H) 9.8 - 12.8 seconds    INR 1.7 (H) 0.9 - 1.1   Type And Screen   Result Value Ref Range    ABO TYPE B     Rh TYPE POS     ANTIBODY SCREEN NEG    ECG 12 lead   Result Value Ref Range    Ventricular Rate 79 BPM    Atrial Rate 93 BPM    AR Interval 224 ms    QRS Duration 142 ms    QT Interval 490 ms    QTC Calculation(Bazett) 561 ms    P Axis 65 degrees    R Axis 54 degrees    T Axis 40 degrees    QRS Count 12 beats    Q Onset 228 ms    T Offset 473 ms    QTC Fredericia 537 ms   SST TOP   Result Value Ref Range    Extra Tube Hold for add-ons.    CBC and Auto Differential   Result Value Ref Range    WBC 10.7 4.4 - 11.3 x10*3/uL    nRBC 0.0 0.0 - 0.0 /100 WBCs    RBC 3.59 (L) 4.00 - 5.20 x10*6/uL    Hemoglobin 8.7 (L) 12.0 - 16.0 g/dL    Hematocrit 25.0 (L) 36.0 - 46.0 %    MCV 70 (L) 80 - 100 fL    MCH 24.2 (L) 26.0 - 34.0 pg    MCHC 34.8 32.0 - 36.0 g/dL    RDW 18.7 (H) 11.5 - 14.5 %    Platelets 282 150 - 450 x10*3/uL    Neutrophils % 54.0 40.0 - 80.0 %    Immature Granulocytes %, Automated 0.3 0.0 - 0.9 %    Lymphocytes % 32.3 13.0 - 44.0 %    Monocytes % 10.7 2.0 - 10.0 %    Eosinophils % 1.8 0.0 - 6.0 %    Basophils % 0.9 0.0 - 2.0 %    Neutrophils Absolute 5.79 1.20 - 7.70 x10*3/uL    Immature Granulocytes Absolute, Automated 0.03 0.00 - 0.70 x10*3/uL    Lymphocytes  Absolute 3.47 1.20 - 4.80 x10*3/uL    Monocytes Absolute 1.15 (H) 0.10 - 1.00 x10*3/uL    Eosinophils Absolute 0.19 0.00 - 0.70 x10*3/uL    Basophils Absolute 0.10 0.00 - 0.10 x10*3/uL   Lavender Top   Result Value Ref Range    Extra Tube Hold for add-ons.    Blood Gas Venous Full Panel   Result Value Ref Range    POCT pH, Venous 7.50 (H) 7.33 - 7.43 pH    POCT pCO2, Venous 45 41 - 51 mm Hg    POCT pO2, Venous 26 (L) 35 - 45 mm Hg    POCT SO2, Venous 42 (L) 45 - 75 %    POCT Oxy Hemoglobin, Venous 41.1 (L) 45.0 - 75.0 %    POCT Hematocrit Calculated, Venous 25.0 (L) 36.0 - 46.0 %    POCT Sodium, Venous 137 136 - 145 mmol/L    POCT Potassium, Venous 3.5 3.5 - 5.3 mmol/L    POCT Chloride, Venous 102 98 - 107 mmol/L    POCT Ionized Calicum, Venous 1.11 1.10 - 1.33 mmol/L    POCT Glucose, Venous 88 74 - 99 mg/dL    POCT Lactate, Venous 0.8 0.4 - 2.0 mmol/L    POCT Base Excess, Venous 10.8 (H) -2.0 - 3.0 mmol/L    POCT HCO3 Calculated, Venous 35.1 (H) 22.0 - 26.0 mmol/L    POCT Hemoglobin, Venous 8.3 (L) 12.0 - 16.0 g/dL    POCT Anion Gap, Venous 3.0 (L) 10.0 - 25.0 mmol/L    Patient Temperature 37.0 degrees Celsius    FiO2 21 %   Comprehensive metabolic panel   Result Value Ref Range    Glucose 84 74 - 99 mg/dL    Sodium 140 136 - 145 mmol/L    Potassium 3.7 3.5 - 5.3 mmol/L    Chloride 100 98 - 107 mmol/L    Bicarbonate 33 (H) 21 - 32 mmol/L    Anion Gap 11 10 - 20 mmol/L    Urea Nitrogen 24 (H) 6 - 23 mg/dL    Creatinine 0.97 0.50 - 1.05 mg/dL    eGFR 63 >60 mL/min/1.73m*2    Calcium 8.3 (L) 8.6 - 10.6 mg/dL    Albumin 2.7 (L) 3.4 - 5.0 g/dL    Alkaline Phosphatase 86 33 - 136 U/L    Total Protein 5.1 (L) 6.4 - 8.2 g/dL    AST 14 9 - 39 U/L    Bilirubin, Total 0.4 0.0 - 1.2 mg/dL    ALT 9 7 - 45 U/L   CBC and Auto Differential   Result Value Ref Range    WBC 10.6 4.4 - 11.3 x10*3/uL    nRBC 0.0 0.0 - 0.0 /100 WBCs    RBC 3.42 (L) 4.00 - 5.20 x10*6/uL    Hemoglobin 8.2 (L) 12.0 - 16.0 g/dL    Hematocrit 23.9 (L) 36.0 -  46.0 %    MCV 70 (L) 80 - 100 fL    MCH 24.0 (L) 26.0 - 34.0 pg    MCHC 34.3 32.0 - 36.0 g/dL    RDW 18.6 (H) 11.5 - 14.5 %    Platelets 294 150 - 450 x10*3/uL    Neutrophils % 45.6 40.0 - 80.0 %    Immature Granulocytes %, Automated 0.3 0.0 - 0.9 %    Lymphocytes % 39.3 13.0 - 44.0 %    Monocytes % 11.6 2.0 - 10.0 %    Eosinophils % 2.2 0.0 - 6.0 %    Basophils % 1.0 0.0 - 2.0 %    Neutrophils Absolute 4.85 1.20 - 7.70 x10*3/uL    Immature Granulocytes Absolute, Automated 0.03 0.00 - 0.70 x10*3/uL    Lymphocytes Absolute 4.18 1.20 - 4.80 x10*3/uL    Monocytes Absolute 1.23 (H) 0.10 - 1.00 x10*3/uL    Eosinophils Absolute 0.23 0.00 - 0.70 x10*3/uL    Basophils Absolute 0.11 (H) 0.00 - 0.10 x10*3/uL   Coagulation Screen   Result Value Ref Range    Protime 17.2 (H) 9.8 - 12.8 seconds    INR 1.5 (H) 0.9 - 1.1    aPTT 31 27 - 38 seconds   Lactate   Result Value Ref Range    Lactate 0.9 0.4 - 2.0 mmol/L   POCT GLUCOSE   Result Value Ref Range    POCT Glucose 91 74 - 99 mg/dL             Assessment/Plan     Ms. Jones is a 67-year-old with past medical history of atrial fibrillation, atrial flutter previously on Xarelto, COPD, diabetes mellitus, aortic valve insufficiency  status post aortic valve replacement, SMA occlusion status post thromboembolectomy who presents to the MICU for hemodynamic instability in the setting of suspected lower GI bleed.     Patient has hx of thromboembolic disease x2 with occlusion of SMA s/p embelectomy, although complete occlusion not seen on CTA, thromboembolism should be high on differential given prior history and multiple risk factors. Ischemic colitis less likely given no lactate elevation and only mild abdominal discomfort/pain. Diverticula not clearly visualized lower GI painless bleeding may point towards diverticular disease. Given history of recent abdominal surgery can consider adhesions/anastomotic ulcers. With hx of “black stool” should still consider upper GI bleed 2/2 to  peptic ulcer/gastritis etc.         Neuro: no active issues, A&O x3    CVS:   #atrial fibrillation/flutter on Coumadin with 2 prior thromboemboli  #aortic valve insufficiency   #HF 2/2 rheumatic heart disease with symptomatic mitral stenosis and severe TR  #SALTY thrombus 7/2023  -Is at high risk of thromboembolism given rheumatic mitral stenosis and recent SALTY thrombus, was recently switched from warfarin back to Xarelto as per patient.  -Holding anticoagulation in the setting of active bleed including aspirin and Xarelto  -Holding empagliflozin, furosemide, metoprolol, spironolactone in the setting of hypotension.      GI:   #GIB  -Unclear etiology at this time, but given CTA findings with active extravasation in ascending colon this is LGIB  -Thromboembolic disease vs ischemic colitis vs diverticular disease vs UGIB 2/2 PUD/gastritis. Given history of recent abdominal surgery can consider adhesions/anastomotic ulcers.  -Maintain active type and screen and two peripheral IVs, CMP, lactate, type and screen and coags ordered  -Patient is s/p 2L of LR in the ED, blood pressure since arrival is 100/66 hemodynamically stable.  -Additional 500cc of LR given upon arrival to MICU  -Monitor BMs and trend hemoglobin.   -IR has been consulted regarding this patient's bleed, initial impression was that bleed was not severe and that colonoscopy may be ideal approach, consult GI in the AM.   - 40mg IV PPI BID ordered  -Last dose of Xarelto was 12/18 morning, if Hgb drop/hemodynamic instability consider administering 4F PCC    Renal:  #VASHTI  -Patient has VASHTI likely 2/2 to hypo perfusion, pre-renal   -S/p 2L in ED  -Giving additional 500cc of LR upon arrival to MICU  -Trend RFP    Endo:   #T2DM  -On lantus 10 units and lispro TID at home  -Will continue lantus at 5 units and mild corrective sliding scale    Heme/Onc  #questionable anemia 2/2 to GIB (baseline hgb of 8.7-8.9)  -Transfuse Hgb < 7    F: s/p 2.5L, replete as needed but  caution given hx of HF  E: replete as needed  N: NPO   A: two PIVs  O2: room air  Gtt: none   DVT ppx: none  Abx: none             Jennifer Savage MD

## 2023-12-19 NOTE — SIGNIFICANT EVENT
"Floor Readiness Note       I, personally, evaluated Hilda Jones prior to transfer to the floor, including reviewing all current laboratory and imaging studies. The patient remains appropriate for transfer to the floor. Bedside nurse and respiratory therapy are also in agreement of patient's readiness for the floor.     Brief summary:  Hilda Jones is a 68 y.o. female who was admitted to the MICU on 12/19 for Lower GI bleed. They have been treated with volume resuscitation.     Updated focused Physical Exam:  Visit Vitals  BP 91/58   Pulse 54   Temp 36.3 °C (97.3 °F)   Resp 12   Ht 1.651 m (5' 5\")   Wt 55.1 kg (121 lb 7.6 oz)   SpO2 100%   BMI 20.21 kg/m²   OB Status Postmenopausal   Smoking Status Every Day   BSA 1.59 m²        PHYSICAL EXAM:   General: well appearing, NAD, pleasant and engaged in encounter    HEENT: NCAT, MMM  CV: RRR, no m/r/g  PULM: CTAB, non-labored respirations   ABD: soft, NT, ND, + bowel sounds   : no suprapubic or CVA tenderness   EXT: WWP, no significant edema   SKIN: hx of shingles. No painful or pruritic vesicles noted  NEURO: A&Ox4, symmetric facies, no gross motor or sensory deficits, normal gait  PSYCH: pleasant mood, appropriate affect      Current Vital Signs:  Heart Rate: 54 (12/19/23 1200 : Julianne Sanders RN)  BP: 91/58 (12/19/23 1200 : Julianne Sanders RN)  Temp: 36.3 °C (97.3 °F) (12/19/23 1200 : Mauricio Altman)  Resp: 12 (12/19/23 1200 : Julianne Sanders, RN)  SpO2: 100 % (12/19/23 1200 : Julianne Sanders, RN)    Relevant updates since rounds:  Scheduled for colonoscopy tomorrow    Accepting team, Floyd, received verbal sign out and the Provider Care team/Attending has been updated. Bedside nurse will now call accepting nurse for report and patient will be transferred to [place] [id number].    Phani Hoyt MD     "

## 2023-12-19 NOTE — PROGRESS NOTES
ICU to [facility] Transfer Summary     I:  ICU Admission Reason & Brief ICU Course:    Hilda is a 67-year-old female with history of atrial fibrillation on warfarin, diabetes, aortic valve insufficiency status post aortic valve replacement 10 years ago, SMA occlusion status post embolectomy x 2 who presents to the MICU for hemodynamic instability in the setting of a lower GI bleed. In the ED, CT showed active GI bleed originating from ascending colon and was transferred to the MICU for soft blood pressures and low hemoglobin of 8.2. After review, patient's baseline is 90s over 70s which she has maintained in the MICU without vasopressor support. Baseline hemoglobin between 8 and 10. No acute inventions provided in the MICU, patient is appropriate for floor.      C: Code Status/DPOA Info/Goals of Care/ACP Note    Full Code  DPOA/Contact Number: -    U: Unprescribing & Pertinent High-Risk Medications    Changes to home meds: holding warfarin     Anticoagulation: No Reason for no VTE prophylaxis:medical contraindication due to GI bleed    Antibiotics:   [x] N/A - no current planned antimicrobioals  []   indication  start date  planned duration     P: Pending Tests at the Time of Transfer   Colonoscopy planned for tomorrow      A: Active consultants, including Rehab:   [x]  Subspecialty Consultants: gastroenterology  [x]  PT  [x]  OT  []  SLP  []  Wound Care    U: Uncertainty Measure/Diagnostic Pause:    Working diagnosis at the time of transfer Lower GI bleed, though ddx includes upper gi bleed     Diagnosis Degree of Certainty: 2. Some uncertainty about the clinical diagnosis.     S: Summary of Major Problems and To-Dos:   #GI bleed  -Unclear etiology at this time, but given CTA findings with active extravasation in ascending colon this is LGIB  -Thromboembolic disease vs ischemic colitis vs diverticular disease vs UGIB 2/2 PUD/gastritis. Given history of recent abdominal surgery can consider adhesions/anastomotic  ulcers.  -Maintain active type and screen and two peripheral IVs, CMP, lactate, type and screen and coags ordered  -Patient is s/p 2L of LR in the ED, blood pressure since arrival is 100/66 hemodynamically stable.  -Additional 500cc of LR given upon arrival to MICU  -Monitor BMs and trend hemoglobin.   -IR has been consulted regarding this patient's bleed, initial impression was that bleed was not severe and that colonoscopy may be ideal approach  - No bloody BM since arrival to MICU    Plan:  -GI consulted and plan for colonoscopy in endo suite tomorrow  - starting Prep today  -Continue holding anticoagulation  -trend CBC     E: Exam, including Lines/Drains/Airways & Data Review:     Difficult airway? No  Lines/drains assessed for removal? No    Within 30 minutes of the patient physically leaving the floor, a Floor Readiness Note needs to be placed with updated vitals.

## 2023-12-19 NOTE — ED PROVIDER NOTES
CC: Abdominal Pain     HPI:  Hilda Jones is a 68 y.o. female with a past medical history of A-fib, a flutter, previously on Xarelto, COPD, diabetes, AV D&C status post replacement, SMA occlusion status post thromboembolectomy presenting to the ED today due to concerns for nausea, vomiting, diarrhea, and dark tarry stools.  Patient states that she has not been feeling well she has had multiple episodes of dark tarry stool.  She states that she come to the ED.  Triage nurse asked that she was hypertensive in triage, her to go straight to the critical parameters 3.    Limitations to History: none  Additional History provided by: N/A    External Records Reviewed:  Recent available ED and inpatient notes reviewed in EMR.  Was recently admitted for SMA embolectomy    PMHx/PSHx:  Per HPI.   - has a past medical history of Atrial fibrillation (CMS/HCC), Awareness under anesthesia, CHF (congestive heart failure) (CMS/HCC), COPD (chronic obstructive pulmonary disease) (CMS/HCC), Diabetes (CMS/HCC), GERD (gastroesophageal reflux disease), HTN (hypertension), Mitral insufficiency, PONV (postoperative nausea and vomiting), and S/P AVR.  - has a past surgical history that includes Embolectomy (N/A, 08/17/2022); Aortic root replacement; Aortic valve replacement; CT angio abdomen pelvis w and or wo IV IV contrast (10/06/2023); Embolectomy (N/A, 10/06/2023); and CT angio abdomen pelvis w and or wo IV IV contrast (12/18/2023).    Medications:  Reviewed in EMR. See EMR for complete list of medications and doses.    Allergies:  Flexeril [cyclobenzaprine]    Social History:  - Tobacco:  reports that she has been smoking cigarettes. She has a 15.00 pack-year smoking history. She uses smokeless tobacco.   - Alcohol:  reports no history of alcohol use.   - Illicit Drugs:  reports no history of drug use.     ROS:  Per HPI.     ???????????????????????????????????????????????????????????????  Triage Vitals:  T 36.4 °C (97.5 °F)  HR 74  BP  88/61  RR 23  O2 95 % None (Room air)    Physical Exam  Vitals and nursing note reviewed.   Constitutional:       General: She is not in acute distress.     Appearance: She is well-developed.   HENT:      Head: Normocephalic and atraumatic.      Mouth/Throat:      Mouth: Mucous membranes are dry.   Eyes:      Conjunctiva/sclera: Conjunctivae normal.   Cardiovascular:      Rate and Rhythm: Normal rate and regular rhythm.      Heart sounds: No murmur heard.  Pulmonary:      Effort: Pulmonary effort is normal. No respiratory distress.      Breath sounds: Normal breath sounds.   Abdominal:      Palpations: Abdomen is soft.      Tenderness: There is no abdominal tenderness.   Genitourinary:     Comments: Black tarry stools per LATANYA, no active bright red blood  Musculoskeletal:         General: No swelling.      Cervical back: Neck supple.   Skin:     General: Skin is warm and dry.      Capillary Refill: Capillary refill takes less than 2 seconds.   Neurological:      Mental Status: She is alert.   Psychiatric:         Mood and Affect: Mood normal.       ???????????????????????????????????????????????????????????????  ED Course:  Diagnoses as of 12/18/23 2128   Gastrointestinal hemorrhage, unspecified gastrointestinal hemorrhage type       EKG & Images:  Independently reviewed, See ED Course    EKG shows a great AV block, right bundle branch block, otherwise normal axis, normal intervals. And normal ST and T wave pattern with no evidence of acute ischemia or other acute findings    MDM:  -The patient is a 68-year-old woman presenting to the emergency department as a critical patient due to hypotension 80s over 60s, nausea vomiting diarrhea, dark tarry stools per rectum,.  Patient was recently admitted for a SMA embolectomy.  She was previously on Eliquis however upon recent discharge, this was changed to Coumadin.  On exam, she is mentating well.  She is mildly hypotensive thus IV fluids were initiated, additionally she  looks volume down.  She does however have dark tarry stool per rectum.  In light of her hypertension, this is concerning for active bleed.  She does have a elevated lactate however again this could be contributed by dehydration versus an active bleed.  Hemoglobin is improved from baseline.  Chart review was done and it appears that her baseline blood pressures 90s to 100s systolic, as such, this does not seem too far off her baseline blood pressure and is overall reassuring as she is not tachycardic, or symptomatic with this decreased blood pressure.  Regardless, patient will continue to be worked up.  CT of the abdomen with GI protocol was done and did show some slight blushing/slow bleed over the cecum which is likely contributing to these patient's symptoms.  As her hemoglobin is stable, discussed the case with medicine floor for admission and further workup.  They are hesitant due to her blood pressure and elevated lactate.  As such, discussed the case with the MICU attending to recommended repeat CBC and lactate.  Lactate had resolved however CBC did drop 1.5 points on the hemoglobin.  As such, patient will be admitted to the MICU for further management.  She is amenable to this plan.    Final diagnoses:   [K92.2] Gastrointestinal hemorrhage, unspecified gastrointestinal hemorrhage type         Social Determinants Limiting Care:  None identified    Disposition:  Admit to ICU    Edel Rabago MD   Emergency Medicine Resident, PGY3  [facility]     Disclaimer: This note was dictated by speech recognition. Minor errors in transcription may be present    Procedures ? SmartLinks last updated 12/18/2023 9:28 PM     Edel Rabago MD  Resident  12/20/23 1347    -------------------------    This patient was seen by the resident physician. I have seen and examined the patient, agree with the workup, evaluation, management and diagnosis. The care plan has been discussed and I  concur.    My assessment reveals the following:    HPI:  Patient is a 69 y/o female presenting hypotensive with SBP in the 80s. Reports dark tarry stools recently. No CP/SOB. No F/C/N/V/abd pain. No urinary complaints. Patient requires my immediate attention.    PE:  Vital signs reviewed in nursing triage note, EMR flowsheets, and at patient's bedside  GEN: Patient is awake, alert, calm, cooperative, and in mild to moderate hemodynamic distress.  HEAD: Normocephalic and atraumatic.  EYES: Anicteric sclera.  MOUTH: Mucous membranes moist.  CV: Regular rate and rhythm. (+) s1/s2. No murmurs/rubs/gallops.  PULM: CTAB. No wheezes, rales, or crackles.  GI: Soft, non-tender, non-distended without rebound or guarding.  EXT: No deformities noted.   NEURO: Moves all extremities.   SKIN: Warm, dry. No erythema or ecchymosis.    Labs Reviewed   CBC WITH AUTO DIFFERENTIAL - Abnormal       Result Value    WBC 11.0      nRBC 0.0      RBC 4.20      Hemoglobin 10.2 (*)     Hematocrit 29.6 (*)     MCV 71 (*)     MCH 24.3 (*)     MCHC 34.5      RDW 18.9 (*)     Platelets 367      Neutrophils % 57.3      Immature Granulocytes %, Automated 0.4      Lymphocytes % 28.3      Monocytes % 11.3      Eosinophils % 1.6      Basophils % 1.1      Neutrophils Absolute 6.28      Immature Granulocytes Absolute, Automated 0.04      Lymphocytes Absolute 3.10      Monocytes Absolute 1.24 (*)     Eosinophils Absolute 0.17      Basophils Absolute 0.12 (*)    COMPREHENSIVE METABOLIC PANEL - Abnormal    Glucose 124 (*)     Sodium 138      Potassium 4.0      Chloride 98      Bicarbonate 29      Anion Gap 15      Urea Nitrogen 30 (*)     Creatinine 1.24 (*)     eGFR 47 (*)     Calcium 9.1      Albumin 3.3 (*)     Alkaline Phosphatase 101      Total Protein 6.5      AST 19      Bilirubin, Total 0.5      ALT 11     PROTIME-INR - Abnormal    Protime 18.7 (*)     INR 1.7 (*)    CBC WITH AUTO DIFFERENTIAL - Abnormal    WBC 10.7      nRBC 0.0      RBC 3.59 (*)      Hemoglobin 8.7 (*)     Hematocrit 25.0 (*)     MCV 70 (*)     MCH 24.2 (*)     MCHC 34.8      RDW 18.7 (*)     Platelets 282      Neutrophils % 54.0      Immature Granulocytes %, Automated 0.3      Lymphocytes % 32.3      Monocytes % 10.7      Eosinophils % 1.8      Basophils % 0.9      Neutrophils Absolute 5.79      Immature Granulocytes Absolute, Automated 0.03      Lymphocytes Absolute 3.47      Monocytes Absolute 1.15 (*)     Eosinophils Absolute 0.19      Basophils Absolute 0.10     BLOOD GAS VENOUS FULL PANEL - Abnormal    POCT pH, Venous 7.50 (*)     POCT pCO2, Venous 45      POCT pO2, Venous 26 (*)     POCT SO2, Venous 42 (*)     POCT Oxy Hemoglobin, Venous 41.1 (*)     POCT Hematocrit Calculated, Venous 25.0 (*)     POCT Sodium, Venous 137      POCT Potassium, Venous 3.5      POCT Chloride, Venous 102      POCT Ionized Calicum, Venous 1.11      POCT Glucose, Venous 88      POCT Lactate, Venous 0.8      POCT Base Excess, Venous 10.8 (*)     POCT HCO3 Calculated, Venous 35.1 (*)     POCT Hemoglobin, Venous 8.3 (*)     POCT Anion Gap, Venous 3.0 (*)     Patient Temperature 37.0      FiO2 21     COMPREHENSIVE METABOLIC PANEL - Abnormal    Glucose 84      Sodium 140      Potassium 3.7      Chloride 100      Bicarbonate 33 (*)     Anion Gap 11      Urea Nitrogen 24 (*)     Creatinine 0.97      eGFR 63      Calcium 8.3 (*)     Albumin 2.7 (*)     Alkaline Phosphatase 86      Total Protein 5.1 (*)     AST 14      Bilirubin, Total 0.4      ALT 9     CBC WITH AUTO DIFFERENTIAL - Abnormal    WBC 10.6      nRBC 0.0      RBC 3.42 (*)     Hemoglobin 8.2 (*)     Hematocrit 23.9 (*)     MCV 70 (*)     MCH 24.0 (*)     MCHC 34.3      RDW 18.6 (*)     Platelets 294      Neutrophils % 45.6      Immature Granulocytes %, Automated 0.3      Lymphocytes % 39.3      Monocytes % 11.6      Eosinophils % 2.2      Basophils % 1.0      Neutrophils Absolute 4.85      Immature Granulocytes Absolute, Automated 0.03      Lymphocytes  Absolute 4.18      Monocytes Absolute 1.23 (*)     Eosinophils Absolute 0.23      Basophils Absolute 0.11 (*)    COAGULATION SCREEN - Abnormal    Protime 17.2 (*)     INR 1.5 (*)     aPTT 31      Narrative:     The APTT is no longer used for monitoring Unfractionated Heparin Therapy. For monitoring Heparin Therapy, use the Heparin Assay.   CBC WITH AUTO DIFFERENTIAL - Abnormal    WBC 8.1      nRBC 0.0      RBC 3.02 (*)     Hemoglobin 7.1 (*)     Hematocrit 21.7 (*)     MCV 72 (*)     MCH 23.5 (*)     MCHC 32.7      RDW 19.0 (*)     Platelets 214      Neutrophils % 41.7      Immature Granulocytes %, Automated 0.2      Lymphocytes % 40.3      Monocytes % 12.4      Eosinophils % 4.3      Basophils % 1.1      Neutrophils Absolute 3.38      Immature Granulocytes Absolute, Automated 0.02      Lymphocytes Absolute 3.28      Monocytes Absolute 1.01 (*)     Eosinophils Absolute 0.35      Basophils Absolute 0.09     RENAL FUNCTION PANEL - Abnormal    Glucose 71 (*)     Sodium 143      Potassium 3.6      Chloride 103      Bicarbonate 32      Anion Gap 12      Urea Nitrogen 21      Creatinine 0.80      eGFR 80      Calcium 8.2 (*)     Phosphorus 3.4      Albumin 2.6 (*)    CBC WITH AUTO DIFFERENTIAL - Abnormal    WBC 7.8      nRBC 0.0      RBC 3.63 (*)     Hemoglobin 8.7 (*)     Hematocrit 26.6 (*)     MCV 73 (*)     MCH 24.0 (*)     MCHC 32.7      RDW 19.4 (*)     Platelets 306      Neutrophils % 45.1      Immature Granulocytes %, Automated 0.3      Lymphocytes % 35.1      Monocytes % 13.2      Eosinophils % 5.1      Basophils % 1.2      Neutrophils Absolute 3.53      Immature Granulocytes Absolute, Automated 0.02      Lymphocytes Absolute 2.74      Monocytes Absolute 1.03 (*)     Eosinophils Absolute 0.40      Basophils Absolute 0.09     COMPREHENSIVE METABOLIC PANEL - Abnormal    Glucose 74      Sodium 138      Potassium 3.4 (*)     Chloride 99      Bicarbonate 31      Anion Gap 11      Urea Nitrogen 10      Creatinine 0.69       eGFR >90      Calcium 8.4 (*)     Albumin 2.7 (*)     Alkaline Phosphatase 85      Total Protein 5.1 (*)     AST 16      Bilirubin, Total 0.6      ALT 10     POCT GLUCOSE - Abnormal    POCT Glucose 103 (*)    POCT GLUCOSE - Abnormal    POCT Glucose 107 (*)    POCT GLUCOSE - Abnormal    POCT Glucose 65 (*)    POCT GLUCOSE - Abnormal    POCT Glucose 116 (*)    BLOOD GAS VENOUS FULL PANEL UNSOLICITED - Abnormal    POCT pH, Venous 7.46 (*)     POCT pCO2, Venous 45      POCT pO2, Venous 24 (*)     POCT SO2, Venous 19 (*)     POCT Oxy Hemoglobin, Venous 18.3 (*)     POCT Hematocrit Calculated, Venous 31.0 (*)     POCT Sodium, Venous 137      POCT Potassium, Venous 4.2      POCT Chloride, Venous 99      POCT Ionized Calicum, Venous 1.13      POCT Glucose, Venous 136 (*)     POCT Lactate, Venous 3.2 (*)     POCT Base Excess, Venous 7.3 (*)     POCT HCO3 Calculated, Venous 32.0 (*)     POCT Hemoglobin, Venous 10.4 (*)     POCT Anion Gap, Venous 10.0      Patient Temperature 37.0     MAGNESIUM - Normal    Magnesium 1.78     PHOSPHORUS - Normal    Phosphorus 4.6     LACTATE - Normal    Lactate 0.9      Narrative:     Venipuncture immediately after or during the administration of Metamizole may lead to falsely low results. Testing should be performed immediately  prior to Metamizole dosing.   MAGNESIUM - Normal    Magnesium 1.75     PHOSPHORUS - Normal    Phosphorus 3.0     COAGULATION SCREEN - Normal    Protime 12.3      INR 1.1      aPTT 28      Narrative:     The APTT is no longer used for monitoring Unfractionated Heparin Therapy. For monitoring Heparin Therapy, use the Heparin Assay.   POCT GLUCOSE - Normal    POCT Glucose 91     POCT GLUCOSE - Normal    POCT Glucose 90     POCT GLUCOSE - Normal    POCT Glucose 84     POCT GLUCOSE - Normal    POCT Glucose 95     POCT GLUCOSE - Normal    POCT Glucose 95     TYPE AND SCREEN    ABO TYPE B      Rh TYPE POS      ANTIBODY SCREEN NEG     BLOOD GAS VENOUS FULL PANEL   BLOOD  GAS LACTIC ACID, VENOUS   BLOOD GAS VENOUS FULL PANEL   TYPE AND SCREEN   MAGNESIUM   RENAL FUNCTION PANEL   CBC WITH AUTO DIFFERENTIAL   POCT GLUCOSE   POCT GLUCOSE   POCT GLUCOSE   POCT GLUCOSE   POCT GLUCOSE   POCT GLUCOSE   POCT GLUCOSE   POCT GLUCOSE     CT angio abdomen pelvis w and or wo IV IV contrast   Final Result   1. Findings suggestive of active GI bleed originating in the   ascending colon.   2. Interval resolution of the superior mesenteric branch thrombus   with residual moderate stenosis of the SMA branch.   3. Enlarged uterus with a heterogeneous central mass, not   significantly changed in size dating back to 08/17/2022, most   consistent with a fibroid. Follow-up nonemergent pelvic ultrasound   should be considered for evaluation.   4. Additional stable chronic and incidental findings described above.        I personally reviewed the image(s) / study and I agree with the   findings as stated by Lillian Bustamante MD. This study was interpreted at   [facility], [place], [place].        MACRO:   Lillian Bustamante discussed the significance and urgency of this critical   finding by telephone with  MAURO MALIN on 12/18/2023 at 7:24 pm.   (**-RCF-**) Findings:  See findings.        Signed by: Antonette Hood 12/18/2023 8:25 PM   Dictation workstation:   [id number]      Point of Care Ultrasound   Final Result            Medical Decision Making:  - Monitor  - IVF  - Labs  - CTA abd/pelvis with IV contrast  - MICU admission  - IR consult requested by MICU attending    EKG: EKG independently reviewed by the attending ED physician, and I and concur with the resident's/advanced practice provider's interpretation. Sinus rhythm at 79 bpm with SA, normal axis,  indicating a first degree AV block, , Qtc 561, RBBB, no ST or T wave changes. Similar to old EKG on 10/16/23.    Differential Diagnoses Considered: GI bleed, ACS, Dehydration, Infection, Sepsis    Chronic Medical Conditions  Significantly Affecting Care:     Escalation of Care:  Appropriate for inpatient management     MD Cullen Liriano MD  12/20/23 3046     labia cyst

## 2023-12-19 NOTE — CARE PLAN
Problem: Fall/Injury  Goal: Not fall by end of shift  Outcome: Progressing  Goal: Be free from injury by end of the shift  Outcome: Progressing     Problem: Skin  Goal: Participates in plan/prevention/treatment measures  Outcome: Progressing  Flowsheets (Taken 12/19/2023 1708)  Participates in plan/prevention/treatment measures: Increase activity/out of bed for meals  Goal: Prevent/minimize sheer/friction injuries  Outcome: Progressing  Flowsheets (Taken 12/19/2023 1708)  Prevent/minimize sheer/friction injuries: Use pull sheet   The patient's goals for the shift include      The clinical goals for the shift include Pt will not have increased GI bleed during this shift.

## 2023-12-19 NOTE — CONSULTS
Reason For Consult:   Concern for GI bleed    History Of Present Illness  Hilda Jones is a 68 y.o. female with a history of HTN, Afib/Aflutter on Xarelto, COPD, diabetes mellitus (5.9%), aortic valve insufficiency status post aortic valve replacement X2, SMA occlusion and lysis of adhesions on 10/6/2023 by Dr. Isaacs for acute mesenteric ischemia as well as an open SMA embolectomy in August 2022 by Dr. Oropeza for acute mesenteric ischemia who is now admitted to the MICU for dark stools concerning for a GI bleed. GI has been consulted for the same.     Patient says that she has been feeling a little unwell for the past week and her appetite has been reduced. She says that she has been having diarrhea for over a year ever since she had open SMA embolectomy in Aug 2022. She says that at 9am on 12/18/2023, she had 4 back to back really dark bloody bowel movements. She is unable to tell me if they were tarry or sticky, but just knows that they looked really dark. She denies any overt abdominal pain, but felt uneasy in her stomach leading up to these 4 episodes of dark stools. She also says that she had a bit of lightheadedness 2 days prior to her dark stools, but her lightheadedness was not persistent. Alarmed by these 4 dark stools, she called her daughter who brought her to [facility] ER. Of note, she is on Xarelto and she took her last dose yesterday. Of note, when she was discharged on 10/19/2023, she was discharged on coumadin and her INR was 3.4 on the day of discharge. Her hemoglobin when she presented to [facility] was 10.2 (in comparison to 9.1 when she was discharged from the hospital in Oct 2023). Her baseline hemoglobin appears to be between 9 and 10. Also of note, her creatinine had uptrended to 1.24 from 0.68 and her BUN had uptrended to 30 from 7 (BUN creatinine ratio: 24). At 1715 on 12/18/2023, the patient had a BP of 58/37 (likely false, as she did not have a compensatory increase in her HR and also BP checked 13 mins  later was 100/72). However, the patient did have several low blood pressures, so the ER obtained a CTA which was suggestive of active GI bleed in the the ascending colon. 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 She has not had any dark stools after coming to the ER.     Also, patient is known to the GI service when we were consulted during a prior admission in Oct 2023 for workup of diarrhea and ischemic colitis. At that time, she had presented on 10/6 with abd pain, taken to the OR on 10/6 for open embolectomy for acute mesenteric ischemia. Underwent 2nd look on 10/7 with closure. Etiology of acute mesenteric ischemia was thought to be 2/2 subtherapeutic INR on presentation. Pt had been having watery diarrhea throughout hospitalization. C.diff was negative. Fecal lactoferrin was +ve and her fecal calprotectin was highly elevated at >3000. Fecal fat was normal. Stool ova and parasites were both negative. Celiac panel was negative. She has had diarrhea for about 1 year. She says that some days she has 3 to 4 bowel movements (watery or loose), and other days she has about 2 bowel movements. She has a BM with every meal. Stool is watery, no blood in stool, no mucus. Not associated w pain. Stool does not float. Pt does not wake up at night to have BM, no nocturnal pain. Pt states weight has been fluctuant. No fevers, no chills, no night sweats. No fam hx of IBD or Colorectal cancer    Prior GI procedures  Last colon was >10 years prior at [place] (no records available)     EGD 6/6/2023:  Impression:                              - Normal esophagus.                         - 2 cm hiatal hernia.                         - Z-line regular, 43 cm from the incisors.                         - Erythematous mucosa in the pylorus and gastric body.                         - Normal examined duodenum.                         - No specimens collected.    Social History  She has recently reduced the  "number of cigarettes recently and she is smoking about 8 cigarettes per day      Past Medical History  She has a past medical history of Atrial fibrillation (CMS/[facility]), Awareness under anesthesia, CHF (congestive heart failure) (CMS/[facility]), COPD (chronic obstructive pulmonary disease) (CMS/[facility]), Diabetes (CMS/[facility]), GERD (gastroesophageal reflux disease), HTN (hypertension), Mitral insufficiency, PONV (postoperative nausea and vomiting), and S/P AVR.    Surgical History  She has a past surgical history that includes Embolectomy (N/A, 08/17/2022); Aortic root replacement; Aortic valve replacement; CT angio abdomen pelvis w and or wo IV IV contrast (10/06/2023); Embolectomy (N/A, 10/06/2023); and CT angio abdomen pelvis w and or wo IV IV contrast (12/18/2023).     Social History  She reports that she has been smoking cigarettes. She has a 15.00 pack-year smoking history. She uses smokeless tobacco. She reports that she does not drink alcohol and does not use drugs.      Allergies  Flexeril [cyclobenzaprine]    Review of Systems  A 12 point ROS was done and is negative except for HPI above.      Physical Exam  Physical Exam  Vitals signs reviewed.    General: not in acute distress, frail   HEENT: edentulous   CV: regular rate and rhythm  Chest: clear to auscultation bilaterally. Midline surgical scar seen   GI: soft, active bowel sounds, mild tenderness to palpation,   Msk: no lower extremity edema  Derm: no jaundice  Psych: normal affect      Last Recorded Vitals  Blood pressure 91/61, pulse 58, temperature 36.4 °C (97.5 °F), temperature source Temporal, resp. rate 12, height 1.651 m (5' 5\"), weight 55.1 kg (121 lb 7.6 oz), SpO2 100 %.    Relevant Results  Results for orders placed or performed during the hospital encounter of 12/18/23 (from the past 96 hour(s))   Blood Gas Venous Full Panel Unsolicited   Result Value Ref Range    POCT pH, Venous 7.46 (H) 7.33 - 7.43 pH    POCT pCO2, Venous 45 41 - 51 mm Hg    POCT pO2, " Venous 24 (L) 35 - 45 mm Hg    POCT SO2, Venous 19 (L) 45 - 75 %    POCT Oxy Hemoglobin, Venous 18.3 (L) 45.0 - 75.0 %    POCT Hematocrit Calculated, Venous 31.0 (L) 36.0 - 46.0 %    POCT Sodium, Venous 137 136 - 145 mmol/L    POCT Potassium, Venous 4.2 3.5 - 5.3 mmol/L    POCT Chloride, Venous 99 98 - 107 mmol/L    POCT Ionized Calicum, Venous 1.13 1.10 - 1.33 mmol/L    POCT Glucose, Venous 136 (H) 74 - 99 mg/dL    POCT Lactate, Venous 3.2 (H) 0.4 - 2.0 mmol/L    POCT Base Excess, Venous 7.3 (H) -2.0 - 3.0 mmol/L    POCT HCO3 Calculated, Venous 32.0 (H) 22.0 - 26.0 mmol/L    POCT Hemoglobin, Venous 10.4 (L) 12.0 - 16.0 g/dL    POCT Anion Gap, Venous 10.0 10.0 - 25.0 mmol/L    Patient Temperature 37.0 degrees Celsius   CBC and Auto Differential   Result Value Ref Range    WBC 11.0 4.4 - 11.3 x10*3/uL    nRBC 0.0 0.0 - 0.0 /100 WBCs    RBC 4.20 4.00 - 5.20 x10*6/uL    Hemoglobin 10.2 (L) 12.0 - 16.0 g/dL    Hematocrit 29.6 (L) 36.0 - 46.0 %    MCV 71 (L) 80 - 100 fL    MCH 24.3 (L) 26.0 - 34.0 pg    MCHC 34.5 32.0 - 36.0 g/dL    RDW 18.9 (H) 11.5 - 14.5 %    Platelets 367 150 - 450 x10*3/uL    Neutrophils % 57.3 40.0 - 80.0 %    Immature Granulocytes %, Automated 0.4 0.0 - 0.9 %    Lymphocytes % 28.3 13.0 - 44.0 %    Monocytes % 11.3 2.0 - 10.0 %    Eosinophils % 1.6 0.0 - 6.0 %    Basophils % 1.1 0.0 - 2.0 %    Neutrophils Absolute 6.28 1.20 - 7.70 x10*3/uL    Immature Granulocytes Absolute, Automated 0.04 0.00 - 0.70 x10*3/uL    Lymphocytes Absolute 3.10 1.20 - 4.80 x10*3/uL    Monocytes Absolute 1.24 (H) 0.10 - 1.00 x10*3/uL    Eosinophils Absolute 0.17 0.00 - 0.70 x10*3/uL    Basophils Absolute 0.12 (H) 0.00 - 0.10 x10*3/uL   Comprehensive metabolic panel   Result Value Ref Range    Glucose 124 (H) 74 - 99 mg/dL    Sodium 138 136 - 145 mmol/L    Potassium 4.0 3.5 - 5.3 mmol/L    Chloride 98 98 - 107 mmol/L    Bicarbonate 29 21 - 32 mmol/L    Anion Gap 15 10 - 20 mmol/L    Urea Nitrogen 30 (H) 6 - 23 mg/dL     Creatinine 1.24 (H) 0.50 - 1.05 mg/dL    eGFR 47 (L) >60 mL/min/1.73m*2    Calcium 9.1 8.6 - 10.6 mg/dL    Albumin 3.3 (L) 3.4 - 5.0 g/dL    Alkaline Phosphatase 101 33 - 136 U/L    Total Protein 6.5 6.4 - 8.2 g/dL    AST 19 9 - 39 U/L    Bilirubin, Total 0.5 0.0 - 1.2 mg/dL    ALT 11 7 - 45 U/L   Magnesium   Result Value Ref Range    Magnesium 1.78 1.60 - 2.40 mg/dL   Phosphorus   Result Value Ref Range    Phosphorus 4.6 2.5 - 4.9 mg/dL   Protime-INR   Result Value Ref Range    Protime 18.7 (H) 9.8 - 12.8 seconds    INR 1.7 (H) 0.9 - 1.1   Type And Screen   Result Value Ref Range    ABO TYPE B     Rh TYPE POS     ANTIBODY SCREEN NEG    ECG 12 lead   Result Value Ref Range    Ventricular Rate 79 BPM    Atrial Rate 93 BPM    DC Interval 224 ms    QRS Duration 142 ms    QT Interval 490 ms    QTC Calculation(Bazett) 561 ms    P Axis 65 degrees    R Axis 54 degrees    T Axis 40 degrees    QRS Count 12 beats    Q Onset 228 ms    T Offset 473 ms    QTC Fredericia 537 ms   SST TOP   Result Value Ref Range    Extra Tube Hold for add-ons.    CBC and Auto Differential   Result Value Ref Range    WBC 10.7 4.4 - 11.3 x10*3/uL    nRBC 0.0 0.0 - 0.0 /100 WBCs    RBC 3.59 (L) 4.00 - 5.20 x10*6/uL    Hemoglobin 8.7 (L) 12.0 - 16.0 g/dL    Hematocrit 25.0 (L) 36.0 - 46.0 %    MCV 70 (L) 80 - 100 fL    MCH 24.2 (L) 26.0 - 34.0 pg    MCHC 34.8 32.0 - 36.0 g/dL    RDW 18.7 (H) 11.5 - 14.5 %    Platelets 282 150 - 450 x10*3/uL    Neutrophils % 54.0 40.0 - 80.0 %    Immature Granulocytes %, Automated 0.3 0.0 - 0.9 %    Lymphocytes % 32.3 13.0 - 44.0 %    Monocytes % 10.7 2.0 - 10.0 %    Eosinophils % 1.8 0.0 - 6.0 %    Basophils % 0.9 0.0 - 2.0 %    Neutrophils Absolute 5.79 1.20 - 7.70 x10*3/uL    Immature Granulocytes Absolute, Automated 0.03 0.00 - 0.70 x10*3/uL    Lymphocytes Absolute 3.47 1.20 - 4.80 x10*3/uL    Monocytes Absolute 1.15 (H) 0.10 - 1.00 x10*3/uL    Eosinophils Absolute 0.19 0.00 - 0.70 x10*3/uL    Basophils Absolute  0.10 0.00 - 0.10 x10*3/uL   Lavender Top   Result Value Ref Range    Extra Tube Hold for add-ons.    Blood Gas Venous Full Panel   Result Value Ref Range    POCT pH, Venous 7.50 (H) 7.33 - 7.43 pH    POCT pCO2, Venous 45 41 - 51 mm Hg    POCT pO2, Venous 26 (L) 35 - 45 mm Hg    POCT SO2, Venous 42 (L) 45 - 75 %    POCT Oxy Hemoglobin, Venous 41.1 (L) 45.0 - 75.0 %    POCT Hematocrit Calculated, Venous 25.0 (L) 36.0 - 46.0 %    POCT Sodium, Venous 137 136 - 145 mmol/L    POCT Potassium, Venous 3.5 3.5 - 5.3 mmol/L    POCT Chloride, Venous 102 98 - 107 mmol/L    POCT Ionized Calicum, Venous 1.11 1.10 - 1.33 mmol/L    POCT Glucose, Venous 88 74 - 99 mg/dL    POCT Lactate, Venous 0.8 0.4 - 2.0 mmol/L    POCT Base Excess, Venous 10.8 (H) -2.0 - 3.0 mmol/L    POCT HCO3 Calculated, Venous 35.1 (H) 22.0 - 26.0 mmol/L    POCT Hemoglobin, Venous 8.3 (L) 12.0 - 16.0 g/dL    POCT Anion Gap, Venous 3.0 (L) 10.0 - 25.0 mmol/L    Patient Temperature 37.0 degrees Celsius    FiO2 21 %   Comprehensive metabolic panel   Result Value Ref Range    Glucose 84 74 - 99 mg/dL    Sodium 140 136 - 145 mmol/L    Potassium 3.7 3.5 - 5.3 mmol/L    Chloride 100 98 - 107 mmol/L    Bicarbonate 33 (H) 21 - 32 mmol/L    Anion Gap 11 10 - 20 mmol/L    Urea Nitrogen 24 (H) 6 - 23 mg/dL    Creatinine 0.97 0.50 - 1.05 mg/dL    eGFR 63 >60 mL/min/1.73m*2    Calcium 8.3 (L) 8.6 - 10.6 mg/dL    Albumin 2.7 (L) 3.4 - 5.0 g/dL    Alkaline Phosphatase 86 33 - 136 U/L    Total Protein 5.1 (L) 6.4 - 8.2 g/dL    AST 14 9 - 39 U/L    Bilirubin, Total 0.4 0.0 - 1.2 mg/dL    ALT 9 7 - 45 U/L   CBC and Auto Differential   Result Value Ref Range    WBC 10.6 4.4 - 11.3 x10*3/uL    nRBC 0.0 0.0 - 0.0 /100 WBCs    RBC 3.42 (L) 4.00 - 5.20 x10*6/uL    Hemoglobin 8.2 (L) 12.0 - 16.0 g/dL    Hematocrit 23.9 (L) 36.0 - 46.0 %    MCV 70 (L) 80 - 100 fL    MCH 24.0 (L) 26.0 - 34.0 pg    MCHC 34.3 32.0 - 36.0 g/dL    RDW 18.6 (H) 11.5 - 14.5 %    Platelets 294 150 - 450  x10*3/uL    Neutrophils % 45.6 40.0 - 80.0 %    Immature Granulocytes %, Automated 0.3 0.0 - 0.9 %    Lymphocytes % 39.3 13.0 - 44.0 %    Monocytes % 11.6 2.0 - 10.0 %    Eosinophils % 2.2 0.0 - 6.0 %    Basophils % 1.0 0.0 - 2.0 %    Neutrophils Absolute 4.85 1.20 - 7.70 x10*3/uL    Immature Granulocytes Absolute, Automated 0.03 0.00 - 0.70 x10*3/uL    Lymphocytes Absolute 4.18 1.20 - 4.80 x10*3/uL    Monocytes Absolute 1.23 (H) 0.10 - 1.00 x10*3/uL    Eosinophils Absolute 0.23 0.00 - 0.70 x10*3/uL    Basophils Absolute 0.11 (H) 0.00 - 0.10 x10*3/uL   Coagulation Screen   Result Value Ref Range    Protime 17.2 (H) 9.8 - 12.8 seconds    INR 1.5 (H) 0.9 - 1.1    aPTT 31 27 - 38 seconds   Lactate   Result Value Ref Range    Lactate 0.9 0.4 - 2.0 mmol/L   POCT GLUCOSE   Result Value Ref Range    POCT Glucose 91 74 - 99 mg/dL   CBC and Auto Differential   Result Value Ref Range    WBC 8.1 4.4 - 11.3 x10*3/uL    nRBC 0.0 0.0 - 0.0 /100 WBCs    RBC 3.02 (L) 4.00 - 5.20 x10*6/uL    Hemoglobin 7.1 (L) 12.0 - 16.0 g/dL    Hematocrit 21.7 (L) 36.0 - 46.0 %    MCV 72 (L) 80 - 100 fL    MCH 23.5 (L) 26.0 - 34.0 pg    MCHC 32.7 32.0 - 36.0 g/dL    RDW 19.0 (H) 11.5 - 14.5 %    Platelets 214 150 - 450 x10*3/uL    Neutrophils % 41.7 40.0 - 80.0 %    Immature Granulocytes %, Automated 0.2 0.0 - 0.9 %    Lymphocytes % 40.3 13.0 - 44.0 %    Monocytes % 12.4 2.0 - 10.0 %    Eosinophils % 4.3 0.0 - 6.0 %    Basophils % 1.1 0.0 - 2.0 %    Neutrophils Absolute 3.38 1.20 - 7.70 x10*3/uL    Immature Granulocytes Absolute, Automated 0.02 0.00 - 0.70 x10*3/uL    Lymphocytes Absolute 3.28 1.20 - 4.80 x10*3/uL    Monocytes Absolute 1.01 (H) 0.10 - 1.00 x10*3/uL    Eosinophils Absolute 0.35 0.00 - 0.70 x10*3/uL    Basophils Absolute 0.09 0.00 - 0.10 x10*3/uL   Renal function panel   Result Value Ref Range    Glucose 71 (L) 74 - 99 mg/dL    Sodium 143 136 - 145 mmol/L    Potassium 3.6 3.5 - 5.3 mmol/L    Chloride 103 98 - 107 mmol/L     Bicarbonate 32 21 - 32 mmol/L    Anion Gap 12 10 - 20 mmol/L    Urea Nitrogen 21 6 - 23 mg/dL    Creatinine 0.80 0.50 - 1.05 mg/dL    eGFR 80 >60 mL/min/1.73m*2    Calcium 8.2 (L) 8.6 - 10.6 mg/dL    Phosphorus 3.4 2.5 - 4.9 mg/dL    Albumin 2.6 (L) 3.4 - 5.0 g/dL   POCT GLUCOSE   Result Value Ref Range    POCT Glucose 90 74 - 99 mg/dL   POCT GLUCOSE   Result Value Ref Range    POCT Glucose 84 74 - 99 mg/dL         Assessment/Plan   Hilda Jones is a 68 y.o. female with a history of HTN, Afib/Aflutter on Xarelto, COPD, diabetes mellitus (5.9%), aortic valve insufficiency status post aortic valve replacement X2, SMA occlusion and lysis of adhesions on 10/6/2023 by Dr. Isaacs for acute mesenteric ischemia (thought to be in the setting of subtherapeutic INR) as well as an open SMA embolectomy in August 2022 by Dr. Oropeza for acute mesenteric ischemia who is now admitted to the MICU for dark stools concerning for a GI bleed in the setting of anticoagulant use. GI has been consulted for the same.     Patient had 4 dark bowel movements on 12/18/2023 which brought the patient to the ER and she had some softer blood pressures (but with relatively stable hemoglobin) which prompted the ER to do a CTA a/p which showed slight extravasation in the ascending colon. So, this is most likely a slow GI bleed from the R side of the colon, which explains the dark stools. However, her elevated BUN:Creatinine ratio also could possibly suggest an UGIB, but less likely. AVMs are a strong possibility. A diverticular bleed is less likely as one would typically expect a larger volume bleeding (of note, a prior CT scan from 10/6/2023 showed scattered diverticulosis). Ischemic colitis is less likely given normal lactate and lack of hematochezia.     With regard to her diarrhea, she has had diarrhea for about a year. Her infectious workup has been negative, but interestingly, her fecal calprotectin was >3000 which suggests an inflammatory diarrhea.  Whether she has IBD is unclear.     RECS:  We will add her on for colonoscopy +/- EGD tomorrow   Please keep the patient on clear liquid today (no red or purple colored liquids)   Please start Bowel prep (can use the [employer] bowel prep orderset which also has nursing orders along with the 4000 cc Golytely bowel prep) today at 4 pm. Give the first of 2 liters today. At 4 am tomorrow, give the 2nd of the 2 liters and if not clear by 6 am   Please call the on call GI fellow if there is evidence of any additional acute bleeding from the GI tract   Agree with PPI 40mg BID IV for now   Please continue to hold her Xarelto. If patient is put on a heparin gtt, please ensure that it is held at 4am tomorrow       The patient was seen and staffed with GI attending, Dr. Teresita Carlson.     Thank you for the consultation.  Please do not hesitate to contact us again on by paging the consultation team again between the weekday hours of 7 AM - 5 PM. If there is an urgent concern during the weekend, after-hours, or holidays; then [place] [phone]. Thank you.    Leanna Maxwell MD MPH  GI Fellow

## 2023-12-19 NOTE — PROGRESS NOTES
"Hilda Jones is a 68 y.o. female on day 1 of admission presenting with Gastrointestinal hemorrhage, unspecified gastrointestinal hemorrhage type.    Transfer summary:   \"Hilda is a 67-year-old female with history of atrial fibrillation on warfarin, diabetes, aortic valve insufficiency status post aortic valve replacement 10 years ago, SMA occlusion status post embolectomy x 2 who presents to the MICU for hemodynamic instability in the setting of a lower GI bleed. In the ED, CT showed active GI bleed originating from ascending colon and was transferred to the MICU for soft blood pressures and low hemoglobin of 8.2. After review, patient's baseline is 90s over 70s which she has maintained in the MICU without vasopressor support. Baseline hemoglobin between 8 and 10. No acute inventions provided in the MICU, patient is appropriate for floor. \"    GI consultation note: \"Patient says that she has been feeling a little unwell for the past week and her appetite has been reduced. She says that she has been having diarrhea for over a year ever since she had open SMA embolectomy in Aug 2022. She says that at 9am on 12/18/2023, she had 4 back to back really dark bloody bowel movements. She is unable to tell me if they were tarry or sticky, but just knows that they looked really dark. She denies any overt abdominal pain, but felt uneasy in her stomach leading up to these 4 episodes of dark stools. She also says that she had a bit of lightheadedness 2 days prior to her dark stools, but her lightheadedness was not persistent. Alarmed by these 4 dark stools, she called her daughter who brought her to [facility] ER. Of note, she is on Xarelto and she took her last dose yesterday. Of note, when she was discharged on 10/19/2023, she was discharged on coumadin and her INR was 3.4 on the day of discharge. Her hemoglobin when she presented to [facility] was 10.2 (in comparison to 9.1 when she was discharged from the hospital in Oct 2023). Her " "baseline hemoglobin appears to be between 9 and 10. Also of note, her creatinine had uptrended to 1.24 from 0.68 and her BUN had uptrended to 30 from 7 (BUN creatinine ratio: 24). At 1715 on 12/18/2023, the patient had a BP of 58/37 (likely false, as she did not have a compensatory increase in her HR and also BP checked 13 mins later was 100/72). However, the patient did have several low blood pressures, so the ER obtained a CTA which was suggestive of active GI bleed in the the ascending colon. 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 She has not had any dark stools after coming to the ER.      Also, patient is known to the GI service when we were consulted during a prior admission in Oct 2023 for workup of diarrhea and ischemic colitis. At that time, she had presented on 10/6 with abd pain, taken to the OR on 10/6 for open embolectomy for acute mesenteric ischemia. Underwent 2nd look on 10/7 with closure. Etiology of acute mesenteric ischemia was thought to be 2/2 subtherapeutic INR on presentation. Pt had been having watery diarrhea throughout hospitalization. C.diff was negative. Fecal lactoferrin was +ve and her fecal calprotectin was highly elevated at >3000. Fecal fat was normal. Stool ova and parasites were both negative. Celiac panel was negative. She has had diarrhea for about 1 year. She says that some days she has 3 to 4 bowel movements (watery or loose), and other days she has about 2 bowel movements. She has a BM with every meal. Stool is watery, no blood in stool, no mucus. Not associated w pain. Stool does not float. Pt does not wake up at night to have BM, no nocturnal pain. Pt states weight has been fluctuant. No fevers, no chills, no night sweats. No fam hx of IBD or Colorectal cancer\"    Subjective   Pt is without sig pain and has not had more bloody Bms since arrival to hospital.     No dyspnea.        Objective     Last Recorded Vitals  Blood pressure 88/50, " "pulse 65, temperature 36.5 °C (97.7 °F), resp. rate 14, height 1.651 m (5' 5\"), weight 55.1 kg (121 lb 7.6 oz), SpO2 97 %.  Intake/Output last 3 Shifts:  I/O last 3 completed shifts:  In: 1500 (27.2 mL/kg) [IV Piggyback:1500]  Out: - (0 mL/kg)   Dosing Weight: 55.1 kg     PHYSICAL EXAM:   General: well appearing, NAD, pleasant and engaged in encounter    HEENT: NCAT, MMM  CV: RRR, no m/r/g  PULM: CTAB, non-labored respirations   ABD: soft, NT, ND, + bowel sounds   : no suprapubic or CVA tenderness   EXT: WWP, no significant edema   SKIN: hx of shingles. No painful or pruritic vesicles noted  NEURO: A&Ox4, symmetric facies, no gross motor or sensory deficits, normal gait  PSYCH: pleasant mood, appropriate affect      Relevant Results  Vitals:    12/19/23 1524   BP: 88/50   Pulse: 65   Resp:    Temp: 36.5 °C (97.7 °F)   SpO2: 97%       Scheduled medications  atorvastatin, 40 mg, oral, Nightly  [Held by provider] donepezil, 5 mg, oral, Nightly  FLUoxetine, 20 mg, oral, Daily  fluticasone furoate-vilanteroL, 1 puff, inhalation, Daily  gabapentin, 100 mg, oral, Nightly  insulin lispro, 0-5 Units, subcutaneous, TID with meals  pantoprazole, 40 mg, intravenous, BID  polyethylene glycol-electrolytes, 4,000 mL, oral, Once  terbinafine, , Topical, BID      Continuous medications     PRN medications  PRN medications: albuterol, dextrose 10 % in water (D10W), dextrose, glucagon, melatonin, polyethylene glycol, polyethylene glycol-electrolytes                  Assessment/Plan   Principal Problem:    Gastrointestinal hemorrhage, unspecified gastrointestinal hemorrhage type    Ms. Jones is a 67-year-old with past medical history of atrial fibrillation, atrial flutter previously on Xarelto, COPD, diabetes mellitus, aortic valve insufficiency  status post aortic valve replacement, SMA occlusion status post thromboembolectomy who presents to the MICU for hemodynamic instability in the setting of suspected lower GI bleed.    "   Patient has hx of thromboembolic disease x2 with occlusion of SMA s/p embelectomy, although complete occlusion not seen on CTA, thromboembolism should be high on differential given prior history and multiple risk factors. Ischemic colitis less likely given no lactate elevation and only mild abdominal discomfort/pain. Diverticula not clearly visualized lower GI painless bleeding may point towards diverticular disease. Given history of recent abdominal surgery can consider adhesions/anastomotic ulcers. With hx of “black stool” should still consider upper GI bleed 2/2 to peptic ulcer/gastritis etc.         #GI bleed  -Unclear etiology at this time, but given CTA findings with active extravasation in ascending colon this is LGIB  -Thromboembolic disease vs ischemic colitis vs diverticular disease vs UGIB 2/2 PUD/gastritis. Given history of recent abdominal surgery can consider adhesions/anastomotic ulcers.  -Maintain active type and screen and two peripheral IVs, CMP, lactate, type and screen and coags ordered  -Patient is s/p 2L of LR in the ED, blood pressure since arrival is 100/66 hemodynamically stable.  -Additional 500cc of LR given upon arrival to MICU  -Monitor BMs and trend hemoglobin.   -IR has been consulted regarding this patient's bleed, initial impression was that bleed was not severe and that colonoscopy may be ideal approach  - No bloody BM since arrival to hospital  - 40mg IV PPI BID ordered  -Last dose of Xarelto was 12/18 morning, if Hgb drop/hemodynamic instability consider administering 4F PCC  -plan for colonoscopy +/- EGD on 12/20      #atrial fibrillation/flutter on Coumadin with 2 prior thromboemboli  #aortic valve insufficiency   #HF 2/2 rheumatic heart disease with symptomatic mitral stenosis and severe TR  #SALTY thrombus 7/2023  -Is at high risk of thromboembolism given rheumatic mitral stenosis and recent SALTY thrombus, was recently switched from warfarin back to Xarelto as per  patient.  -Holding anticoagulation in the setting of active bleed including aspirin and Xarelto  -Holding empagliflozin, furosemide, metoprolol, spironolactone in the setting of hypotension.     #T2DM - 5.9%   -On lantus 10 units and lispro TID at home  -LDSSI     #VASHTI - resolved   -Patient has VASHTI likely 2/2 to hypo perfusion, pre-renal   -S/p 2L in ED  -Giving additional 500cc of LR upon arrival to MICU  -Trend RFP     #questionable anemia 2/2 to GIB (baseline hgb of 8.7-8.9)  -Transfuse Hgb < 7     F: s/p 2.5L, replete as needed but caution given hx of HF  E: replete as needed  N: CLD, NPO at midnight    A: two PIVs  O2: room air  Gtt: none   DVT ppx: none  Abx: none         Doni Carver MD

## 2023-12-19 NOTE — NURSING NOTE
Patient transferred to [place] [id number] from the MICU this evening. She was oriented to the room and call light system. She is continued on contact precautions for bed bugs in the ICU. She has her clothes, glasses, cell phone, and rolator with her. Currently she is resting in bed at this time.

## 2023-12-19 NOTE — CONSULTS
Interventional Radiology Consultation  December 18, 2023    Interventional Radiology was consulted to determine the appropriate procedure and whether or not a procedure can and should be performed.    Reason For Consult  Lower GI bleed         Admission on 12/18/2023   Component Date Value Ref Range Status    WBC 12/18/2023 11.0  4.4 - 11.3 x10*3/uL Final    nRBC 12/18/2023 0.0  0.0 - 0.0 /100 WBCs Final    RBC 12/18/2023 4.20  4.00 - 5.20 x10*6/uL Final    Hemoglobin 12/18/2023 10.2 (L)  12.0 - 16.0 g/dL Final    Hematocrit 12/18/2023 29.6 (L)  36.0 - 46.0 % Final    MCV 12/18/2023 71 (L)  80 - 100 fL Final    MCH 12/18/2023 24.3 (L)  26.0 - 34.0 pg Final    MCHC 12/18/2023 34.5  32.0 - 36.0 g/dL Final    RDW 12/18/2023 18.9 (H)  11.5 - 14.5 % Final    Platelets 12/18/2023 367  150 - 450 x10*3/uL Final    Neutrophils % 12/18/2023 57.3  40.0 - 80.0 % Final    Immature Granulocytes %, Automated 12/18/2023 0.4  0.0 - 0.9 % Final    Immature Granulocyte Count (IG) includes promyelocytes, myelocytes and metamyelocytes but does not include bands. Percent differential counts (%) should be interpreted in the context of the absolute cell counts (cells/UL).    Lymphocytes % 12/18/2023 28.3  13.0 - 44.0 % Final    Monocytes % 12/18/2023 11.3  2.0 - 10.0 % Final    Eosinophils % 12/18/2023 1.6  0.0 - 6.0 % Final    Basophils % 12/18/2023 1.1  0.0 - 2.0 % Final    Neutrophils Absolute 12/18/2023 6.28  1.20 - 7.70 x10*3/uL Final    Percent differential counts (%) should be interpreted in the context of the absolute cell counts (cells/uL).    Immature Granulocytes Absolute, Au* 12/18/2023 0.04  0.00 - 0.70 x10*3/uL Final    Lymphocytes Absolute 12/18/2023 3.10  1.20 - 4.80 x10*3/uL Final    Monocytes Absolute 12/18/2023 1.24 (H)  0.10 - 1.00 x10*3/uL Final    Eosinophils Absolute 12/18/2023 0.17  0.00 - 0.70 x10*3/uL Final    Basophils Absolute 12/18/2023 0.12 (H)  0.00 - 0.10 x10*3/uL Final    Glucose 12/18/2023 124 (H)  74 - 99  mg/dL Final    Sodium 12/18/2023 138  136 - 145 mmol/L Final    Potassium 12/18/2023 4.0  3.5 - 5.3 mmol/L Final    Chloride 12/18/2023 98  98 - 107 mmol/L Final    Bicarbonate 12/18/2023 29  21 - 32 mmol/L Final    Anion Gap 12/18/2023 15  10 - 20 mmol/L Final    Urea Nitrogen 12/18/2023 30 (H)  6 - 23 mg/dL Final    Creatinine 12/18/2023 1.24 (H)  0.50 - 1.05 mg/dL Final    eGFR 12/18/2023 47 (L)  >60 mL/min/1.73m*2 Final    Calculations of estimated GFR are performed using the 2021 CKD-EPI Study Refit equation without the race variable for the IDMS-Traceable creatinine methods.  https://jasn.asnjournals.org/content/early/2021/09/22/ASN.3867557531    Calcium 12/18/2023 9.1  8.6 - 10.6 mg/dL Final    Albumin 12/18/2023 3.3 (L)  3.4 - 5.0 g/dL Final    Alkaline Phosphatase 12/18/2023 101  33 - 136 U/L Final    Total Protein 12/18/2023 6.5  6.4 - 8.2 g/dL Final    AST 12/18/2023 19  9 - 39 U/L Final    Bilirubin, Total 12/18/2023 0.5  0.0 - 1.2 mg/dL Final    ALT 12/18/2023 11  7 - 45 U/L Final    Patients treated with Sulfasalazine may generate falsely decreased results for ALT.    Magnesium 12/18/2023 1.78  1.60 - 2.40 mg/dL Final    Phosphorus 12/18/2023 4.6  2.5 - 4.9 mg/dL Final    The performance characteristics of phosphorus testing in heparinized plasma have been validated by the individual  laboratory site where testing is performed. Testing on heparinized plasma is not approved by the FDA; however, such approval is not necessary.    Protime 12/18/2023 18.7 (H)  9.8 - 12.8 seconds Final    INR 12/18/2023 1.7 (H)  0.9 - 1.1 Final    ABO TYPE 12/18/2023 B   Final    Rh TYPE 12/18/2023 POS   Final    ANTIBODY SCREEN 12/18/2023 NEG   Final    POCT pH, Venous 12/18/2023 7.46 (H)  7.33 - 7.43 pH Final    POCT pCO2, Venous 12/18/2023 45  41 - 51 mm Hg Final    POCT pO2, Venous 12/18/2023 24 (L)  35 - 45 mm Hg Final    POCT SO2, Venous 12/18/2023 19 (L)  45 - 75 % Final    POCT Oxy Hemoglobin, Venous 12/18/2023  18.3 (L)  45.0 - 75.0 % Final    POCT Hematocrit Calculated, Venous 12/18/2023 31.0 (L)  36.0 - 46.0 % Final    POCT Sodium, Venous 12/18/2023 137  136 - 145 mmol/L Final    POCT Potassium, Venous 12/18/2023 4.2  3.5 - 5.3 mmol/L Final    POCT Chloride, Venous 12/18/2023 99  98 - 107 mmol/L Final    POCT Ionized Calicum, Venous 12/18/2023 1.13  1.10 - 1.33 mmol/L Final    POCT Glucose, Venous 12/18/2023 136 (H)  74 - 99 mg/dL Final    POCT Lactate, Venous 12/18/2023 3.2 (H)  0.4 - 2.0 mmol/L Final    POCT Base Excess, Venous 12/18/2023 7.3 (H)  -2.0 - 3.0 mmol/L Final    POCT HCO3 Calculated, Venous 12/18/2023 32.0 (H)  22.0 - 26.0 mmol/L Final    POCT Hemoglobin, Venous 12/18/2023 10.4 (L)  12.0 - 16.0 g/dL Final    POCT Anion Gap, Venous 12/18/2023 10.0  10.0 - 25.0 mmol/L Final    Patient Temperature 12/18/2023 37.0  degrees Celsius Final    NOTE: Patient Results are Not Corrected for Temperature    Extra Tube 12/18/2023 Hold for add-ons.   Final    Auto resulted.    WBC 12/18/2023 10.7  4.4 - 11.3 x10*3/uL Final    nRBC 12/18/2023 0.0  0.0 - 0.0 /100 WBCs Final    RBC 12/18/2023 3.59 (L)  4.00 - 5.20 x10*6/uL Final    Hemoglobin 12/18/2023 8.7 (L)  12.0 - 16.0 g/dL Final    Hematocrit 12/18/2023 25.0 (L)  36.0 - 46.0 % Final    MCV 12/18/2023 70 (L)  80 - 100 fL Final    MCH 12/18/2023 24.2 (L)  26.0 - 34.0 pg Final    MCHC 12/18/2023 34.8  32.0 - 36.0 g/dL Final    RDW 12/18/2023 18.7 (H)  11.5 - 14.5 % Final    Platelets 12/18/2023 282  150 - 450 x10*3/uL Final    Neutrophils % 12/18/2023 54.0  40.0 - 80.0 % Final    Immature Granulocytes %, Automated 12/18/2023 0.3  0.0 - 0.9 % Final    Immature Granulocyte Count (IG) includes promyelocytes, myelocytes and metamyelocytes but does not include bands. Percent differential counts (%) should be interpreted in the context of the absolute cell counts (cells/UL).    Lymphocytes % 12/18/2023 32.3  13.0 - 44.0 % Final    Monocytes % 12/18/2023 10.7  2.0 - 10.0 % Final     Eosinophils % 12/18/2023 1.8  0.0 - 6.0 % Final    Basophils % 12/18/2023 0.9  0.0 - 2.0 % Final    Neutrophils Absolute 12/18/2023 5.79  1.20 - 7.70 x10*3/uL Final    Percent differential counts (%) should be interpreted in the context of the absolute cell counts (cells/uL).    Immature Granulocytes Absolute, Au* 12/18/2023 0.03  0.00 - 0.70 x10*3/uL Final    Lymphocytes Absolute 12/18/2023 3.47  1.20 - 4.80 x10*3/uL Final    Monocytes Absolute 12/18/2023 1.15 (H)  0.10 - 1.00 x10*3/uL Final    Eosinophils Absolute 12/18/2023 0.19  0.00 - 0.70 x10*3/uL Final    Basophils Absolute 12/18/2023 0.10  0.00 - 0.10 x10*3/uL Final    Extra Tube 12/18/2023 Hold for add-ons.   Final    Auto resulted.    POCT pH, Venous 12/18/2023 7.50 (H)  7.33 - 7.43 pH Final    POCT pCO2, Venous 12/18/2023 45  41 - 51 mm Hg Final    POCT pO2, Venous 12/18/2023 26 (L)  35 - 45 mm Hg Final    POCT SO2, Venous 12/18/2023 42 (L)  45 - 75 % Final    POCT Oxy Hemoglobin, Venous 12/18/2023 41.1 (L)  45.0 - 75.0 % Final    POCT Hematocrit Calculated, Venous 12/18/2023 25.0 (L)  36.0 - 46.0 % Final    POCT Sodium, Venous 12/18/2023 137  136 - 145 mmol/L Final    POCT Potassium, Venous 12/18/2023 3.5  3.5 - 5.3 mmol/L Final    POCT Chloride, Venous 12/18/2023 102  98 - 107 mmol/L Final    POCT Ionized Calicum, Venous 12/18/2023 1.11  1.10 - 1.33 mmol/L Final    POCT Glucose, Venous 12/18/2023 88  74 - 99 mg/dL Final    POCT Lactate, Venous 12/18/2023 0.8  0.4 - 2.0 mmol/L Final    POCT Base Excess, Venous 12/18/2023 10.8 (H)  -2.0 - 3.0 mmol/L Final    POCT HCO3 Calculated, Venous 12/18/2023 35.1 (H)  22.0 - 26.0 mmol/L Final    POCT Hemoglobin, Venous 12/18/2023 8.3 (L)  12.0 - 16.0 g/dL Final    POCT Anion Gap, Venous 12/18/2023 3.0 (L)  10.0 - 25.0 mmol/L Final    Patient Temperature 12/18/2023 37.0  degrees Celsius Final    FiO2 12/18/2023 21  % Final   No results displayed because visit has over 200 results.      Orders Only on 08/14/2023    Component Date Value Ref Range Status    Glucose 08/14/2023 77  74 - 99 mg/dL Final    Sodium 08/14/2023 141  136 - 145 mmol/L Final    Potassium 08/14/2023 4.8  3.5 - 5.3 mmol/L Final    Chloride 08/14/2023 106  98 - 107 mmol/L Final    Bicarbonate 08/14/2023 27  21 - 32 mmol/L Final    Anion Gap 08/14/2023 13  10 - 20 mmol/L Final    Urea Nitrogen 08/14/2023 8  6 - 23 mg/dL Final    Creatinine 08/14/2023 0.81  0.50 - 1.05 mg/dL Final    GFR Female 08/14/2023 79  >90 mL/min/1.73m2 Final    Comment:  CALCULATIONS OF ESTIMATED GFR ARE PERFORMED   USING THE 2021 CKD-EPI STUDY REFIT EQUATION   WITHOUT THE RACE VARIABLE FOR THE IDMS-TRACEABLE   CREATININE METHODS.    https://jasn.asnjournals.org/content/early/2021/09/22/ASN.7929549056      Calcium 08/14/2023 9.7  8.6 - 10.6 mg/dL Final    Phosphorus 08/14/2023 4.9  2.5 - 4.9 mg/dL Final    Comment:  The performance characteristics of phosphorus testing in   heparinized plasma have been validated by the individual     laboratory site where testing is performed. Testing    on heparinized plasma is not approved by the FDA;    however, such approval is not necessary.      Albumin 08/14/2023 4.2  3.4 - 5.0 g/dL Final    Iron 08/14/2023 44  35 - 150 ug/dL Final    TIBC 08/14/2023 368  240 - 445 ug/dL Final    Iron Saturation 08/14/2023 12 (L)  25 - 45 % Final    WBC 08/14/2023 6.6  4.4 - 11.3 x10E9/L Final    nRBC 08/14/2023 0.0  0.0 - 0.0 /100 WBC Final    RBC 08/14/2023 5.79 (H)  4.00 - 5.20 x10E12/L Final    Hemoglobin 08/14/2023 15.0  12.0 - 16.0 g/dL Final    Hematocrit 08/14/2023 46.2 (H)  36.0 - 46.0 % Final    MCV 08/14/2023 80  80 - 100 fL Final    MCHC 08/14/2023 32.5  32.0 - 36.0 g/dL Final    Platelets 08/14/2023 295  150 - 450 x10E9/L Final    RDW 08/14/2023 22.2 (H)  11.5 - 14.5 % Final    BNP 08/14/2023 385 (H)  0 - 99 pg/mL Final    Comment: .  <100 pg/mL - Heart failure unlikely  100-299 pg/mL - Intermediate probability of acute heart  .                failure exacerbation. Correlate with clinical  .               context and patient history.    >=300 pg/mL - Heart Failure likely. Correlate with clinical  .               context and patient history.   Biotin interference may cause falsely decreased results.   Patients taking a Biotin dose of up to 5 mg/day should   refrain from taking Biotin for 24 hours before sample   collection. Providers may contact their local laboratory   for further information.      Ferritin 08/14/2023 30  8 - 150 ug/L Final   [facility] Encounter on 07/28/2023   Component Date Value Ref Range Status    Ventricular Rate 07/28/2023 59  BPM Final    Atrial Rate 07/28/2023 59  BPM Final    OR Interval 07/28/2023 194  ms Final    QRS Duration 07/28/2023 136  ms Final    QT Interval 07/28/2023 488  ms Final    QTC Calculation(Bazett) 07/28/2023 483  ms Final    P Axis 07/28/2023 67  degrees Final    R Axis 07/28/2023 -51  degrees Final    T Axis 07/28/2023 58  degrees Final    QRS Count 07/28/2023 10  beats Final    Q Onset 07/28/2023 193  ms Final    P Onset 07/28/2023 96  ms Final    P Offset 07/28/2023 156  ms Final    T Offset 07/28/2023 437  ms Final    QTC Fredericia 07/28/2023 485  ms Final   Orders Only on 07/25/2023   Component Date Value Ref Range Status    Hemoglobin A1C 07/25/2023 5.9 (A)  % Final    Comment:      Diagnosis of Diabetes-Adults   Non-Diabetic: < or = 5.6%   Increased risk for developing diabetes: 5.7-6.4%   Diagnostic of diabetes: > or = 6.5%  .       Monitoring of Diabetes                Age (y)     Therapeutic Goal (%)   Adults:          >18           <7.0   Pediatrics:    13-18           <7.5                   7-12           <8.0                   0- 6            7.5-8.5   American Diabetes Association. Diabetes Care 33(S1), Jan 2010.   Hemoglobin variant detected which does not interfere    with determination of Hemoglobin A1c. Hemoglobin   identification can be ordered to characterize the variant   if clinically  indicated.      Estimated Average Glucose 07/25/2023 123  MG/DL Final    Protime 07/25/2023 21.6 (H)  9.8 - 12.8 sec Final    Note new reference range as of 6/20/2023 at 10:00am.    INR 07/25/2023 1.9 (H)  0.9 - 1.1 Final    Glucose 07/25/2023 92  74 - 99 mg/dL Final    Sodium 07/25/2023 140  136 - 145 mmol/L Final    Potassium 07/25/2023 5.3  3.5 - 5.3 mmol/L Final    Chloride 07/25/2023 99  98 - 107 mmol/L Final    Bicarbonate 07/25/2023 31  21 - 32 mmol/L Final    Anion Gap 07/25/2023 15  10 - 20 mmol/L Final    Urea Nitrogen 07/25/2023 16  6 - 23 mg/dL Final    Creatinine 07/25/2023 0.81  0.50 - 1.05 mg/dL Final    GFR Female 07/25/2023 79  >90 mL/min/1.73m2 Final    Comment:  CALCULATIONS OF ESTIMATED GFR ARE PERFORMED   USING THE 2021 CKD-EPI STUDY REFIT EQUATION   WITHOUT THE RACE VARIABLE FOR THE IDMS-TRACEABLE   CREATININE METHODS.    https://jasn.asnjournals.org/content/early/2021/09/22/ASN.2042938955      Calcium 07/25/2023 10.0  8.6 - 10.6 mg/dL Final    WBC 07/25/2023 8.0  4.4 - 11.3 x10E9/L Final    nRBC 07/25/2023 0.0  0.0 - 0.0 /100 WBC Final    RBC 07/25/2023 5.83 (H)  4.00 - 5.20 x10E12/L Final    Hemoglobin 07/25/2023 14.9  12.0 - 16.0 g/dL Final    Hematocrit 07/25/2023 45.8  36.0 - 46.0 % Final    MCV 07/25/2023 79 (L)  80 - 100 fL Final    MCHC 07/25/2023 32.5  32.0 - 36.0 g/dL Final    Platelets 07/25/2023 247  150 - 450 x10E9/L Final    RDW 07/25/2023 26.9 (H)  11.5 - 14.5 % Final    Cholesterol 07/25/2023 141  0 - 199 mg/dL Final    Comment: .      AGE      DESIRABLE   BORDERLINE HIGH   HIGH     0-19 Y     0 - 169       170 - 199     >/= 200    20-24 Y     0 - 189       190 - 224     >/= 225         >24 Y     0 - 199       200 - 239     >/= 240   **All ranges are based on fasting samples. Specific   therapeutic targets will vary based on patient-specific   cardiac risk.  .   Pediatric guidelines reference:Pediatrics 2011, 128(S5).   Adult guidelines reference: NCEP ATPIII Guidelines,      ISHA 2001, 258:8346-97  .   Venipuncture immediately after or during the    administration of Metamizole may lead to falsely   low results. Testing should be performed immediately   prior to Metamizole dosing.      HDL 07/25/2023 51.4  mg/dL Final    Comment: .      AGE      VERY LOW   LOW     NORMAL    HIGH       0-19 Y       < 35   < 40     40-45     ----    20-24 Y       ----   < 40       >45     ----      >24 Y       ----   < 40     40-60      >60  .      Cholesterol/HDL Ratio 07/25/2023 2.7   Final    Comment: REF VALUES  DESIRABLE  < 3.4  HIGH RISK  > 5.0      LDL 07/25/2023 72  0 - 99 mg/dL Final    Comment: .                           NEAR      BORD      AGE      DESIRABLE  OPTIMAL    HIGH     HIGH     VERY HIGH     0-19 Y     0 - 109     ---    110-129   >/= 130     ----    20-24 Y     0 - 119     ---    120-159   >/= 160     ----      >24 Y     0 -  99   100-129  130-159   160-189     >/=190  .      VLDL 07/25/2023 17  0 - 40 mg/dL Final    Triglycerides 07/25/2023 86  0 - 149 mg/dL Final    Comment: .      AGE      DESIRABLE   BORDERLINE HIGH   HIGH     VERY HIGH   0 D-90 D    19 - 174         ----         ----        ----  91 D- 9 Y     0 -  74        75 -  99     >/= 100      ----    10-19 Y     0 -  89        90 - 129     >/= 130      ----    20-24 Y     0 - 114       115 - 149     >/= 150      ----         >24 Y     0 - 149       150 - 199    200- 499    >/= 500  .   Venipuncture immediately after or during the    administration of Metamizole may lead to falsely   low results. Testing should be performed immediately   prior to Metamizole dosing.      Yersinia Enterocolitica 07/25/2023 NOT DETECTED  NOT DETECTED Final    Campylobacter gp. 07/25/2023 NOT DETECTED  NOT DETECTED Final    Salmonella sp. 07/25/2023 NOT DETECTED  NOT DETECTED Final    Shigella sp. 07/25/2023 NOT DETECTED  NOT DETECTED Final    Vibrio grp. 07/25/2023 NOT DETECTED  NOT DETECTED Final    Shiga Toxin 1 07/25/2023 NOT DETECTED   "NOT DETECTED Final    Shiga Toxin 2 07/25/2023 NOT DETECTED  NOT DETECTED Final    Norovirus GI/GII 07/25/2023 NOT DETECTED  NOT DETECTED Final    Rotavirus A 07/25/2023 NOT DETECTED  NOT DETECTED Final    Comment:  The enteric PCR panel is a panel of sensitive and specific   amplified nucleic acid tests indicated as an aid in the   diagnosis of specific bacterial and viral agents of   gastrointestinal illness, in conjunction with other   clinical, laboratory, and epidemiological information.   This test is not approved for monitoring these infections.   Monitoring is available for Salmonella and Shigella   infections-request test \"Stool PCR Follow-Up (STLPF)\".   Monitoring tests are not available at this time for other   enteric agents in this panel.      Calprotectin, Stool 07/25/2023 CANCELED   Final-Edited    Result canceled by the ancillary.    Helicobacter pylori Ag 07/25/2023 CANCELED   Final-Edited    Result canceled by the ancillary.    Fat, Fecal - Neutral 07/25/2023 CANCELED   Final-Edited    Result canceled by the ancillary.    Fat, Fecal - Split 07/25/2023 CANCELED   Final-Edited    Result canceled by the ancillary.    lactoferrin, Fecal, Quant. 07/25/2023 CANCELED   Final-Edited    Result canceled by the ancillary.    Ova + Parasite Exam 07/25/2023 CANCELED   Final-Edited    Result canceled by the ancillary.    Giardia lamblia Antigen 07/25/2023 CANCELED   Final-Edited    Result canceled by the ancillary.    Cryptosporidum Antigen by EIA 07/25/2023 CANCELED   Final-Edited    Result canceled by the ancillary.   [facility] Encounter on 07/11/2023   Component Date Value Ref Range Status    Staph/MRSA Screen Culture 07/11/2023 NO Staphylococcus aureus ISOLATED.   Final    ABO GROUP (TYPE) IN BLOOD 07/11/2023 B   Final    Rh 07/11/2023 POS   Final    ANTIBODY SCREEN 07/11/2023 NEG   Final    Protime 07/11/2023 14.8 (H)  9.8 - 12.8 sec Final    Note new reference range as of 6/20/2023 at 10:00am.    INR " 07/11/2023 1.3 (H)  0.9 - 1.1 Final    aPTT 07/11/2023 33  27 - 38 sec Final    Note new reference range as of 6/20/2023 at 10:00am.    Color, Urine 07/11/2023 YELLOW  STRAW,YELLOW Final    Appearance, Urine 07/11/2023 CLEAR  CLEAR Final    Specific Gravity, Urine 07/11/2023 1.013  1.005 - 1.035 Final    pH, Urine 07/11/2023 5.0  5.0 - 8.0 Final    Protein, Urine 07/11/2023 NEGATIVE  NEGATIVE mg/dL Final    Glucose, Urine 07/11/2023 >=500 (3+) (A)  NEGATIVE mg/dL Final    Blood, Urine 07/11/2023 NEGATIVE  NEGATIVE Final    Ketones, Urine 07/11/2023 NEGATIVE  NEGATIVE mg/dL Final    Bilirubin, Urine 07/11/2023 NEGATIVE  NEGATIVE Final    Urobilinogen, Urine 07/11/2023 <2.0  0.0 - 1.9 mg/dL Final    Nitrite, Urine 07/11/2023 NEGATIVE  NEGATIVE Final    Leukocyte Esterase, Urine 07/11/2023 NEGATIVE  NEGATIVE Final   Orders Only on 07/06/2023   Component Date Value Ref Range Status    WBC 07/06/2023 6.0  4.4 - 11.3 x10E9/L Final    nRBC 07/06/2023 0.0  0.0 - 0.0 /100 WBC Final    RBC 07/06/2023 5.96 (H)  4.00 - 5.20 x10E12/L Final    Hemoglobin 07/06/2023 14.3  12.0 - 16.0 g/dL Final    Hematocrit 07/06/2023 45.2  36.0 - 46.0 % Final    MCV 07/06/2023 76 (L)  80 - 100 fL Final    MCHC 07/06/2023 31.6 (L)  32.0 - 36.0 g/dL Final    Platelets 07/06/2023 282  150 - 450 x10E9/L Final    RDW 07/06/2023 Not Measured (AA)  11.5 - 14.5 % Final    Dimorphic population precludes RDW-CV    Neutrophils % 07/06/2023 36.4  40.0 - 80.0 % Final    Immature Granulocytes %, Automated 07/06/2023 0.3  0.0 - 0.9 % Final    Comment:  Immature Granulocyte Count (IG) includes promyelocytes,    myelocytes and metamyelocytes but does not include bands.   Percent differential counts (%) should be interpreted in the   context of the absolute cell counts (cells/L).      Lymphocytes % 07/06/2023 48.3  13.0 - 44.0 % Final    Monocytes % 07/06/2023 11.6  2.0 - 10.0 % Final    Eosinophils % 07/06/2023 1.7  0.0 - 6.0 % Final    Basophils % 07/06/2023  1.7  0.0 - 2.0 % Final    Neutrophils Absolute 07/06/2023 2.19  1.20 - 7.70 x10E9/L Final    Lymphocytes Absolute 07/06/2023 2.90  1.20 - 4.80 x10E9/L Final    Monocytes Absolute 07/06/2023 0.70  0.10 - 1.00 x10E9/L Final    Eosinophils Absolute 07/06/2023 0.10  0.00 - 0.70 x10E9/L Final    Basophils Absolute 07/06/2023 0.10  0.00 - 0.10 x10E9/L Final    Glucose 07/06/2023 78  74 - 99 mg/dL Final    Sodium 07/06/2023 138  136 - 145 mmol/L Final    Potassium 07/06/2023 4.6  3.5 - 5.3 mmol/L Final    Chloride 07/06/2023 103  98 - 107 mmol/L Final    Bicarbonate 07/06/2023 27  21 - 32 mmol/L Final    Anion Gap 07/06/2023 13  10 - 20 mmol/L Final    Urea Nitrogen 07/06/2023 12  6 - 23 mg/dL Final    Creatinine 07/06/2023 0.81  0.50 - 1.05 mg/dL Final    GFR Female 07/06/2023 79  >90 mL/min/1.73m2 Final    Comment:  CALCULATIONS OF ESTIMATED GFR ARE PERFORMED   USING THE 2021 CKD-EPI STUDY REFIT EQUATION   WITHOUT THE RACE VARIABLE FOR THE IDMS-TRACEABLE   CREATININE METHODS.    https://jasn.asnjournals.org/content/early/2021/09/22/ASN.1821278721      Calcium 07/06/2023 9.8  8.6 - 10.6 mg/dL Final    Albumin 07/06/2023 4.2  3.4 - 5.0 g/dL Final    Alkaline Phosphatase 07/06/2023 109  33 - 136 U/L Final    Total Protein 07/06/2023 6.9  6.4 - 8.2 g/dL Final    AST 07/06/2023 16  9 - 39 U/L Final    Total Bilirubin 07/06/2023 0.7  0.0 - 1.2 mg/dL Final    ALT (SGPT) 07/06/2023 13  7 - 45 U/L Final    Comment:  Patients treated with Sulfasalazine may generate    falsely decreased results for ALT.      TSH 07/06/2023 1.58  0.44 - 3.98 mIU/L Final    Comment:  TSH testing is performed using different testing    methodology at [facility] than at other    [facility] hospitals. Direct result comparisons should    only be made within the same method.      Tissue Transglutaminase, IgA 07/06/2023 <1  0 - 14 U/mL Final    Comment:  Celiac disease is unlikely. False negative Tissue   Transglutaminase  Antibody, IgA results  can occur in   approximately 10% of patients with celiac disease,   patients already adhering to a gluten-free diet, or   patients with IgA deficiency.      Tissue Transglutamase IgG 07/06/2023 <1  0 - 14 U/mL Final    Comment:  False negative Tissue Transglutaminase Antibody, IgG   results can occur in patients already adhering to a   gluten-free diet.  Tissue Transglutaminase Antibody,   IgA is the preferred test for screening patients with   suspected Celiac Disease.       DEAMIDATED GLIADIN PEPTIDE IGA 07/06/2023 <1  0 - 14 U/mL Final    Comment:  False negative Deamidated Gliadin Peptide Antibody, IgA   results can occur in patients already adhering to a   gluten-free diet or patients with IgA deficiency.    Tissue Transglutaminase Antibody, IgA is the preferred   test for screening patients with suspected Celiac Disease.       DEAMIDATED GLIADIN PEPTIDE IGG 07/06/2023 <1  0 - 14 U/mL Final    Comment:  False negative Deamidated Gliadin Peptide Antibody,   IgG results can occur in patients already adhering   to a gluten-free diet. Tissue Transglutaminase   Antibody, IgA is the preferred test for screening   patients with suspected Celiac Disease.       RBC Morphology 07/06/2023 See Below   Final    RBC Fragments 07/06/2023 Few   Final    Target Cells 07/06/2023 Few   Final    Radha Cells 07/06/2023 Few   Final   Orders Only on 06/26/2023   Component Date Value Ref Range Status    Protime 06/26/2023 13.6 (H)  9.8 - 12.8 sec Final    Note new reference range as of 6/20/2023 at 10:00am.    INR 06/26/2023 1.2 (H)  0.9 - 1.1 Final    aPTT 06/26/2023 33  27 - 38 sec Final    Note new reference range as of 6/20/2023 at 10:00am.    Glucose 06/26/2023 97  74 - 99 mg/dL Final    Sodium 06/26/2023 140  136 - 145 mmol/L Final    Potassium 06/26/2023 4.5  3.5 - 5.3 mmol/L Final    Chloride 06/26/2023 104  98 - 107 mmol/L Final    Bicarbonate 06/26/2023 27  21 - 32 mmol/L Final    Anion Gap 06/26/2023 14  10 - 20 mmol/L Final     Urea Nitrogen 06/26/2023 12  6 - 23 mg/dL Final    Creatinine 06/26/2023 0.80  0.50 - 1.05 mg/dL Final    GFR Female 06/26/2023 80  >90 mL/min/1.73m2 Final    Comment:  CALCULATIONS OF ESTIMATED GFR ARE PERFORMED   USING THE 2021 CKD-EPI STUDY REFIT EQUATION   WITHOUT THE RACE VARIABLE FOR THE IDMS-TRACEABLE   CREATININE METHODS.    https://jasn.asnjournals.org/content/early/2021/09/22/[email or web address]      Calcium 06/26/2023 10.1  8.6 - 10.6 mg/dL Final    Phosphorus 06/26/2023 4.7  2.5 - 4.9 mg/dL Final    Comment:  The performance characteristics of phosphorus testing in   heparinized plasma have been validated by the individual     laboratory site where testing is performed. Testing    on heparinized plasma is not approved by the FDA;    however, such approval is not necessary.      Albumin 06/26/2023 4.1  3.4 - 5.0 g/dL Final    WBC 06/26/2023 6.5  4.4 - 11.3 x10E9/L Final    nRBC 06/26/2023 0.0  0.0 - 0.0 /100 WBC Final    RBC 06/26/2023 5.85 (H)  4.00 - 5.20 x10E12/L Final    Hemoglobin 06/26/2023 13.5  12.0 - 16.0 g/dL Final    Hematocrit 06/26/2023 43.5  36.0 - 46.0 % Final    MCV 06/26/2023 74 (L)  80 - 100 fL Final    MCHC 06/26/2023 31.0 (L)  32.0 - 36.0 g/dL Final    Platelets 06/26/2023 255  150 - 450 x10E9/L Final    RDW 06/26/2023 31.4 (H)  11.5 - 14.5 % Final       Imaging  The relevant imaging was reviewed. The CTA 12/18/2023 revealed an small focal area of active GI bleed within the ascending colon.    Assessment/Plan  Imaging, labs, and vitals were reviewed and discussed with IR team recommendations as follows:  Trend H&H. (Currently at baseline Hb 8.7-8.9 as time of dictation.)  Support with IV fluids/blood products as needed.  Hold anticoagulation at this time.   Correct INR < 1.5  Recommend GI consult for colonoscopy.    It was determined no indication for urgent IR intervention at this time.     However, should the patient's status change, IR team can be reached at pager [phone] or Epic  Chat to resident Narda Lorenzo MD for re-evaluation. If patient remains stable overnight, IR resident will discuss the case in IR morning team huddle at 6:30am 12/19/2023 and discuss follow-up accordingly.     Discussed with IR Attending Dr. Meghna Marie.

## 2023-12-19 NOTE — ED PROCEDURE NOTE
Procedure  Critical Care    Performed by: Cullen Meier MD  Authorized by: Cullen Meier MD    Critical care provider statement:     Critical care time (minutes):  30    Critical care time was exclusive of:  Separately billable procedures and treating other patients    Critical care was necessary to treat or prevent imminent or life-threatening deterioration of the following conditions:  Shock and circulatory failure    Critical care was time spent personally by me on the following activities:  Ordering and performing treatments and interventions, ordering and review of laboratory studies, development of treatment plan with patient or surrogate, ordering and review of radiographic studies, pulse oximetry, evaluation of patient's response to treatment, re-evaluation of patient's condition, examination of patient, obtaining history from patient or surrogate and interpretation of cardiac output measurements    I assumed direction of critical care for this patient from another provider in my specialty: no                 Cullen Meier MD  12/18/23 [id number]

## 2023-12-19 NOTE — ED PROCEDURE NOTE
Procedure    Performed by: Leroy Menendez DO  Authorized by: Leroy Menendez DO  Cardiac Indications: hypotension                Procedure: Abdominal FAST Ultrasound    Findings:  RUQ: the RUQ was visualized and was NEGATIVE for free fluid  LUQ: The LUQ was visualized and was NEGATIVE for free fluid.  Pelvic Free Fluid: The Pelvis was visualized and was NEGATIVE for free fluid.    Impression:  Abdominal FAST: The FAST exam was NEGATIVE for intra-abdominal free fluid.  Procedure: Cardiac Ultrasound    Findings:   Views: parasternal long, parasternal short, apical four and subxiphoid  The pericardial space was visualized and was NEGATIVE for a significant pericardial effusion.  Activity: Ventricular contractions were visualized.  LV: LV systolic function was NORMAL.  RV: RV size was NORMAL.    Impression:  Cardiac: The focused cardiac ultrasound exam was NORMAL.  Procedure: Aorta Ultrasound    Findings:   Aorta: The abdominal aorta was visualized and measured < 3 cm  Iliac diameter <1.5 cm: Unable to visualize.      Impression:  Aorta: The Aorta exam was NEGATIVE for abdominal aortic abnormalities as described.    Comments: Large homogenous uterus noted on pelvic facet views.  Patient with history of known uterine fibroids.               Leroy Menendez DO  Resident  12/18/23 1923

## 2023-12-20 ENCOUNTER — OFFICE VISIT (OUTPATIENT)
Dept: VASCULAR SURGERY | Facility: HOSPITAL | Age: 68
End: 2023-12-20
Payer: MEDICARE

## 2023-12-20 ENCOUNTER — APPOINTMENT (OUTPATIENT)
Dept: VASCULAR MEDICINE | Facility: HOSPITAL | Age: 68
End: 2023-12-20
Payer: MEDICARE

## 2023-12-20 VITALS
SYSTOLIC BLOOD PRESSURE: 120 MMHG | BODY MASS INDEX: 16.81 KG/M2 | DIASTOLIC BLOOD PRESSURE: 73 MMHG | HEART RATE: 68 BPM | WEIGHT: 101 LBS

## 2023-12-20 LAB
ALBUMIN SERPL BCP-MCNC: 2.7 G/DL (ref 3.4–5)
ALP SERPL-CCNC: 85 U/L (ref 33–136)
ALT SERPL W P-5'-P-CCNC: 10 U/L (ref 7–45)
ANION GAP SERPL CALC-SCNC: 11 MMOL/L (ref 10–20)
APTT PPP: 28 SECONDS (ref 27–38)
AST SERPL W P-5'-P-CCNC: 16 U/L (ref 9–39)
BASOPHILS # BLD AUTO: 0.09 X10*3/UL (ref 0–0.1)
BASOPHILS NFR BLD AUTO: 1.2 %
BILIRUB SERPL-MCNC: 0.6 MG/DL (ref 0–1.2)
BUN SERPL-MCNC: 10 MG/DL (ref 6–23)
CALCIUM SERPL-MCNC: 8.4 MG/DL (ref 8.6–10.6)
CHLORIDE SERPL-SCNC: 99 MMOL/L (ref 98–107)
CO2 SERPL-SCNC: 31 MMOL/L (ref 21–32)
CREAT SERPL-MCNC: 0.69 MG/DL (ref 0.5–1.05)
EOSINOPHIL # BLD AUTO: 0.4 X10*3/UL (ref 0–0.7)
EOSINOPHIL NFR BLD AUTO: 5.1 %
ERYTHROCYTE [DISTWIDTH] IN BLOOD BY AUTOMATED COUNT: 19.4 % (ref 11.5–14.5)
GFR SERPL CREATININE-BSD FRML MDRD: >90 ML/MIN/1.73M*2
GLUCOSE BLD MANUAL STRIP-MCNC: 116 MG/DL (ref 74–99)
GLUCOSE BLD MANUAL STRIP-MCNC: 65 MG/DL (ref 74–99)
GLUCOSE BLD MANUAL STRIP-MCNC: 95 MG/DL (ref 74–99)
GLUCOSE BLD MANUAL STRIP-MCNC: 95 MG/DL (ref 74–99)
GLUCOSE SERPL-MCNC: 74 MG/DL (ref 74–99)
HCT VFR BLD AUTO: 26.6 % (ref 36–46)
HGB BLD-MCNC: 8.7 G/DL (ref 12–16)
IMM GRANULOCYTES # BLD AUTO: 0.02 X10*3/UL (ref 0–0.7)
IMM GRANULOCYTES NFR BLD AUTO: 0.3 % (ref 0–0.9)
INR PPP: 1.1 (ref 0.9–1.1)
LYMPHOCYTES # BLD AUTO: 2.74 X10*3/UL (ref 1.2–4.8)
LYMPHOCYTES NFR BLD AUTO: 35.1 %
MAGNESIUM SERPL-MCNC: 1.75 MG/DL (ref 1.6–2.4)
MCH RBC QN AUTO: 24 PG (ref 26–34)
MCHC RBC AUTO-ENTMCNC: 32.7 G/DL (ref 32–36)
MCV RBC AUTO: 73 FL (ref 80–100)
MONOCYTES # BLD AUTO: 1.03 X10*3/UL (ref 0.1–1)
MONOCYTES NFR BLD AUTO: 13.2 %
NEUTROPHILS # BLD AUTO: 3.53 X10*3/UL (ref 1.2–7.7)
NEUTROPHILS NFR BLD AUTO: 45.1 %
NRBC BLD-RTO: 0 /100 WBCS (ref 0–0)
PHOSPHATE SERPL-MCNC: 3 MG/DL (ref 2.5–4.9)
PLATELET # BLD AUTO: 306 X10*3/UL (ref 150–450)
POTASSIUM SERPL-SCNC: 3.4 MMOL/L (ref 3.5–5.3)
PROT SERPL-MCNC: 5.1 G/DL (ref 6.4–8.2)
PROTHROMBIN TIME: 12.3 SECONDS (ref 9.8–12.8)
RBC # BLD AUTO: 3.63 X10*6/UL (ref 4–5.2)
SODIUM SERPL-SCNC: 138 MMOL/L (ref 136–145)
WBC # BLD AUTO: 7.8 X10*3/UL (ref 4.4–11.3)

## 2023-12-20 PROCEDURE — 36415 COLL VENOUS BLD VENIPUNCTURE: CPT

## 2023-12-20 PROCEDURE — 2500000004 HC RX 250 GENERAL PHARMACY W/ HCPCS (ALT 636 FOR OP/ED)

## 2023-12-20 PROCEDURE — 84100 ASSAY OF PHOSPHORUS: CPT

## 2023-12-20 PROCEDURE — 2500000001 HC RX 250 WO HCPCS SELF ADMINISTERED DRUGS (ALT 637 FOR MEDICARE OP)

## 2023-12-20 PROCEDURE — 2500000002 HC RX 250 W HCPCS SELF ADMINISTERED DRUGS (ALT 637 FOR MEDICARE OP, ALT 636 FOR OP/ED)

## 2023-12-20 PROCEDURE — 85025 COMPLETE CBC W/AUTO DIFF WBC: CPT

## 2023-12-20 PROCEDURE — 82947 ASSAY GLUCOSE BLOOD QUANT: CPT

## 2023-12-20 PROCEDURE — 99232 SBSQ HOSP IP/OBS MODERATE 35: CPT

## 2023-12-20 PROCEDURE — 94640 AIRWAY INHALATION TREATMENT: CPT

## 2023-12-20 PROCEDURE — 83735 ASSAY OF MAGNESIUM: CPT

## 2023-12-20 PROCEDURE — 1100000001 HC PRIVATE ROOM DAILY

## 2023-12-20 PROCEDURE — 85610 PROTHROMBIN TIME: CPT

## 2023-12-20 PROCEDURE — C9113 INJ PANTOPRAZOLE SODIUM, VIA: HCPCS

## 2023-12-20 PROCEDURE — 80053 COMPREHEN METABOLIC PANEL: CPT

## 2023-12-20 RX ORDER — HEPARIN SODIUM 10000 [USP'U]/100ML
0-4000 INJECTION, SOLUTION INTRAVENOUS CONTINUOUS
Status: DISCONTINUED | OUTPATIENT
Start: 2023-12-20 | End: 2023-12-20

## 2023-12-20 RX ORDER — MAGNESIUM SULFATE HEPTAHYDRATE 40 MG/ML
2 INJECTION, SOLUTION INTRAVENOUS ONCE
Status: COMPLETED | OUTPATIENT
Start: 2023-12-20 | End: 2023-12-20

## 2023-12-20 RX ORDER — POTASSIUM CHLORIDE 20 MEQ/1
40 TABLET, EXTENDED RELEASE ORAL ONCE
Status: COMPLETED | OUTPATIENT
Start: 2023-12-20 | End: 2023-12-20

## 2023-12-20 RX ORDER — HEPARIN SODIUM 5000 [USP'U]/ML
INJECTION, SOLUTION INTRAVENOUS; SUBCUTANEOUS EVERY 4 HOURS PRN
Status: DISCONTINUED | OUTPATIENT
Start: 2023-12-20 | End: 2023-12-22

## 2023-12-20 RX ORDER — HEPARIN SODIUM 10000 [USP'U]/100ML
0-4000 INJECTION, SOLUTION INTRAVENOUS CONTINUOUS
Status: DISCONTINUED | OUTPATIENT
Start: 2023-12-21 | End: 2023-12-20

## 2023-12-20 RX ORDER — HEPARIN SODIUM 10000 [USP'U]/100ML
0-4000 INJECTION, SOLUTION INTRAVENOUS CONTINUOUS
Status: DISPENSED | OUTPATIENT
Start: 2023-12-20 | End: 2023-12-21

## 2023-12-20 RX ORDER — PSYLLIUM HUSK 3.4 G/5.8G
1 POWDER ORAL 2 TIMES DAILY
COMMUNITY

## 2023-12-20 RX ORDER — FLUOXETINE HYDROCHLORIDE 40 MG/1
40 CAPSULE ORAL DAILY
COMMUNITY

## 2023-12-20 RX ORDER — FLUOXETINE HYDROCHLORIDE 20 MG/1
40 CAPSULE ORAL DAILY
Status: DISCONTINUED | OUTPATIENT
Start: 2023-12-21 | End: 2023-12-23 | Stop reason: HOSPADM

## 2023-12-20 RX ORDER — METOPROLOL TARTRATE 25 MG/1
25 TABLET, FILM COATED ORAL 2 TIMES DAILY
COMMUNITY
End: 2023-12-23 | Stop reason: HOSPADM

## 2023-12-20 RX ADMIN — PANTOPRAZOLE SODIUM 40 MG: 40 INJECTION, POWDER, FOR SOLUTION INTRAVENOUS at 03:32

## 2023-12-20 RX ADMIN — GABAPENTIN 100 MG: 100 CAPSULE ORAL at 21:03

## 2023-12-20 RX ADMIN — FLUOXETINE 20 MG: 20 CAPSULE ORAL at 08:58

## 2023-12-20 RX ADMIN — HEPARIN SODIUM 6.8 UNITS/HR: 10000 INJECTION, SOLUTION INTRAVENOUS at 18:52

## 2023-12-20 RX ADMIN — POTASSIUM CHLORIDE 40 MEQ: 1500 TABLET, EXTENDED RELEASE ORAL at 10:38

## 2023-12-20 RX ADMIN — SODIUM CHLORIDE, POTASSIUM CHLORIDE, SODIUM LACTATE AND CALCIUM CHLORIDE 1000 ML: 600; 310; 30; 20 INJECTION, SOLUTION INTRAVENOUS at 11:42

## 2023-12-20 RX ADMIN — MAGNESIUM SULFATE HEPTAHYDRATE 2 G: 40 INJECTION, SOLUTION INTRAVENOUS at 10:29

## 2023-12-20 RX ADMIN — FLUTICASONE FUROATE AND VILANTEROL TRIFENATATE 1 PUFF: 100; 25 POWDER RESPIRATORY (INHALATION) at 10:07

## 2023-12-20 RX ADMIN — PANTOPRAZOLE SODIUM 40 MG: 40 INJECTION, POWDER, FOR SOLUTION INTRAVENOUS at 17:48

## 2023-12-20 RX ADMIN — TERBINAFINE HYDROCHLORIDE: 1 CREAM TOPICAL at 09:00

## 2023-12-20 RX ADMIN — TERBINAFINE HYDROCHLORIDE: 1 CREAM TOPICAL at 21:00

## 2023-12-20 RX ADMIN — ATORVASTATIN CALCIUM 40 MG: 40 TABLET, FILM COATED ORAL at 21:03

## 2023-12-20 ASSESSMENT — COGNITIVE AND FUNCTIONAL STATUS - GENERAL
TOILETING: A LITTLE
DRESSING REGULAR LOWER BODY CLOTHING: A LITTLE
WALKING IN HOSPITAL ROOM: A LITTLE
MOBILITY SCORE: 22
HELP NEEDED FOR BATHING: A LITTLE
CLIMB 3 TO 5 STEPS WITH RAILING: A LITTLE
DAILY ACTIVITIY SCORE: 18
TOILETING: A LITTLE
HELP NEEDED FOR BATHING: A LITTLE
PERSONAL GROOMING: A LITTLE
EATING MEALS: A LITTLE
CLIMB 3 TO 5 STEPS WITH RAILING: A LITTLE
DRESSING REGULAR UPPER BODY CLOTHING: A LITTLE
WALKING IN HOSPITAL ROOM: A LITTLE
EATING MEALS: A LITTLE
DAILY ACTIVITIY SCORE: 18
DRESSING REGULAR LOWER BODY CLOTHING: A LITTLE
DRESSING REGULAR UPPER BODY CLOTHING: A LITTLE
PERSONAL GROOMING: A LITTLE
MOBILITY SCORE: 22

## 2023-12-20 ASSESSMENT — PAIN - FUNCTIONAL ASSESSMENT: PAIN_FUNCTIONAL_ASSESSMENT: 0-10

## 2023-12-20 ASSESSMENT — PAIN SCALES - GENERAL
PAINLEVEL: 0-NO PAIN
PAINLEVEL_OUTOF10: 0 - NO PAIN

## 2023-12-20 NOTE — PROGRESS NOTES
12/20/23 9876 Transitional Care Coordinator Notes:    Patient is scheduled for a EGD today. Will fax orders to [facility] when patient is medically ready to discharge.               Assessment/Plan   Principal Problem:    Gastrointestinal hemorrhage, unspecified gastrointestinal hemorrhage type    Discharge Plans: discharge home with home care           Sally Stover RN

## 2023-12-20 NOTE — PROGRESS NOTES
"Hilda Jones is a 68 y.o. female on day 2 of admission presenting with Gastrointestinal hemorrhage, unspecified gastrointestinal hemorrhage type.    Subjective   Overnight: \"Rapid Response RN at bedside for RADAR score 7 due to the following VS: T 35.8 °Celsius; HR  66; RR 16; BP 77/38; SPO2 91% .      Went to patient's room and rechecked BP in both upper extremities.  Manual BP in right arm was improved from previous measurement.  Patient asymptomatic.  No additional interventions by Rapid Response team indicated at this time. \"    Pt is without sig changes from yesterday. No dyspnea, presyncope/syncope, chest pain, abdominal pain. The bowel prep was difficult to consume per pt and bedside 4 L golytlety appeared to be maximum consumed one fourth, however there was water consistency of stool in hat that was darker brown/red    No dyspnea.        Objective     Last Recorded Vitals  Blood pressure 90/56, pulse 68, temperature 36 °C (96.8 °F), resp. rate 16, height 1.651 m (5' 5\"), weight 60 kg (132 lb 4.4 oz), SpO2 92 %.  Intake/Output last 3 Shifts:  I/O last 3 completed shifts:  In: 500 (9.1 mL/kg) [IV Piggyback:500]  Out: - (0 mL/kg)   Dosing Weight: 55.1 kg     PHYSICAL EXAM:   General: well appearing, NAD, pleasant and engaged in encounter    HEENT: NCAT, MMM  CV: RRR, no m/r/g  PULM: CTAB, non-labored respirations   ABD: soft, NT, ND, + bowel sounds   : no suprapubic or CVA tenderness   EXT: WWP, no significant edema   SKIN: hx of shingles. No painful or pruritic vesicles noted  NEURO: A&Ox4, symmetric facies, no gross motor or sensory deficits, normal gait  PSYCH: pleasant mood, appropriate affect      Relevant Results  Vitals:    12/20/23 0912   BP: 90/56   Pulse: 68   Resp: 16   Temp: 36 °C (96.8 °F)   SpO2: 92%       Scheduled medications  atorvastatin, 40 mg, oral, Nightly  [Held by provider] donepezil, 5 mg, oral, Nightly  FLUoxetine, 20 mg, oral, Daily  fluticasone furoate-vilanteroL, 1 puff, inhalation, " Daily  gabapentin, 100 mg, oral, Nightly  insulin lispro, 0-5 Units, subcutaneous, TID with meals  lactated Ringer's, 1,000 mL, intravenous, Once  pantoprazole, 40 mg, intravenous, BID  terbinafine, , Topical, BID      Continuous medications     PRN medications  PRN medications: albuterol, dextrose 10 % in water (D10W), dextrose, glucagon, melatonin, polyethylene glycol, polyethylene glycol-electrolytes                  Assessment/Plan   Principal Problem:    Gastrointestinal hemorrhage, unspecified gastrointestinal hemorrhage type    Ms. Jones is a 67-year-old with past medical history of atrial fibrillation, atrial flutter previously on Xarelto, COPD, diabetes mellitus, aortic valve insufficiency  status post aortic valve replacement, SMA occlusion status post thromboembolectomy who presents to the MICU for hemodynamic instability in the setting of suspected lower GI bleed.      Patient has hx of thromboembolic disease x2 with occlusion of SMA s/p embelectomy, although complete occlusion not seen on CTA, thromboembolism should be high on differential given prior history and multiple risk factors. Ischemic colitis less likely given no lactate elevation and only mild abdominal discomfort/pain. Diverticula not clearly visualized lower GI painless bleeding may point towards diverticular disease. Given history of recent abdominal surgery can consider adhesions/anastomotic ulcers. With hx of “black stool” should still consider upper GI bleed 2/2 to peptic ulcer/gastritis etc.        12/20 updates  -hemoglobin and BP is stable   -discussed with cardiology that recommendation is for warfarin for valvular afib   -pending colonoscopy +/- EGD today   - LR 1 L at 150 ml/hr        #GI bleed  -Unclear etiology at this time, but given CTA findings with active extravasation in ascending colon this is LGIB  -Thromboembolic disease vs ischemic colitis vs diverticular disease vs UGIB 2/2 PUD/gastritis. Given history of recent  abdominal surgery can consider adhesions/anastomotic ulcers.  -Maintain active type and screen and two peripheral IVs, CMP, lactate, type and screen and coags ordered  -Patient is s/p 2L of LR in the ED, blood pressure since arrival is 100/66 hemodynamically stable.  -Additional 500cc of LR given upon arrival to MICU  -Monitor BMs and trend hemoglobin.   -IR has been consulted regarding this patient's bleed, initial impression was that bleed was not severe and that colonoscopy may be ideal approach  - No bloody BM since prior to bowel prep, however watery stools morning of 12/20 with darker brown color   - 40mg IV PPI BID ordered  -Last dose of Xarelto was 12/18 morning, if Hgb drop/hemodynamic instability consider administering 4F PCC  -plan for colonoscopy +/- EGD on 12/20      #atrial fibrillation/flutter on Coumadin with 2 prior thromboemboli  #aortic valve insufficiency   #HF 2/2 rheumatic heart disease with symptomatic mitral stenosis and severe TR  #SALTY thrombus 7/2023  -Is at high risk of thromboembolism given rheumatic mitral stenosis and recent SALTY thrombus, was recently switched from warfarin back to Xarelto as per patient.  -Holding anticoagulation in the setting of active bleed including aspirin and Xarelto  -Holding empagliflozin, furosemide, metoprolol, spironolactone in the setting of hypotension.     #T2DM - 5.9%   -On lantus 10 units and lispro TID at home  -LDSSI     #VASHTI - resolved   -Patient has VASHTI likely 2/2 to hypo perfusion, pre-renal   -S/p 2L in ED  -Giving additional 500cc of LR upon arrival to MICU  -Trend RFP     #questionable anemia 2/2 to GIB (baseline hgb of 8.7-8.9)  -Transfuse Hgb < 7     F: s/p 2.5L, replete as needed but caution given hx of HF  E: replete as needed  N: CLD, NPO at midnight    A: two PIVs  O2: room air  Gtt: none   DVT ppx: none  Abx: none         Doni Carver MD

## 2023-12-20 NOTE — HOSPITAL COURSE
Hilda Jones is a 68 y.o. female with a history of HTN, Afib/Aflutter on Xarelto, COPD, diabetes mellitus (5.9%), aortic valve insufficiency status post aortic valve replacement X2, SMA occlusion and lysis of adhesions on 10/6/2023 by Dr. Isaacs for acute mesenteric ischemia as well as an open SMA embolectomy in August 2022 by Dr. Oropeza for acute mesenteric ischemia who initially presented to [facility] ED on 12/18 for dark stools and was admitted to the MICU for hemodynamic instability. On 9am on 12/18/2023, she had 4 back to back really dark bloody bowel movements without any overt abdominal pain, but felt uneasy in her stomach leading up to these 4 episodes of dark stools. She also says that she had a bit of lightheadedness 2 days prior to her dark stools, but her lightheadedness was not persistent.  Patient had last dose of Xarelto on 12/17. Of note, when she was discharged on 10/19/2023, she was discharged on coumadin and her INR was 3.4 on the day of discharge. Her hemoglobin when she presented to [facility] was 10.2 (in comparison to 9.1 when she was discharged from the hospital in Oct 2023). Her baseline hemoglobin appears to be between 9 and 10. Also of note, her creatinine had uptrended to 1.24 from 0.68 and her BUN had uptrended to 30 from 7 (BUN creatinine ratio: 24). At 1715 on 12/18/2023, the patient had a BP of 58/37 (likely false, as she did not have a compensatory increase in her HR and also BP checked 13 mins later was 100/72). However, the patient did have several low blood pressures, so the ER obtained a CTA which was suggestive of active GI bleed in the the ascending colon. 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 She has not had any dark stools after coming to the ER.  On 12/19 patient was transferred to the floors given stable blood pressures with 90s over 70s maintained in the MICU without vasopressor support and stable hemoglobin.  Patient was started on heparin drip on  12/20 in the setting of her valvular A-fib and recent thromboembolic events prior to colonoscopy on 12/21 which showed subcentimeter polyp in the transverse colon removed with cold snare and EGD which showed erythematous mucosa in the esophagus and antrum no ulcer identified no blood seen.  Although discussed with cardiology and the patient has a strong indication to be on warfarin given her valvular A-fib and there are notes relating as such; from review by pharm the pt was switched to xarelto by Denise Delgado (ARCENIO) on 12/7 because her inr was 1.3 on November 30 th and December 7 th and she was skeptical about the pt's compliance to medication..  Patient was discharged in stable condition on Xarelto with recommendations to follow-up with provider to reattempt to initiate warfarin.  Given that patient did not have bleeding site identified on EGD nor colonoscopy patient can be referred for capsule endoscopy.

## 2023-12-20 NOTE — PROGRESS NOTES
Physical Therapy    Therapy Communication Note    Patient Name: Hilda Jones  MRN: [id number]  Today's Date: 12/20/2023     Discipline: Physical Therapy      Missed Visit: Yes  Missed Visit Reason:  Held per RN due to pt having multiple and frequent bowel movements; will follow-up when appropriate      12/20/23 at 9:16 AM   Celi Brown, PT

## 2023-12-20 NOTE — SIGNIFICANT EVENT
[facility]   12/20/23 1009   Onset Documentation   Rapid Response Initiated By Radar auto page   Pager Time 1009   Arrival Time 1020   Event End Time 1040   Primary Reason for Call Radar auto page     RADAR page auto generated from VS -see documented VS. Upon arrival pt sitting up in the chair watching tv- no complaints. Pt with go-shawn at the side. Pt ok to remain on the floor for continued monitoring-bedside nurse updated and will call with any concerns.

## 2023-12-20 NOTE — SIGNIFICANT EVENT
Rapid Response RN at bedside for RADAR score 7 due to the following VS: T 35.8 °Celsius; HR  66; RR 16; BP 77/38; SPO2 91% .      Went to patient's room and rechecked BP in both upper extremities.  Manual BP in right arm was improved from previous measurement.  Patient asymptomatic.  No additional interventions by Rapid Response team indicated at this time.

## 2023-12-20 NOTE — SIGNIFICANT EVENT
Radar pg - Decreased BP and SpO2 91%    VS recheck -   100% on RA  BP 92/74 (manual)    No respiratory complaints at this time

## 2023-12-20 NOTE — PROGRESS NOTES
Pharmacy Medication History Review    Hilda Jones is a 68 y.o. female admitted for Gastrointestinal hemorrhage, unspecified gastrointestinal hemorrhage type. Pharmacy reviewed the patient's [id number] medications and allergies for accuracy.    The list below reflects the updated PTA list. Comments regarding how patient may be taking medications differently can be found in the Admit Orders Activity  Prior to Admission Medications   Prescriptions Last Dose Informant Patient Reported?   FLUoxetine (PROzac) 40 mg capsule  Other Yes   Sig: Take 1 capsule (40 mg) by mouth once daily.   albuterol 90 mcg/actuation inhaler  Other No   Sig: INHALE 2 PUFF BY MOUTH EVERY 4 HOURS AS NEEDED   aspirin 81 mg EC tablet  Other No   Sig: Take 1 tablet (81 mg) by mouth once daily. Do not start before October 20, 2023.   atorvastatin (Lipitor) 40 mg tablet  Other No   Sig: Take 1 tablet (40 mg) by mouth once daily at bedtime.   cholecalciferol (Vitamin D-3) 50 MCG (2000 UT) tablet 12/18/2023 Other No   Sig: TAKE 1 TABLET BY MOUTH ONCE DAILY   donepezil (Aricept) 5 mg tablet 12/18/2023 Other No   Sig: TAKE 1 TABLET BY MOUTH AT BEDTIME   empagliflozin (Jardiance) 10 mg 12/18/2023 Other No   Sig: TAKE 1 TABLET BY MOUTH ONCE DAILY   famotidine (Pepcid) 20 mg tablet 12/18/2023 Other Yes   Sig: Take 1 tablet (20 mg) by mouth every 12 hours if needed.   fluticasone furoate-vilanteroL (Breo Ellipta) 100-25 mcg/dose inhaler 12/18/2023 Other No   Sig: INHALE 1 PUFF BY MOUTH ONCE DAILY   gabapentin (Neurontin) 100 mg capsule 12/18/2023 Other No   Sig: TAKE 1 CAPSULE BY MOUTH AT BEDTIME   insulin glargine (Lantus) 100 unit/mL (3 mL) pen Unknown Other No   Sig: INJECT 10 UNITS UNDER THE SKIN AT BEDTIME   loperamide (Imodium) 2 mg capsule Unknown Other No   Sig: Take 1 capsule (2 mg) by mouth 4 times a day as needed for diarrhea.   melatonin 5 mg tablet Past Week Other No   Sig: TAKE 1 TABLET BY MOUTH AT BEDTIME AS NEEDED FOR INSOMNIA    metoprolol succinate XL (Toprol-XL) 100 mg 24 hr tablet Past Week  No   Sig: Take 1 tablet (100 mg) by mouth once daily. Do not crush or chew. Do not start before October 20, 2023.   metoprolol tartrate (Lopressor) 25 mg tablet  Other Yes   Sig: Take 1 tablet (25 mg) by mouth 2 times a day.   multivitamin with minerals tablet 12/18/2023 Other No   Sig: TAKE 1 TABLET BY MOUTH ONCE DAILY   nicotine (Nicoderm CQ) 14 mg/24 hr patch 12/18/2023 Other No   Sig: APPLY 1 PATCH TO SKIN EVERY 24 HOURS   potassium chloride CR (Klor-Con M20) 20 mEq ER tablet 12/18/2023 Other No   Sig: TAKE 1 TABLET BY MOUTH ONCE DAILY   psyllium (Metamucil Fiber Singles) 3.4 gram packet  Other Yes   Sig: Take 1 packet by mouth 2 times a day.   rivaroxaban (Xarelto) 20 mg tablet  Other Yes   Sig: Take 1 tablet (20 mg) by mouth once daily in the evening. Take with meals. Take with food.   spironolactone (Aldactone) 25 mg tablet 12/18/2023 Other No   Sig: TAKE 1 TABLET BY MOUTH ONCE DAILY   terbinafine (LamISIL) 1 % cream 12/18/2023 Other Yes   Sig: APPLY TOPICALLY TWICE DAILY TO BOTH FEET   torsemide (Demadex) 20 mg tablet 12/18/2023 Other No   Sig: TAKE 2 TABLETS BY MOUTH ONCE DAILY   traZODone (Desyrel) 50 mg tablet Past Week Other No   Sig: TAKE 1 TABLET BY MOUTH AT BEDTIME AS NEEDED      Facility-Administered Medications: None        The list below reflects the updated allergy list. Please review each documented allergy for additional clarification and justification.  Allergies  Reviewed by Clarence Sinha RN on 12/18/2023        Severity Reactions Comments    Flexeril [cyclobenzaprine] Not Specified Unknown             Patient declines M2B at discharge.     Sources used to complete the med history include out patient fill history, OARRS, and the list from [facility].       Below are additional concerns with the patient's PTA list.      Ez Harrison [facility]  Transitions of Care Clinical Pharmacist  Please reach out via Epic Chat for questions, if  no response call  [phone] or Vocera Med[employer]  [facility]

## 2023-12-20 NOTE — CARE PLAN
The patient's goals for the shift include      The clinical goals for the shift include Monitor for bleeding this shift

## 2023-12-20 NOTE — CARE PLAN
Problem: Fall/Injury  Goal: Not fall by end of shift  Outcome: Progressing  Goal: Be free from injury by end of the shift  Outcome: Progressing  Goal: Verbalize understanding of personal risk factors for fall in the hospital  Outcome: Progressing  Goal: Verbalize understanding of risk factor reduction measures to prevent injury from fall in the home  Outcome: Progressing  Goal: Use assistive devices by end of the shift  Outcome: Progressing  Goal: Pace activities to prevent fatigue by end of the shift  Outcome: Progressing     Problem: Skin  Goal: Decreased wound size/increased tissue granulation at next dressing change  Outcome: Progressing  Goal: Participates in plan/prevention/treatment measures  Outcome: Progressing  Goal: Prevent/manage excess moisture  Outcome: Progressing  Goal: Prevent/minimize sheer/friction injuries  Outcome: Progressing  Goal: Promote/optimize nutrition  Outcome: Progressing  Goal: Promote skin healing  Outcome: Progressing     Problem: Safety  Goal: Patient will be injury free during hospitalization  Outcome: Progressing  Goal: I will remain free of falls  Outcome: Progressing     Problem: Daily Care  Goal: Daily care needs are met  Outcome: Progressing     Problem: Psychosocial Needs  Goal: Demonstrates ability to cope with hospitalization/illness  Outcome: Progressing  Goal: Collaborate with me, my family, and caregiver to identify my specific goals  Outcome: Progressing     Problem: Psychosocial Needs  Goal: Collaborate with me, my family, and caregiver to identify my specific goals  Outcome: Progressing     Problem: Pain  Goal: Takes deep breaths with improved pain control throughout the shift  Outcome: Progressing  Goal: Turns in bed with improved pain control throughout the shift  Outcome: Progressing  Goal: Walks with improved pain control throughout the shift  Outcome: Progressing  Goal: Performs ADL's with improved pain control throughout shift  Outcome: Progressing  Goal:  Participates in PT with improved pain control throughout the shift  Outcome: Progressing  Goal: Free from opioid side effects throughout the shift  Outcome: Progressing  Goal: Free from acute confusion related to pain meds throughout the shift  Outcome: Progressing     Problem: Pain - Adult  Goal: Verbalizes/displays adequate comfort level or baseline comfort level  Outcome: Progressing     Problem: Safety - Adult  Goal: Free from fall injury  Outcome: Progressing     Problem: Discharge Planning  Goal: Discharge to home or other facility with appropriate resources  Outcome: Progressing     Problem: Diabetes  Goal: Achieve decreasing blood glucose levels by end of shift  Outcome: Progressing  Goal: Increase stability of blood glucose readings by end of shift  Outcome: Progressing  Goal: Decrease in ketones present in urine by end of shift  Outcome: Progressing  Goal: Maintain electrolyte levels within acceptable range throughout shift  Outcome: Progressing  Goal: Maintain glucose levels >70mg/dl to <250mg/dl throughout shift  Outcome: Progressing  Goal: No changes in neurological exam by end of shift  Outcome: Progressing  Goal: Learn about and adhere to nutrition recommendations by end of shift  Outcome: Progressing  Goal: Vital signs within normal range for age by end of shift  Outcome: Progressing  Goal: Increase self care and/or family involovement by end of shift  Outcome: Progressing  Goal: Receive DSME education by end of shift  Outcome: Progressing   The patient's goals for the shift include      The clinical goals for the shift include pt does not mahve any GI bleed throughought shift

## 2023-12-21 PROBLEM — Z98.890 PONV (POSTOPERATIVE NAUSEA AND VOMITING): Status: ACTIVE | Noted: 2023-12-21

## 2023-12-21 PROBLEM — T88.53XA AWARENESS UNDER ANESTHESIA: Status: ACTIVE | Noted: 2023-12-21

## 2023-12-21 PROBLEM — R11.2 PONV (POSTOPERATIVE NAUSEA AND VOMITING): Status: ACTIVE | Noted: 2023-12-21

## 2023-12-21 LAB
ABO GROUP (TYPE) IN BLOOD: NORMAL
ALBUMIN SERPL BCP-MCNC: 3.1 G/DL (ref 3.4–5)
ANION GAP SERPL CALC-SCNC: 13 MMOL/L (ref 10–20)
ANTIBODY SCREEN: NORMAL
BUN SERPL-MCNC: 5 MG/DL (ref 6–23)
CALCIUM SERPL-MCNC: 8.9 MG/DL (ref 8.6–10.6)
CHLORIDE SERPL-SCNC: 98 MMOL/L (ref 98–107)
CO2 SERPL-SCNC: 29 MMOL/L (ref 21–32)
CREAT SERPL-MCNC: 0.72 MG/DL (ref 0.5–1.05)
GFR SERPL CREATININE-BSD FRML MDRD: >90 ML/MIN/1.73M*2
GLUCOSE BLD MANUAL STRIP-MCNC: 85 MG/DL (ref 74–99)
GLUCOSE BLD MANUAL STRIP-MCNC: 88 MG/DL (ref 74–99)
GLUCOSE SERPL-MCNC: 72 MG/DL (ref 74–99)
MAGNESIUM SERPL-MCNC: 2.29 MG/DL (ref 1.6–2.4)
PHOSPHATE SERPL-MCNC: 2.9 MG/DL (ref 2.5–4.9)
POTASSIUM SERPL-SCNC: 3.8 MMOL/L (ref 3.5–5.3)
RH FACTOR (ANTIGEN D): NORMAL
SODIUM SERPL-SCNC: 136 MMOL/L (ref 136–145)

## 2023-12-21 PROCEDURE — 36415 COLL VENOUS BLD VENIPUNCTURE: CPT

## 2023-12-21 PROCEDURE — 86901 BLOOD TYPING SEROLOGIC RH(D): CPT

## 2023-12-21 PROCEDURE — 2500000001 HC RX 250 WO HCPCS SELF ADMINISTERED DRUGS (ALT 637 FOR MEDICARE OP)

## 2023-12-21 PROCEDURE — C9113 INJ PANTOPRAZOLE SODIUM, VIA: HCPCS

## 2023-12-21 PROCEDURE — 83735 ASSAY OF MAGNESIUM: CPT

## 2023-12-21 PROCEDURE — 94640 AIRWAY INHALATION TREATMENT: CPT

## 2023-12-21 PROCEDURE — 1100000001 HC PRIVATE ROOM DAILY

## 2023-12-21 PROCEDURE — 80069 RENAL FUNCTION PANEL: CPT

## 2023-12-21 PROCEDURE — 99232 SBSQ HOSP IP/OBS MODERATE 35: CPT

## 2023-12-21 PROCEDURE — 2500000004 HC RX 250 GENERAL PHARMACY W/ HCPCS (ALT 636 FOR OP/ED)

## 2023-12-21 PROCEDURE — 2500000002 HC RX 250 W HCPCS SELF ADMINISTERED DRUGS (ALT 637 FOR MEDICARE OP, ALT 636 FOR OP/ED)

## 2023-12-21 PROCEDURE — 82947 ASSAY GLUCOSE BLOOD QUANT: CPT

## 2023-12-21 PROCEDURE — 97161 PT EVAL LOW COMPLEX 20 MIN: CPT | Mod: GP

## 2023-12-21 RX ORDER — POTASSIUM CHLORIDE 20 MEQ/1
20 TABLET, EXTENDED RELEASE ORAL ONCE
Status: COMPLETED | OUTPATIENT
Start: 2023-12-21 | End: 2023-12-21

## 2023-12-21 RX ADMIN — FLUTICASONE FUROATE AND VILANTEROL TRIFENATATE 1 PUFF: 100; 25 POWDER RESPIRATORY (INHALATION) at 08:31

## 2023-12-21 RX ADMIN — ATORVASTATIN CALCIUM 40 MG: 40 TABLET, FILM COATED ORAL at 21:06

## 2023-12-21 RX ADMIN — FLUOXETINE 40 MG: 20 CAPSULE ORAL at 09:00

## 2023-12-21 RX ADMIN — Medication 5 MG: at 21:06

## 2023-12-21 RX ADMIN — GABAPENTIN 100 MG: 100 CAPSULE ORAL at 21:06

## 2023-12-21 RX ADMIN — PANTOPRAZOLE SODIUM 40 MG: 40 INJECTION, POWDER, FOR SOLUTION INTRAVENOUS at 04:30

## 2023-12-21 RX ADMIN — POTASSIUM CHLORIDE 20 MEQ: 1500 TABLET, EXTENDED RELEASE ORAL at 08:00

## 2023-12-21 RX ADMIN — TERBINAFINE HYDROCHLORIDE: 1 CREAM TOPICAL at 21:00

## 2023-12-21 ASSESSMENT — COGNITIVE AND FUNCTIONAL STATUS - GENERAL
CLIMB 3 TO 5 STEPS WITH RAILING: A LITTLE
WALKING IN HOSPITAL ROOM: A LITTLE
CLIMB 3 TO 5 STEPS WITH RAILING: A LITTLE
DRESSING REGULAR LOWER BODY CLOTHING: A LITTLE
MOBILITY SCORE: 21
CLIMB 3 TO 5 STEPS WITH RAILING: A LITTLE
EATING MEALS: A LITTLE
WALKING IN HOSPITAL ROOM: A LITTLE
STANDING UP FROM CHAIR USING ARMS: A LITTLE
MOBILITY SCORE: 20
DRESSING REGULAR UPPER BODY CLOTHING: A LITTLE
DAILY ACTIVITIY SCORE: 18
MOVING TO AND FROM BED TO CHAIR: A LITTLE
PERSONAL GROOMING: A LITTLE
STANDING UP FROM CHAIR USING ARMS: A LITTLE
WALKING IN HOSPITAL ROOM: A LITTLE
HELP NEEDED FOR BATHING: A LITTLE
DAILY ACTIVITIY SCORE: 23
HELP NEEDED FOR BATHING: A LITTLE
TOILETING: A LITTLE
MOBILITY SCORE: 22

## 2023-12-21 ASSESSMENT — PAIN SCALES - PAIN ASSESSMENT IN ADVANCED DEMENTIA (PAINAD)
BODYLANGUAGE: RELAXED
CONSOLABILITY: NO NEED TO CONSOLE
BREATHING: NORMAL

## 2023-12-21 ASSESSMENT — PAIN - FUNCTIONAL ASSESSMENT: PAIN_FUNCTIONAL_ASSESSMENT: 0-10

## 2023-12-21 ASSESSMENT — PAIN SCALES - WONG BAKER: WONGBAKER_NUMERICALRESPONSE: NO HURT

## 2023-12-21 ASSESSMENT — PAIN SCALES - GENERAL
PAINLEVEL_OUTOF10: 0 - NO PAIN
PAINLEVEL_OUTOF10: 2
PAINLEVEL_OUTOF10: 0 - NO PAIN

## 2023-12-21 ASSESSMENT — ACTIVITIES OF DAILY LIVING (ADL): ADL_ASSISTANCE: INDEPENDENT

## 2023-12-21 NOTE — PROGRESS NOTES
"Hilda Jones is a 68 y.o. female on day 3 of admission presenting with Gastrointestinal hemorrhage, unspecified gastrointestinal hemorrhage type.    Subjective   NAEO.     Pt is without sig changes from yesterday. Pt is with mild abdominal discomfort from bowel prep. No dyspnea, presyncope/syncope, chest pain. The bowel prep was more consumed by pt  with a little more than half of 4 L consumed    No dyspnea.        Objective     Last Recorded Vitals  Blood pressure 88/53, pulse 69, temperature 36.6 °C (97.9 °F), resp. rate 12, height 1.651 m (5' 5\"), weight 56.8 kg (125 lb 3.5 oz), SpO2 97 %.  Intake/Output last 3 Shifts:  I/O last 3 completed shifts:  In: 2129 (38.6 mL/kg) [P.O.:1080; I.V.:50 (0.9 mL/kg); IV Piggyback:999]  Out: - (0 mL/kg)   Dosing Weight: 55.1 kg     PHYSICAL EXAM:   General: well appearing, NAD, pleasant and engaged in encounter    HEENT: NCAT, MMM  CV: RRR, no m/r/g  PULM: CTAB, non-labored respirations   ABD: soft, NT, ND, + bowel sounds   : no suprapubic or CVA tenderness   EXT: WWP, no significant edema   SKIN: hx of shingles. No painful or pruritic vesicles noted  NEURO: A&Ox4, symmetric facies, no gross motor or sensory deficits, normal gait  PSYCH: pleasant mood, appropriate affect      Relevant Results  Vitals:    12/21/23 0443   BP: 88/53   Pulse: 69   Resp: 12   Temp: 36.6 °C (97.9 °F)   SpO2: 97%       Scheduled medications  atorvastatin, 40 mg, oral, Nightly  [Held by provider] donepezil, 5 mg, oral, Nightly  FLUoxetine, 40 mg, oral, Daily  fluticasone furoate-vilanteroL, 1 puff, inhalation, Daily  gabapentin, 100 mg, oral, Nightly  insulin lispro, 0-5 Units, subcutaneous, TID with meals  pantoprazole, 40 mg, intravenous, BID  terbinafine, , Topical, BID      Continuous medications     PRN medications  PRN medications: albuterol, dextrose 10 % in water (D10W), dextrose, glucagon, heparin, melatonin, polyethylene glycol, polyethylene glycol-electrolytes            "       Assessment/Plan   Principal Problem:    Gastrointestinal hemorrhage, unspecified gastrointestinal hemorrhage type    Ms. Jones is a 67-year-old with past medical history of atrial fibrillation, atrial flutter previously on Xarelto, COPD, diabetes mellitus, aortic valve insufficiency  status post aortic valve replacement, SMA occlusion status post thromboembolectomy who presents to the MICU for hemodynamic instability in the setting of suspected lower GI bleed.      Patient has hx of thromboembolic disease x2 with occlusion of SMA s/p embelectomy, although complete occlusion not seen on CTA, thromboembolism should be high on differential given prior history and multiple risk factors. Ischemic colitis less likely given no lactate elevation and only mild abdominal discomfort/pain. Diverticula not clearly visualized lower GI painless bleeding may point towards diverticular disease. Given history of recent abdominal surgery can consider adhesions/anastomotic ulcers. With hx of “black stool” should still consider upper GI bleed 2/2 to peptic ulcer/gastritis etc.        12/21 updates  -hemoglobin and BP is stable   -discussed with cardiology that recommendation is for warfarin for valvular afib. Given pt had issues with being therepeautic INR in past and questions about compliance will consider BID lovenox as mentioned in previous cardiology notes  -pending colonoscopy +/- EGD today        #GI bleed  -Unclear etiology at this time, but given CTA findings with active extravasation in ascending colon this is LGIB  -Thromboembolic disease vs ischemic colitis vs diverticular disease vs UGIB 2/2 PUD/gastritis. Given history of recent abdominal surgery can consider adhesions/anastomotic ulcers.  -Maintain active type and screen and two peripheral IVs, CMP, lactate, type and screen and coags ordered  -Patient is s/p 2L of LR in the ED, blood pressure since arrival is 100/66 hemodynamically stable.  -Additional 500cc of LR  given upon arrival to MICU  -Monitor BMs and trend hemoglobin.   -IR has been consulted regarding this patient's bleed, initial impression was that bleed was not severe and that colonoscopy may be ideal approach  - No bloody BM since prior to bowel prep, however watery stools morning of 12/20 with darker brown color   - 40mg IV PPI BID ordered  -Last dose of Xarelto was 12/18 morning, if Hgb drop/hemodynamic instability consider administering 4F PCC  -plan for colonoscopy +/- EGD on 12/20      #atrial fibrillation/flutter on Coumadin with 2 prior thromboemboli  #aortic valve insufficiency   #HF 2/2 rheumatic heart disease with symptomatic mitral stenosis and severe TR  #SALTY thrombus 7/2023  -Is at high risk of thromboembolism given rheumatic mitral stenosis and recent SALTY thrombus, was recently switched from warfarin back to Xarelto as per patient.  -Holding anticoagulation in the setting of active bleed including aspirin and Xarelto  -discussed with cardiology that recommendation is for warfarin for valvular afib. Given pt had issues with being therepeautic INR in past and questions about compliance will consider BID lovenox as mentioned in previous cardiology notes  -Holding empagliflozin, furosemide, metoprolol, spironolactone in the setting of hypotension.     #T2DM - 5.9%   -On lantus 10 units and lispro TID at home  -LDSSI     #VASHTI - resolved   -Patient has VASHTI likely 2/2 to hypo perfusion, pre-renal   -S/p 2L in ED  -Giving additional 500cc of LR upon arrival to MICU  -Trend RFP     #questionable anemia 2/2 to GIB (baseline hgb of 8.7-8.9)  -Transfuse Hgb < 7     F: s/p 2.5L, replete as needed but caution given hx of HF  E: replete as needed  N: CLD, NPO at midnight    A: two PIVs  O2: room air  Gtt: none   DVT ppx: none  Abx: none         Doni Carver MD

## 2023-12-21 NOTE — PROGRESS NOTES
Physical Therapy    Physical Therapy Evaluation    Patient Name: Hilda Jones  MRN: [id number]  Today's Date: 12/21/2023   Time Calculation  Start Time: 0911  Stop Time: 0921  Time Calculation (min): 10 min    Assessment/Plan   PT Assessment  PT Assessment Results: Impaired balance, Decreased mobility, Decreased endurance  Rehab Prognosis: Good  End of Session Communication: Bedside nurse  End of Session Patient Position: Bed, 3 rail up, Alarm off, not on at start of session  IP OR SWING BED PT PLAN  Inpatient or Swing Bed: Inpatient  PT Plan  Treatment/Interventions: Transfer training, Gait training, Stair training, Balance training, Endurance training  PT Plan: Skilled PT  PT Frequency: 3 times per week  PT Discharge Recommendations: Low intensity level of continued care  PT - OK to Discharge: Yes      Subjective   General Visit Information:  Reason for Referral: lower GI bleed  Past Medical History Relevant to Rehab: atrial fibrillation, atrial flutter previously on Xarelto, COPD, diabetes mellitus, aortic valve insufficiency  status post aortic valve replacement, SMA occlusion status post thromboembolectomy  Prior to Session Communication: Bedside nurse  Patient Position Received: Bed, 3 rail up, Alarm off, not on at start of session   Home Living:  Home Living  Type of Home: Apartment  Lives With: Alone  Home Layout: One level  Home Access: Elevator  Prior Level of Function:  Prior Function Per Pt/Caregiver Report  ADL Assistance: Independent  Homemaking Assistance: Independent  Ambulatory Assistance: Independent (uses a rollator)  Prior Function Comments: one recent fall, (-) drive  Precautions:  Precautions  Medical Precautions: Fall precautions       Objective     Pain:  Pain Assessment  Pain Assessment: 0-10  Pain Score: 2  Pain Location: Abdomen  Cognition:  Cognition  Overall Cognitive Status: Within Functional Limits      Extremity/Trunk Assessments:  Strength:     RLE   RLE : Within Functional Limits  LLE    LLE : Within Functional Limits    General Assessments:     Sensation  Light Touch: No apparent deficits     Static Sitting Balance  Static Sitting-Level of Assistance: Independent  Dynamic Sitting Balance  Dynamic Sitting-Comments: independent  Static Standing Balance  Static Standing-Level of Assistance:  (SBA)  Dynamic Standing Balance  Dynamic Standing-Comments: CGA    Functional Assessments:  Bed Mobility  Bed Mobility: Yes  Bed Mobility 1  Bed Mobility 1: Supine to sitting, Sitting to supine  Level of Assistance 1: Distant supervision  Transfers  Transfer: Yes  Transfer 1  Technique 1: Sit to stand, Stand to sit  Transfer Device 1:  (pt declined to use a device)  Transfer Level of Assistance 1:  (SBA)  Ambulation/Gait Training  Ambulation/Gait Training Performed: Yes  Ambulation/Gait Training 1  Device 1:  (pt declined to use a device)  Assistance 1:  (SBA)  Quality of Gait 1:  (decreased franki)  Comments/Distance (ft) 1: 15 feet     Outcome Measures:  [facility] Basic Mobility  Turning from your back to your side while in a flat bed without using bedrails: None  Moving from lying on your back to sitting on the side of a flat bed without using bedrails: None  Moving to and from bed to chair (including a wheelchair): A little  Standing up from a chair using your arms (e.g. wheelchair or bedside chair): A little  To walk in hospital room: A little  Climbing 3-5 steps with railing: A little  Basic Mobility - Total Score: 20       Encounter Problems       Encounter Problems (Active)       Balance       independent static/dynamic stance       Start:  12/21/23    Expected End:  01/11/24               Mobility       STG - Patient will ambulate 150 feet with LRD Modified independent        Start:  12/21/23    Expected End:  01/11/24               Transfers       STG - Patient will transfer sit to and from stand with LRD Modified independent        Start:  12/21/23    Expected End:  01/11/24                   Education  Documentation  Precautions, taught by Celi Brown, PT at 12/21/2023 10:05 AM.  Learner: Patient  Readiness: Acceptance  Method: Explanation  Response: Verbalizes Understanding    Mobility Training, taught by Celi Brown, PT at 12/21/2023 10:05 AM.  Learner: Patient  Readiness: Acceptance  Method: Explanation  Response: Verbalizes Understanding    Education Comments  No comments found.            12/21/23 at 10:06 AM   Celi Brown, PT

## 2023-12-21 NOTE — CARE PLAN
Problem: Fall/Injury  Goal: Not fall by end of shift  Outcome: Progressing  Goal: Be free from injury by end of the shift  Outcome: Progressing  Goal: Verbalize understanding of personal risk factors for fall in the hospital  Outcome: Progressing  Goal: Verbalize understanding of risk factor reduction measures to prevent injury from fall in the home  Outcome: Progressing  Goal: Use assistive devices by end of the shift  Outcome: Progressing  Goal: Pace activities to prevent fatigue by end of the shift  Outcome: Progressing     Problem: Skin  Goal: Decreased wound size/increased tissue granulation at next dressing change  Outcome: Progressing  Flowsheets (Taken 12/20/2023 2037)  Decreased wound size/increased tissue granulation at next dressing change:   Promote sleep for wound healing   Protective dressings over bony prominences  Goal: Participates in plan/prevention/treatment measures  Outcome: Progressing  Flowsheets (Taken 12/20/2023 2037)  Participates in plan/prevention/treatment measures: Elevate heels  Goal: Prevent/manage excess moisture  Outcome: Progressing  Flowsheets (Taken 12/20/2023 2037)  Prevent/manage excess moisture:   Moisturize dry skin   Cleanse incontinence/protect with barrier cream  Goal: Prevent/minimize sheer/friction injuries  Outcome: Progressing  Flowsheets (Taken 12/20/2023 2037)  Prevent/minimize sheer/friction injuries:   HOB 30 degrees or less   Turn/reposition every 2 hours/use positioning/transfer devices  Goal: Promote/optimize nutrition  Outcome: Progressing  Flowsheets (Taken 12/20/2023 2037)  Promote/optimize nutrition: Assist with feeding  Goal: Promote skin healing  Outcome: Progressing  Flowsheets (Taken 12/20/2023 2037)  Promote skin healing:   Assess skin/pad under line(s)/device(s)   Turn/reposition every 2 hours/use positioning/transfer devices     Problem: Pain  Goal: My pain/discomfort is manageable  Outcome: Progressing     Problem: Safety  Goal: Patient will be  injury free during hospitalization  Outcome: Progressing  Goal: I will remain free of falls  Outcome: Progressing     Problem: Daily Care  Goal: Daily care needs are met  Outcome: Progressing     Problem: Psychosocial Needs  Goal: Demonstrates ability to cope with hospitalization/illness  Outcome: Progressing  Goal: Collaborate with me, my family, and caregiver to identify my specific goals  Outcome: Progressing     Problem: Discharge Barriers  Goal: My discharge needs are met  Outcome: Progressing     Problem: Pain  Goal: Takes deep breaths with improved pain control throughout the shift  Outcome: Progressing  Goal: Turns in bed with improved pain control throughout the shift  Outcome: Progressing  Goal: Walks with improved pain control throughout the shift  Outcome: Progressing  Goal: Performs ADL's with improved pain control throughout shift  Outcome: Progressing  Goal: Participates in PT with improved pain control throughout the shift  Outcome: Progressing  Goal: Free from opioid side effects throughout the shift  Outcome: Progressing  Goal: Free from acute confusion related to pain meds throughout the shift  Outcome: Progressing     Problem: Diabetes  Goal: Achieve decreasing blood glucose levels by end of shift  Outcome: Progressing  Goal: Increase stability of blood glucose readings by end of shift  Outcome: Progressing  Goal: Decrease in ketones present in urine by end of shift  Outcome: Progressing  Goal: Maintain electrolyte levels within acceptable range throughout shift  Outcome: Progressing  Goal: Maintain glucose levels >70mg/dl to <250mg/dl throughout shift  Outcome: Progressing  Goal: No changes in neurological exam by end of shift  Outcome: Progressing  Goal: Learn about and adhere to nutrition recommendations by end of shift  Outcome: Progressing  Goal: Vital signs within normal range for age by end of shift  Outcome: Progressing  Goal: Increase self care and/or family involovement by end of  shift  Outcome: Progressing  Goal: Receive DSME education by end of shift  Outcome: Progressing

## 2023-12-22 ENCOUNTER — DOCUMENTATION (OUTPATIENT)
Dept: HOME HEALTH SERVICES | Facility: HOME HEALTH | Age: 68
End: 2023-12-22
Payer: MEDICARE

## 2023-12-22 ENCOUNTER — APPOINTMENT (OUTPATIENT)
Dept: GASTROENTEROLOGY | Facility: HOSPITAL | Age: 68
DRG: 378 | End: 2023-12-22
Payer: MEDICARE

## 2023-12-22 ENCOUNTER — HOME HEALTH ADMISSION (OUTPATIENT)
Dept: HOME HEALTH SERVICES | Facility: HOME HEALTH | Age: 68
End: 2023-12-22
Payer: MEDICARE

## 2023-12-22 LAB
ALBUMIN SERPL BCP-MCNC: 2.5 G/DL (ref 3.4–5)
ANION GAP SERPL CALC-SCNC: 11 MMOL/L (ref 10–20)
BASOPHILS # BLD AUTO: 0.09 X10*3/UL (ref 0–0.1)
BASOPHILS NFR BLD AUTO: 1.1 %
BUN SERPL-MCNC: 4 MG/DL (ref 6–23)
CALCIUM SERPL-MCNC: 8.2 MG/DL (ref 8.6–10.6)
CHLORIDE SERPL-SCNC: 101 MMOL/L (ref 98–107)
CO2 SERPL-SCNC: 27 MMOL/L (ref 21–32)
CREAT SERPL-MCNC: 0.74 MG/DL (ref 0.5–1.05)
EOSINOPHIL # BLD AUTO: 0.33 X10*3/UL (ref 0–0.7)
EOSINOPHIL NFR BLD AUTO: 4.2 %
ERYTHROCYTE [DISTWIDTH] IN BLOOD BY AUTOMATED COUNT: 19 % (ref 11.5–14.5)
GFR SERPL CREATININE-BSD FRML MDRD: 88 ML/MIN/1.73M*2
GLUCOSE BLD MANUAL STRIP-MCNC: 104 MG/DL (ref 74–99)
GLUCOSE BLD MANUAL STRIP-MCNC: 124 MG/DL (ref 74–99)
GLUCOSE BLD MANUAL STRIP-MCNC: 84 MG/DL (ref 74–99)
GLUCOSE SERPL-MCNC: 76 MG/DL (ref 74–99)
HCT VFR BLD AUTO: 23 % (ref 36–46)
HGB BLD-MCNC: 7.5 G/DL (ref 12–16)
IMM GRANULOCYTES # BLD AUTO: 0.02 X10*3/UL (ref 0–0.7)
IMM GRANULOCYTES NFR BLD AUTO: 0.3 % (ref 0–0.9)
LYMPHOCYTES # BLD AUTO: 2.98 X10*3/UL (ref 1.2–4.8)
LYMPHOCYTES NFR BLD AUTO: 37.8 %
MAGNESIUM SERPL-MCNC: 1.87 MG/DL (ref 1.6–2.4)
MCH RBC QN AUTO: 23.4 PG (ref 26–34)
MCHC RBC AUTO-ENTMCNC: 32.6 G/DL (ref 32–36)
MCV RBC AUTO: 72 FL (ref 80–100)
MONOCYTES # BLD AUTO: 1.15 X10*3/UL (ref 0.1–1)
MONOCYTES NFR BLD AUTO: 14.6 %
NEUTROPHILS # BLD AUTO: 3.31 X10*3/UL (ref 1.2–7.7)
NEUTROPHILS NFR BLD AUTO: 42 %
NRBC BLD-RTO: 0 /100 WBCS (ref 0–0)
PHOSPHATE SERPL-MCNC: 3.9 MG/DL (ref 2.5–4.9)
PLATELET # BLD AUTO: 292 X10*3/UL (ref 150–450)
POTASSIUM SERPL-SCNC: 3.8 MMOL/L (ref 3.5–5.3)
RBC # BLD AUTO: 3.21 X10*6/UL (ref 4–5.2)
SODIUM SERPL-SCNC: 135 MMOL/L (ref 136–145)
WBC # BLD AUTO: 7.9 X10*3/UL (ref 4.4–11.3)

## 2023-12-22 PROCEDURE — 45385 COLONOSCOPY W/LESION REMOVAL: CPT | Performed by: INTERNAL MEDICINE

## 2023-12-22 PROCEDURE — 2500000004 HC RX 250 GENERAL PHARMACY W/ HCPCS (ALT 636 FOR OP/ED)

## 2023-12-22 PROCEDURE — 99231 SBSQ HOSP IP/OBS SF/LOW 25: CPT | Performed by: INTERNAL MEDICINE

## 2023-12-22 PROCEDURE — 3700000012 HC SEDATION LEVEL 5+ TIME - INITIAL 15 MINUTES 5/> YEARS

## 2023-12-22 PROCEDURE — 2500000002 HC RX 250 W HCPCS SELF ADMINISTERED DRUGS (ALT 637 FOR MEDICARE OP, ALT 636 FOR OP/ED)

## 2023-12-22 PROCEDURE — 85025 COMPLETE CBC W/AUTO DIFF WBC: CPT

## 2023-12-22 PROCEDURE — 7100000009 HC PHASE TWO TIME - INITIAL BASE CHARGE

## 2023-12-22 PROCEDURE — 2500000001 HC RX 250 WO HCPCS SELF ADMINISTERED DRUGS (ALT 637 FOR MEDICARE OP)

## 2023-12-22 PROCEDURE — 2500000004 HC RX 250 GENERAL PHARMACY W/ HCPCS (ALT 636 FOR OP/ED): Performed by: INTERNAL MEDICINE

## 2023-12-22 PROCEDURE — 84100 ASSAY OF PHOSPHORUS: CPT

## 2023-12-22 PROCEDURE — 99152 MOD SED SAME PHYS/QHP 5/>YRS: CPT | Performed by: INTERNAL MEDICINE

## 2023-12-22 PROCEDURE — 7100000010 HC PHASE TWO TIME - EACH INCREMENTAL 1 MINUTE

## 2023-12-22 PROCEDURE — 43235 EGD DIAGNOSTIC BRUSH WASH: CPT | Performed by: INTERNAL MEDICINE

## 2023-12-22 PROCEDURE — C9113 INJ PANTOPRAZOLE SODIUM, VIA: HCPCS

## 2023-12-22 PROCEDURE — 88305 TISSUE EXAM BY PATHOLOGIST: CPT | Mod: TC,SUR | Performed by: INTERNAL MEDICINE

## 2023-12-22 PROCEDURE — 88305 TISSUE EXAM BY PATHOLOGIST: CPT | Performed by: PATHOLOGY

## 2023-12-22 PROCEDURE — 83735 ASSAY OF MAGNESIUM: CPT

## 2023-12-22 PROCEDURE — 1100000001 HC PRIVATE ROOM DAILY

## 2023-12-22 PROCEDURE — 82947 ASSAY GLUCOSE BLOOD QUANT: CPT

## 2023-12-22 PROCEDURE — 36415 COLL VENOUS BLD VENIPUNCTURE: CPT

## 2023-12-22 RX ORDER — MIDAZOLAM HYDROCHLORIDE 1 MG/ML
INJECTION, SOLUTION INTRAMUSCULAR; INTRAVENOUS AS NEEDED
Status: COMPLETED | OUTPATIENT
Start: 2023-12-22 | End: 2023-12-22

## 2023-12-22 RX ORDER — MAGNESIUM SULFATE HEPTAHYDRATE 40 MG/ML
2 INJECTION, SOLUTION INTRAVENOUS ONCE
Status: COMPLETED | OUTPATIENT
Start: 2023-12-22 | End: 2023-12-22

## 2023-12-22 RX ORDER — FENTANYL CITRATE 50 UG/ML
INJECTION, SOLUTION INTRAMUSCULAR; INTRAVENOUS AS NEEDED
Status: COMPLETED | OUTPATIENT
Start: 2023-12-22 | End: 2023-12-22

## 2023-12-22 RX ADMIN — PANTOPRAZOLE SODIUM 40 MG: 40 INJECTION, POWDER, FOR SOLUTION INTRAVENOUS at 16:24

## 2023-12-22 RX ADMIN — PANTOPRAZOLE SODIUM 40 MG: 40 INJECTION, POWDER, FOR SOLUTION INTRAVENOUS at 04:33

## 2023-12-22 RX ADMIN — MIDAZOLAM 2 MG: 1 INJECTION INTRAMUSCULAR; INTRAVENOUS at 13:55

## 2023-12-22 RX ADMIN — FLUOXETINE 40 MG: 20 CAPSULE ORAL at 08:00

## 2023-12-22 RX ADMIN — FLUTICASONE FUROATE AND VILANTEROL TRIFENATATE 1 PUFF: 100; 25 POWDER RESPIRATORY (INHALATION) at 08:00

## 2023-12-22 RX ADMIN — GABAPENTIN 100 MG: 100 CAPSULE ORAL at 20:38

## 2023-12-22 RX ADMIN — ATORVASTATIN CALCIUM 40 MG: 40 TABLET, FILM COATED ORAL at 20:38

## 2023-12-22 RX ADMIN — TERBINAFINE HYDROCHLORIDE: 1 CREAM TOPICAL at 09:00

## 2023-12-22 RX ADMIN — FENTANYL CITRATE 50 MCG: 50 INJECTION, SOLUTION INTRAMUSCULAR; INTRAVENOUS at 13:54

## 2023-12-22 RX ADMIN — MAGNESIUM SULFATE HEPTAHYDRATE 2 G: 40 INJECTION, SOLUTION INTRAVENOUS at 08:02

## 2023-12-22 RX ADMIN — TERBINAFINE HYDROCHLORIDE: 1 CREAM TOPICAL at 21:00

## 2023-12-22 RX ADMIN — Medication 5 MG: at 20:38

## 2023-12-22 RX ADMIN — MIDAZOLAM 1 MG: 1 INJECTION INTRAMUSCULAR; INTRAVENOUS at 14:09

## 2023-12-22 ASSESSMENT — PAIN SCALES - GENERAL

## 2023-12-22 ASSESSMENT — PAIN - FUNCTIONAL ASSESSMENT
PAIN_FUNCTIONAL_ASSESSMENT: 0-10

## 2023-12-22 ASSESSMENT — COGNITIVE AND FUNCTIONAL STATUS - GENERAL
MOBILITY SCORE: 21
STANDING UP FROM CHAIR USING ARMS: A LITTLE
DAILY ACTIVITIY SCORE: 23
CLIMB 3 TO 5 STEPS WITH RAILING: A LITTLE
WALKING IN HOSPITAL ROOM: A LITTLE
HELP NEEDED FOR BATHING: A LITTLE

## 2023-12-22 ASSESSMENT — COLUMBIA-SUICIDE SEVERITY RATING SCALE - C-SSRS

## 2023-12-22 NOTE — HH CARE COORDINATION
Home Care received a referral for Nursing, Physical Therapy, and Occupational Therapy. Unfortunately, we are unable to accept and process the referral at this time.    Patients, please reach out to the referring provider or your PCP to assist in obtaining an alternative home care agency and/or guidance to meet your needs.    Providers, please reach out to  Home Care with any questions regarding the declined referral.

## 2023-12-22 NOTE — CARE PLAN
Problem: Skin  Goal: Decreased wound size/increased tissue granulation at next dressing change  Outcome: Progressing  Goal: Participates in plan/prevention/treatment measures  Outcome: Progressing  Goal: Prevent/manage excess moisture  Outcome: Progressing  Goal: Prevent/minimize sheer/friction injuries  Outcome: Progressing  Goal: Promote/optimize nutrition  Outcome: Progressing  Goal: Promote skin healing  Outcome: Progressing     Problem: Pain  Goal: My pain/discomfort is manageable  Outcome: Progressing     Problem: Discharge Barriers  Goal: My discharge needs are met  Outcome: Progressing   The patient's goals for the shift include      The clinical goals for the shift include pt will have clear stool by tomorrow morning

## 2023-12-22 NOTE — CONSULTS
"Nutrition   Nutrition Assessment    Reason for Assessment: Admission nursing screening    Hilda Jones is a 68 y.o. female on day 4 of admission presenting with Gastrointestinal hemorrhage, unspecified gastrointestinal hemorrhage type     Surgical hx includes open SMA embolectomy with lysis of adhesions on 10/6/2023 for acute mesenteric ischemia and open SMA embolectomy in August 2022 for acute mesenteric ischemia.     For colonoscopy and EGD today.     Past medical history of atrial fibrillation, atrial flutter previously on Xarelto, COPD, diabetes mellitus, aortic valve insufficiency  status post aortic valve replacement, SMA occlusion status post thromboembolectomy.     Nutrition History:  Food and Nutrient History: Patient reports \"poor\" PO intake this admission. \"My stomach hurts and it's tender. I eat, but I get diarrhea.\" States her PO intake was better PTA. Patient lost a signicant amount of weight after her first open SMA embolectomy (Aug 2022) as well as her most recent open SMA embolectomy this past October. States wt loss more r/t diarrhea then actual change in appetite/PO intake. Patient reports she gets \"nausea a lot\" at home, but doesn't take anything for it. Patient denies chewing or swallowing problems. Reports no food allergies and no lactose intolerance. Patient doesn't like either regular Boost or regular Ensure. Suggested Ensure Clear and patient agreed to try.  Vitamin/Herbal Supplement Use: Vitamin D3, Potassium and MVI    Anthropometrics:  Height: 165.1 cm (5' 5\")   Weight: 56.7 kg (125 lb)   BMI (Calculated): 20.8  IBW/kg (Dietitian Calculated): 56.8 kg  Percent of IBW: 100 %       Weight History:   Wt Readings from Last 10 Encounters:   12/22/23 56.7 kg (125 lb) - 13.8% wt loss x ~ 2 months.   12/20/23 45.8 kg (101 lb) ? Question accuracy   11/01/23 65.8 kg (145 lb)   10/19/23 66.9 kg (147 lb 7.8 oz)   07/28/23 62.7 kg (138 lb 2 oz)   07/25/23 64 kg (141 lb)   07/06/23 64.7 kg (142 lb 11.2 " "oz)   06/26/23 65 kg (143 lb 6 oz)   09/21/22 85.7 kg (189 lb)   09/14/22 88.5 kg (195 lb)     Patient reports her usual wt is 130 lbs and that it \"goes up and down\" r/t diarrhea. \"I've had two bowel surgeries you know.\" States she lost 5-6 lbs in the \"last five days.\" Objective data indicates that patient lost 24% of her weight from Sept 2022 to June 2023. Weight has gone 'up and down' since June 2023 as patient reported. However, admit wt indicates a 13.8% wt loss over the past ~ 2 months.     Weight Change %:  Significant Weight Loss: Yes    Nutrition Focused Physical Exam Findings:  Deferred due to presence of bed bug(s).  Edema:  Edema: none  Physical Findings:  Skin: Positive (Lesions to back, R arm and L knee per flow sheets)    Nutrition Significant Labs:  CBC Trend:   Results from last 7 days   Lab Units 12/22/23  0603 12/20/23  0622 12/19/23  0619 12/18/23  2327   WBC AUTO x10*3/uL 7.9 7.8 8.1 10.6   RBC AUTO x10*6/uL 3.21* 3.63* 3.02* 3.42*   HEMOGLOBIN g/dL 7.5* 8.7* 7.1* 8.2*   HEMATOCRIT % 23.0* 26.6* 21.7* 23.9*   MCV fL 72* 73* 72* 70*   PLATELETS AUTO x10*3/uL 292 306 214 294    , BMP Trend:   Results from last 7 days   Lab Units 12/22/23  0603 12/21/23  0552 12/20/23  0622 12/19/23  0619   GLUCOSE mg/dL 76 72* 74 71*   CALCIUM mg/dL 8.2* 8.9 8.4* 8.2*   SODIUM mmol/L 135* 136 138 143   POTASSIUM mmol/L 3.8 3.8 3.4* 3.6   CO2 mmol/L 27 29 31 32   CHLORIDE mmol/L 101 98 99 103   BUN mg/dL 4* 5* 10 21   CREATININE mg/dL 0.74 0.72 0.69 0.80    , BG POCT trend:   Results from last 7 days   Lab Units 12/22/23  1140 12/21/23  1458 12/21/23  0121 12/20/23  1839 12/20/23  1756   POCT GLUCOSE mg/dL 84 88 85 116* 65*    , Liver Function Trend:   Results from last 7 days   Lab Units 12/20/23  0622 12/18/23  2327 12/18/23  1648   ALK PHOS U/L 85 86 101   AST U/L 16 14 19   ALT U/L 10 9 11   BILIRUBIN TOTAL mg/dL 0.6 0.4 0.5    , Renal Lab Trend:   Results from last 7 days   Lab Units 12/22/23  0603 " 12/21/23  0552 12/20/23  0622 12/19/23  0619   POTASSIUM mmol/L 3.8 3.8 3.4* 3.6   PHOSPHORUS mg/dL 3.9 2.9 3.0 3.4   SODIUM mmol/L 135* 136 138 143   MAGNESIUM mg/dL 1.87 2.29 1.75  --    EGFR mL/min/1.73m*2 88 >90 >90 80   BUN mg/dL 4* 5* 10 21   CREATININE mg/dL 0.74 0.72 0.69 0.80      Nutrition Specific Medications:  Scheduled medications  atorvastatin, 40 mg, oral, Nightly  [Held by provider] donepezil, 5 mg, oral, Nightly  FLUoxetine, 40 mg, oral, Daily  fluticasone furoate-vilanteroL, 1 puff, inhalation, Daily  gabapentin, 100 mg, oral, Nightly  insulin lispro, 0-5 Units, subcutaneous, TID with meals  pantoprazole, 40 mg, intravenous, BID  terbinafine, , Topical, BID    Continuous medications     PRN medications  PRN medications: albuterol, dextrose 10 % in water (D10W), dextrose, glucagon, heparin, melatonin, polyethylene glycol, polyethylene glycol-electrolytes    I/O:   Last BM Date: 12/22/23; Stool Appearance: Watery (12/22/23 0800)    Dietary Orders (From admission, onward)       Start     Ordered    12/21/23 1802  NPO Diet; Effective midnight  Diet effective midnight        Comments: Ok for gatorade bowel prep    12/21/23 1801                     Estimated Needs:   Total Energy Estimated Needs (kCal): 1850 kCal  Method for Estimating Needs: 56.7kg/30-35kcal  Total Protein Estimated Needs (g): 75 g  Method for Estimating Needs: 56.7kg/1.2-1.4g  Total Fluid Estimated Needs (mL): 1850 mL  Method for Estimating Needs: 1ml/kcal        Nutrition Diagnosis   Malnutrition Diagnosis  Patient has Malnutrition Diagnosis:  (Unknown withought benefit of physical exam.) Suspect some degree of malnutrition given her hx of significant wt loss.     Nutrition Diagnosis  Patient has Nutrition Diagnosis: Yes  Diagnosis Status (1): New  Nutrition Diagnosis 1: Unintended weight loss  Related to (1): chronic diarrhea (per patient) associated with bowel/surgery, mesenteric ischemia.  As Evidenced by (1): 13.8% weight loss x ~  2 months.  Additional Nutrition Diagnosis: Diagnosis 2  Diagnosis Status (2): New  Nutrition Diagnosis 2: Inadequate oral intake  Related to (2): acute illness  As Evidenced by (2): report of poor PO intake this hospitalzation.       Nutrition Interventions/Recommendations         Nutrition Prescription:  Individualized Nutrition Prescription Provided for : 1. Regular diet.  2. Ensure Clear ordered by Dietitian  3. Daily MVI  4. Check Vitamin D level.       Nutrition Education:   Encourage protein foods/beverages along with Ensure Clear.        Nutrition Monitoring and Evaluation   Food/Nutrient Related History Monitoring  Monitoring and Evaluation Plan: Energy intake  Criteria: Consume >/= 50% of meals/Ensure    Body Composition/Growth/Weight History  Monitoring and Evaluation Plan: Weight  Criteria: Stable weight    Biochemical Data, Medical Tests and Procedures  Monitoring and Evaluation Plan: Electrolyte/renal panel  Criteria: Labs wnl            Time Spent/Follow-up Reminder:   Time Spent (min): 60 minutes  Last Date of Nutrition Visit: 12/22/23  Nutrition Follow-Up Needed?: Dietitian to reassess per policy

## 2023-12-22 NOTE — PROGRESS NOTES
"Hilda Jones is a 68 y.o. female on day 4 of admission presenting with Gastrointestinal hemorrhage, unspecified gastrointestinal hemorrhage type.    Subjective   NAEO.     Patient is continuing to drink prep for colonoscopy. Stool is currently light brown. She reports no other complaints.           Objective     Last Recorded Vitals  Blood pressure 108/59, pulse 74, temperature 36.8 °C (98.2 °F), resp. rate 18, height 1.651 m (5' 5\"), weight 56.7 kg (125 lb), SpO2 96 %.  Intake/Output last 3 Shifts:  No intake/output data recorded.    PHYSICAL EXAM:   General: well appearing, NAD, pleasant and engaged in encounter    HEENT: NCAT, MMM  CV: RRR, no m/r/g  PULM: CTAB, non-labored respirations   ABD: soft, NT, ND, + bowel sounds   : no suprapubic or CVA tenderness   EXT: WWP, no significant edema   SKIN: hx of shingles. No painful or pruritic vesicles noted  NEURO: A&Ox4, symmetric facies, no gross motor or sensory deficits, normal gait  PSYCH: pleasant mood, appropriate affect      Relevant Results  Vitals:    12/22/23 0015   BP: 108/59   Pulse: 74   Resp: 18   Temp: 36.8 °C (98.2 °F)   SpO2: 96%       Scheduled medications  atorvastatin, 40 mg, oral, Nightly  [Held by provider] donepezil, 5 mg, oral, Nightly  FLUoxetine, 40 mg, oral, Daily  fluticasone furoate-vilanteroL, 1 puff, inhalation, Daily  gabapentin, 100 mg, oral, Nightly  insulin lispro, 0-5 Units, subcutaneous, TID with meals  magnesium sulfate, 2 g, intravenous, Once  pantoprazole, 40 mg, intravenous, BID  terbinafine, , Topical, BID      Continuous medications     PRN medications  PRN medications: albuterol, dextrose 10 % in water (D10W), dextrose, glucagon, heparin, melatonin, polyethylene glycol, polyethylene glycol-electrolytes                  Assessment/Plan   Principal Problem:    Gastrointestinal hemorrhage, unspecified gastrointestinal hemorrhage type    Ms. Jones is a 67-year-old with past medical history of atrial fibrillation, atrial flutter " previously on Xarelto, COPD, diabetes mellitus, aortic valve insufficiency  status post aortic valve replacement, SMA occlusion status post thromboembolectomy who presents to the MICU for hemodynamic instability in the setting of suspected lower GI bleed.      Patient has hx of thromboembolic disease x2 with occlusion of SMA s/p embelectomy, although complete occlusion not seen on CTA, thromboembolism should be high on differential given prior history and multiple risk factors. Ischemic colitis less likely given no lactate elevation and only mild abdominal discomfort/pain. Diverticula not clearly visualized lower GI painless bleeding may point towards diverticular disease. Given history of recent abdominal surgery can consider adhesions/anastomotic ulcers. With hx of “black stool” should still consider upper GI bleed 2/2 to peptic ulcer/gastritis etc.        12/22 updates:  -hgb slight downtrend to 7.5 from 8.7; will continue to monitor   -pending colonoscopy +/- EGD today            #GI bleed  -Unclear etiology at this time, but given CTA findings with active extravasation in ascending colon this is LGIB  -Thromboembolic disease vs ischemic colitis vs diverticular disease vs UGIB 2/2 PUD/gastritis. Given history of recent abdominal surgery can consider adhesions/anastomotic ulcers.  -Maintain active type and screen and two peripheral IVs, CMP, lactate, type and screen and coags ordered  -Patient is s/p 2L of LR in the ED, blood pressure since arrival is 100/66 hemodynamically stable.  -Additional 500cc of LR given upon arrival to MICU  -Monitor BMs and trend hemoglobin.   -IR has been consulted regarding this patient's bleed, initial impression was that bleed was not severe and that colonoscopy may be ideal approach  - No bloody BM since prior to bowel prep, however watery stools morning of 12/20 with darker brown color   - 40mg IV PPI BID ordered  -Last dose of Xarelto was 12/18 morning, if Hgb drop/hemodynamic  instability consider administering 4F PCC  -plan for colonoscopy +/- EGD on 12/22     #atrial fibrillation/flutter on Coumadin with 2 prior thromboemboli  #aortic valve insufficiency   #HF 2/2 rheumatic heart disease with symptomatic mitral stenosis and severe TR  #SALTY thrombus 7/2023  -Is at high risk of thromboembolism given rheumatic mitral stenosis and recent SALTY thrombus, was recently switched from warfarin back to Xarelto as per patient.  -Holding anticoagulation in the setting of active bleed including aspirin and Xarelto  -discussed with cardiology that recommendation is for warfarin for valvular afib. Given pt had issues with being therepeautic INR in past and questions about compliance will consider BID lovenox as mentioned in previous cardiology notes  -Holding empagliflozin, furosemide, metoprolol, spironolactone in the setting of hypotension.     #T2DM - 5.9%   -On lantus 10 units and lispro TID at home  -LDSSI     #VASHTI - resolved   -Patient has VASHTI likely 2/2 to hypo perfusion, pre-renal   -S/p 2L in ED  -Giving additional 500cc of LR upon arrival to MICU  -Trend RFP     #questionable anemia 2/2 to GIB (baseline hgb of 8.7-8.9)  -Transfuse Hgb < 7     F: s/p 2.5L, replete as needed but caution given hx of HF  E: replete as needed  N: CLD, NPO at midnight for EGD/colonoscopy today 12/22  A: two PIVs  O2: room air  Gtt: none   DVT ppx: none  Abx: none         Mauricio ECKERT Do, MD    Patient seen and examined.  Agree with above.  Pain resolved.    Discussed smoking cessation and likelihood of this admission being due to recurrent mesenteric ischemia.  Last colonoscopy at [facility] more than 5 years ago  Safe at home; lives alone   No issues with food security   Colonoscopy today for weakly positive CTA and updating CRC screening  Likely can go home tomorrow     Rahul Cline, DO

## 2023-12-22 NOTE — CARE PLAN
The patient's goals for the shift include      The clinical goals for the shift include pt will remain hds      Problem: Skin  Goal: Decreased wound size/increased tissue granulation at next dressing change  Outcome: Progressing  Goal: Participates in plan/prevention/treatment measures  Outcome: Progressing  Goal: Prevent/manage excess moisture  Outcome: Progressing  Goal: Prevent/minimize sheer/friction injuries  Outcome: Progressing  Goal: Promote/optimize nutrition  Outcome: Progressing  Goal: Promote skin healing  Outcome: Progressing     Problem: Pain  Goal: My pain/discomfort is manageable  Outcome: Progressing     Problem: Discharge Barriers  Goal: My discharge needs are met  Outcome: Progressing     Problem: Pain  Goal: Turns in bed with improved pain control throughout the shift  Outcome: Progressing  Goal: Walks with improved pain control throughout the shift  Outcome: Progressing  Goal: Performs ADL's with improved pain control throughout shift  Outcome: Progressing  Goal: Participates in PT with improved pain control throughout the shift  Outcome: Progressing  Goal: Free from opioid side effects throughout the shift  Outcome: Progressing  Goal: Free from acute confusion related to pain meds throughout the shift  Outcome: Progressing     Problem: Diabetes  Goal: Achieve decreasing blood glucose levels by end of shift  Outcome: Progressing  Goal: Increase stability of blood glucose readings by end of shift  Outcome: Progressing  Goal: Decrease in ketones present in urine by end of shift  Outcome: Progressing  Goal: Maintain electrolyte levels within acceptable range throughout shift  Outcome: Progressing  Goal: Maintain glucose levels >70mg/dl to <250mg/dl throughout shift  Outcome: Progressing  Goal: No changes in neurological exam by end of shift  Outcome: Progressing  Goal: Learn about and adhere to nutrition recommendations by end of shift  Outcome: Progressing  Goal: Vital signs within normal range  for age by end of shift  Outcome: Progressing  Goal: Increase self care and/or family involovement by end of shift  Outcome: Progressing  Goal: Receive DSME education by end of shift  Outcome: Progressing

## 2023-12-23 ENCOUNTER — PHARMACY VISIT (OUTPATIENT)
Dept: PHARMACY | Facility: CLINIC | Age: 68
End: 2023-12-23

## 2023-12-23 VITALS
DIASTOLIC BLOOD PRESSURE: 53 MMHG | RESPIRATION RATE: 18 BRPM | HEIGHT: 65 IN | TEMPERATURE: 97.9 F | OXYGEN SATURATION: 93 % | HEART RATE: 87 BPM | BODY MASS INDEX: 19.99 KG/M2 | WEIGHT: 120 LBS | SYSTOLIC BLOOD PRESSURE: 83 MMHG

## 2023-12-23 LAB
ALBUMIN SERPL BCP-MCNC: 2.4 G/DL (ref 3.4–5)
ANION GAP SERPL CALC-SCNC: 11 MMOL/L (ref 10–20)
BASOPHILS # BLD AUTO: 0.09 X10*3/UL (ref 0–0.1)
BASOPHILS NFR BLD AUTO: 1.1 %
BUN SERPL-MCNC: 5 MG/DL (ref 6–23)
CALCIUM SERPL-MCNC: 8 MG/DL (ref 8.6–10.6)
CHLORIDE SERPL-SCNC: 104 MMOL/L (ref 98–107)
CO2 SERPL-SCNC: 26 MMOL/L (ref 21–32)
CREAT SERPL-MCNC: 0.82 MG/DL (ref 0.5–1.05)
EOSINOPHIL # BLD AUTO: 0.38 X10*3/UL (ref 0–0.7)
EOSINOPHIL NFR BLD AUTO: 4.4 %
ERYTHROCYTE [DISTWIDTH] IN BLOOD BY AUTOMATED COUNT: 19.2 % (ref 11.5–14.5)
GFR SERPL CREATININE-BSD FRML MDRD: 78 ML/MIN/1.73M*2
GLUCOSE BLD MANUAL STRIP-MCNC: 118 MG/DL (ref 74–99)
GLUCOSE BLD MANUAL STRIP-MCNC: 84 MG/DL (ref 74–99)
GLUCOSE SERPL-MCNC: 79 MG/DL (ref 74–99)
HCT VFR BLD AUTO: 23 % (ref 36–46)
HGB BLD-MCNC: 7.6 G/DL (ref 12–16)
IMM GRANULOCYTES # BLD AUTO: 0.03 X10*3/UL (ref 0–0.7)
IMM GRANULOCYTES NFR BLD AUTO: 0.4 % (ref 0–0.9)
LYMPHOCYTES # BLD AUTO: 2.9 X10*3/UL (ref 1.2–4.8)
LYMPHOCYTES NFR BLD AUTO: 33.9 %
MAGNESIUM SERPL-MCNC: 2.11 MG/DL (ref 1.6–2.4)
MCH RBC QN AUTO: 23.6 PG (ref 26–34)
MCHC RBC AUTO-ENTMCNC: 33 G/DL (ref 32–36)
MCV RBC AUTO: 71 FL (ref 80–100)
MONOCYTES # BLD AUTO: 1.31 X10*3/UL (ref 0.1–1)
MONOCYTES NFR BLD AUTO: 15.3 %
NEUTROPHILS # BLD AUTO: 3.84 X10*3/UL (ref 1.2–7.7)
NEUTROPHILS NFR BLD AUTO: 44.9 %
NRBC BLD-RTO: 0 /100 WBCS (ref 0–0)
PHOSPHATE SERPL-MCNC: 4.4 MG/DL (ref 2.5–4.9)
PLATELET # BLD AUTO: 319 X10*3/UL (ref 150–450)
POTASSIUM SERPL-SCNC: 3.9 MMOL/L (ref 3.5–5.3)
RBC # BLD AUTO: 3.22 X10*6/UL (ref 4–5.2)
SODIUM SERPL-SCNC: 137 MMOL/L (ref 136–145)
WBC # BLD AUTO: 8.6 X10*3/UL (ref 4.4–11.3)

## 2023-12-23 PROCEDURE — 2500000004 HC RX 250 GENERAL PHARMACY W/ HCPCS (ALT 636 FOR OP/ED)

## 2023-12-23 PROCEDURE — 94640 AIRWAY INHALATION TREATMENT: CPT

## 2023-12-23 PROCEDURE — 82947 ASSAY GLUCOSE BLOOD QUANT: CPT

## 2023-12-23 PROCEDURE — 85025 COMPLETE CBC W/AUTO DIFF WBC: CPT

## 2023-12-23 PROCEDURE — 83735 ASSAY OF MAGNESIUM: CPT

## 2023-12-23 PROCEDURE — RXMED WILLOW AMBULATORY MEDICATION CHARGE

## 2023-12-23 PROCEDURE — 84100 ASSAY OF PHOSPHORUS: CPT

## 2023-12-23 PROCEDURE — 2500000001 HC RX 250 WO HCPCS SELF ADMINISTERED DRUGS (ALT 637 FOR MEDICARE OP)

## 2023-12-23 PROCEDURE — 36415 COLL VENOUS BLD VENIPUNCTURE: CPT

## 2023-12-23 PROCEDURE — C9113 INJ PANTOPRAZOLE SODIUM, VIA: HCPCS

## 2023-12-23 RX ORDER — FERROUS SULFATE 325(65) MG
325 TABLET ORAL
Qty: 30 TABLET | Refills: 0 | Status: SHIPPED | OUTPATIENT
Start: 2023-12-23 | End: 2024-01-22

## 2023-12-23 RX ADMIN — FLUOXETINE 40 MG: 20 CAPSULE ORAL at 09:26

## 2023-12-23 RX ADMIN — FLUTICASONE FUROATE AND VILANTEROL TRIFENATATE 1 PUFF: 100; 25 POWDER RESPIRATORY (INHALATION) at 09:00

## 2023-12-23 RX ADMIN — PANTOPRAZOLE SODIUM 40 MG: 40 INJECTION, POWDER, FOR SOLUTION INTRAVENOUS at 04:48

## 2023-12-23 RX ADMIN — TERBINAFINE HYDROCHLORIDE: 1 CREAM TOPICAL at 09:00

## 2023-12-23 RX ADMIN — SODIUM CHLORIDE, POTASSIUM CHLORIDE, SODIUM LACTATE AND CALCIUM CHLORIDE 500 ML: 600; 310; 30; 20 INJECTION, SOLUTION INTRAVENOUS at 09:27

## 2023-12-23 ASSESSMENT — COGNITIVE AND FUNCTIONAL STATUS - GENERAL
STANDING UP FROM CHAIR USING ARMS: A LITTLE
DAILY ACTIVITIY SCORE: 22
DAILY ACTIVITIY SCORE: 23
MOBILITY SCORE: 22
CLIMB 3 TO 5 STEPS WITH RAILING: A LITTLE
DRESSING REGULAR UPPER BODY CLOTHING: A LITTLE
HELP NEEDED FOR BATHING: A LITTLE
STANDING UP FROM CHAIR USING ARMS: A LITTLE
HELP NEEDED FOR BATHING: A LITTLE
WALKING IN HOSPITAL ROOM: A LITTLE
CLIMB 3 TO 5 STEPS WITH RAILING: A LITTLE
MOBILITY SCORE: 21

## 2023-12-23 ASSESSMENT — PAIN SCALES - GENERAL
PAINLEVEL_OUTOF10: 0 - NO PAIN
PAINLEVEL_OUTOF10: 0 - NO PAIN

## 2023-12-23 ASSESSMENT — PAIN - FUNCTIONAL ASSESSMENT
PAIN_FUNCTIONAL_ASSESSMENT: 0-10
PAIN_FUNCTIONAL_ASSESSMENT: 0-10

## 2023-12-23 NOTE — CARE PLAN
Problem: Discharge Barriers  Goal: My discharge needs are met  Outcome: Progressing   The patient's goals for the shift include      The clinical goals for the shift include pt will have VS WNL

## 2023-12-23 NOTE — CARE PLAN
Problem: Discharge Barriers  Goal: My discharge needs are met  Outcome: Progressing     Problem: Diabetes  Goal: Achieve decreasing blood glucose levels by end of shift  Outcome: Progressing  Goal: Increase stability of blood glucose readings by end of shift  Outcome: Progressing  Goal: Decrease in ketones present in urine by end of shift  Outcome: Progressing  Goal: Maintain electrolyte levels within acceptable range throughout shift  Outcome: Progressing  Goal: Maintain glucose levels >70mg/dl to <250mg/dl throughout shift  Outcome: Progressing  Goal: No changes in neurological exam by end of shift  Outcome: Progressing  Goal: Learn about and adhere to nutrition recommendations by end of shift  Outcome: Progressing  Goal: Vital signs within normal range for age by end of shift  Outcome: Progressing  Goal: Increase self care and/or family involovement by end of shift  Outcome: Progressing  Goal: Receive DSME education by end of shift  Outcome: Progressing

## 2023-12-23 NOTE — DISCHARGE INSTRUCTIONS
Dear Ms Jones,     You were admitted for concern for bleeding in your GI tract. You initially had to go to the ICU as your blood pressures were on the low side. You improved with fluids. We did a CT scan which did show evidence of bleeding in your GI system. The GI team saw you and did an upper and lower scope to look for sources of bleed. They did not find any active source of bleed but did remove a small polyp. We think you may have had bleeding from your history of mesenteric ischemia and you should follow up with GI to see if there is another type of endoscopy is needed (capsule endoscopy). You should also follow up with vascular surgery for your mesenteric ischemia.     Your blood sugars were well controlled without insulin throughout your admission. Please continue to take your sugars regularly and write down what they are. We requested an appointment with your PCP. Until you see them, please stop your insulin as we do not want your sugars to get too low. If you find your sugars are >200s consistently, please call you PCP's office.     Your blood pressures remained low throughout your admission. You never had any symptoms associated with this but we wanted to make sure you didn't develop any symptoms. Your heart rates in regard to your atrial fibrillation also were well controlled in the 60-80s without your metoprolol. Please stop taking your metoprolol until you see your cardiologist on 12/29- they can re-assess and let you know when to resume. Please also stop taking your spironolactone/aldactone and torsemide/demadex.    Although you should be on warfarin for your afib given your rheumatic heart disease history, we will continue you on xarelto. Please follow up with your provider about considering restarting warfarin.     Start taking iron supplementation given iron deficiency anemia.     It was our pleasure taking care of you.   [facility] [place] Care Team

## 2023-12-23 NOTE — DISCHARGE SUMMARY
Discharge Diagnosis  Gastrointestinal hemorrhage, unspecified gastrointestinal hemorrhage type    Issues Requiring Follow-Up  -Restart antihypertensives and GDMT as able.  Much of it was stopped while inpatient as patient had persistently soft blood pressures  -Consider reinitiating warfarin given that it is much preferred for patient's valvular A-fib  -Consideration for capsule endoscopy for obscure GI bleeding    Test Results Pending At Discharge  Pending Labs       Order Current Status    CBC and Auto Differential Collected (12/21/23 0585)    Surgical Pathology Exam Collected (12/22/23 0682)            Hospital Course  Hilda Jones is a 68 y.o. female with a history of HTN, Afib/Aflutter on Xarelto, COPD, diabetes mellitus (5.9%), aortic valve insufficiency status post aortic valve replacement X2, SMA occlusion and lysis of adhesions on 10/6/2023 by Dr. Isaacs for acute mesenteric ischemia as well as an open SMA embolectomy in August 2022 by Dr. Oropeza for acute mesenteric ischemia who initially presented [facility] ED on 12/18 for dark stools and was admitted to the MICU for hemodynamic instability. On 9am on 12/18/2023, she had 4 back to back really dark bloody bowel movements without any overt abdominal pain, but felt uneasy in her stomach leading up to these 4 episodes of dark stools. She also says that she had a bit of lightheadedness 2 days prior to her dark stools, but her lightheadedness was not persistent.  Patient had last dose of Xarelto on 12/17. Of note, when she was discharged on 10/19/2023, she was discharged on coumadin and her INR was 3.4 on the day of discharge. Her hemoglobin when she presented to [facility] was 10.2 (in comparison to 9.1 when she was discharged from the hospital in Oct 2023). Her baseline hemoglobin appears to be between 9 and 10. Also of note, her creatinine had uptrended to 1.24 from 0.68 and her BUN had uptrended to 30 from 7 (BUN creatinine ratio: 24). At 1715 on 12/18/2023, the  patient had a BP of 58/37 (likely false, as she did not have a compensatory increase in her HR and also BP checked 13 mins later was 100/72). However, the patient did have several low blood pressures, so the ER obtained a CTA which was suggestive of active GI bleed in the the ascending colon. 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 She has not had any dark stools after coming to the ER.  On 12/19 patient was transferred to the floors given stable blood pressures with 90s over 70s maintained in the MICU without vasopressor support and stable hemoglobin.  Patient was started on heparin drip on 12/20 in the setting of her valvular A-fib and recent thromboembolic events prior to colonoscopy on 12/21 which showed subcentimeter polyp in the transverse colon removed with cold snare and EGD which showed erythematous mucosa in the esophagus and antrum no ulcer identified no blood seen.  Although discussed with cardiology and the patient has a strong indication to be on warfarin given her valvular A-fib and there are notes relating as such; from review by pharm the pt was switched to xarelto by Denise Delgado (NP) on 12/7 because her inr was 1.3 on November 30 th and December 7 th and she was skeptical about the pt's compliance to medication..  Patient was discharged in stable condition on Xarelto with recommendations to follow-up with provider to reattempt to initiate warfarin.  Given that patient did not have bleeding site identified on EGD nor colonoscopy patient can be referred for capsule endoscopy.      Pertinent Physical Exam At Time of Discharge  Vitals:    12/23/23 1253   BP: 83/53   Pulse: 87   Resp:    Temp: 36.6 °C (97.9 °F)   SpO2: 93%       PHYSICAL EXAM:   General: well appearing, NAD, pleasant and engaged in encounter    HEENT: NCAT, MMM  CV: RRR, no m/r/g  PULM: CTAB, non-labored respirations   ABD: soft, NT, ND, + bowel sounds   : no suprapubic or CVA tenderness   EXT:  WWP, no significant edema   SKIN: hx of shingles. No painful or pruritic vesicles noted  NEURO: A&Ox4, symmetric facies, no gross motor or sensory deficits, normal gait  PSYCH: pleasant mood, appropriate affect      Home Medications     Medication List      START taking these medications     FeroSuL tablet; Generic drug: ferrous sulfate (325 mg ferrous sulfate);   Take 1 tablet by mouth once daily with breakfast.     CONTINUE taking these medications     albuterol 90 mcg/actuation inhaler; INHALE 1 PUFF BY MOUTH EVERY 4 HOURS   AS NEEDED   aspirin 81 mg EC tablet; Take 1 tablet (81 mg) by mouth once daily. Do   not start before October 20, 2023.   atorvastatin 40 mg tablet; Commonly known as: Lipitor; Take 1 tablet (40   mg) by mouth once daily at bedtime.   Breo Ellipta 100-25 mcg/dose inhaler; Generic drug: fluticasone   furoate-vilanteroL; INHALE 1 PUFF BY MOUTH ONCE DAILY   cholecalciferol 50 MCG (2000 UT) tablet; Commonly known as: Vitamin D-3;   TAKE 1 TABLET BY MOUTH ONCE DAILY   donepezil 5 mg tablet; Commonly known as: Aricept; TAKE 1 TABLET BY   MOUTH AT BEDTIME   FLUoxetine 40 mg capsule; Commonly known as: PROzac   gabapentin 100 mg capsule; Commonly known as: Neurontin; TAKE 1 CAPSULE   BY MOUTH AT BEDTIME   Jardiance 10 mg; Generic drug: empagliflozin; TAKE 1 TABLET BY MOUTH   ONCE DAILY   loperamide 2 mg capsule; Commonly known as: Imodium; Take 1 capsule (2   mg) by mouth 4 times a day as needed for diarrhea.   melatonin 5 mg tablet; TAKE 1 TABLET BY MOUTH AT BEDTIME AS NEEDED FOR   INSOMNIA   Metamucil Fiber Singles 3.4 gram packet; Generic drug: psyllium   multivitamin with minerals tablet; TAKE 1 TABLET BY MOUTH ONCE DAILY   nicotine 14 mg/24 hr patch; Commonly known as: Nicoderm CQ; APPLY 1   PATCH TO SKIN EVERY 24 HOURS   potassium chloride CR 20 mEq ER tablet; Commonly known as: Klor-Con M20;   TAKE 1 TABLET BY MOUTH ONCE DAILY   terbinafine 1 % cream; Commonly known as: LamISIL   Xarelto 20 mg  tablet; Generic drug: rivaroxaban     STOP taking these medications     famotidine 20 mg tablet; Commonly known as: Pepcid   Lantus Solostar U-100 Insulin 100 unit/mL (3 mL) pen; Generic drug:   insulin glargine   metoprolol succinate  mg 24 hr tablet; Commonly known as:   Toprol-XL   metoprolol tartrate 25 mg tablet; Commonly known as: Lopressor   spironolactone 25 mg tablet; Commonly known as: Aldactone   torsemide 20 mg tablet; Commonly known as: Demadex   traZODone 50 mg tablet; Commonly known as: Desyrel       Outpatient Follow-Up  Future Appointments   Date Time Provider Department Center   1/12/2024  8:40 AM Carlee Salmeron MD PhD [id number] [facility]       Doni Carver MD

## 2023-12-23 NOTE — PROGRESS NOTES
12/23/23 1220 Transitional Care Coordinator Notes:    Per team, patient will discharge to home today. Will fax orders home care orders to [facility]. Patient will transport home via [employer].                    Assessment/Plan   Principal Problem:    Gastrointestinal hemorrhage, unspecified gastrointestinal hemorrhage type    Discharge Plans: discharge home with home care           Sally Stover RN

## 2023-12-31 LAB
ATRIAL RATE: 56 BPM
P AXIS: 80 DEGREES
P OFFSET: 132 MS
P ONSET: 93 MS
PR INTERVAL: 194 MS
Q ONSET: 190 MS
QRS COUNT: 10 BEATS
QRS DURATION: 142 MS
QT INTERVAL: 542 MS
QTC CALCULATION(BAZETT): 523 MS
QTC FREDERICIA: 530 MS
R AXIS: 38 DEGREES
T AXIS: 63 DEGREES
T OFFSET: 461 MS
VENTRICULAR RATE: 56 BPM

## 2024-01-01 ENCOUNTER — APPOINTMENT (OUTPATIENT)
Dept: RADIOLOGY | Facility: HOSPITAL | Age: 69
End: 2024-01-01
Payer: MEDICARE

## 2024-01-01 ENCOUNTER — APPOINTMENT (OUTPATIENT)
Dept: CARDIOLOGY | Facility: HOSPITAL | Age: 69
End: 2024-01-01
Payer: MEDICARE

## 2024-01-01 ENCOUNTER — CLINICAL SUPPORT (OUTPATIENT)
Dept: EMERGENCY MEDICINE | Facility: HOSPITAL | Age: 69
End: 2024-01-01
Payer: MEDICARE

## 2024-01-01 PROCEDURE — 71250 CT THORAX DX C-: CPT

## 2024-01-01 PROCEDURE — 93005 ELECTROCARDIOGRAM TRACING: CPT

## 2024-01-01 PROCEDURE — 74018 RADEX ABDOMEN 1 VIEW: CPT

## 2024-01-01 PROCEDURE — 71045 X-RAY EXAM CHEST 1 VIEW: CPT

## 2024-01-01 PROCEDURE — 74177 CT ABD & PELVIS W/CONTRAST: CPT

## 2024-01-01 PROCEDURE — 75561 CARDIAC MRI FOR MORPH W/DYE: CPT

## 2024-01-01 PROCEDURE — 78830 RP LOCLZJ TUM SPECT W/CT 1: CPT

## 2024-01-01 PROCEDURE — 71046 X-RAY EXAM CHEST 2 VIEWS: CPT

## 2024-01-01 PROCEDURE — 74174 CTA ABD&PLVS W/CONTRAST: CPT

## 2024-01-03 LAB
LABORATORY COMMENT REPORT: NORMAL
PATH REPORT.FINAL DX SPEC: NORMAL
PATH REPORT.GROSS SPEC: NORMAL
PATH REPORT.TOTAL CANCER: NORMAL

## 2024-01-12 ENCOUNTER — APPOINTMENT (OUTPATIENT)
Dept: CARDIOLOGY | Facility: HOSPITAL | Age: 69
End: 2024-01-12
Payer: MEDICARE

## 2024-01-22 ENCOUNTER — APPOINTMENT (OUTPATIENT)
Dept: OPHTHALMOLOGY | Facility: CLINIC | Age: 69
End: 2024-01-22
Payer: MEDICARE

## 2024-03-30 ENCOUNTER — HOSPITAL ENCOUNTER (EMERGENCY)
Facility: HOSPITAL | Age: 69
Discharge: HOME | End: 2024-03-30
Attending: EMERGENCY MEDICINE
Payer: MEDICARE

## 2024-03-30 ENCOUNTER — APPOINTMENT (OUTPATIENT)
Dept: RADIOLOGY | Facility: HOSPITAL | Age: 69
End: 2024-03-30
Payer: MEDICARE

## 2024-03-30 VITALS
BODY MASS INDEX: 21.33 KG/M2 | HEART RATE: 68 BPM | WEIGHT: 128 LBS | OXYGEN SATURATION: 93 % | HEIGHT: 65 IN | RESPIRATION RATE: 18 BRPM | DIASTOLIC BLOOD PRESSURE: 66 MMHG | TEMPERATURE: 97.7 F | SYSTOLIC BLOOD PRESSURE: 104 MMHG

## 2024-03-30 DIAGNOSIS — S61.219A FINGER LACERATION, INITIAL ENCOUNTER: Primary | ICD-10-CM

## 2024-03-30 PROCEDURE — 73130 X-RAY EXAM OF HAND: CPT | Mod: LEFT SIDE | Performed by: STUDENT IN AN ORGANIZED HEALTH CARE EDUCATION/TRAINING PROGRAM

## 2024-03-30 PROCEDURE — 90715 TDAP VACCINE 7 YRS/> IM: CPT | Performed by: EMERGENCY MEDICINE

## 2024-03-30 PROCEDURE — 73130 X-RAY EXAM OF HAND: CPT | Mod: LT

## 2024-03-30 PROCEDURE — 90471 IMMUNIZATION ADMIN: CPT | Performed by: EMERGENCY MEDICINE

## 2024-03-30 PROCEDURE — 12002 RPR S/N/AX/GEN/TRNK2.6-7.5CM: CPT | Performed by: EMERGENCY MEDICINE

## 2024-03-30 PROCEDURE — 2500000005 HC RX 250 GENERAL PHARMACY W/O HCPCS

## 2024-03-30 PROCEDURE — 99283 EMERGENCY DEPT VISIT LOW MDM: CPT | Mod: 25

## 2024-03-30 PROCEDURE — 99284 EMERGENCY DEPT VISIT MOD MDM: CPT | Performed by: EMERGENCY MEDICINE

## 2024-03-30 PROCEDURE — 2500000004 HC RX 250 GENERAL PHARMACY W/ HCPCS (ALT 636 FOR OP/ED): Performed by: EMERGENCY MEDICINE

## 2024-03-30 PROCEDURE — 12002 RPR S/N/AX/GEN/TRNK2.6-7.5CM: CPT

## 2024-03-30 RX ORDER — LIDOCAINE HYDROCHLORIDE 10 MG/ML
10 INJECTION INFILTRATION; PERINEURAL ONCE
Status: COMPLETED | OUTPATIENT
Start: 2024-03-30 | End: 2024-03-30

## 2024-03-30 RX ORDER — ACETAMINOPHEN 325 MG/1
650 TABLET ORAL ONCE
Status: COMPLETED | OUTPATIENT
Start: 2024-03-30 | End: 2024-03-30

## 2024-03-30 RX ADMIN — TETANUS TOXOID, REDUCED DIPHTHERIA TOXOID AND ACELLULAR PERTUSSIS VACCINE, ADSORBED 0.5 ML: 5; 2.5; 8; 8; 2.5 SUSPENSION INTRAMUSCULAR at 21:09

## 2024-03-30 RX ADMIN — ACETAMINOPHEN 650 MG: 325 TABLET ORAL at 21:09

## 2024-03-30 RX ADMIN — LIDOCAINE HYDROCHLORIDE 100 MG: 10 INJECTION, SOLUTION INFILTRATION; PERINEURAL at 21:00

## 2024-03-30 ASSESSMENT — LIFESTYLE VARIABLES
EVER FELT BAD OR GUILTY ABOUT YOUR DRINKING: NO
HAVE YOU EVER FELT YOU SHOULD CUT DOWN ON YOUR DRINKING: NO
TOTAL SCORE: 0
HAVE PEOPLE ANNOYED YOU BY CRITICIZING YOUR DRINKING: NO
EVER HAD A DRINK FIRST THING IN THE MORNING TO STEADY YOUR NERVES TO GET RID OF A HANGOVER: NO

## 2024-03-30 ASSESSMENT — PAIN DESCRIPTION - ORIENTATION: ORIENTATION: LEFT

## 2024-03-30 ASSESSMENT — PAIN DESCRIPTION - LOCATION: LOCATION: HAND

## 2024-03-30 ASSESSMENT — COLUMBIA-SUICIDE SEVERITY RATING SCALE - C-SSRS
2. HAVE YOU ACTUALLY HAD ANY THOUGHTS OF KILLING YOURSELF?: NO
1. IN THE PAST MONTH, HAVE YOU WISHED YOU WERE DEAD OR WISHED YOU COULD GO TO SLEEP AND NOT WAKE UP?: NO
6. HAVE YOU EVER DONE ANYTHING, STARTED TO DO ANYTHING, OR PREPARED TO DO ANYTHING TO END YOUR LIFE?: NO

## 2024-03-30 ASSESSMENT — PAIN SCALES - GENERAL: PAINLEVEL_OUTOF10: 3

## 2024-03-30 ASSESSMENT — PAIN - FUNCTIONAL ASSESSMENT: PAIN_FUNCTIONAL_ASSESSMENT: 0-10

## 2024-03-30 ASSESSMENT — PAIN DESCRIPTION - DESCRIPTORS: DESCRIPTORS: SHARP

## 2024-03-31 NOTE — ED PROVIDER NOTES
CC: Laceration     History provided by: Patient  Limitations to History: None    HPI:  Hilda Jones is a 68 y.o. female with a past medical history of hypertension, A-fib/a flutter not currently on blood thinners, COPD, and diabetes that presents to the emergency department today for 2 lacerations that she sustained on her left hand while she was cooking food this afternoon.  Patient states that she turned her head briefly and accidentally sustained a cut on her left thumb and left palm with a mild amount of bleeding. She reports that she was cutting with a brand new knife. She called EMS and they placed a bandage on the wound which controlled the bleeding and was brought here for further evaluation.  The patient denies any numbness/tingling and is able to move her fingers appropriately. She denies any other injuries. No falls. No head injury or loss of consciousness. No headache, dizziness, vision changes, neck pain, chest pain, shortness of breath, nausea, vomiting, abdominal pain, numbness, tingling, fevers, or chills.    External Records Reviewed: Discharge summary from 12/23/2023 reviewed.  ???????????????????????????????????????????????????????????????  Triage Vitals:  T 36.5 °C (97.7 °F)  HR 76  BP 96/58  RR 16  O2 (!) 93 %      Vital signs reviewed in nursing triage note, EMR flow sheets, and at patient's bedside.   General: Awake, alert, in no acute distress. Resting calmly.  Eyes: Gaze conjugate.  No scleral icterus or injection  Head: Normo-cephalic, atraumatic.   ENT: mucous membranes moist. No rhinorrhea or epistaxis.  Neck: supple, full ROM intact.  CV: Regular rhythm. No murmurs appreciated. Radial pulses 2+ bilaterally  Respiratory: Breathing non-labored, speaking in full sentences.  Clear to auscultation bilaterally  GI: Soft, non-distended, non-tender. No rebound or guarding.  MSK/Extremities: No gross bony deformities. Moving all extremities with good tone and full ROM intact throughout. No  lower extremity edema.  2 cm jenni shaped flap laceration located just proximal to MCP joint of the first digit over on the left hand with mild bleeding and no obvious foreign body.  1 cm linear nonbleeding laceration over the IP joint of the first digit on the left hand.  Neurovascularly intact distal to both lacerations with full range of motion of the fingers. Compartments are soft.   Skin: Warm. Appropriate color  Neuro: Alert. Oriented. Face symmetric. Speech is fluent.  Gross strength and sensation intact in b/l UE and LEs  Psych: Appropriate mood and affect   ???????????????????????????????????????????????????????????????  ED Course/Treatment/Medical Decision Making  MDM:  Hilda Jones is a 68 y.o. female with a past medical history of hypertension, A-fib/flutter not currently on blood thinners, COPD and diabetes that presents the emergency department for 2 lacerations of the left hand.  The patient was mildly hypotensive with BP of 96/58 upon arrival to the emergency department which is consistent with her previous baseline blood pressures per review of EMR.  She had otherwise stable vital signs and was afebrile. Patient denies any headache, dizziness, chest pain, or shortness of breath. Physical exam showed 2 small nonbleeding lacerations over the left first digit and base of the second digit.  The patient was neurologically intact and neurovascularly intact distally to this with no evidence of tendon compromise.  An x-ray of the left hand was ordered and the patient was given Tylenol for her pain.  The patient is unsure of of when her last tetanus was so Boostrix  vaccine was given.    Differential diagnoses considered include but are not limited to: Laceration, foreign body, fracture    Independent Interpretation of Studies:  I independently interpreted: See ED Course Below  -X-ray    ED Course:  ED Course as of 03/31/24 0142   Sat Mar 30, 2024   2144 XR hand left 3+ views  X-ray personally interpreted  by me showing no evidence of acute fracture or foreign body. [RS]      ED Course User Index  [RS] Leroy Menendez DO         Diagnoses as of 03/31/24 0142   Finger laceration, initial encounter     The patient was monitored for any change in vital signs or symptoms throughout the ED course.  X-rays of the left hand did not show any evidence of foreign body or underlying fracture.  The patient's lacerations were copiously irrigated and sutured successfully with good hemostasis. Patient remained stable, neurovascularly intact afterwards. Repeat BP was 104/66. Patient denies any other complaints. The patient remained hemodynamically stable throughout the ED course and was discharged home in stable condition.  She was provided with strict return precautions including development of surrounding erythema to the lacerations, significant drainage or development of fevers.  She was instructed to follow-up here in the emergency department or with her primary care provider in 7-10 days for removal of her sutures. The patient verbalized her understanding of this and she was discharged home in stable condition.      Social Determinants Limiting Care:  None identified    Impression:  Finger and hand laceration    Disposition:  Discharge home    Assessment and plan discussed with Dr. Mary Menendez DO   Emergency Medicine, PGY-1     Disclaimer: This note was dictated by speech recognition. Minor errors in transcription may be present.     Procedures ? SmartLinks last updated 3/31/2024 1:42 AM        Leroy Menendez DO  Resident  03/31/24 0142    I saw and evaluated the patient. I personally obtained the key and critical portions of the history and physical exam or was physically present for key and critical portions performed by the resident/fellow. I reviewed the resident/fellow's documentation and discussed the patient with the resident/fellow. I agree with the resident/fellow's medical decision making as  documented in the note.    MD Mary Alicea MD  03/31/24 5901

## 2024-03-31 NOTE — DISCHARGE INSTRUCTIONS
You have been diagnosed with a laceration here in the emergency department.  There was no evidence of fracture on the x-rays that we obtained.  Your laceration was fixed with sutures and have 4 sutures in the left thumb and 3 sutures at the base of your left index finger.  Please return to the emergency department or schedule an appointment with your primary care provider in 7-10 days to have these removed.  If you do not have these removed there is a higher chance of infection.  If you notice any surrounding redness or significant swelling/draining of pus please return to the emergency department.  You can use Tylenol and ibuprofen as needed for your pain.  Please keep the area clean and dry and you are able to shower and bathe just do not soak the area in water.

## 2024-03-31 NOTE — ED PROCEDURE NOTE
Procedure  Laceration Repair    Performed by: Leroy Menendez DO  Authorized by: Mary Slade MD    Consent:     Consent obtained:  Verbal    Consent given by:  Patient    Risks, benefits, and alternatives were discussed: yes      Risks discussed:  Infection, pain, need for additional repair, poor cosmetic result, tendon damage, nerve damage and poor wound healing    Alternatives discussed:  No treatment  Universal protocol:     Patient identity confirmed:  Verbally with patient and arm band  Anesthesia:     Anesthesia method:  Local infiltration and nerve block    Local anesthetic:  Lidocaine 1% WITH epi    Block location:  Median nerve    Block needle gauge:  24 G    Block anesthetic:  Lidocaine 1% w/o epi    Block technique:  Ultrasound-guided nerve block    Block injection procedure:  Anatomic landmarks identified, introduced needle, incremental injection and negative aspiration for blood    Block outcome:  Incomplete block  Laceration details:     Location: Left hand and finger.    Wound length (cm): Left thumb 1.5 cm, left proximal second digit 1.5 cm.  Pre-procedure details:     Preparation:  Patient was prepped and draped in usual sterile fashion  Exploration:     Limited defect created (wound extended): no      Imaging obtained: x-ray      Imaging outcome: foreign body not noted      Wound exploration: wound explored through full range of motion and entire depth of wound visualized      Contaminated: no    Treatment:     Area cleansed with:  Saline    Amount of cleaning:  Standard    Irrigation solution:  Sterile saline    Irrigation volume:  400 mL    Irrigation method:  Pressure wash    Debridement:  None    Undermining:  None    Scar revision: no    Skin repair:     Repair method:  Sutures    Suture size:  4-0    Suture material:  Prolene    Suture technique:  Simple interrupted    Number of sutures: 4 in the thumb, 3 in the proximal second digit.  Approximation:     Approximation:  Close  Repair  type:     Repair type:  Simple  Post-procedure details:     Dressing:  Open (no dressing)    Procedure completion:  Tolerated               Leroy Menendez DO  Resident  03/30/24 0165

## 2024-03-31 NOTE — ED TRIAGE NOTES
Patient arrived via [facility] reporting 2 lacerations on her left hand. States she was cutting potatoes and onions with her new knife set when she turned her head for a minute and cut her hand. Patient arrived with bandage from EMS. Bleeding controlled in triage. MSPs intact.

## 2024-07-12 ENCOUNTER — HOSPITAL ENCOUNTER (OUTPATIENT)
Dept: RADIOLOGY | Facility: HOSPITAL | Age: 69
Discharge: HOME | End: 2024-07-12
Payer: MEDICARE

## 2024-07-12 VITALS — HEIGHT: 65 IN | BODY MASS INDEX: 20.83 KG/M2 | WEIGHT: 125 LBS

## 2024-07-12 DIAGNOSIS — Z12.31 ENCOUNTER FOR SCREENING MAMMOGRAM FOR MALIGNANT NEOPLASM OF BREAST: ICD-10-CM

## 2024-07-12 DIAGNOSIS — C50.919 MALIGNANT NEOPLASM OF UNSPECIFIED SITE OF UNSPECIFIED FEMALE BREAST (MULTI): ICD-10-CM

## 2024-07-12 PROCEDURE — 77067 SCR MAMMO BI INCL CAD: CPT

## 2024-07-18 ENCOUNTER — HOSPITAL ENCOUNTER (OUTPATIENT)
Dept: RADIOLOGY | Facility: EXTERNAL LOCATION | Age: 69
Discharge: HOME | End: 2024-07-18

## 2024-10-03 ENCOUNTER — HOSPITAL ENCOUNTER (INPATIENT)
Facility: HOSPITAL | Age: 69
End: 2024-10-03
Attending: STUDENT IN AN ORGANIZED HEALTH CARE EDUCATION/TRAINING PROGRAM | Admitting: STUDENT IN AN ORGANIZED HEALTH CARE EDUCATION/TRAINING PROGRAM
Payer: MEDICARE

## 2024-10-03 DIAGNOSIS — J18.9 COMMUNITY ACQUIRED PNEUMONIA OF BOTH LUNGS: Primary | ICD-10-CM

## 2024-10-03 DIAGNOSIS — I50.33 ACUTE ON CHRONIC HEART FAILURE WITH PRESERVED EJECTION FRACTION: ICD-10-CM

## 2024-10-03 DIAGNOSIS — J96.01 ACUTE HYPOXIC RESPIRATORY FAILURE (MULTI): ICD-10-CM

## 2024-10-03 DIAGNOSIS — R06.02 SHORTNESS OF BREATH: ICD-10-CM

## 2024-10-03 LAB
ALBUMIN SERPL BCP-MCNC: 3.3 G/DL (ref 3.4–5)
ALP SERPL-CCNC: 92 U/L (ref 33–136)
ALT SERPL W P-5'-P-CCNC: 9 U/L (ref 7–45)
ANION GAP BLDV CALCULATED.4IONS-SCNC: 9 MMOL/L (ref 10–25)
ANION GAP SERPL CALC-SCNC: 14 MMOL/L (ref 10–20)
AST SERPL W P-5'-P-CCNC: 17 U/L (ref 9–39)
ATRIAL RATE: 101 BPM
BASE EXCESS BLDV CALC-SCNC: 6.5 MMOL/L (ref -2–3)
BASOPHILS # BLD MANUAL: 0.26 X10*3/UL (ref 0–0.1)
BASOPHILS NFR BLD MANUAL: 3.5 %
BILIRUB SERPL-MCNC: 1.2 MG/DL (ref 0–1.2)
BNP SERPL-MCNC: 887 PG/ML (ref 0–99)
BODY TEMPERATURE: 37 DEGREES CELSIUS
BUN SERPL-MCNC: 14 MG/DL (ref 6–23)
CA-I BLDV-SCNC: 1.08 MMOL/L (ref 1.1–1.33)
CALCIUM SERPL-MCNC: 8.5 MG/DL (ref 8.6–10.6)
CARDIAC TROPONIN I PNL SERPL HS: 8 NG/L (ref 0–34)
CARDIAC TROPONIN I PNL SERPL HS: 9 NG/L (ref 0–34)
CHLORIDE BLDV-SCNC: 99 MMOL/L (ref 98–107)
CHLORIDE SERPL-SCNC: 99 MMOL/L (ref 98–107)
CO2 SERPL-SCNC: 30 MMOL/L (ref 21–32)
CREAT SERPL-MCNC: 1.16 MG/DL (ref 0.5–1.05)
EGFRCR SERPLBLD CKD-EPI 2021: 51 ML/MIN/1.73M*2
EOSINOPHIL # BLD MANUAL: 0.32 X10*3/UL (ref 0–0.7)
EOSINOPHIL NFR BLD MANUAL: 4.3 %
ERYTHROCYTE [DISTWIDTH] IN BLOOD BY AUTOMATED COUNT: 25 % (ref 11.5–14.5)
FLUAV RNA RESP QL NAA+PROBE: NOT DETECTED
FLUBV RNA RESP QL NAA+PROBE: NOT DETECTED
GLUCOSE BLDV-MCNC: 92 MG/DL (ref 74–99)
GLUCOSE SERPL-MCNC: 92 MG/DL (ref 74–99)
HCO3 BLDV-SCNC: 32.5 MMOL/L (ref 22–26)
HCT VFR BLD AUTO: 26.9 % (ref 36–46)
HCT VFR BLD EST: 24 % (ref 36–46)
HGB BLD-MCNC: 7.7 G/DL (ref 12–16)
HGB BLDV-MCNC: 8 G/DL (ref 12–16)
HOLD SPECIMEN: NORMAL
HOLD SPECIMEN: NORMAL
HYPOCHROMIA BLD QL SMEAR: ABNORMAL
IMM GRANULOCYTES # BLD AUTO: 0.02 X10*3/UL (ref 0–0.7)
IMM GRANULOCYTES NFR BLD AUTO: 0.3 % (ref 0–0.9)
LACTATE BLDV-SCNC: 2.1 MMOL/L (ref 0.4–2)
LYMPHOCYTES # BLD MANUAL: 0.9 X10*3/UL (ref 1.2–4.8)
LYMPHOCYTES NFR BLD MANUAL: 12.2 %
MAGNESIUM SERPL-MCNC: 1.98 MG/DL (ref 1.6–2.4)
MCH RBC QN AUTO: 16.6 PG (ref 26–34)
MCHC RBC AUTO-ENTMCNC: 28.6 G/DL (ref 32–36)
MCV RBC AUTO: 58 FL (ref 80–100)
MONOCYTES # BLD MANUAL: 0.9 X10*3/UL (ref 0.1–1)
MONOCYTES NFR BLD MANUAL: 12.2 %
NEUTS SEG # BLD MANUAL: 4.7 X10*3/UL (ref 1.2–7)
NEUTS SEG NFR BLD MANUAL: 63.5 %
NRBC BLD MANUAL-RTO: 1.7 % (ref 0–0)
NRBC BLD-RTO: 0.5 /100 WBCS (ref 0–0)
OXYHGB MFR BLDV: 51.1 % (ref 45–75)
PCO2 BLDV: 55 MM HG (ref 41–51)
PH BLDV: 7.38 PH (ref 7.33–7.43)
PLATELET # BLD AUTO: 280 X10*3/UL (ref 150–450)
PLATELET CLUMP BLD QL SMEAR: PRESENT
PO2 BLDV: 37 MM HG (ref 35–45)
POTASSIUM BLDV-SCNC: 3 MMOL/L (ref 3.5–5.3)
POTASSIUM SERPL-SCNC: 3 MMOL/L (ref 3.5–5.3)
PROT SERPL-MCNC: 6.5 G/DL (ref 6.4–8.2)
Q ONSET: 232 MS
QRS COUNT: 13 BEATS
QRS DURATION: 144 MS
QT INTERVAL: 474 MS
QTC CALCULATION(BAZETT): 560 MS
QTC FREDERICIA: 530 MS
R AXIS: 221 DEGREES
RBC # BLD AUTO: 4.64 X10*6/UL (ref 4–5.2)
RBC MORPH BLD: ABNORMAL
SAO2 % BLDV: 54 % (ref 45–75)
SARS-COV-2 RNA RESP QL NAA+PROBE: NOT DETECTED
SCHISTOCYTES BLD QL SMEAR: ABNORMAL
SODIUM BLDV-SCNC: 137 MMOL/L (ref 136–145)
SODIUM SERPL-SCNC: 140 MMOL/L (ref 136–145)
T AXIS: 116 DEGREES
T OFFSET: 469 MS
TARGETS BLD QL SMEAR: ABNORMAL
TOTAL CELLS COUNTED BLD: 115
TSH SERPL-ACNC: 2.73 MIU/L (ref 0.44–3.98)
VARIANT LYMPHS # BLD MANUAL: 0.32 X10*3/UL (ref 0–0.5)
VARIANT LYMPHS NFR BLD: 4.3 %
VENTRICULAR RATE: 84 BPM
WBC # BLD AUTO: 7.4 X10*3/UL (ref 4.4–11.3)

## 2024-10-03 PROCEDURE — 96366 THER/PROPH/DIAG IV INF ADDON: CPT

## 2024-10-03 PROCEDURE — 36415 COLL VENOUS BLD VENIPUNCTURE: CPT

## 2024-10-03 PROCEDURE — 96367 TX/PROPH/DG ADDL SEQ IV INF: CPT

## 2024-10-03 PROCEDURE — 82330 ASSAY OF CALCIUM: CPT

## 2024-10-03 PROCEDURE — 84484 ASSAY OF TROPONIN QUANT: CPT

## 2024-10-03 PROCEDURE — 83540 ASSAY OF IRON: CPT

## 2024-10-03 PROCEDURE — 93010 ELECTROCARDIOGRAM REPORT: CPT | Performed by: STUDENT IN AN ORGANIZED HEALTH CARE EDUCATION/TRAINING PROGRAM

## 2024-10-03 PROCEDURE — 2500000004 HC RX 250 GENERAL PHARMACY W/ HCPCS (ALT 636 FOR OP/ED): Performed by: STUDENT IN AN ORGANIZED HEALTH CARE EDUCATION/TRAINING PROGRAM

## 2024-10-03 PROCEDURE — 99223 1ST HOSP IP/OBS HIGH 75: CPT

## 2024-10-03 PROCEDURE — 99285 EMERGENCY DEPT VISIT HI MDM: CPT

## 2024-10-03 PROCEDURE — 71046 X-RAY EXAM CHEST 2 VIEWS: CPT | Mod: FOREIGN READ | Performed by: RADIOLOGY

## 2024-10-03 PROCEDURE — 96365 THER/PROPH/DIAG IV INF INIT: CPT

## 2024-10-03 PROCEDURE — 96361 HYDRATE IV INFUSION ADD-ON: CPT

## 2024-10-03 PROCEDURE — 83880 ASSAY OF NATRIURETIC PEPTIDE: CPT

## 2024-10-03 PROCEDURE — 2500000004 HC RX 250 GENERAL PHARMACY W/ HCPCS (ALT 636 FOR OP/ED)

## 2024-10-03 PROCEDURE — 85027 COMPLETE CBC AUTOMATED: CPT

## 2024-10-03 PROCEDURE — 83735 ASSAY OF MAGNESIUM: CPT

## 2024-10-03 PROCEDURE — 94640 AIRWAY INHALATION TREATMENT: CPT

## 2024-10-03 PROCEDURE — 99285 EMERGENCY DEPT VISIT HI MDM: CPT | Performed by: STUDENT IN AN ORGANIZED HEALTH CARE EDUCATION/TRAINING PROGRAM

## 2024-10-03 PROCEDURE — 87040 BLOOD CULTURE FOR BACTERIA: CPT

## 2024-10-03 PROCEDURE — 74177 CT ABD & PELVIS W/CONTRAST: CPT | Mod: FOREIGN READ | Performed by: RADIOLOGY

## 2024-10-03 PROCEDURE — 2550000001 HC RX 255 CONTRASTS: Performed by: STUDENT IN AN ORGANIZED HEALTH CARE EDUCATION/TRAINING PROGRAM

## 2024-10-03 PROCEDURE — 2500000002 HC RX 250 W HCPCS SELF ADMINISTERED DRUGS (ALT 637 FOR MEDICARE OP, ALT 636 FOR OP/ED): Performed by: STUDENT IN AN ORGANIZED HEALTH CARE EDUCATION/TRAINING PROGRAM

## 2024-10-03 PROCEDURE — 82435 ASSAY OF BLOOD CHLORIDE: CPT

## 2024-10-03 PROCEDURE — 84132 ASSAY OF SERUM POTASSIUM: CPT

## 2024-10-03 PROCEDURE — 1200000002 HC GENERAL ROOM WITH TELEMETRY DAILY

## 2024-10-03 PROCEDURE — 96375 TX/PRO/DX INJ NEW DRUG ADDON: CPT

## 2024-10-03 PROCEDURE — 85007 BL SMEAR W/DIFF WBC COUNT: CPT

## 2024-10-03 PROCEDURE — 84520 ASSAY OF UREA NITROGEN: CPT

## 2024-10-03 PROCEDURE — 87636 SARSCOV2 & INF A&B AMP PRB: CPT

## 2024-10-03 PROCEDURE — 84443 ASSAY THYROID STIM HORMONE: CPT

## 2024-10-03 RX ORDER — CEFTRIAXONE 1 G/50ML
1 INJECTION, SOLUTION INTRAVENOUS ONCE
Status: COMPLETED | OUTPATIENT
Start: 2024-10-03 | End: 2024-10-03

## 2024-10-03 RX ORDER — ASPIRIN 81 MG/1
81 TABLET ORAL DAILY
Status: DISPENSED | OUTPATIENT
Start: 2024-10-04

## 2024-10-03 RX ORDER — FLUTICASONE FUROATE AND VILANTEROL 100; 25 UG/1; UG/1
1 POWDER RESPIRATORY (INHALATION) DAILY
Status: DISPENSED | OUTPATIENT
Start: 2024-10-04

## 2024-10-03 RX ORDER — SENNOSIDES 8.6 MG/1
2 TABLET ORAL 2 TIMES DAILY
Status: DISPENSED | OUTPATIENT
Start: 2024-10-03

## 2024-10-03 RX ORDER — POTASSIUM CHLORIDE 1.5 G/1.58G
20 POWDER, FOR SOLUTION ORAL ONCE
Status: COMPLETED | OUTPATIENT
Start: 2024-10-03 | End: 2024-10-04

## 2024-10-03 RX ORDER — IPRATROPIUM BROMIDE AND ALBUTEROL SULFATE 2.5; .5 MG/3ML; MG/3ML
SOLUTION RESPIRATORY (INHALATION)
Status: COMPLETED
Start: 2024-10-03 | End: 2024-10-03

## 2024-10-03 RX ORDER — IPRATROPIUM BROMIDE AND ALBUTEROL SULFATE 2.5; .5 MG/3ML; MG/3ML
3 SOLUTION RESPIRATORY (INHALATION) ONCE
Status: COMPLETED | OUTPATIENT
Start: 2024-10-03 | End: 2024-10-03

## 2024-10-03 RX ORDER — ENOXAPARIN SODIUM 100 MG/ML
40 INJECTION SUBCUTANEOUS EVERY 24 HOURS
Status: DISPENSED | OUTPATIENT
Start: 2024-10-03

## 2024-10-03 RX ORDER — ATORVASTATIN CALCIUM 40 MG/1
40 TABLET, FILM COATED ORAL NIGHTLY
Status: DISPENSED | OUTPATIENT
Start: 2024-10-03

## 2024-10-03 NOTE — ED TRIAGE NOTES
From home via EMS with c/o SOB. Ems report pt was stating at 84% RA, she was placed on 3L NC and is now 99%.

## 2024-10-04 ENCOUNTER — APPOINTMENT (OUTPATIENT)
Dept: VASCULAR MEDICINE | Facility: HOSPITAL | Age: 69
End: 2024-10-04
Payer: MEDICARE

## 2024-10-04 LAB
ALBUMIN SERPL BCP-MCNC: 3.1 G/DL (ref 3.4–5)
ANION GAP SERPL CALC-SCNC: 11 MMOL/L (ref 10–20)
BUN SERPL-MCNC: 16 MG/DL (ref 6–23)
CALCIUM SERPL-MCNC: 8.3 MG/DL (ref 8.6–10.6)
CHLORIDE SERPL-SCNC: 97 MMOL/L (ref 98–107)
CHLORIDE UR-SCNC: 28 MMOL/L
CHLORIDE/CREATININE (MMOL/G) IN URINE: 26 MMOL/G CREAT (ref 38–318)
CO2 SERPL-SCNC: 34 MMOL/L (ref 21–32)
CREAT SERPL-MCNC: 1.22 MG/DL (ref 0.5–1.05)
CREAT UR-MCNC: 108 MG/DL (ref 20–320)
D DIMER PPP FEU-MCNC: 1041 NG/ML FEU
EGFRCR SERPLBLD CKD-EPI 2021: 48 ML/MIN/1.73M*2
ERYTHROCYTE [DISTWIDTH] IN BLOOD BY AUTOMATED COUNT: 24.9 % (ref 11.5–14.5)
GLUCOSE BLD MANUAL STRIP-MCNC: 163 MG/DL (ref 74–99)
GLUCOSE BLD MANUAL STRIP-MCNC: 212 MG/DL (ref 74–99)
GLUCOSE SERPL-MCNC: 173 MG/DL (ref 74–99)
HCT VFR BLD AUTO: 28.2 % (ref 36–46)
HGB BLD-MCNC: 7.9 G/DL (ref 12–16)
IRON SATN MFR SERPL: ABNORMAL %
IRON SERPL-MCNC: <10 UG/DL (ref 35–150)
LEGIONELLA AG UR QL: NEGATIVE
MCH RBC QN AUTO: 16.5 PG (ref 26–34)
MCHC RBC AUTO-ENTMCNC: 28 G/DL (ref 32–36)
MCV RBC AUTO: 59 FL (ref 80–100)
NRBC BLD-RTO: 0.9 /100 WBCS (ref 0–0)
PHOSPHATE SERPL-MCNC: 5.2 MG/DL (ref 2.5–4.9)
PLATELET # BLD AUTO: 294 X10*3/UL (ref 150–450)
POTASSIUM SERPL-SCNC: 3 MMOL/L (ref 3.5–5.3)
POTASSIUM UR-SCNC: 30 MMOL/L
POTASSIUM/CREAT UR-RTO: 28 MMOL/G CREAT
PROCALCITONIN SERPL-MCNC: 0.03 NG/ML
RBC # BLD AUTO: 4.78 X10*6/UL (ref 4–5.2)
S PNEUM AG UR QL: NEGATIVE
SODIUM SERPL-SCNC: 139 MMOL/L (ref 136–145)
SODIUM UR-SCNC: <10 MMOL/L
SODIUM/CREAT UR-RTO: ABNORMAL
TIBC SERPL-MCNC: ABNORMAL UG/DL
UIBC SERPL-MCNC: 345 UG/DL (ref 110–370)
WBC # BLD AUTO: 3.3 X10*3/UL (ref 4.4–11.3)

## 2024-10-04 PROCEDURE — 84145 PROCALCITONIN (PCT): CPT

## 2024-10-04 PROCEDURE — 3430000001 HC RX 343 DIAGNOSTIC RADIOPHARMACEUTICALS

## 2024-10-04 PROCEDURE — 82436 ASSAY OF URINE CHLORIDE: CPT

## 2024-10-04 PROCEDURE — 2500000004 HC RX 250 GENERAL PHARMACY W/ HCPCS (ALT 636 FOR OP/ED)

## 2024-10-04 PROCEDURE — 1200000002 HC GENERAL ROOM WITH TELEMETRY DAILY

## 2024-10-04 PROCEDURE — 2500000001 HC RX 250 WO HCPCS SELF ADMINISTERED DRUGS (ALT 637 FOR MEDICARE OP)

## 2024-10-04 PROCEDURE — 93970 EXTREMITY STUDY: CPT

## 2024-10-04 PROCEDURE — 36415 COLL VENOUS BLD VENIPUNCTURE: CPT

## 2024-10-04 PROCEDURE — 85027 COMPLETE CBC AUTOMATED: CPT

## 2024-10-04 PROCEDURE — 99233 SBSQ HOSP IP/OBS HIGH 50: CPT

## 2024-10-04 PROCEDURE — 82947 ASSAY GLUCOSE BLOOD QUANT: CPT

## 2024-10-04 PROCEDURE — 94640 AIRWAY INHALATION TREATMENT: CPT

## 2024-10-04 PROCEDURE — 85379 FIBRIN DEGRADATION QUANT: CPT

## 2024-10-04 PROCEDURE — 78830 RP LOCLZJ TUM SPECT W/CT 1: CPT | Performed by: RADIOLOGY

## 2024-10-04 PROCEDURE — 87449 NOS EACH ORGANISM AG IA: CPT

## 2024-10-04 PROCEDURE — A9540 TC99M MAA: HCPCS

## 2024-10-04 PROCEDURE — 87899 AGENT NOS ASSAY W/OPTIC: CPT

## 2024-10-04 PROCEDURE — 2500000001 HC RX 250 WO HCPCS SELF ADMINISTERED DRUGS (ALT 637 FOR MEDICARE OP): Performed by: STUDENT IN AN ORGANIZED HEALTH CARE EDUCATION/TRAINING PROGRAM

## 2024-10-04 PROCEDURE — 93970 EXTREMITY STUDY: CPT | Performed by: INTERNAL MEDICINE

## 2024-10-04 PROCEDURE — 80069 RENAL FUNCTION PANEL: CPT

## 2024-10-04 RX ORDER — TORSEMIDE 20 MG/1
2 TABLET ORAL DAILY
Status: ON HOLD | COMMUNITY

## 2024-10-04 RX ORDER — POTASSIUM CHLORIDE 1.5 G/1.58G
40 POWDER, FOR SOLUTION ORAL ONCE
Status: COMPLETED | OUTPATIENT
Start: 2024-10-04 | End: 2024-10-04

## 2024-10-04 RX ORDER — POTASSIUM CHLORIDE 1.5 G/1.58G
20 POWDER, FOR SOLUTION ORAL ONCE
Status: DISCONTINUED | OUTPATIENT
Start: 2024-10-04 | End: 2024-10-04

## 2024-10-04 RX ORDER — AMOXICILLIN 250 MG
1 CAPSULE ORAL DAILY PRN
Status: ON HOLD | COMMUNITY

## 2024-10-04 RX ORDER — MULTIVIT-MIN/IRON FUM/FOLIC AC 7.5 MG-4
1 TABLET ORAL DAILY
Status: ON HOLD | COMMUNITY

## 2024-10-04 RX ORDER — CEFTRIAXONE 1 G/50ML
1 INJECTION, SOLUTION INTRAVENOUS DAILY
Status: DISCONTINUED | OUTPATIENT
Start: 2024-10-04 | End: 2024-10-04

## 2024-10-04 RX ORDER — CHOLECALCIFEROL (VITAMIN D3) 50 MCG
50 TABLET ORAL DAILY
Status: ON HOLD | COMMUNITY

## 2024-10-04 RX ORDER — SPIRONOLACTONE 25 MG/1
25 TABLET ORAL DAILY
Status: ON HOLD | COMMUNITY

## 2024-10-04 RX ORDER — VALACYCLOVIR HYDROCHLORIDE 1 G/1
1000 TABLET, FILM COATED ORAL 3 TIMES DAILY
Status: ON HOLD | COMMUNITY

## 2024-10-04 RX ORDER — INSULIN GLARGINE 100 [IU]/ML
10 INJECTION, SOLUTION SUBCUTANEOUS DAILY
Status: ON HOLD | COMMUNITY

## 2024-10-04 RX ORDER — DOXYCYCLINE HYCLATE 100 MG
100 TABLET ORAL 2 TIMES DAILY
Status: DISCONTINUED | OUTPATIENT
Start: 2024-10-04 | End: 2024-10-04

## 2024-10-04 RX ORDER — MIRTAZAPINE 15 MG/1
15 TABLET, FILM COATED ORAL NIGHTLY PRN
Status: ON HOLD | COMMUNITY

## 2024-10-04 RX ORDER — ACETAMINOPHEN 325 MG/1
650 TABLET ORAL EVERY 6 HOURS PRN
Status: DISPENSED | OUTPATIENT
Start: 2024-10-04

## 2024-10-04 RX ORDER — FAMOTIDINE 20 MG/1
20 TABLET, FILM COATED ORAL 2 TIMES DAILY
Status: ON HOLD | COMMUNITY

## 2024-10-04 RX ORDER — TRAMADOL HYDROCHLORIDE 50 MG/1
50 TABLET ORAL EVERY 8 HOURS PRN
Status: DISPENSED | OUTPATIENT
Start: 2024-10-04 | End: 2024-10-05

## 2024-10-04 RX ORDER — ASCORBIC ACID 125 MG
1 TABLET,CHEWABLE ORAL NIGHTLY PRN
Status: ON HOLD | COMMUNITY

## 2024-10-04 RX ORDER — POTASSIUM CHLORIDE 20 MEQ/1
20 TABLET, EXTENDED RELEASE ORAL EVERY OTHER DAY
Status: ON HOLD | COMMUNITY

## 2024-10-04 RX ORDER — METOPROLOL TARTRATE 25 MG/1
25 TABLET, FILM COATED ORAL 2 TIMES DAILY
Status: ON HOLD | COMMUNITY

## 2024-10-04 SDOH — ECONOMIC STABILITY: FOOD INSECURITY: WITHIN THE PAST 12 MONTHS, YOU WORRIED THAT YOUR FOOD WOULD RUN OUT BEFORE YOU GOT THE MONEY TO BUY MORE.: NEVER TRUE

## 2024-10-04 SDOH — SOCIAL STABILITY: SOCIAL INSECURITY: HAS ANYONE EVER THREATENED TO HURT YOUR FAMILY OR YOUR PETS?: NO

## 2024-10-04 SDOH — ECONOMIC STABILITY: FOOD INSECURITY: WITHIN THE PAST 12 MONTHS, THE FOOD YOU BOUGHT JUST DIDN'T LAST AND YOU DIDN'T HAVE MONEY TO GET MORE.: NEVER TRUE

## 2024-10-04 SDOH — SOCIAL STABILITY: SOCIAL INSECURITY: WITHIN THE LAST YEAR, HAVE YOU BEEN AFRAID OF YOUR PARTNER OR EX-PARTNER?: NO

## 2024-10-04 SDOH — HEALTH STABILITY: MENTAL HEALTH: HOW OFTEN DO YOU HAVE 6 OR MORE DRINKS ON ONE OCCASION?: NEVER

## 2024-10-04 SDOH — SOCIAL STABILITY: SOCIAL INSECURITY: ABUSE: ADULT

## 2024-10-04 SDOH — ECONOMIC STABILITY: TRANSPORTATION INSECURITY
IN THE PAST 12 MONTHS, HAS LACK OF TRANSPORTATION KEPT YOU FROM MEETINGS, WORK, OR FROM GETTING THINGS NEEDED FOR DAILY LIVING?: YES

## 2024-10-04 SDOH — HEALTH STABILITY: MENTAL HEALTH: HOW MANY DRINKS CONTAINING ALCOHOL DO YOU HAVE ON A TYPICAL DAY WHEN YOU ARE DRINKING?: PATIENT DOES NOT DRINK

## 2024-10-04 SDOH — ECONOMIC STABILITY: INCOME INSECURITY: HOW HARD IS IT FOR YOU TO PAY FOR THE VERY BASICS LIKE FOOD, HOUSING, MEDICAL CARE, AND HEATING?: NOT VERY HARD

## 2024-10-04 SDOH — SOCIAL STABILITY: SOCIAL INSECURITY
WITHIN THE LAST YEAR, HAVE YOU BEEN RAPED OR FORCED TO HAVE ANY KIND OF SEXUAL ACTIVITY BY YOUR PARTNER OR EX-PARTNER?: NO

## 2024-10-04 SDOH — ECONOMIC STABILITY: INCOME INSECURITY: IN THE PAST 12 MONTHS HAS THE ELECTRIC, GAS, OIL, OR WATER COMPANY THREATENED TO SHUT OFF SERVICES IN YOUR HOME?: NO

## 2024-10-04 SDOH — SOCIAL STABILITY: SOCIAL INSECURITY: WITHIN THE LAST YEAR, HAVE YOU BEEN HUMILIATED OR EMOTIONALLY ABUSED IN OTHER WAYS BY YOUR PARTNER OR EX-PARTNER?: NO

## 2024-10-04 SDOH — ECONOMIC STABILITY: INCOME INSECURITY: IN THE LAST 12 MONTHS, WAS THERE A TIME WHEN YOU WERE NOT ABLE TO PAY THE MORTGAGE OR RENT ON TIME?: NO

## 2024-10-04 SDOH — ECONOMIC STABILITY: FOOD INSECURITY: WITHIN THE PAST 12 MONTHS, YOU WORRIED THAT YOUR FOOD WOULD RUN OUT BEFORE YOU GOT MONEY TO BUY MORE.: NEVER TRUE

## 2024-10-04 SDOH — ECONOMIC STABILITY: HOUSING INSECURITY: AT ANY TIME IN THE PAST 12 MONTHS, WERE YOU HOMELESS OR LIVING IN A SHELTER (INCLUDING NOW)?: NO

## 2024-10-04 SDOH — HEALTH STABILITY: MENTAL HEALTH: HOW OFTEN DO YOU HAVE SIX OR MORE DRINKS ON ONE OCCASION?: NEVER

## 2024-10-04 SDOH — SOCIAL STABILITY: SOCIAL INSECURITY: DO YOU FEEL UNSAFE GOING BACK TO THE PLACE WHERE YOU ARE LIVING?: NO

## 2024-10-04 SDOH — HEALTH STABILITY: MENTAL HEALTH: HOW OFTEN DO YOU HAVE A DRINK CONTAINING ALCOHOL?: NEVER

## 2024-10-04 SDOH — ECONOMIC STABILITY: HOUSING INSECURITY: IN THE LAST 12 MONTHS, WAS THERE A TIME WHEN YOU WERE NOT ABLE TO PAY THE MORTGAGE OR RENT ON TIME?: NO

## 2024-10-04 SDOH — ECONOMIC STABILITY: INCOME INSECURITY: IN THE PAST 12 MONTHS, HAS THE ELECTRIC, GAS, OIL, OR WATER COMPANY THREATENED TO SHUT OFF SERVICE IN YOUR HOME?: NO

## 2024-10-04 SDOH — SOCIAL STABILITY: SOCIAL INSECURITY: DOES ANYONE TRY TO KEEP YOU FROM HAVING/CONTACTING OTHER FRIENDS OR DOING THINGS OUTSIDE YOUR HOME?: NO

## 2024-10-04 SDOH — SOCIAL STABILITY: SOCIAL INSECURITY: ARE YOU OR HAVE YOU BEEN THREATENED OR ABUSED PHYSICALLY, EMOTIONALLY, OR SEXUALLY BY ANYONE?: NO

## 2024-10-04 SDOH — ECONOMIC STABILITY: FOOD INSECURITY: HOW HARD IS IT FOR YOU TO PAY FOR THE VERY BASICS LIKE FOOD, HOUSING, MEDICAL CARE, AND HEATING?: NOT VERY HARD

## 2024-10-04 SDOH — ECONOMIC STABILITY: TRANSPORTATION INSECURITY: IN THE PAST 12 MONTHS, HAS LACK OF TRANSPORTATION KEPT YOU FROM MEDICAL APPOINTMENTS OR FROM GETTING MEDICATIONS?: NO

## 2024-10-04 SDOH — ECONOMIC STABILITY: HOUSING INSECURITY: IN THE PAST 12 MONTHS, HOW MANY TIMES HAVE YOU MOVED WHERE YOU WERE LIVING?: 1

## 2024-10-04 SDOH — SOCIAL STABILITY: SOCIAL INSECURITY: ARE THERE ANY APPARENT SIGNS OF INJURIES/BEHAVIORS THAT COULD BE RELATED TO ABUSE/NEGLECT?: NO

## 2024-10-04 SDOH — SOCIAL STABILITY: SOCIAL INSECURITY: HAVE YOU HAD THOUGHTS OF HARMING ANYONE ELSE?: YES

## 2024-10-04 SDOH — HEALTH STABILITY: MENTAL HEALTH: HOW MANY STANDARD DRINKS CONTAINING ALCOHOL DO YOU HAVE ON A TYPICAL DAY?: PATIENT DOES NOT DRINK

## 2024-10-04 SDOH — SOCIAL STABILITY: SOCIAL INSECURITY: DO YOU FEEL ANYONE HAS EXPLOITED OR TAKEN ADVANTAGE OF YOU FINANCIALLY OR OF YOUR PERSONAL PROPERTY?: NO

## 2024-10-04 ASSESSMENT — COGNITIVE AND FUNCTIONAL STATUS - GENERAL
MOBILITY SCORE: 24
DAILY ACTIVITIY SCORE: 24
PATIENT BASELINE BEDBOUND: NO
CLIMB 3 TO 5 STEPS WITH RAILING: A LITTLE
MOBILITY SCORE: 24
MOBILITY SCORE: 23
MOBILITY SCORE: 24
DAILY ACTIVITIY SCORE: 24
MOBILITY SCORE: 23
MOBILITY SCORE: 24
MOBILITY SCORE: 24
DAILY ACTIVITIY SCORE: 24
DAILY ACTIVITIY SCORE: 24
MOBILITY SCORE: 24
MOBILITY SCORE: 24
DAILY ACTIVITIY SCORE: 24
MOBILITY SCORE: 24
CLIMB 3 TO 5 STEPS WITH RAILING: A LITTLE
DAILY ACTIVITIY SCORE: 24
DAILY ACTIVITIY SCORE: 24

## 2024-10-04 ASSESSMENT — PAIN DESCRIPTION - LOCATION: LOCATION: LEG

## 2024-10-04 ASSESSMENT — ACTIVITIES OF DAILY LIVING (ADL)
TOILETING: INDEPENDENT
HEARING - RIGHT EAR: FUNCTIONAL
DRESSING YOURSELF: INDEPENDENT
GROOMING: INDEPENDENT
PATIENT'S MEMORY ADEQUATE TO SAFELY COMPLETE DAILY ACTIVITIES?: YES
PATIENT'S MEMORY ADEQUATE TO SAFELY COMPLETE DAILY ACTIVITIES?: YES
ADEQUATE_TO_COMPLETE_ADL: YES
WALKS IN HOME: INDEPENDENT
FEEDING YOURSELF: INDEPENDENT
FEEDING YOURSELF: INDEPENDENT
ASSISTIVE_DEVICE: WALKER
BATHING: INDEPENDENT
DRESSING YOURSELF: INDEPENDENT
LACK_OF_TRANSPORTATION: YES
HEARING - LEFT EAR: FUNCTIONAL
GROOMING: INDEPENDENT
HEARING - LEFT EAR: FUNCTIONAL
JUDGMENT_ADEQUATE_SAFELY_COMPLETE_DAILY_ACTIVITIES: YES
HEARING - RIGHT EAR: FUNCTIONAL
BATHING: INDEPENDENT
TOILETING: INDEPENDENT
WALKS IN HOME: INDEPENDENT
ADEQUATE_TO_COMPLETE_ADL: YES
ASSISTIVE_DEVICE: WALKER

## 2024-10-04 ASSESSMENT — PATIENT HEALTH QUESTIONNAIRE - PHQ9
2. FEELING DOWN, DEPRESSED OR HOPELESS: NEARLY EVERY DAY
SUM OF ALL RESPONSES TO PHQ9 QUESTIONS 1 & 2: 6
1. LITTLE INTEREST OR PLEASURE IN DOING THINGS: NEARLY EVERY DAY

## 2024-10-04 ASSESSMENT — PAIN DESCRIPTION - ORIENTATION: ORIENTATION: LEFT;RIGHT

## 2024-10-04 ASSESSMENT — LIFESTYLE VARIABLES
HOW MANY STANDARD DRINKS CONTAINING ALCOHOL DO YOU HAVE ON A TYPICAL DAY: PATIENT DOES NOT DRINK
AUDIT-C TOTAL SCORE: 0
SKIP TO QUESTIONS 9-10: 1
SKIP TO QUESTIONS 9-10: 1
HOW OFTEN DO YOU HAVE A DRINK CONTAINING ALCOHOL: NEVER
AUDIT-C TOTAL SCORE: 0
HOW OFTEN DO YOU HAVE 6 OR MORE DRINKS ON ONE OCCASION: NEVER
AUDIT-C TOTAL SCORE: 0

## 2024-10-04 ASSESSMENT — PAIN - FUNCTIONAL ASSESSMENT
PAIN_FUNCTIONAL_ASSESSMENT: 0-10

## 2024-10-04 ASSESSMENT — PAIN DESCRIPTION - DESCRIPTORS: DESCRIPTORS: SHARP

## 2024-10-04 ASSESSMENT — PAIN SCALES - GENERAL
PAINLEVEL_OUTOF10: 3
PAINLEVEL_OUTOF10: 0 - NO PAIN
PAINLEVEL_OUTOF10: 7
PAINLEVEL_OUTOF10: 2

## 2024-10-04 NOTE — NURSING NOTE
Pt arrived to the floor, able to ambulate to bed. Pt on 3L o2. Pt in bed with call light within reach.

## 2024-10-04 NOTE — H&P
History Of Present Illness  69-year-old female with history of hypertension, A-fib, COPD, diabetes (A1c 5.9 July 2023), aortic valve insufficiency s/p AVR x 2, SMA occlusion presenting with dyspnea.  Patient states over the past week she has had progressive amounts of SOB with associated cough, wheeze, and chills.  Endorses worsening in her leg swelling bilaterally.  Reports smoking cigarettes regularly due to stress.  Patient denies any fever, chest pain, nausea, vomiting, dysuria, hematuria, blood in stool.  On presentation patient found to have new oxygen requirement of 2 to 3 L.      BP 89/53  RR 16 T36.8 O2 sat 100%    CBC: WBC 7.4 Hgb 7.7   CMP K3.0 creatinine 1.16 GFR 51    Troponin 8-> 9  EKG atrial fibrillation  COVID, flu A/B negative  VBG 7.3 8/55/54/32.5  Lactate 2.1  CXR: Airspace opacity in the inferior portion right upper lobe and some ill-defined airspace opacity in the posterior portions of both lungs likely reflecting pneumonia in the proper clinical setting. Also likely some degree of volume overload/heart failure.    CT AP : 1. Anterior abdominal wall hernia containing a loop of bowel with moderate partial small bowel obstruction. 2. Heterogeneous enlarged uterus with masslike expansion of the endometrial cavity. Recommend clinical and ultrasound correlation.  This appears similar prior exam. 3. Gallstones.  4. Small right pleural effusion with pleural calcifications present. Suggest correlation for asbestos-related pleural disease. 5. Body wall edema and mild ascites.    Provided methylprednisolone 125 mg, DuoNeb, CTX, azithromycin, 1 L LR bolus    Past Medical History  Past Medical History:   Diagnosis Date    Atrial fibrillation (Multi)     Awareness under anesthesia     CHF (congestive heart failure)     COPD (chronic obstructive pulmonary disease) (Multi)     Diabetes (Multi)     GERD (gastroesophageal reflux disease)     HTN (hypertension)     Mitral insufficiency      PONV (postoperative nausea and vomiting)     S/P AVR        Surgical History  Past Surgical History:   Procedure Laterality Date    AORTIC ROOT REPLACEMENT      AORTIC VALVE REPLACEMENT      CT ABDOMEN PELVIS ANGIOGRAM W AND/OR WO IV CONTRAST  10/06/2023    CT ABDOMEN PELVIS ANGIOGRAM W AND/OR WO IV CONTRAST 10/6/2023 [facility] [place] [facility]    CT ABDOMEN PELVIS ANGIOGRAM W AND/OR WO IV CONTRAST  12/18/2023    CT ABDOMEN PELVIS ANGIOGRAM W AND/OR WO IV CONTRAST 12/18/2023 [facility] [place]    EMBOLECTOMY N/A 08/17/2022    SMA embolectomy via laparotomy    EMBOLECTOMY N/A 10/06/2023    Open SMA embolectomy        Social History  She reports that she has been smoking cigarettes. She has a 15 pack-year smoking history. She uses smokeless tobacco. She reports that she does not drink alcohol and does not use drugs.    Family History  Family History   Problem Relation Name Age of Onset    Breast cancer Maternal Grandmother  50 - 59        Allergies  Flexeril [cyclobenzaprine]    Review of Systems  As per HPI  Physical Exam  HENT:      Head: Normocephalic and atraumatic.   Eyes:      General: No scleral icterus.     Conjunctiva/sclera: Conjunctivae normal.   Cardiovascular:      Rate and Rhythm: Rhythm irregular.   Pulmonary:      Breath sounds: Wheezing present.      Comments: 3L NC  Abdominal:      General: There is no distension.      Palpations: Abdomen is soft.      Tenderness: There is no abdominal tenderness.   Musculoskeletal:      Comments: 3+ b/l LE edema. Both legs TTP   Skin:     General: Skin is warm and dry.   Neurological:      General: No focal deficit present.      Mental Status: She is alert and oriented to person, place, and time.   Psychiatric:         Mood and Affect: Mood normal.          Last Recorded Vitals  Blood pressure 94/75, pulse 81, temperature 36.8 °C (98.2 °F), resp. rate 16, SpO2 99%.      Assessment/Plan   Assessment & Plan  Community acquired pneumonia of both lungs    69-year-old female with history of  hypertension, A-fib not on anticoagulation, COPD, OMARI, diabetes (A1c 5.9 July 2023), aortic valve insufficiency s/p AVR x 2, SMA occlusion presenting with acute hypoxemic respiratory failure likely in the setting of community-acquired pneumonia.  Patient without leukocytosis, however does have cough, elevated lactate, and CXR indicative of PNA.  Patient appears fluid overloaded with BNP elevated from prior assessment which may be contributing to her presentation.  Additionally patient hemoglobin 7.7 with history of OMARI which may also explain her new oxygen requirement.    #Community-acquired pneumonia  #HFpEF exacerbation?  :: CXR concerning for opacities in the right upper lobe and posterior portions of bilateral lungs concerning for pneumonia.  Also degree of volume overload/heart failure  :: COVID, flu A/B negative  :: TTE 10/2023 shows EF 75%.  RVP overload.  Moderate mitral valve stenosis.  Severe tricuspid regurg  :: BNP elevated 887 (385)  :: Moderate risk DVT/PE per Wells criteria  -Continue with ceftriaxone 1 g and azithromycin 500 mg started in the ED  -Obtain sputum culture, procalcitonin, urine strep pneumonia and Legionella  [ ] Follow-up blood culture  - admit to tele floor  -Obtain repeat echocardiogram, TSH, iron studies  - obtain d-dimer. If the dimer is positive consider CTA/ duplex  -Consider diuresis as patient exhibits edema in bilateral lower extremities. Monitor blood pressure closely, as the patient presented hypotensive and tachycardic but improved with fluid resuscitation  -Continue with Jardiance 10 mg daily  -Home Breo Ellipta daily    #Symptomatic anemia  :: Hgb 7.7 hx OMARI per chart review however patient denies taking any iron supplementation  :: Patient without any hematuria, blood in stool, obvious signs of bleeding  -Continue to monitor hemoglobin    F: prn   E: replete K+  N: regular  GI: senokot  DVTppx: lovenox 40mg every day   Code status: FULL CODE  NOK: Carmen Jones (Daughter)  [phone] ([place])       Jeff Clarke, DO  Family Medicine PGY-3

## 2024-10-04 NOTE — CARE PLAN
Problem: Chronic Conditions and Co-morbidities  Goal: Patient's chronic conditions and co-morbidity symptoms are monitored and maintained or improved  10/4/2024 1055 by Shari Gaytan RN  Outcome: Progressing  10/4/2024 1054 by Shari Gaytan RN  Outcome: Progressing     Problem: Fall/Injury  Goal: Not fall by end of shift  Outcome: Progressing  Goal: Be free from injury by end of the shift  Outcome: Progressing  Goal: Verbalize understanding of personal risk factors for fall in the hospital  Outcome: Progressing  Goal: Verbalize understanding of risk factor reduction measures to prevent injury from fall in the home  Outcome: Progressing  Goal: Use assistive devices by end of the shift  Outcome: Progressing  Goal: Pace activities to prevent fatigue by end of the shift  Outcome: Progressing   The patient's goals for the shift include      The clinical goals for the shift include rest

## 2024-10-04 NOTE — ED PROVIDER NOTES
CC: Shortness of Breath     HPI:   Patient is a 69-year-old female with past medical history of hypertension, A-fib not on blood thinners, COPD, diabetes presenting due to concerns of her daughter coughing and feeling short of breath. Patient reports that over the past few days she has had rhinorrhea, increased congestion and clear to white productive cough. Patient endorses few episodes of diarrhea between 2 to 4-day and has not been eating as much as usual. She does not appear clinically dehydrated but does have significantly edematous bilateral lower extremities. She denies any falls and is still ambulatory. She denies any upper extremity swelling and only feels slightly short of breath when ambulating. She was noted to be hypoxic to 82% upon EMS arrival and they placed her on 3 L nasal cannula with improvement to 99%.  Patient was initially having slightly soft blood pressures with a MAP of 65. Patient also is endorsing abdominal pain in her epigastrium without any nausea or vomiting.  She denies any recent alcohol use and does have a history of active smoking.  Patient has not had any fevers or chills.    Limitations to History: none  Additional History Obtained from: patient alone    PMHx/PSHx:  Per HPI.   - has a past medical history of Atrial fibrillation (Multi), Awareness under anesthesia, CHF (congestive heart failure), COPD (chronic obstructive pulmonary disease) (Multi), Diabetes (Multi), GERD (gastroesophageal reflux disease), HTN (hypertension), Mitral insufficiency, PONV (postoperative nausea and vomiting), and S/P AVR.  - has a past surgical history that includes Embolectomy (N/A, 08/17/2022); Aortic root replacement; Aortic valve replacement; CT angio abdomen pelvis w and or wo IV IV contrast (10/06/2023); Embolectomy (N/A, 10/06/2023); and CT angio abdomen pelvis w and or wo IV IV contrast (12/18/2023).    Social History:  - Tobacco:  reports that she has been smoking cigarettes. She has a 15  pack-year smoking history. She uses smokeless tobacco.   - Alcohol:  reports no history of alcohol use.   - Drugs:  reports no history of drug use.     Medications: Reviewed in EMR.     Allergies:  Flexeril [cyclobenzaprine]    ???????????????????????????????????????????????????????????????  Triage Vitals:  T 36.8 °C (98.2 °F)  HR (!) 104  BP 89/53  RR 16  O2 100 % Supplemental oxygen    Physical Exam  Vitals and nursing note reviewed.   Constitutional:       General: She is not in acute distress.     Appearance: She is well-developed.   HENT:      Head: Normocephalic and atraumatic.      Mouth/Throat:      Mouth: Mucous membranes are dry.      Pharynx: Oropharynx is clear.   Eyes:      Conjunctiva/sclera: Conjunctivae normal.   Cardiovascular:      Rate and Rhythm: Normal rate and regular rhythm.      Heart sounds: No murmur heard.  Pulmonary:      Effort: Pulmonary effort is normal. No respiratory distress.      Breath sounds: Normal breath sounds. No wheezing, rhonchi or rales.      Comments: Prolonged expiratory phase without any noted wheezing  Abdominal:      General: There is no distension.      Palpations: Abdomen is soft.      Tenderness: There is no abdominal tenderness. There is no guarding or rebound.   Musculoskeletal:         General: No swelling or tenderness.      Cervical back: Neck supple.      Comments: Bilateral lower extremity pitting edema to the level of the knees that is 2+ bilaterally   Skin:     General: Skin is warm and dry.      Capillary Refill: Capillary refill takes less than 2 seconds.   Neurological:      Mental Status: She is alert and oriented to person, place, and time.      Sensory: No sensory deficit.      Motor: No weakness.      Gait: Gait normal.   Psychiatric:         Mood and Affect: Mood normal.       ???????????????????????????????????????????????????????????????  EKG (per my interpretation):  A-fib with a rate of 84.  QRS prolongation indicative of right bundle  branch block.  No acute ST elevations or depressions noted.  No PAL.  QTc 560.    ED Course  Diagnoses as of 10/03/24 2344   Community acquired pneumonia of both lungs       Medical Decision Making:  Patient is a 69-year-old female with past medical history of hypertension, A-fib not on blood thinners, COPD, diabetes presenting due to concerns of her daughter coughing and feeling short of breath.  Differentials considered but not limited to pneumonia, ACS, PE, pneumothorax, pancreatitis, volvulus, flu, symptomatic anemia, COVID, new onset CHF.    Based on the patient's history and physical examination broad workup was initiated.  Patient had sepsis protocol activated and was given just 1 L of fluids, given concerns for fluid overload with bilateral LE edema and BNP bumped 3x her normal. Patient was covered empirically with ceftriaxone azithromycin and does have pneumonia noted on chest x-ray.  Patient is flu COVID-negative with downtrending troponin.  Patient had elevated BNP to 887 however had borderline blood pressure and have not diuresed her as a result.  Patient was admitted for new onset hypoxia in the setting of pneumonia. She was downtitrated to 1-2 LPM given COPD and wanting to avoid knocking out respiratory drive.  On re-eval, oxygenation had improved.    Concerned that VASHTI worsened despite 1 L fluid, this may represent a cardiorenal state, but per review of old ECHO does not have HF, diastolic or otherwise, though may have poor perfusion from chronic a-fib. Given tenuous fluid status, holding on additional fluids or diuresis currently and will admit. Patient care was overseen by attending physician agrees with the plan and disposition.    External records reviewed: recent inpatient, clinic, and prior ED notes  Diagnostic imaging independently reviewed/interpreted by me (as reflected in MDM) includes: CXR  Social Determinants Affecting Care: Poor health literacy  Discussion of management with other  providers: attending  Prescription Drug Consideration: per admission team  Escalation of Care: admission    Impression:   PNA  Hypoxia  Cough  VASHTI    Disposition: Admitted      Procedures ? SmartLinks last updated 10/3/2024 10:53 PM        Aliyah Chilel MD  Resident  10/03/24 3003       Shaw Mayes MD  10/04/24 0386

## 2024-10-04 NOTE — PROGRESS NOTES
"Hilda Jones is a 69 y.o. female on day 1 of admission presenting with Community acquired pneumonia of both lungs.    Subjective   Patient seen and examined at bedside. On 3L NC but reports improvement in her breathing. She denied any concerns and was eating breakfast.     Objective     Physical Exam  HENT:      Head: Normocephalic and atraumatic.   Eyes:      General: No scleral icterus.     Conjunctiva/sclera: Conjunctivae normal.   Cardiovascular:      Rate and Rhythm: regular   Pulmonary:      Breath sounds: CTAB      Comments: 3L NC  Abdominal:      General: There is no distension.      Palpations: Abdomen is soft.      Tenderness: There is no abdominal tenderness.   Musculoskeletal:      Comments: Both legs TTP, no LE edema   Skin:     General: Skin is warm and dry.   Neurological:      General: No focal deficit present.      Mental Status: She is alert and oriented to person, place, and time.   Psychiatric:         Mood and Affect: Mood normal.     Last Recorded Vitals  Blood pressure 107/73, pulse 78, temperature 36.8 °C (98.2 °F), temperature source Temporal, resp. rate 16, height 1.651 m (5' 5\"), weight 64.1 kg (141 lb 5 oz), SpO2 96%.  Intake/Output last 3 Shifts:  I/O last 3 completed shifts:  In: 221 (3.4 mL/kg) [P.O.:221]  Out: - (0 mL/kg)   Weight: 64.1 kg     Relevant Results  Scheduled medications  aspirin, 81 mg, oral, Daily  atorvastatin, 40 mg, oral, Nightly  empagliflozin, 10 mg, oral, Daily  enoxaparin, 40 mg, subcutaneous, q24h  fluticasone furoate-vilanteroL, 1 puff, inhalation, Daily  potassium chloride, 20 mEq, oral, Once  sennosides, 2 tablet, oral, BID      Continuous medications     PRN medications    Results for orders placed or performed during the hospital encounter of 10/03/24 (from the past 24 hour(s))   Blood Gas Venous Full Panel Unsolicited   Result Value Ref Range    POCT pH, Venous 7.38 7.33 - 7.43 pH    POCT pCO2, Venous 55 (H) 41 - 51 mm Hg    POCT pO2, Venous 37 35 - 45 mm Hg "    POCT SO2, Venous 54 45 - 75 %    POCT Oxy Hemoglobin, Venous 51.1 45.0 - 75.0 %    POCT Hematocrit Calculated, Venous 24.0 (L) 36.0 - 46.0 %    POCT Sodium, Venous 137 136 - 145 mmol/L    POCT Potassium, Venous 3.0 (L) 3.5 - 5.3 mmol/L    POCT Chloride, Venous 99 98 - 107 mmol/L    POCT Ionized Calicum, Venous 1.08 (L) 1.10 - 1.33 mmol/L    POCT Glucose, Venous 92 74 - 99 mg/dL    POCT Lactate, Venous 2.1 (H) 0.4 - 2.0 mmol/L    POCT Base Excess, Venous 6.5 (H) -2.0 - 3.0 mmol/L    POCT HCO3 Calculated, Venous 32.5 (H) 22.0 - 26.0 mmol/L    POCT Hemoglobin, Venous 8.0 (L) 12.0 - 16.0 g/dL    POCT Anion Gap, Venous 9.0 (L) 10.0 - 25.0 mmol/L    Patient Temperature 37.0 degrees Celsius   CBC and Auto Differential   Result Value Ref Range    WBC 7.4 4.4 - 11.3 x10*3/uL    nRBC 0.5 (H) 0.0 - 0.0 /100 WBCs    RBC 4.64 4.00 - 5.20 x10*6/uL    Hemoglobin 7.7 (L) 12.0 - 16.0 g/dL    Hematocrit 26.9 (L) 36.0 - 46.0 %    MCV 58 (L) 80 - 100 fL    MCH 16.6 (L) 26.0 - 34.0 pg    MCHC 28.6 (L) 32.0 - 36.0 g/dL    RDW 25.0 (H) 11.5 - 14.5 %    Platelets 280 150 - 450 x10*3/uL    Immature Granulocytes %, Automated 0.3 0.0 - 0.9 %    Immature Granulocytes Absolute, Automated 0.02 0.00 - 0.70 x10*3/uL   Comprehensive metabolic panel   Result Value Ref Range    Glucose 92 74 - 99 mg/dL    Sodium 140 136 - 145 mmol/L    Potassium 3.0 (L) 3.5 - 5.3 mmol/L    Chloride 99 98 - 107 mmol/L    Bicarbonate 30 21 - 32 mmol/L    Anion Gap 14 10 - 20 mmol/L    Urea Nitrogen 14 6 - 23 mg/dL    Creatinine 1.16 (H) 0.50 - 1.05 mg/dL    eGFR 51 (L) >60 mL/min/1.73m*2    Calcium 8.5 (L) 8.6 - 10.6 mg/dL    Albumin 3.3 (L) 3.4 - 5.0 g/dL    Alkaline Phosphatase 92 33 - 136 U/L    Total Protein 6.5 6.4 - 8.2 g/dL    AST 17 9 - 39 U/L    Bilirubin, Total 1.2 0.0 - 1.2 mg/dL    ALT 9 7 - 45 U/L   Magnesium   Result Value Ref Range    Magnesium 1.98 1.60 - 2.40 mg/dL   B-Type Natriuretic Peptide   Result Value Ref Range     (H) 0 - 99 pg/mL    Troponin I, High Sensitivity, Initial   Result Value Ref Range    Troponin I, High Sensitivity (CMC) 8 0 - 34 ng/L   Manual Differential   Result Value Ref Range    Neutrophils %, Manual 63.5 40.0 - 80.0 %    Lymphocytes %, Manual 12.2 13.0 - 44.0 %    Monocytes %, Manual 12.2 2.0 - 10.0 %    Eosinophils %, Manual 4.3 0.0 - 6.0 %    Basophils %, Manual 3.5 0.0 - 2.0 %    Atypical Lymphocytes %, Manual 4.3 0.0 - 2.0 %    Seg Neutrophils Absolute, Manual 4.70 1.20 - 7.00 x10*3/uL    Lymphocytes Absolute, Manual 0.90 (L) 1.20 - 4.80 x10*3/uL    Monocytes Absolute, Manual 0.90 0.10 - 1.00 x10*3/uL    Eosinophils Absolute, Manual 0.32 0.00 - 0.70 x10*3/uL    Basophils Absolute, Manual 0.26 (H) 0.00 - 0.10 x10*3/uL    Atypical Lymphs Absolute, Manual 0.32 0.00 - 0.50 x10*3/uL    Total Cells Counted 115     Manual nRBC per 100 Cells 1.7 (H) 0.0 - 0.0 %    RBC Morphology See Below     Hypochromia Marked     RBC Fragments Few     Target Cells Many     Clumped Platelets Present    Iron and TIBC   Result Value Ref Range    Iron <10 (L) 35 - 150 ug/dL    UIBC 345 110 - 370 ug/dL    TIBC      % Saturation     Light Blue Top   Result Value Ref Range    Extra Tube Hold for add-ons.    Gray Top   Result Value Ref Range    Extra Tube Hold for add-ons.    Blood Culture    Specimen: Peripheral Venipuncture; Blood culture   Result Value Ref Range    Blood Culture Loaded on Instrument - Culture in progress    Blood Culture    Specimen: Peripheral Venipuncture; Blood culture   Result Value Ref Range    Blood Culture Loaded on Instrument - Culture in progress    Troponin, High Sensitivity, 1 Hour   Result Value Ref Range    Troponin I, High Sensitivity (CMC) 9 0 - 34 ng/L   TSH   Result Value Ref Range    Thyroid Stimulating Hormone 2.73 0.44 - 3.98 mIU/L   Influenza A, and B PCR   Result Value Ref Range    Flu A Result Not Detected Not Detected    Flu B Result Not Detected Not Detected   Sars-CoV-2 PCR   Result Value Ref Range     Coronavirus 2019, PCR Not Detected Not Detected   ECG 12 lead   Result Value Ref Range    Ventricular Rate 84 BPM    Atrial Rate 101 BPM    QRS Duration 144 ms    QT Interval 474 ms    QTC Calculation(Bazett) 560 ms    R Axis 221 degrees    T Axis 116 degrees    QRS Count 13 beats    Q Onset 232 ms    T Offset 469 ms    QTC Fredericia 530 ms   CBC   Result Value Ref Range    WBC 3.3 (L) 4.4 - 11.3 x10*3/uL    nRBC 0.9 (H) 0.0 - 0.0 /100 WBCs    RBC 4.78 4.00 - 5.20 x10*6/uL    Hemoglobin 7.9 (L) 12.0 - 16.0 g/dL    Hematocrit 28.2 (L) 36.0 - 46.0 %    MCV 59 (L) 80 - 100 fL    MCH 16.5 (L) 26.0 - 34.0 pg    MCHC 28.0 (L) 32.0 - 36.0 g/dL    RDW 24.9 (H) 11.5 - 14.5 %    Platelets 294 150 - 450 x10*3/uL   Renal Function Panel   Result Value Ref Range    Glucose 173 (H) 74 - 99 mg/dL    Sodium 139 136 - 145 mmol/L    Potassium 3.0 (L) 3.5 - 5.3 mmol/L    Chloride 97 (L) 98 - 107 mmol/L    Bicarbonate 34 (H) 21 - 32 mmol/L    Anion Gap 11 10 - 20 mmol/L    Urea Nitrogen 16 6 - 23 mg/dL    Creatinine 1.22 (H) 0.50 - 1.05 mg/dL    eGFR 48 (L) >60 mL/min/1.73m*2    Calcium 8.3 (L) 8.6 - 10.6 mg/dL    Phosphorus 5.2 (H) 2.5 - 4.9 mg/dL    Albumin 3.1 (L) 3.4 - 5.0 g/dL   D-dimer, VTE Exclusion   Result Value Ref Range    D-Dimer, Quantitative VTE Exclusion 1,041 (H) <=500 ng/mL FEU     Imaging:   ECG 12 lead  Result Date: 10/3/2024  Atrial fibrillation with premature ventricular or aberrantly conducted complexes Right bundle branch block Abnormal ECG When compared with ECG of 03-OCT-2024 22:02, No significant change was found     CT abdomen pelvis w IV contrast  Result Date: 10/3/2024  FINDINGS:  1. Anterior abdominal wall hernia containing a loop of bowel with moderate partial small bowel obstruction. 2. Heterogeneous enlarged uterus with masslike expansion of the endometrial cavity. Recommend clinical and ultrasound correlation. This appears similar prior exam. 3. Gallstones. 4. Small right pleural effusion with pleural  calcifications present. Suggest correlation for asbestos-related pleural disease. 5. Body wall edema and mild ascites. Signed by German Hurd MD    XR chest 2 views  Result Date: 10/3/2024  FINDINGS: Airspace opacity in the inferior portion right upper lobe and some ill-defined airspace opacity in the posterior portions of both lungs likely reflecting pneumonia in the proper clinical setting. Also likely some degree of volume overload/heart failure.  Signed by James Dempsey MD     Assessment/Plan   Assessment & Plan  Community acquired pneumonia of both lungs    69-year-old female with history of hypertension, A-fib not on anticoagulation, COPD, OMARI, diabetes (A1c 5.9 July 2023), aortic valve insufficiency s/p AVR x 2, SMA occlusion presenting with acute hypoxemic respiratory failure likely in the setting of community-acquired pneumonia vs new heart failure vs PE. There is no leukocytosis on CBC however she did have elevated lactate w/ CXR findings suggestive of PNA. She received CTX and Azithromycin in the ED. Will continue with the CTX x5 days (transition to PO abx on discharge) and doxycycline x5 days given patient's QTC >500. Patient with elevated BNP at 887 and signs of volume overload on CXR s/p 1L lR in the ED may be contributing to symptoms however patient appears very dry on exam. Her d-dimer was elevated to 1,041 and given her new O2 requirement and tachycardic on admission will obtain a V/Q scan and BLE duplex to r/o PE. Will differ CTPE given patient's renal function on admission and minimize contrast induced nephropathy. Additionally patient hemoglobin 7.7 with history of OMARI which may also explain her new oxygen requirement.    Plan:   #Community-acquired pneumonia  #HFpEF exacerbation?  :: CXR concerning for opacities in the right upper lobe and posterior portions of bilateral lungs concerning for pneumonia.  Also degree of volume overload/heart failure  :: COVID, flu A/B negative  :: TTE 10/2023  shows EF 75%.  RVP overload.  Moderate mitral valve stenosis.  Severe tricuspid regurg  :: BNP elevated 887 (385)  :: Moderate risk DVT/PE per Wells criteria  - Repeat TTE pending   - s/p ceftriaxone 1 g and azithromycin 500 mg started in the ED  - C/w CTX and doxycycline   - Procal 0.03   - Urine strep pneumonia and Legionella pending   - Bcx pending   - c/w home Jardiance 10 mg daily  - c/w home Breo Ellipta daily    #cf PE   - New oxygen requirement and tachycardic on admission   - Elevated d-dimer 1,041  - V/Q scan ordered   - BLE Duplex ordered   - Will differ CTPE pending some improvement in kidney function unless its warranted     #VASHTI, pre-renal   - Baseline Cr ~0.7 -0.8   - Cr on admission 1.16  - Given CXR concerning for vol overload, will minimize fluids and encourage PO pending her ECHO   - Urine lyte for etiology   - CTM UOP and creatinine      #Symptomatic anemia  :: Hgb 7.7 hx OMARI per chart review however patient denies taking any iron supplementation  :: Patient without any hematuria, blood in stool, obvious signs of bleeding  - Iron <10, % saturation 12, and unable to estimate TIBC   - Continue to monitor hemoglobin     F: prn     E: replete K+  N: regular  GI: senokot  DVTppx: lovenox 40mg every day   Code status: FULL CODE  NOK: Carmen Jones (Daughter) [phone] (Mobile)     Patient seen and d/w attending physician Dr. Aline Lee MD

## 2024-10-04 NOTE — CARE PLAN
The patient's goals for the shift include  Pt will improve oxygenation status  Problem: Chronic Conditions and Co-morbidities  Goal: Patient's chronic conditions and co-morbidity symptoms are monitored and maintained or improved  Outcome: Progressing     Problem: Safety - Adult  Goal: Free from fall injury  Outcome: Met       The clinical goals for the shift include rest

## 2024-10-04 NOTE — PROGRESS NOTES
"Pharmacy Medication History Review    Hilda Jones is a 69 y.o. female admitted for Community acquired pneumonia of both lungs. Pharmacy reviewed the patient's gabir-oi-evslkrezg medications and allergies for accuracy.    Patient is a participant of [facility] (independent living).   PTA Medication List has been updated as appears on \"Current Medications\" List provided by [facility]. A copy of this facility paperwork has been uploaded to \"Media\" under Chart Review in Epic for future reference. See further comments/notes in PTA Table below underneath medications.    The list below reflects the updated PTA list.   Comments regarding how patient may be taking   medications differently can be found   in the Admit Orders Activity  Prior to Admission Medications   Prescriptions  Patient / Pharmacy Reported?   albuterol 90 mcg/actuation inhaler  Yes   Sig: INHALE 1 PUFF BY MOUTH EVERY 4 HOURS AS NEEDED  --> Not found on PACE List. Left in PTA List PRN.   aspirin 81 mg EC tablet  Yes   Sig: Take 1 tablet (81 mg) by mouth once daily.   --> Active on PACE List, but patient states not taking (?).   atorvastatin (Lipitor) 40 mg tablet  Yes   Sig: Take 1 tablet (40 mg) by mouth once daily at bedtime.   cholecalciferol (Vitamin D-3) 50 MCG (2000 UT) tablet  Yes   Sig: Take 1 tablet (50 mcg) by mouth once daily.   donepezil (Aricept) 5 mg tablet  Yes   Sig: TAKE 1 TABLET BY MOUTH AT BEDTIME   empagliflozin (Jardiance) 10 mg  Yes   Sig: TAKE 1 TABLET BY MOUTH ONCE DAILY   famotidine (Pepcid) 20 mg tablet  Yes   Sig: Take 1 tablet (20 mg) by mouth 2 times a day.   fluticasone furoate-vilanteroL (Breo Ellipta) 100-25 mcg/dose inhaler  Yes   Sig: INHALE 1 PUFF BY MOUTH ONCE DAILY   gabapentin (Neurontin) 100 mg capsule  Yes   Sig: Take 1 capsule (100 mg) by mouth as needed at bedtime.  --> Patient states PRN use.   insulin glargine (Lantus U-100 Insulin) 100 unit/mL injection  Yes   Sig: Inject 10 Units under the skin once " "daily.  --> Active on PACE List, but patient states uses PRN (?).  --> Pt states hasn't used in awhile, last dose \"a month ago.\"   loperamide (Imodium) 2 mg capsule  Yes   Sig: Take 1 capsule (2 mg) by mouth 4 times a day as   needed for diarrhea.   melatonin 5 mg tablet,chewable  Yes   Sig: Chew 1 tablet as needed at bedtime.   metoprolol tartrate (Lopressor) 25 mg tablet  Yes   Sig: Take 1 tablet (25 mg) by mouth 2 times a day.   mirtazapine (Remeron) 15 mg tablet  Yes   Sig: Take 1 tablet (15 mg) by mouth as needed at bedtime.   multivitamin with minerals tablet  Yes   Sig: Take 1 tablet by mouth once daily.   nicotine (Nicoderm CQ) 14 mg/24 hr patch  Yes   Sig: APPLY 1 PATCH TO SKIN EVERY 24 HOURS  --> Pt states not currently using.   potassium chloride CR 20 mEq ER tablet  Yes   Sig: Take 1 tablet (20 mEq) by mouth every other day.  --> Patient confirms taking every other day.   psyllium (Metamucil Fiber Singles) 3.4 gram packet  Yes   Sig: Take 1 packet by mouth 2 times a day as needed.  --> PRN use per patient; doesn't like taste.   rivaroxaban (Xarelto) 20 mg tablet  Yes   Sig: Take 1 tablet (20 mg) by mouth once daily in the evening.   --> Active on PACE List, but patient states not taking (?).   sennosides-docusate sodium (Jackie-Colace) 8.6-50 mg tablet  Yes   Sig: Take 1 tablet by mouth once daily as needed for constipation.   spironolactone (Aldactone) 25 mg tablet  Yes   Sig: Take 1 tablet (25 mg) by mouth once daily.  --> Active on PACE List, but patient does not recognize (?)   torsemide (Demadex) 20 mg tablet  Yes   Sig: Take 2 tablets (40 mg) by mouth once daily.  --> Active on PACE List, but patient does not recognize (?)   valACYclovir (Valtrex) 1 gram tablet  Yes   Sig: Take 1 tablet (1,000 mg) by mouth 3 times a day.   For 7 days. Start Date 10/3/24, Stop Date 10/10/24  --> Diagnosis per Med List: Zoster [herpes zoster]      FLUoxetine (PROzac) 40 mg capsule  Yes   Sig: Take 1 capsule (40 mg) by " mouth once daily in the morning.           The list below reflects the updated allergy list. Please review each documented allergy for additional clarification and justification.  Allergies  Reviewed by Zeb Sharma RPh on 10/4/2024        Severity Reactions Comments    Flexeril [cyclobenzaprine] Not Specified Unknown           Patient accepts M2B at discharge.   Of Note: [facility]    Sources used to complete the med history include:  Patient Interview (awake/alert; fair historian, recognized some medications using name prompting; however, unclear on others as noted above; PTA List updated as per [employer] medication list for complete reference.)  Epic Dispense Report (Pharmacy Fill History) [does not populate with PACE]  [facility] 10/3/24    Additional Comments:  See additional comments/notes underneath medications in above PTA Table. Added to PTA List: Vitamin D3, Famotidine, Glargine, Melatonin, Mirtazapine, Metoprolol, Multivitamin, Potassium, Senna Plus, Spironolactone, Torsemide, Valcyclovir      ---------------------------------  Zeb Sharma PharmD, [facility]  Transitions of Care Pharmacist  Medication reconciliation complete  Please reach out via [employer] Secure Chat for questions,   or if no response call [employer] or [employer].   Meds [facility]

## 2024-10-04 NOTE — ED PROCEDURE NOTE
Procedure  Critical Care    Performed by: Shaw Mayes MD  Authorized by: Shaw Mayes MD    Critical care provider statement:     Critical care time (minutes):  10    Critical care time was exclusive of:  Separately billable procedures and treating other patients and teaching time    Critical care was necessary to treat or prevent imminent or life-threatening deterioration of the following conditions:  Respiratory failure    Critical care was time spent personally by me on the following activities:  Ordering and performing treatments and interventions, development of treatment plan with patient or surrogate, ordering and review of laboratory studies, discussions with consultants, ordering and review of radiographic studies, pulse oximetry, evaluation of patient's response to treatment and re-evaluation of patient's condition               Shaw Mayes MD  10/04/24 0936

## 2024-10-04 NOTE — PROGRESS NOTES
10/04/24 1200   Discharge Planning   Living Arrangements Alone   Support Systems Children   Assistance Needed None   Type of Residence Private residence   Do you have animals or pets at home? No   Home or Post Acute Services In home services   Type of Home Care Services Home nursing visits;Home OT;Home PT   Expected Discharge Disposition Home H   Does the patient need discharge transport arranged? Yes   RoundTrip coordination needed? Yes   Has discharge transport been arranged? No   Financial Resource Strain   How hard is it for you to pay for the very basics like food, housing, medical care, and heating? Not very   Housing Stability   In the last 12 months, was there a time when you were not able to pay the mortgage or rent on time? N   Transportation Needs   In the past 12 months, has lack of transportation kept you from medical appointments or from getting medications? no       DC Planning:  10/4:   Went in and met with the pt, confirmed demographics.   Needs: TBD      Dispo Plan: Home    Barriers: None    ADOD: 10/7    This TCC will continue to follow for home going needs and safe DC plan.    Mojgan Mccabe. SOFÍA. TCC.

## 2024-10-05 ENCOUNTER — APPOINTMENT (OUTPATIENT)
Dept: CARDIOLOGY | Facility: HOSPITAL | Age: 69
End: 2024-10-05
Payer: MEDICARE

## 2024-10-05 LAB
ALBUMIN SERPL BCP-MCNC: 3.3 G/DL (ref 3.4–5)
ANION GAP SERPL CALC-SCNC: 12 MMOL/L (ref 10–20)
AORTIC VALVE MEAN GRADIENT: 4 MMHG
AORTIC VALVE PEAK VELOCITY: 1.29 M/S
AV PEAK GRADIENT: 6.7 MMHG
AVA (PEAK VEL): 2.32 CM2
AVA (VTI): 2.63 CM2
BUN SERPL-MCNC: 19 MG/DL (ref 6–23)
CALCIUM SERPL-MCNC: 8.6 MG/DL (ref 8.6–10.6)
CHLORIDE SERPL-SCNC: 98 MMOL/L (ref 98–107)
CO2 SERPL-SCNC: 33 MMOL/L (ref 21–32)
CREAT SERPL-MCNC: 1.14 MG/DL (ref 0.5–1.05)
EGFRCR SERPLBLD CKD-EPI 2021: 52 ML/MIN/1.73M*2
EJECTION FRACTION APICAL 4 CHAMBER: 67.6
EJECTION FRACTION: 67 %
GLUCOSE BLD MANUAL STRIP-MCNC: 111 MG/DL (ref 74–99)
GLUCOSE BLD MANUAL STRIP-MCNC: 121 MG/DL (ref 74–99)
GLUCOSE BLD MANUAL STRIP-MCNC: 142 MG/DL (ref 74–99)
GLUCOSE BLD MANUAL STRIP-MCNC: 156 MG/DL (ref 74–99)
GLUCOSE SERPL-MCNC: 101 MG/DL (ref 74–99)
LEFT VENTRICLE INTERNAL DIMENSION DIASTOLE: 4.1 CM (ref 3.5–6)
LEFT VENTRICULAR OUTFLOW TRACT DIAMETER: 2.1 CM
PHOSPHATE SERPL-MCNC: 3.5 MG/DL (ref 2.5–4.9)
POTASSIUM SERPL-SCNC: 3 MMOL/L (ref 3.5–5.3)
RIGHT VENTRICLE FREE WALL PEAK S': 7.2 CM/S
RIGHT VENTRICLE PEAK SYSTOLIC PRESSURE: 58.3 MMHG
SODIUM SERPL-SCNC: 140 MMOL/L (ref 136–145)
TRICUSPID ANNULAR PLANE SYSTOLIC EXCURSION: 10 CM

## 2024-10-05 PROCEDURE — 36415 COLL VENOUS BLD VENIPUNCTURE: CPT | Performed by: STUDENT IN AN ORGANIZED HEALTH CARE EDUCATION/TRAINING PROGRAM

## 2024-10-05 PROCEDURE — 71250 CT THORAX DX C-: CPT | Performed by: RADIOLOGY

## 2024-10-05 PROCEDURE — 2500000001 HC RX 250 WO HCPCS SELF ADMINISTERED DRUGS (ALT 637 FOR MEDICARE OP)

## 2024-10-05 PROCEDURE — 99233 SBSQ HOSP IP/OBS HIGH 50: CPT

## 2024-10-05 PROCEDURE — 93306 TTE W/DOPPLER COMPLETE: CPT

## 2024-10-05 PROCEDURE — 80069 RENAL FUNCTION PANEL: CPT | Performed by: STUDENT IN AN ORGANIZED HEALTH CARE EDUCATION/TRAINING PROGRAM

## 2024-10-05 PROCEDURE — 93306 TTE W/DOPPLER COMPLETE: CPT | Performed by: STUDENT IN AN ORGANIZED HEALTH CARE EDUCATION/TRAINING PROGRAM

## 2024-10-05 PROCEDURE — 1200000002 HC GENERAL ROOM WITH TELEMETRY DAILY

## 2024-10-05 PROCEDURE — 2500000001 HC RX 250 WO HCPCS SELF ADMINISTERED DRUGS (ALT 637 FOR MEDICARE OP): Performed by: STUDENT IN AN ORGANIZED HEALTH CARE EDUCATION/TRAINING PROGRAM

## 2024-10-05 PROCEDURE — 82947 ASSAY GLUCOSE BLOOD QUANT: CPT

## 2024-10-05 PROCEDURE — 2500000004 HC RX 250 GENERAL PHARMACY W/ HCPCS (ALT 636 FOR OP/ED)

## 2024-10-05 RX ORDER — POTASSIUM CHLORIDE 1.5 G/1.58G
60 POWDER, FOR SOLUTION ORAL ONCE
Status: COMPLETED | OUTPATIENT
Start: 2024-10-05 | End: 2024-10-05

## 2024-10-05 ASSESSMENT — COGNITIVE AND FUNCTIONAL STATUS - GENERAL
DAILY ACTIVITIY SCORE: 24
DAILY ACTIVITIY SCORE: 24
MOBILITY SCORE: 24
DAILY ACTIVITIY SCORE: 24
MOBILITY SCORE: 24
DAILY ACTIVITIY SCORE: 24
MOBILITY SCORE: 24
DAILY ACTIVITIY SCORE: 24
MOBILITY SCORE: 24
MOBILITY SCORE: 24
DAILY ACTIVITIY SCORE: 24
MOBILITY SCORE: 24

## 2024-10-05 ASSESSMENT — PAIN - FUNCTIONAL ASSESSMENT
PAIN_FUNCTIONAL_ASSESSMENT: 0-10
PAIN_FUNCTIONAL_ASSESSMENT: 0-10

## 2024-10-05 ASSESSMENT — PAIN SCALES - GENERAL
PAINLEVEL_OUTOF10: 4
PAINLEVEL_OUTOF10: 2

## 2024-10-05 ASSESSMENT — PAIN DESCRIPTION - ORIENTATION: ORIENTATION: LEFT;RIGHT

## 2024-10-05 ASSESSMENT — PAIN DESCRIPTION - DESCRIPTORS: DESCRIPTORS: ACHING

## 2024-10-05 NOTE — PROGRESS NOTES
"Hilda Jones is a 69 y.o. female on day 2 of admission presenting with Community acquired pneumonia of both lungs.    Subjective   Patient seen and examined at bedside. Patient was alseep with her NC hanging on her face, her saturations were checked and she was sating at 60-70% on RA. She normalized back to >95% on 3L NC. She denied any concerns.     Objective     Physical Exam  HENT:      Head: Normocephalic and atraumatic.   Eyes:      General: No scleral icterus.     Conjunctiva/sclera: Conjunctivae normal.   Cardiovascular:      Rate and Rhythm: regular   Pulmonary:      Breath sounds: CTAB      Comments: 3L NC  Abdominal:      General: There is no distension.      Palpations: Abdomen is soft.      Tenderness: There is no abdominal tenderness.   Musculoskeletal:      Comments: Both legs TTP  Skin:     General: Skin is warm and dry.   Neurological:      General: No focal deficit present.      Mental Status: She is alert and oriented to person, place, and time.   Psychiatric:         Mood and Affect: Mood normal.     Last Recorded Vitals  Blood pressure 103/71, pulse 105, temperature 36.3 °C (97.3 °F), temperature source Temporal, resp. rate 18, height 1.651 m (5' 5\"), weight 65.9 kg (145 lb 4.5 oz), SpO2 95%.  Intake/Output last 3 Shifts:  I/O last 3 completed shifts:  In: 501 (7.6 mL/kg) [P.O.:501]  Out: 0 (0 mL/kg)   Weight: 65.9 kg     Relevant Results  Scheduled medications  aspirin, 81 mg, oral, Daily  atorvastatin, 40 mg, oral, Nightly  empagliflozin, 10 mg, oral, Daily  enoxaparin, 40 mg, subcutaneous, q24h  fluticasone furoate-vilanteroL, 1 puff, inhalation, Daily  sennosides, 2 tablet, oral, BID      Continuous medications     PRN medications  PRN medications: acetaminophen, traMADol  Results for orders placed or performed during the hospital encounter of 10/03/24 (from the past 24 hour(s))   POCT GLUCOSE   Result Value Ref Range    POCT Glucose 163 (H) 74 - 99 mg/dL   Urine electrolytes   Result Value " Ref Range    Sodium, Urine Random <10 mmol/L    Sodium/Creatinine Ratio      Potassium, Urine Random 30 mmol/L    Potassium/Creatinine Ratio 28 Not established mmol/g Creat    Chloride, Urine Random 28 mmol/L    Chloride/Creatinine Ratio 26 (L) 38 - 318 mmol/g creat    Creatinine, Urine Random 108.0 20.0 - 320.0 mg/dL   POCT GLUCOSE   Result Value Ref Range    POCT Glucose 142 (H) 74 - 99 mg/dL     Imaging:   ECG 12 lead  Result Date: 10/3/2024  Atrial fibrillation with premature ventricular or aberrantly conducted complexes Right bundle branch block Abnormal ECG When compared with ECG of 03-OCT-2024 22:02, No significant change was found     CT abdomen pelvis w IV contrast  Result Date: 10/3/2024  FINDINGS:  1. Anterior abdominal wall hernia containing a loop of bowel with moderate partial small bowel obstruction. 2. Heterogeneous enlarged uterus with masslike expansion of the endometrial cavity. Recommend clinical and ultrasound correlation. This appears similar prior exam. 3. Gallstones. 4. Small right pleural effusion with pleural calcifications present. Suggest correlation for asbestos-related pleural disease. 5. Body wall edema and mild ascites. Signed by German Hurd MD    XR chest 2 views  Result Date: 10/3/2024  FINDINGS: Airspace opacity in the inferior portion right upper lobe and some ill-defined airspace opacity in the posterior portions of both lungs likely reflecting pneumonia in the proper clinical setting. Also likely some degree of volume overload/heart failure.  Signed by James Dempsey MD     Assessment/Plan   Assessment & Plan  Community acquired pneumonia of both lungs    69-year-old female with history of hypertension, A-fib not on anticoagulation, COPD, OMARI, diabetes (A1c 5.9 July 2023), aortic valve insufficiency s/p AVR x 2, SMA occlusion presenting with acute hypoxemic respiratory failure likely in the setting of community-acquired pneumonia vs new heart failure vs PE. There is no  leukocytosis on CBC however she did have elevated lactate w/ CXR findings suggestive of PNA. She received CTX and Azithromycin in the ED. Patient's procal 0.03 with no leukocytosis or fever therefore her abx were discontinued. However, given her CXR did show concerns for infiltrates, will follow-up with CT w/o con for further evaluation. This may also better exam her new oxygen requirement. Her V/Q scan was low probability for PE w/ neg BLE duplex. TTE ordered to r/o cardiac etiology for her presenting symptoms.     Plan:   #Community-acquired pneumonia  #HFpEF exacerbation?  :: CXR concerning for opacities in the right upper lobe and posterior portions of bilateral lungs concerning for pneumonia.  Also degree of volume overload/heart failure  :: COVID, flu A/B negative  :: TTE 10/2023 shows EF 75%.  RVP overload.  Moderate mitral valve stenosis.  Severe tricuspid regurg  :: BNP elevated 887 (385)  :: Moderate risk DVT/PE per Wells criteria  - Echo pending final read  - s/p ceftriaxone 1 g and azithromycin 500 mg started in the ED  - Discont CTX and doxycycline, low cf bacterial PNA   - Procal 0.03   - Urine strep pneumonia and Legionella neg  - Bcx NGTD x1  - c/w home Jardiance 10 mg daily  - c/w home Breo Ellipta daily    #cf PE   - New oxygen requirement and tachycardic on admission   - Elevated d-dimer 1,041  - V/Q scan low probability for PE  - BLE Duplex neg for DVT   - CT chest non con for further evaluation of pulmonary infiltrates     #VASHTI, pre-renal, improving   - Baseline Cr ~0.7 -0.8   - Cr on admission 1.16  - Given CXR concerning for vol overload, will minimize fluids and encourage PO pending her ECHO   - CTM UOP and creatinine      #Symptomatic anemia  :: Hgb 7.7 hx OMARI per chart review however patient denies taking any iron supplementation  :: Patient without any hematuria, blood in stool, obvious signs of bleeding  - Iron <10, % saturation 12, and unable to estimate TIBC   - Continue to monitor  hemoglobin     F: prn     E: replete K+  N: regular  GI: senokot  DVTppx: lovenox 40mg every day   Code status: FULL CODE  NOK: Carmen Jones (Daughter) [phone] ([place])     BRENNEN Lee MD

## 2024-10-05 NOTE — CARE PLAN
The patient's goals for the shift include    Problem: Chronic Conditions and Co-morbidities  Goal: Patient's chronic conditions and co-morbidity symptoms are monitored and maintained or improved  Outcome: Progressing     Problem: Fall/Injury  Goal: Not fall by end of shift  Outcome: Progressing  Goal: Be free from injury by end of the shift  Outcome: Progressing  Goal: Verbalize understanding of personal risk factors for fall in the hospital  Outcome: Progressing  Goal: Verbalize understanding of risk factor reduction measures to prevent injury from fall in the home  Outcome: Progressing  Goal: Use assistive devices by end of the shift  Outcome: Progressing  Goal: Pace activities to prevent fatigue by end of the shift  Outcome: Progressing       The clinical goals for the shift include patient pain throughout shift

## 2024-10-05 NOTE — CARE PLAN
Problem: Chronic Conditions and Co-morbidities  Goal: Patient's chronic conditions and co-morbidity symptoms are monitored and maintained or improved  Outcome: Progressing     Problem: Fall/Injury  Goal: Not fall by end of shift  Outcome: Progressing  Goal: Be free from injury by end of the shift  Outcome: Progressing  Goal: Verbalize understanding of personal risk factors for fall in the hospital  Outcome: Progressing  Goal: Verbalize understanding of risk factor reduction measures to prevent injury from fall in the home  Outcome: Progressing  Goal: Use assistive devices by end of the shift  Outcome: Progressing  Goal: Pace activities to prevent fatigue by end of the shift  Outcome: Progressing   The patient's goals for the shift include      The clinical goals for the shift include patient pain throughout shift

## 2024-10-06 VITALS
BODY MASS INDEX: 24.43 KG/M2 | RESPIRATION RATE: 18 BRPM | HEIGHT: 65 IN | DIASTOLIC BLOOD PRESSURE: 72 MMHG | TEMPERATURE: 97.3 F | HEART RATE: 102 BPM | SYSTOLIC BLOOD PRESSURE: 108 MMHG | OXYGEN SATURATION: 95 % | WEIGHT: 146.61 LBS

## 2024-10-06 LAB
ALBUMIN SERPL BCP-MCNC: 3.4 G/DL (ref 3.4–5)
ANION GAP SERPL CALC-SCNC: 9 MMOL/L (ref 10–20)
BACTERIA BLD CULT: NORMAL
BACTERIA BLD CULT: NORMAL
BASOPHILS # BLD MANUAL: 0.07 X10*3/UL (ref 0–0.1)
BASOPHILS NFR BLD MANUAL: 0.9 %
BUN SERPL-MCNC: 11 MG/DL (ref 6–23)
CALCIUM SERPL-MCNC: 8.9 MG/DL (ref 8.6–10.6)
CHLORIDE SERPL-SCNC: 100 MMOL/L (ref 98–107)
CO2 SERPL-SCNC: 34 MMOL/L (ref 21–32)
CREAT SERPL-MCNC: 0.74 MG/DL (ref 0.5–1.05)
EGFRCR SERPLBLD CKD-EPI 2021: 87 ML/MIN/1.73M*2
EOSINOPHIL # BLD MANUAL: 0.55 X10*3/UL (ref 0–0.7)
EOSINOPHIL NFR BLD MANUAL: 7 %
ERYTHROCYTE [DISTWIDTH] IN BLOOD BY AUTOMATED COUNT: 25.1 % (ref 11.5–14.5)
GLUCOSE BLD MANUAL STRIP-MCNC: 106 MG/DL (ref 74–99)
GLUCOSE BLD MANUAL STRIP-MCNC: 109 MG/DL (ref 74–99)
GLUCOSE BLD MANUAL STRIP-MCNC: 90 MG/DL (ref 74–99)
GLUCOSE BLD MANUAL STRIP-MCNC: 92 MG/DL (ref 74–99)
GLUCOSE SERPL-MCNC: 91 MG/DL (ref 74–99)
HCT VFR BLD AUTO: 27.2 % (ref 36–46)
HGB BLD-MCNC: 7.5 G/DL (ref 12–16)
HYPOCHROMIA BLD QL SMEAR: ABNORMAL
IMM GRANULOCYTES # BLD AUTO: 0.02 X10*3/UL (ref 0–0.7)
IMM GRANULOCYTES NFR BLD AUTO: 0.3 % (ref 0–0.9)
LYMPHOCYTES # BLD MANUAL: 0.62 X10*3/UL (ref 1.2–4.8)
LYMPHOCYTES NFR BLD MANUAL: 7.9 %
MCH RBC QN AUTO: 16.4 PG (ref 26–34)
MCHC RBC AUTO-ENTMCNC: 27.6 G/DL (ref 32–36)
MCV RBC AUTO: 60 FL (ref 80–100)
MONOCYTES # BLD MANUAL: 0.21 X10*3/UL (ref 0.1–1)
MONOCYTES NFR BLD MANUAL: 2.6 %
NEUTS SEG # BLD MANUAL: 6.45 X10*3/UL (ref 1.2–7)
NEUTS SEG NFR BLD MANUAL: 81.6 %
NRBC BLD-RTO: 0.3 /100 WBCS (ref 0–0)
PHOSPHATE SERPL-MCNC: 3.5 MG/DL (ref 2.5–4.9)
PLATELET # BLD AUTO: 299 X10*3/UL (ref 150–450)
POTASSIUM SERPL-SCNC: 4.1 MMOL/L (ref 3.5–5.3)
RBC # BLD AUTO: 4.57 X10*6/UL (ref 4–5.2)
RBC MORPH BLD: ABNORMAL
SODIUM SERPL-SCNC: 139 MMOL/L (ref 136–145)
TARGETS BLD QL SMEAR: ABNORMAL
TOTAL CELLS COUNTED BLD: 114
WBC # BLD AUTO: 7.9 X10*3/UL (ref 4.4–11.3)

## 2024-10-06 PROCEDURE — 82947 ASSAY GLUCOSE BLOOD QUANT: CPT

## 2024-10-06 PROCEDURE — 2500000004 HC RX 250 GENERAL PHARMACY W/ HCPCS (ALT 636 FOR OP/ED): Performed by: STUDENT IN AN ORGANIZED HEALTH CARE EDUCATION/TRAINING PROGRAM

## 2024-10-06 PROCEDURE — 2500000001 HC RX 250 WO HCPCS SELF ADMINISTERED DRUGS (ALT 637 FOR MEDICARE OP)

## 2024-10-06 PROCEDURE — 36415 COLL VENOUS BLD VENIPUNCTURE: CPT | Performed by: STUDENT IN AN ORGANIZED HEALTH CARE EDUCATION/TRAINING PROGRAM

## 2024-10-06 PROCEDURE — 2500000001 HC RX 250 WO HCPCS SELF ADMINISTERED DRUGS (ALT 637 FOR MEDICARE OP): Performed by: STUDENT IN AN ORGANIZED HEALTH CARE EDUCATION/TRAINING PROGRAM

## 2024-10-06 PROCEDURE — 99233 SBSQ HOSP IP/OBS HIGH 50: CPT | Performed by: STUDENT IN AN ORGANIZED HEALTH CARE EDUCATION/TRAINING PROGRAM

## 2024-10-06 PROCEDURE — 1200000002 HC GENERAL ROOM WITH TELEMETRY DAILY

## 2024-10-06 PROCEDURE — 85027 COMPLETE CBC AUTOMATED: CPT | Performed by: STUDENT IN AN ORGANIZED HEALTH CARE EDUCATION/TRAINING PROGRAM

## 2024-10-06 PROCEDURE — 80069 RENAL FUNCTION PANEL: CPT | Performed by: STUDENT IN AN ORGANIZED HEALTH CARE EDUCATION/TRAINING PROGRAM

## 2024-10-06 PROCEDURE — 94640 AIRWAY INHALATION TREATMENT: CPT

## 2024-10-06 PROCEDURE — 85007 BL SMEAR W/DIFF WBC COUNT: CPT | Performed by: STUDENT IN AN ORGANIZED HEALTH CARE EDUCATION/TRAINING PROGRAM

## 2024-10-06 PROCEDURE — 2500000004 HC RX 250 GENERAL PHARMACY W/ HCPCS (ALT 636 FOR OP/ED)

## 2024-10-06 RX ORDER — TRAMADOL HYDROCHLORIDE 50 MG/1
50 TABLET ORAL ONCE
Status: COMPLETED | OUTPATIENT
Start: 2024-10-06 | End: 2024-10-06

## 2024-10-06 RX ORDER — DIPHENHYDRAMINE HCL 25 MG
25 CAPSULE ORAL ONCE
Status: COMPLETED | OUTPATIENT
Start: 2024-10-06 | End: 2024-10-06

## 2024-10-06 RX ORDER — ONDANSETRON HYDROCHLORIDE 2 MG/ML
4 INJECTION, SOLUTION INTRAVENOUS EVERY 8 HOURS PRN
Status: DISCONTINUED | OUTPATIENT
Start: 2024-10-06 | End: 2024-10-06

## 2024-10-06 RX ORDER — ONDANSETRON HYDROCHLORIDE 2 MG/ML
4 INJECTION, SOLUTION INTRAVENOUS ONCE
Status: DISCONTINUED | OUTPATIENT
Start: 2024-10-06 | End: 2024-10-06

## 2024-10-06 RX ORDER — FERROUS SULFATE 325(65) MG
65 TABLET ORAL DAILY
Status: DISPENSED | OUTPATIENT
Start: 2024-10-06

## 2024-10-06 RX ORDER — PETROLATUM 420 MG/G
OINTMENT TOPICAL
Status: DISPENSED | OUTPATIENT
Start: 2024-10-06

## 2024-10-06 RX ORDER — DIPHENHYDRAMINE HCL 25 MG
25 CAPSULE ORAL EVERY 6 HOURS PRN
Status: DISCONTINUED | OUTPATIENT
Start: 2024-10-06 | End: 2024-10-06

## 2024-10-06 RX ORDER — ONDANSETRON 4 MG/1
4 TABLET, ORALLY DISINTEGRATING ORAL EVERY 8 HOURS PRN
Status: DISCONTINUED | OUTPATIENT
Start: 2024-10-06 | End: 2024-10-06

## 2024-10-06 ASSESSMENT — COGNITIVE AND FUNCTIONAL STATUS - GENERAL
DAILY ACTIVITIY SCORE: 24
MOBILITY SCORE: 24
DAILY ACTIVITIY SCORE: 24
MOBILITY SCORE: 24
DAILY ACTIVITIY SCORE: 24
MOBILITY SCORE: 24
DAILY ACTIVITIY SCORE: 24
DAILY ACTIVITIY SCORE: 24
MOBILITY SCORE: 24
MOBILITY SCORE: 24
DAILY ACTIVITIY SCORE: 24
MOBILITY SCORE: 24

## 2024-10-06 ASSESSMENT — PAIN DESCRIPTION - LOCATION: LOCATION: BACK

## 2024-10-06 ASSESSMENT — PAIN SCALES - GENERAL
PAINLEVEL_OUTOF10: 7
PAINLEVEL_OUTOF10: 4

## 2024-10-06 ASSESSMENT — PAIN SCALES - PAIN ASSESSMENT IN ADVANCED DEMENTIA (PAINAD): TOTALSCORE: MEDICATION (SEE MAR)

## 2024-10-06 ASSESSMENT — PAIN DESCRIPTION - ORIENTATION: ORIENTATION: RIGHT;LEFT

## 2024-10-06 NOTE — CARE PLAN
The patient's goals for the shift include    Problem: Fall/Injury  Goal: Not fall by end of shift  Outcome: Progressing  Goal: Be free from injury by end of the shift  Outcome: Progressing  Goal: Verbalize understanding of personal risk factors for fall in the hospital  Outcome: Progressing  Goal: Verbalize understanding of risk factor reduction measures to prevent injury from fall in the home  Outcome: Progressing  Goal: Use assistive devices by end of the shift  Outcome: Progressing  Goal: Pace activities to prevent fatigue by end of the shift  Outcome: Progressing     Problem: Chronic Conditions and Co-morbidities  Goal: Patient's chronic conditions and co-morbidity symptoms are monitored and maintained or improved  Outcome: Progressing       The clinical goals for the shift include patient pain will be manageable

## 2024-10-06 NOTE — SIGNIFICANT EVENT
Rapid Response Nurse Note: [] Rapid Response [x] RADAR alert: Score 6    Pager time: [phone]  Arrival time: 755  Event end time: 800  Location: [place] [address]  [x] Phone triage     Rapid response initiated by:  [] Rapid Response RN [] Family [] Nursing Supervisor [] Physician   [x] RADAR auto-page [] Sepsis auto-page [] RN [] RT   [] NP/PA [] Other:     Primary reason for call:   [] BAT [] New CPAP/BiPAP [] Bleeding [] Change in mental status   [] Chest pain [] Code blue [] FiO2 >/= 50% [] HR </= 40 bpm   [] HR >/= 130 bpm [] Hyperglycemia [] Hypoglycemia [x] RADAR    [] RR </= 8 bpm [] RR >/= 30 bpm [] SBP </= 90 mmHg [] SpO2 < 90%   [] Seizure [] Sepsis [] Staff concern:     Initial VS and/or RADAR VS: T 36.6 °C; ; RR 19; /74; SPO2 93%.  Providers present at bedside (if applicable):   Objective/Subjective data relevant to event (if applicable):   Interventions:  [x] None [] ABG [] Assist w/ICU transfer [] BAT paged    [] Bag mask [] Blood [] Cardioversion [] Code Blue   [] Code blue for intubation [] Code status changed [] Chest x-ray [] EKG   [] IV fluid/bolus [] KUB x-ray [] Labs/cultures [] Medication   [] Nebulizer treatment [] NIPPV (CPAP/BiPAP) [] Oxygen [] Oral airway   [] Peripheral IV [] Palliative care consult [] CT/MRI [] Sepsis protocol    [] Suctioned [] Other:     Name of ICU Provider contacted (if applicable):   Outcome:  [] Coded and  [] Code blue for intubation [] Coded and transferred to ICU []  on division   [x] Remained on division (no change) [] Remained on division + additional monitoring [] Remained in ED [] Transferred to ED   [] Transferred to ICU [] Transferred to inpatient status [] Transferred for interventions (procedure) [] Transferred to ICU stepdown    [] Transferred to surgery [] Transferred to telemetry [] Sepsis protocol [] STEMI protocol   [] Stroke protocol [x] Bedside nurse instructed to page rapid response for any concerns or acute change in condition/VS      Additional Comments:     Radar auto-page received for a radar score of 6 with the above listed vital signs.  Vital signs were confirmed and reviewed with primary RN.  She is at her current baseline and RN has no concerns.  There are no indications for interventions by Rapid Response at this time.  RN to contact Rapid Response with any future concerns or signs of clinical decompensation.

## 2024-10-06 NOTE — PROGRESS NOTES
Hospitalist Progress Note        Name: Hilda Jones  :  1955(69 y.o.)  MRN:  [id number]    Date: 10/06/24     SUBJECTIVE     HPI:  This is a 69 y.o. female with past medical history of hypertension, A-fib not on anticoagulation, COPD, OMARI, diabetes (A1c 5.2023), aortic valve insufficiency s/p AVR x 2, SMA occlusion, who presented to the emergency department with chief complaint of shortness of breath. Admitted for acute hypoxemic respiratory failure. Initially thought to have PNA on CXR but procalcitonin negative, no leukotycosis, and CT chest did not show opacities therefore antibiotics stopped. D-dimer was elevated but due to VASHTI unable to have contrast, therefore V/Q scan was done which was low probability, and venous duplex of LE was negative for DVT. Patient continued to have hypoxia when trialed weaning to room air therefore TTE was ordered.    Interval History:   Vitals and chart notes from overnight reviewed. No acute issues overnight.   Patient seen and evaluated at bedside. She is very sleepy. Patient reports not sleeping well overnight, itching in her back and legs, as well as pain. She was given benadryl for this which she states made her too sleepy.    Review of Systems:   Other than patient's chronic conditions and those complaints in the history above, the rest of the 10 systems review were done and were negative.     Current medications:  Scheduled Meds:aspirin, 81 mg, oral, Daily  atorvastatin, 40 mg, oral, Nightly  empagliflozin, 10 mg, oral, Daily  enoxaparin, 40 mg, subcutaneous, q24h  fluticasone furoate-vilanteroL, 1 puff, inhalation, Daily  sennosides, 2 tablet, oral, BID      Continuous Infusions:   PRN Meds:PRN medications: acetaminophen, diphenhydrAMINE, eucerin      OBJECTIVE     Vitals:    10/06/24 0032 10/06/24 0550 10/06/24 0749 10/06/24 1128   BP: 109/71 117/84 110/74 115/79   BP Location: Right arm Right arm Right arm Right arm   Patient Position: Lying Lying Lying Lying    Pulse: 74 (!) 112 106 97   Resp: 18 18 19 18   Temp: 36.2 °C (97.2 °F) 36.2 °C (97.2 °F) 36.6 °C (97.9 °F) 36.2 °C (97.2 °F)   TempSrc: Temporal Temporal Temporal Temporal   SpO2: 97% 94% 93% 97%   Weight:  66.5 kg (146 lb 9.7 oz)     Height:            Physical Exam  Vitals and nursing note reviewed.   Constitutional:       General: She is not in acute distress.     Appearance: Normal appearance. She is not ill-appearing or toxic-appearing.      Comments: Sleepy   HENT:      Head: Normocephalic and atraumatic.      Nose: Nose normal.      Mouth/Throat:      Mouth: Mucous membranes are moist.   Eyes:      Extraocular Movements: Extraocular movements intact.   Pulmonary:      Effort: Pulmonary effort is normal. No respiratory distress.   Abdominal:      General: There is no distension.   Musculoskeletal:         General: No swelling.      Cervical back: No rigidity.      Right lower leg: No edema.      Left lower leg: No edema.   Skin:     Coloration: Skin is not pale.      Comments: Multiple skin lesions throughout her back and legs, chronic appearing   Neurological:      Mental Status: She is alert and oriented to person, place, and time. Mental status is at baseline.   Psychiatric:         Mood and Affect: Mood normal.         Behavior: Behavior normal.         Labs:   Lab Results   Component Value Date     10/05/2024    K 3.0 (L) 10/05/2024    CL 98 10/05/2024    CO2 33 (H) 10/05/2024    BUN 19 10/05/2024    CREATININE 1.14 (H) 10/05/2024    GLUCOSE 101 (H) 10/05/2024    CALCIUM 8.6 10/05/2024    PROT 6.5 10/03/2024    BILITOT 1.2 10/03/2024    ALKPHOS 92 10/03/2024    AST 17 10/03/2024    ALT 9 10/03/2024       Lab Results   Component Value Date    WBC 3.3 (L) 10/04/2024    HGB 7.9 (L) 10/04/2024    HCT 28.2 (L) 10/04/2024    MCV 59 (L) 10/04/2024     10/04/2024       Imaging:   Transthoracic Echo (TTE) Complete    Result Date: 10/5/2024   [facility], [id number] [place] [address],  [place] [address]                Tel [phone] and Fax [phone] TRANSTHORACIC ECHOCARDIOGRAM REPORT  Patient Name:      HONG MEEKS          Reading Physician:    [id number] Dante Aponte MD Study Date:        10/5/2024            Ordering Provider:    [id number] TERESA BRIDGES MRN/PID:           [id number]             Fellow: Accession#:        [id number]         Nurse:                Jannet Mckinney RN Date of Birth/Age: 1955 / 69 years Sonographer:          James Seo RDCS Gender:            F                    Additional Staff: Height:            165.10 cm            Admit Date:           10/3/2024 Weight:            65.77 kg             Admission Status:     Inpatient -                                                               Routine BSA / BMI:         1.73 m2 / 24.13      Encounter#:           [id number]                    kg/m2 Blood Pressure:    116/70 mmHg          Department Location:  [facility] Non                                                               Invasive Study Type:    TRANSTHORACIC ECHO (TTE) COMPLETE Diagnosis/ICD: Acute on chronic diastolic (congestive) heart failure                (CHF)-I50.33 Indication:    volume overload CPT Code:      Echo Complete w Full Doppler-[id number] Patient History: Pertinent History: Esenteric ischemia, superior mesenteric artery thrombosis,                    AFib, MS, AI, COPD, DM II, PAD, hx AVR & root replacement at                    OSH (unknown size/type), lupus, HTN, HLD, HFpEF,                    encephalopathy, respiratory failure. Study Detail: The following Echo studies were performed: 2D, M-Mode, Doppler and               color flow. Technically challenging study due to body habitus and               poor acoustic windows. Definity used as a contrast agent for               endocardial border definition and agitated saline  used as a               contrast agent for intraseptal flow evaluation. Total contrast               used for this procedure was 2.0 mL via IV push.  PHYSICIAN INTERPRETATION: Left Ventricle: Left ventricular ejection fraction is normal, calculated by Guo's biplane at 67%. There are no regional left ventricular wall motion abnormalities. The left ventricular cavity size is upper limits of normal. The interventricular septum is flattened in diastole ('D' shaped left ventricle), consistent with right ventricular volume overload. Left ventricular diastolic filling was not assessed. Left Atrium: The left atrium is severely dilated. There is no evidence of a patent foramen ovale. A bubble study using agitated saline was performed. Bubble study is negative. Right Ventricle: The right ventricle is severely enlarged. There is moderately reduced right ventricular systolic function. Right Atrium: The right atrium is moderately dilated. Aortic Valve: There is a prosthetic aortic valve present. The aortic valve dimensionless index is 0.76. There is a probable homograft in aortic position, with a unknown reported size. Echo findings are consistent with normal aortic valve prosthesis structure and function. There is no evidence of aortic valve regurgitation. The peak instantaneous gradient of the aortic valve is 6.7 mmHg. The mean gradient of the aortic valve is 4.0 mmHg. Mitral Valve: The mitral valve is moderately thickened with a rheumatic appearance. There is evidence of severe mitral valve stenosis. The doppler estimated mean and peak diastolic pressure gradients are 10.0 mmHg and 20.7 mmHg respectively. There is mild to moderate mitral valve regurgitation. MVA by PHT 1.2 cm2, 2D planimetry 1.1 cm2, Soria score = 9. Tricuspid Valve: The tricuspid valve is abnormal. The tricuspid valve annulus appears dilated. There is severe tricuspid regurgitation. The Doppler estimated RVSP is moderately elevated at 58.3 mmHg.  Pulmonic Valve: The pulmonic valve is structurally normal. There is trace pulmonic valve regurgitation. Pericardium: Trivial pericardial effusion. Aorta: The aortic root is normal. Systemic Veins: The inferior vena cava appears dilated, with IVC inspiratory collapse less than 50%. In comparison to the previous echocardiogram(s): Compared with study dated 10/9/2023, the degree of mitral stenosis has progressed from moderate to severe (stage C-D). There is now mild to moderate MR compared, there was no MR on prior study. The RVSP has also increased from 50 to 58 mmHg with now elevated RA pressure with a severely dilated RV and moderate systolic dysfunction compared to mildly dilated and normal function in 2023. The heart rate during the assessment today was 70-80 bpm compared with  bpm in prior study.  CONCLUSIONS:  1. Left ventricular ejection fraction is normal, calculated by Guo's biplane at 67%.  2. Right ventricular volume overload.  3. There is moderately reduced right ventricular systolic function.  4. Severely enlarged right ventricle.  5. The right atrium is moderately dilated.  6. The left atrium is severely dilated.  7. Moderately elevated right ventricular systolic pressure.  8. There is a probable homograft in aortic position.  9. Echo findings are consistent with normal aortic valve prosthesis structure and function. 10. There is severe mitral valve stenosis. 11. Mild to moderate mitral valve regurgitation. 12. The mitral valve is moderately thickened with a rheumatic appearance. 13. MVA by PHT 1.2 cm2, 2D planimetry 1.1 cm2, Soria score = 9. 14. Severe tricuspid regurgitation visualized. 15. The tricuspid valve annulus appears dilated. 16. There is no evidence of a patent foramen ovale. 17. Compared with study dated 10/9/2023, the degree of mitral stenosis has progressed from moderate to severe (stage C-D). There is now mild to moderate MR compared, there was no MR on prior study. The RVSP  has also increased from 50 to 58 mmHg with now elevated RA pressure with a severely dilated RV and moderate systolic dysfunction compared to mildly dilated and normal function in 2023. The heart rate during the assessment today was 70-80 bpm compared with  bpm in prior study. RECOMMENDATIONS: Utilizing an FDA cleared automated machine learning algorithm (EchoGo Heart Failure by Tello), the analysis of the apical 4-chamber echocardiogram suggests the presence of heart failure with preserved ejection fraction (HFpEF)*. Clinical correlation looking for additional heart failure signs and symptoms is recommended, as a definite diagnosis of heart failure cannot be made by imaging alone. *Per ACC/AHA/HFSA universal diagnosis of heart failure, HFpEF is defined as 1) signs and symptoms leading to clinical diagnosis of heart failure, 2) an ejection fraction of at least 50%, and 3) evidence of elevated intra-cardiac filling pressures by echocardiography, BNP elevation, or catheterization.  QUANTITATIVE DATA SUMMARY:  2D MEASUREMENTS:          Normal Ranges: Ao Root d:       3.60 cm  (2.0-3.7cm) LAs:             4.50 cm  (2.7-4.0cm) IVSd:            1.10 cm  (0.6-1.1cm) LVPWd:           0.80 cm  (0.6-1.1cm) LVIDd:           4.10 cm  (3.9-5.9cm) LVIDs:           2.60 cm LV Mass Index:   71 g/m2 LVEDV Index:     62 ml/m2 LV % FS          36.6 %  LA VOLUME:                  Normal Ranges: LA Volume Index: 51.9 ml/m2  RA VOLUME BY A/L METHOD:          Normal Ranges: RA Area A4C:             28.5 cm2  AORTA MEASUREMENTS:         Normal Ranges: Asc Ao, d:          3.62 cm (2.1-3.4cm)  LV SYSTOLIC FUNCTION BY 2D PLANIMETRY (MOD):                      Normal Ranges: EF-A4C View:    68 % (>=55%) EF-A2C View:    68 % EF-Biplane:     67 % LV EF Reported: 67 %  LV DIASTOLIC FUNCTION:          Normal Ranges: MV Peak E:             2.12 m/s (0.7-1.2 m/s) MV DT:                 316 msec (150-240 msec)  MITRAL VALVE:           Normal  Ranges: MV Vmax:      2.27 m/s  (<=1.3m/s) MV peak P.7 mmHg (<5mmHg) MV mean PG:   10.0 mmHg (<2mmHg) MV VTI:       63.18 cm  (10-13cm) MV DT:        316 msec  (150-240msec)  AORTIC VALVE:                     Normal Ranges: AoV Vmax:                1.29 m/s (<=1.7m/s) AoV Peak P.7 mmHg (<20mmHg) AoV Mean P.0 mmHg (1.7-11.5mmHg) LVOT Max Nico:            0.86 m/s (<=1.1m/s) AoV VTI:                 21.50 cm (18-25cm) LVOT VTI:                16.30 cm LVOT Diameter:           2.10 cm  (1.8-2.4cm) AoV Area, VTI:           2.63 cm2 (2.5-5.5cm2) AoV Area,Vmax:           2.32 cm2 (2.5-4.5cm2) AoV Dimensionless Index: 0.76  RIGHT VENTRICLE: RV Basal 5.30 cm RV Mid   3.80 cm RV Major 7.1 cm TAPSE:   99.9 mm RV s'    0.07 m/s  TRICUSPID VALVE/RVSP:          Normal Ranges: Peak TR Velocity:     3.29 m/s Est. RA Pressure:     15 mmHg RV Syst Pressure:     58 mmHg  (< 30mmHg)  PULMONIC VALVE:          Normal Ranges: PV Max Nico:     1.0 m/s  (0.6-0.9m/s) PV Max P.3 mmHg  [id number] Dante Aponte MD Electronically signed on 10/5/2024 at 10:16:27 PM  ** Final **     CT chest wo IV contrast    Result Date: 10/5/2024  Interpreted By:  Megha Camara, STUDY: CT CHEST WO IV CONTRAST;  10/5/2024 8:46 am   INDICATION: Signs/Symptoms:r/o PNA vs pulmonary edema.     COMPARISON: CT dated 2023   ACCESSION NUMBER(S): [id number]   ORDERING CLINICIAN: ARAMIS MONTEJO   TECHNIQUE: Helical data acquisition of the chest was obtained  without IV contrast material.  Images were reformatted in axial, coronal, and sagittal planes.   FINDINGS: Study is limited at motion artifact.   LUNGS AND AIRWAYS: The trachea and central airways are patent. No endobronchial lesion. Diffuse bronchial wall thickening   There is bilateral small pleural effusion with component of loculation on the right and extension of the fluid along the right major and minor fissure, unchanged. Bibasilar atelectasis.   There  is interlobular septal thickening.   Severe upper lung predominant emphysema   Unchanged areas of pleural calcification in the right hemithorax.   MEDIASTINUM AND MAGDALENO, LOWER NECK AND AXILLA: The visualized thyroid gland is within normal limits.   Multiple slightly enlarged/prominent lymph nodes throughout the mediastinum, likely reactive.   Esophagus appears within normal limits as seen.   HEART AND VESSELS: Patient is status post median sternotomy with postsurgical changes identified in the ascending aorta and aortic root.   Main pulmonary artery and its branches are normal in caliber.   No significant coronary artery calcification. The study is not optimized for evaluation of coronary arteries.   Marked biatrial enlargement.   No evidence of pericardial effusion.   UPPER ABDOMEN: Trace free fluid/ascites in the visualized portion of the upper abdomen.   CHEST WALL AND OSSEOUS STRUCTURES: There are no suspicious osseous lesions.   There is diffuse subcutaneous and soft tissue edema.       1.  Small bilateral pleural effusion with component of loculation in the right lung, pulmonary edema  as well as diffuse soft tissue edema. There is also small amount of free fluid in the upper abdomen. Correlate with volume status and anasarca. 2. Airspace opacity in lung bases, likely atelectatic. No definite focal infiltrate. Background severe upper lung predominant emphysema. 3. Marked cardiomegaly mainly due to enlargement of the atria. Postsurgical changes as described above.   MACRO: None   Signed by: Rigoberto Mcphreson 10/5/2024 3:52 PM Dictation workstation:   [id number]       ASSESSMENT & PLAN     Acute respiratory failure  HFpEF exacerbation  Severe mitral valve stenosis with moderate regurgitation  Elevated D-dimer  -CXR concerning for multiple opacities but procal negative, no leukocytosis or fever, and CT chest wo contrast did not show findings concerning for pneumonia -- stopped antibiotics  -VQ scan low to  intermediate probability, Venous duplex LE negative for DVT; unlikely to be due to PE  -TTE done yesterday showed EF 67% with RV enlargement with moderately reduced function, severe tricuspid regurgitation, severe mitral stenosis, mild to moderate MV regurgitation, and RVSP of 58.3 mmHg; aortic valve replacement working well  -Plan to consult cardiology  - c/w home Jardiance 10 mg daily  - c/w home Breo Ellipta daily     VASHTI, improving   - Baseline Cr ~0.7 -0.8; Cr peaked at 1.22; pending today  - Given CXR concerning for vol overload, will minimize IV fluids and encourage PO   - CTM UOP and creatinine      Iron deficiency anemia  :: Hgb 7.7 hx OMARI per chart review however patient denies taking any iron supplementation  :: Patient without any hematuria, blood in stool, obvious signs of bleeding  - Iron <10, % saturation 12, and unable to estimate TIBC   - Started PO iron supplementation  - Continue to monitor hemoglobin    DVT Prophylaxis: lovenox 40 q 24hr - Estimated Creatinine Clearance: 41.3 mL/min (A) (by C-G formula based on SCr of 1.14 mg/dL (H)).    Code status: Full Code  Diet: Adult diet Cardiac; 70 gm fat; 2 - 3 grams Sodium; Easy to chew; 2000 mL fluid     Disposition: continue to monitor inpatient, await consultant recommendations, await test results, and await clinical improvement    Level of MDM:  High   Risk: High   Data Reviewed and/or Analyzed:   Included: Prior external notes from at least 1 unique source, Results, including laboratory findings and imaging reports, listed above, Orders and notes from all consultants involved, and Notes for this encounter.  I personally reviewed the tests referenced above.  I discussed plan of care with patient.    The patient/family had opportunity to ask questions. All questions were answered to the best of my ability.    Between 7AM-7PM please message me via Epic Secure Chat.  After 7PM please page No[facility]urnist on call.    Electronically signed by Zhanna Mireles  DO on 10/06/24 at 1:37 PM

## 2024-10-06 NOTE — CARE PLAN
Problem: Chronic Conditions and Co-morbidities  Goal: Patient's chronic conditions and co-morbidity symptoms are monitored and maintained or improved  Outcome: Progressing     Problem: Fall/Injury  Goal: Not fall by end of shift  Outcome: Progressing  Goal: Be free from injury by end of the shift  Outcome: Progressing  Goal: Verbalize understanding of personal risk factors for fall in the hospital  Outcome: Progressing  Goal: Verbalize understanding of risk factor reduction measures to prevent injury from fall in the home  Outcome: Progressing  Goal: Use assistive devices by end of the shift  Outcome: Progressing  Goal: Pace activities to prevent fatigue by end of the shift  Outcome: Progressing   The patient's goals for the shift include      The clinical goals for the shift include patient pain will be manageable

## 2024-10-07 ENCOUNTER — APPOINTMENT (OUTPATIENT)
Dept: CARDIOLOGY | Facility: HOSPITAL | Age: 69
End: 2024-10-07
Payer: MEDICARE

## 2024-10-07 LAB
ALBUMIN SERPL BCP-MCNC: 3.6 G/DL (ref 3.4–5)
ANION GAP SERPL CALC-SCNC: 12 MMOL/L (ref 10–20)
AORTIC VALVE MEAN GRADIENT: 4 MMHG
AORTIC VALVE PEAK VELOCITY: 1.29 M/S
AV PEAK GRADIENT: 6.7 MMHG
AVA (PEAK VEL): 2.32 CM2
AVA (VTI): 2.63 CM2
BACTERIA BLD CULT: NORMAL
BACTERIA BLD CULT: NORMAL
BODY SURFACE AREA: 1.74 M2
BUN SERPL-MCNC: 8 MG/DL (ref 6–23)
CALCIUM SERPL-MCNC: 9.4 MG/DL (ref 8.6–10.6)
CHLORIDE SERPL-SCNC: 100 MMOL/L (ref 98–107)
CO2 SERPL-SCNC: 33 MMOL/L (ref 21–32)
CREAT SERPL-MCNC: 0.78 MG/DL (ref 0.5–1.05)
EGFRCR SERPLBLD CKD-EPI 2021: 82 ML/MIN/1.73M*2
EJECTION FRACTION APICAL 4 CHAMBER: 67.6
EJECTION FRACTION: 67 %
ERYTHROCYTE [DISTWIDTH] IN BLOOD BY AUTOMATED COUNT: 25.2 % (ref 11.5–14.5)
GLUCOSE BLD MANUAL STRIP-MCNC: 115 MG/DL (ref 74–99)
GLUCOSE BLD MANUAL STRIP-MCNC: 84 MG/DL (ref 74–99)
GLUCOSE BLD MANUAL STRIP-MCNC: 89 MG/DL (ref 74–99)
GLUCOSE SERPL-MCNC: 93 MG/DL (ref 74–99)
HCT VFR BLD AUTO: 29.7 % (ref 36–46)
HGB BLD-MCNC: 8 G/DL (ref 12–16)
LEFT VENTRICLE INTERNAL DIMENSION DIASTOLE: 4.1 CM (ref 3.5–6)
LEFT VENTRICULAR OUTFLOW TRACT DIAMETER: 2.1 CM
MCH RBC QN AUTO: 16.5 PG (ref 26–34)
MCHC RBC AUTO-ENTMCNC: 26.9 G/DL (ref 32–36)
MCV RBC AUTO: 61 FL (ref 80–100)
NRBC BLD-RTO: 0.5 /100 WBCS (ref 0–0)
PHOSPHATE SERPL-MCNC: 3.7 MG/DL (ref 2.5–4.9)
PLATELET # BLD AUTO: 355 X10*3/UL (ref 150–450)
POTASSIUM SERPL-SCNC: 4.9 MMOL/L (ref 3.5–5.3)
RBC # BLD AUTO: 4.85 X10*6/UL (ref 4–5.2)
RIGHT VENTRICLE FREE WALL PEAK S': 7.2 CM/S
RIGHT VENTRICLE PEAK SYSTOLIC PRESSURE: 58.3 MMHG
SODIUM SERPL-SCNC: 140 MMOL/L (ref 136–145)
TRICUSPID ANNULAR PLANE SYSTOLIC EXCURSION: 10 CM
WBC # BLD AUTO: 8.7 X10*3/UL (ref 4.4–11.3)

## 2024-10-07 PROCEDURE — 82947 ASSAY GLUCOSE BLOOD QUANT: CPT

## 2024-10-07 PROCEDURE — 94640 AIRWAY INHALATION TREATMENT: CPT

## 2024-10-07 PROCEDURE — 93005 ELECTROCARDIOGRAM TRACING: CPT

## 2024-10-07 PROCEDURE — 93010 ELECTROCARDIOGRAM REPORT: CPT | Performed by: INTERNAL MEDICINE

## 2024-10-07 PROCEDURE — 2500000004 HC RX 250 GENERAL PHARMACY W/ HCPCS (ALT 636 FOR OP/ED)

## 2024-10-07 PROCEDURE — 99233 SBSQ HOSP IP/OBS HIGH 50: CPT

## 2024-10-07 PROCEDURE — 85027 COMPLETE CBC AUTOMATED: CPT | Performed by: STUDENT IN AN ORGANIZED HEALTH CARE EDUCATION/TRAINING PROGRAM

## 2024-10-07 PROCEDURE — 99223 1ST HOSP IP/OBS HIGH 75: CPT

## 2024-10-07 PROCEDURE — 36415 COLL VENOUS BLD VENIPUNCTURE: CPT | Performed by: STUDENT IN AN ORGANIZED HEALTH CARE EDUCATION/TRAINING PROGRAM

## 2024-10-07 PROCEDURE — 2500000001 HC RX 250 WO HCPCS SELF ADMINISTERED DRUGS (ALT 637 FOR MEDICARE OP)

## 2024-10-07 PROCEDURE — 1200000002 HC GENERAL ROOM WITH TELEMETRY DAILY

## 2024-10-07 PROCEDURE — 2500000001 HC RX 250 WO HCPCS SELF ADMINISTERED DRUGS (ALT 637 FOR MEDICARE OP): Performed by: STUDENT IN AN ORGANIZED HEALTH CARE EDUCATION/TRAINING PROGRAM

## 2024-10-07 PROCEDURE — 84100 ASSAY OF PHOSPHORUS: CPT | Performed by: STUDENT IN AN ORGANIZED HEALTH CARE EDUCATION/TRAINING PROGRAM

## 2024-10-07 RX ORDER — METOPROLOL TARTRATE 1 MG/ML
5 INJECTION, SOLUTION INTRAVENOUS EVERY 5 MIN PRN
Status: DISCONTINUED | OUTPATIENT
Start: 2024-10-07 | End: 2024-10-07

## 2024-10-07 RX ORDER — FUROSEMIDE 10 MG/ML
40 INJECTION INTRAMUSCULAR; INTRAVENOUS ONCE
Status: COMPLETED | OUTPATIENT
Start: 2024-10-07 | End: 2024-10-07

## 2024-10-07 RX ORDER — METOPROLOL SUCCINATE 25 MG/1
TABLET, EXTENDED RELEASE ORAL DAILY
Status: CANCELLED | OUTPATIENT
Start: 2024-10-07

## 2024-10-07 RX ORDER — METOPROLOL TARTRATE 25 MG/1
25 TABLET, FILM COATED ORAL 2 TIMES DAILY
Status: DISCONTINUED | OUTPATIENT
Start: 2024-10-07 | End: 2024-10-08

## 2024-10-07 ASSESSMENT — COGNITIVE AND FUNCTIONAL STATUS - GENERAL
MOBILITY SCORE: 24
DAILY ACTIVITIY SCORE: 24
DAILY ACTIVITIY SCORE: 24
MOBILITY SCORE: 24
DAILY ACTIVITIY SCORE: 24
DAILY ACTIVITIY SCORE: 24
MOBILITY SCORE: 24
MOBILITY SCORE: 24
DAILY ACTIVITIY SCORE: 24

## 2024-10-07 ASSESSMENT — PAIN - FUNCTIONAL ASSESSMENT: PAIN_FUNCTIONAL_ASSESSMENT: 0-10

## 2024-10-07 ASSESSMENT — PAIN SCALES - GENERAL
PAINLEVEL_OUTOF10: 0 - NO PAIN
PAINLEVEL_OUTOF10: 3

## 2024-10-07 NOTE — CONSULTS
Cardiology Consult Note  Reason for consult:    History Of Present Illness:    Hilda Jones is a 69 y.o. female with a PMH of past medical history of hypertension, A-fib not on anticoagulation, COPD, OMARI, diabetes (A1c 5.9 July 2023), aortic valve insufficiency s/p AVR x 2, SMA occlusion who is currently admitted for acute hypoxemic respiratory failure due PNA.     Patient was found to have PNA based on opacities in the right upper lobe and posterior portions of bilateral lungs on chest xray. She has since completed a course of antibiotics. On interview, she recalled that she had been feeling progressive shortness of breath and bilateral leg swelling before coming to the emergency room.The patient currently denies any chest pain but still endorses SOB. She is currently on 3L NC. She says that she feels dizzy off and on when sitting up quickly and getting up to walk. She sits by the edge of the bed before standing and feels better. She said her LE do not typically swell and she's only experienced swelling once before. She says she was told she had mitral stenosis based on a previous echo but it was never followed up on nor were new medications added to her regimen. Workup thus far is notable for BNP of 887 and echo with moderate mitral stenosis. Not previously on anticoagulation for afib. Notes that she took some anticoagulation after aortic valve repair but stopped taking it after some time. Could not recall the medication name. She denies all other symptoms at this time.     Past Cardiology Workup:    EKG:   ECG 12 lead 10/07/2024: Afib with RBBB, no acute ischemic changes     Echo:   10/5 TTE: EF 67% with RV enlargement with moderately reduced function, severe tricuspid regurgitation, severe mitral stenosis, mild to moderate MV regurgitation, and RVSP of 58.3 mmHg; aortic valve replacement working well       Past Medical History:  She has a past medical history of Atrial fibrillation (Multi), Awareness under  anesthesia, CHF (congestive heart failure), COPD (chronic obstructive pulmonary disease) (Multi), Diabetes (Multi), GERD (gastroesophageal reflux disease), HTN (hypertension), Mitral insufficiency, PONV (postoperative nausea and vomiting), and S/P AVR.    Past Surgical History:  She has a past surgical history that includes Embolectomy (N/A, 2022); Aortic root replacement; Aortic valve replacement; CT angio abdomen pelvis w and or wo IV IV contrast (10/06/2023); Embolectomy (N/A, 10/06/2023); and CT angio abdomen pelvis w and or wo IV IV contrast (2023).      Social History:  She reports that she has been smoking cigarettes. She has a 15 pack-year smoking history. She uses smokeless tobacco. She reports that she does not drink alcohol and does not use drugs.    Family History:  Family History   Problem Relation Name Age of Onset    Breast cancer Maternal Grandmother  50 - 59        Allergies:  Flexeril [cyclobenzaprine]    ROS:  10 point review of systems including (Constitutional, Eyes, ENMT, Respiratory, Cardiac, Gastrointestinal, Neurological, Psychiatric, and Hematologic) was performed and is otherwise negative.    Objective Data:  Last Recorded Vitals:  Vitals:    10/06/24 1956 10/06/24 2300 10/07/24 0537 10/07/24 0751   BP: 108/72 116/78 113/80 107/70   BP Location: Right arm Right arm Right arm Right arm   Patient Position: Lying Lying Lying Lying   Pulse: 102 110 (!) 113 (!) 121   Resp: 18 18 18 18   Temp: 36.3 °C (97.3 °F) 35.8 °C (96.4 °F) 36.9 °C (98.4 °F) 36.7 °C (98.1 °F)   TempSrc: Temporal Temporal Temporal Temporal   SpO2: 95% 95% 96% 96%   Weight:   66.2 kg (145 lb 15.1 oz)    Height:         Medical Gas Therapy: Supplemental oxygen  Medical Gas Delivery Method: Nasal cannula  Weight  Av.7 kg (144 lb 12.6 oz)  Min: 64.1 kg (141 lb 5 oz)  Max: 66.5 kg (146 lb 9.7 oz)    LABS:  CMP:  Results from last 7 days   Lab Units 10/07/24  0558 10/06/24  1628 10/05/24  0942 10/04/24  0506  "10/03/24  1933   SODIUM mmol/L 140 139 140 139 140   POTASSIUM mmol/L 4.9 4.1 3.0* 3.0* 3.0*   CHLORIDE mmol/L 100 100 98 97* 99   CO2 mmol/L 33* 34* 33* 34* 30   ANION GAP mmol/L 12 9* 12 11 14   BUN mg/dL 8 11 19 16 14   CREATININE mg/dL 0.78 0.74 1.14* 1.22* 1.16*   EGFR mL/min/1.73m*2 82 87 52* 48* 51*   MAGNESIUM mg/dL  --   --   --   --  1.98   ALBUMIN g/dL 3.6 3.4 3.3* 3.1* 3.3*   ALT U/L  --   --   --   --  9   AST U/L  --   --   --   --  17   BILIRUBIN TOTAL mg/dL  --   --   --   --  1.2     CBC:  Results from last 7 days   Lab Units 10/07/24  0558 10/06/24  1628 10/04/24  0500 10/03/24  1933   WBC AUTO x10*3/uL 8.7 7.9 3.3* 7.4   HEMOGLOBIN g/dL 8.0* 7.5* 7.9* 7.7*   HEMATOCRIT % 29.7* 27.2* 28.2* 26.9*   PLATELETS AUTO x10*3/uL 355 299 294 280   MCV fL 61* 60* 59* 58*     COAG:     ABO: No results found for: \"ABO\"  HEME/ENDO:  Results from last 7 days   Lab Units 10/03/24  1958   TSH mIU/L 2.73      CARDIAC:   Results from last 7 days   Lab Units 10/03/24  1958 10/03/24  1933   TROPHSCMC ng/L 9 8   BNP pg/mL  --  887*             Last I/O:    Intake/Output Summary (Last 24 hours) at 10/7/2024 0913  Last data filed at 10/7/2024 0537  Gross per 24 hour   Intake 240 ml   Output 0 ml   Net 240 ml     Net IO Since Admission: 591 mL [10/07/24 0913]      Imaging Results:  Vascular US lower extremity venous duplex bilateral    Result Date: 10/5/2024            [place] [id number]   Tel [phone] and Fax [phone]  Vascular Lab Report VASC US LOWER EXTREMITY VENOUS DUPLEX BILATERAL  Patient Name:      HONG Diggs Physician: [id number] Marni Love MD Study Date:        10/4/2024           Ordering           [id number] ARAMIS MONTEJO                                        Physician: MRN/PID:           [id number]            Technologist:      Jennie Ortiz T Accession#:        [id number]        " Technologist 2: Date of Birth/Age: 1955 / 69      Encounter#:        [id number]                    years Gender:            F Admission Status:  Inpatient           Location           [facility]                                        Performed:  Diagnosis/ICD: Shortness of breath-R06.02 Indication:    Positive D-dimer and new O2 requirement CPT Codes:     74821 Peripheral venous duplex scan for DVT complete  CONCLUSIONS: Right Lower Venous: No evidence of acute deep vein thrombus visualized in the right lower extremity. Left Lower Venous: No evidence of acute deep vein thrombus visualized in the left lower extremity.  Additional Findings: Pulsatile venous flow noted throughout suggesting volume overload or elevated cardiac filling pressures.  Imaging & Doppler Findings:  Right                 Compressible Thrombus   Flow Distal External Iliac                       Pulsatile CFV                       Yes        None   Pulsatile PFV                       Yes        None FV Proximal               Yes        None   Pulsatile FV Mid                    Yes        None FV Distal                 Yes        None Popliteal                 Yes        None   Pulsatile Peroneal                  Yes        None PTV                       Yes        None  Left                  Compress Thrombus   Flow Distal External Iliac                   Pulsatile CFV                     Yes      None   Pulsatile PFV                     Yes      None FV Proximal             Yes      None   Pulsatile FV Mid                  Yes      None FV Distal               Yes      None Popliteal               Yes      None   Pulsatile Peroneal                Yes      None PTV                     Yes      None  [id number] Marni Love MD Electronically signed by [id number] Marni Love MD on 10/5/2024 at 5:25:01 PM  ** Final **     NM Lung perfusion with spect/ct    Result Date: 10/4/2024  Interpreted By:  Seun Covarrubias, STUDY: NM LUNG PERFUSION WITH  SPECT/CT;  10/4/2024 12:10 pm   INDICATION: Signs/Symptoms:New O2 requirement with elevated ddimer.     COMPARISON: Chest x-ray dated 10/03/2024   ACCESSION NUMBER(S): [id number]   ORDERING CLINICIAN: ARAMIS MONTEJO   TECHNIQUE: DIVISION OF NUCLEAR MEDICINE PERFUSION LUNG SCAN   Multiple perfusion images of the lungs were obtained after the intravenous administration of 4.2 mCi of Tc-99m MAA. SPECT CT images of the lungs were also obtained   FINDINGS: Analysis of the images reveals heterogeneity of tracer deposition with areas of decreased perfusion in the right upper lung field. Findings appear to correspond to the reported airspace opacities in the right upper lobe on recent chest x-ray dated 10/03/2024.         Low to intermediate probability of acute pulmonary embolism. If there is a high clinical suspicion for PE, further evaluation and/or treatment is warranted.       I personally reviewed the images/study and I agree with the findings as stated.  This study was interpreted at [facility], [place], [place].   MACRO: None     Signed by: Seun Covarrubias 10/4/2024 12:15 PM Dictation workstation:   [id number]    ECG 12 lead    Result Date: 10/3/2024  Atrial fibrillation with premature ventricular or aberrantly conducted complexes Right bundle branch block Abnormal ECG When compared with ECG of 03-OCT-2024 22:02, No significant change was found See ED provider note for full interpretation and clinical correlation Confirmed by Deanne Erickson ([id number]) on 10/3/2024 11:21:45 PM    CT abdomen pelvis w IV contrast    Result Date: 10/3/2024  STUDY: CT Abdomen and Pelvis with IV Contrast; 10/03/2024 9:27 pm INDICATION: Abdominal tender in epigastric. COMPARISON: CTA A/P 12/18/2023. ACCESSION NUMBER(S): [id number] ORDERING CLINICIAN: TERESA BRIDGES TECHNIQUE: CT of the abdomen and pelvis was performed.  Contiguous axial images were obtained at 3 mm slice thickness through the abdomen and pelvis.  Coronal and sagittal reconstructions at 3 mm slice thickness were performed.  Omnipaque 350, 75 mL was administered intravenously.  FINDINGS: LOWER CHEST: Moderate cardiomegaly seen.  No pericardial effusion.  Small right pleural effusion with pleural calcifications present.  Suggest correlation for asbestos-related pleural disease. ABDOMEN:  LIVER: No hepatomegaly.  Smooth surface contour.  Fatty infiltration of the liver present.  BILE DUCTS: No intrahepatic or extrahepatic biliary ductal dilatation.  GALLBLADDER: Gallstones noted.  STOMACH: No abnormalities identified.  PANCREAS: No masses or ductal dilatation.  SPLEEN: No splenomegaly or focal splenic lesion.  ADRENAL GLANDS: No thickening or nodules.  KIDNEYS AND URETERS: Kidneys are normal in size and location.  Bilateral renal cysts seen. No renal or ureteral calculi.  PELVIS:  BLADDER: No abnormalities identified.  REPRODUCTIVE ORGANS: Enlarged heterogeneous uterus present.  Suggest ultrasound correlation.  There is heterogeneity and masslike expansion of the endometrial cavity which could represent underlying tumor.  BOWEL / ABDOMINAL WALL / SOFT TISSUES: Anterior abdominal wall hernia containing a loop of bowel 4 cm in size. There is a left paracentral abdominal wall hernia more superiorly containing only mesenteric fat 2.8 cm in size.  There are mildly dilated loops of small bowel proximal to hernia compatible with moderate partial obstruction associated with incarcerated hernia. These findings are new since prior exam.  VESSELS: There is atherosclerotic change of the aorta.  No abnormalities otherwise identified.   PERITONEUM/RETROPERITONEUM/LYMPH NODES: Body wall edema present.  Mild upper abdominal ascites present.  No pneumoperitoneum. No lymphadenopathy.  BONES: No acute fracture or aggressive osseous lesion.      1. Anterior abdominal wall hernia containing a loop of bowel with moderate partial small bowel obstruction. 2. Heterogeneous enlarged  uterus with masslike expansion of the endometrial cavity. Recommend clinical and ultrasound correlation. This appears similar prior exam. 3. Gallstones. 4. Small right pleural effusion with pleural calcifications present. Suggest correlation for asbestos-related pleural disease. 5. Body wall edema and mild ascites. Signed by German Hurd MD    XR chest 2 views    Result Date: 10/3/2024  STUDY: Chest Radiographs   10/3/24, 8:28 pm INDICATION: Evaluate for pneumonia. COMPARISON: XR Chest 10/18/23, 7/11/23 ACCESSION NUMBER(S): [id number] ORDERING CLINICIAN: AI SALES TECHNIQUE:  Frontal and lateral chest. FINDINGS: CARDIOMEDIASTINAL SILHOUETTE: Cardiomediastinal silhouette is enlarged.  LUNGS: Airspace opacity in the inferior portion of the right upper lobe. Some ill-defined airspace opacity in the posterior portions of both lungs. Likely trace bilateral pleural effusions.  Prominent pulmonary vasculature.  ABDOMEN: No remarkable upper abdominal findings.  BONES: No acute osseous changes.    Airspace opacity in the inferior portion right upper lobe and some ill-defined airspace opacity in the posterior portions of both lungs likely reflecting pneumonia in the proper clinical setting. Also likely some degree of volume overload/heart failure.  Signed by James eDmpsey MD      Inpatient Medications:  Scheduled medications   Medication Dose Route Frequency    aspirin  81 mg oral Daily    atorvastatin  40 mg oral Nightly    empagliflozin  10 mg oral Daily    enoxaparin  40 mg subcutaneous q24h    ferrous sulfate (325 mg ferrous sulfate)  65 mg of iron oral Daily    fluticasone furoate-vilanteroL  1 puff inhalation Daily    sennosides  2 tablet oral BID     PRN medications   Medication    acetaminophen    eucerin    metoprolol    white petrolatum     Continuous Medications   Medication Dose Last Rate       Outpatient Medications:  Current Outpatient Medications   Medication Instructions    albuterol 90  mcg/actuation inhaler INHALE 1 PUFF BY MOUTH EVERY 4 HOURS AS NEEDED    aspirin 81 mg EC tablet Take 1 tablet (81 mg) by mouth once daily. Do not start before October 20, 2023.    atorvastatin (LIPITOR) 40 mg, oral, Nightly    cholecalciferol (VITAMIN D-3) 50 mcg, oral, Daily    donepezil (Aricept) 5 mg tablet TAKE 1 TABLET BY MOUTH AT BEDTIME    empagliflozin (Jardiance) 10 mg TAKE 1 TABLET BY MOUTH ONCE DAILY    famotidine (PEPCID) 20 mg, oral, 2 times daily    FLUoxetine (PROZAC) 40 mg, oral, Every morning    fluticasone furoate-vilanteroL (Breo Ellipta) 100-25 mcg/dose inhaler INHALE 1 PUFF BY MOUTH ONCE DAILY    gabapentin (Neurontin) 100 mg capsule TAKE 1 CAPSULE BY MOUTH AT BEDTIME    Lantus U-100 Insulin 10 Units, subcutaneous, Daily    loperamide (IMODIUM) 2 mg, oral, 4 times daily PRN    melatonin 5 mg tablet,chewable 1 tablet, oral, Nightly PRN    metoprolol tartrate (LOPRESSOR) 25 mg, oral, 2 times daily    mirtazapine (REMERON) 15 mg, oral, Nightly PRN    multivitamin with minerals tablet 1 tablet, oral, Daily    nicotine (Nicoderm CQ) 14 mg/24 hr patch APPLY 1 PATCH TO SKIN EVERY 24 HOURS    potassium chloride CR 20 mEq ER tablet 20 mEq, oral, Every other day    psyllium (Metamucil Fiber Singles) 3.4 gram packet 1 packet, oral, 2 times daily PRN    rivaroxaban (XARELTO) 20 mg, oral, Daily with evening meal, Take with food.    sennosides-docusate sodium (Jackie-Colace) 8.6-50 mg tablet 1 tablet, oral, Daily PRN    spironolactone (ALDACTONE) 25 mg, oral, Daily    torsemide (Demadex) 20 mg tablet 2 tablets, oral, Daily    valACYclovir (VALTREX) 1,000 mg, oral, 3 times daily, For 7 days. Start Date 10/3/24, Stop Date 10/10/24       Physical Exam:  General:  Patient is awake, alert, and oriented.  Patient is in no acute distress.  HEENT:  Pupils equal and reactive.  Normocephalic.  Moist mucosa.    Neck:  No thyromegaly. Jugular Venous Pressure distended.  Cardiovascular:  Irregular rate and rhythm.  Normal  S1 and S2.  Pulmonary:  Clear to auscultation bilaterally, 3 L NC  Abdomen:  Soft. Non-tender. Distended. Positive bowel sounds.  Lower Extremities:  2+ pedal pulses. No LE edema.  Neurologic:  Cranial nerves intact.  No focal deficit.   Skin: Skin warm and dry, normal skin turgor.   Psychiatric: Normal affect.     Assessment/Plan   Hilda Jones is a 69 y.o. female with a PMH of past medical history of hypertension, A-fib not on anticoagulation, COPD, OMARI, diabetes (A1c 5.9 July 2023), aortic valve insufficiency s/p AVR x 2, SMA occlusion who is currently admitted for acute hypoxemic respiratory failure due PNA.     #Acute respiratory failure  #Moderate mitral valve stenosis with moderate regurgitation  #Valvular atrial fibrillation  #HFpEF exacerbation  #Elevated D-dimer, Elevated BNP     Patient found to have EF 67% with severe RV enlargement with moderately reduced function, severe tricuspid regurgitation, severe mitral stenosis, mild to moderate MV regurgitation, and RVSP of 58.3 mmHg on latest TTE from 10/5. 10/7 EKG notes Afib and RBBB. 10/3 CXR also significant for cardiomediastinal silhouette is enlarged.  D dimer and BNP are both elevated and 1041 and 887, respectively. At this time, the patient is currently not on beta blockers, however, beta blockers would provide diastolic support as well as better control her consistent tachycardia in the 110s. The patient still endorses SOB and has a new oxygen requirement of 3 L. JVP and abdominal distension is noted on physical exam. She is naive to diuretics. CHADVASC score is 6, however, due to anemia and history of bleeding problem she is currently not on anticoagulation.     Recommendations:    - Start PO Lasix 40 mg, repeat TTE after volume optimization, net output goal: -1 to 2 L  - Start metop succinate 25 mg BID   - c/w home Jardiance 10 mg daily   - hold AC at this time due to anemia and bleeding risk   - Recommend outpatient cardiology follow up for  anticoagulation recommendation     Thank you for involving us in this patient's care. Recommendations discussed with Dr. Rangel.     General Cardiology Consult Pager: [phone] (weekday 7AM-6PM and weekend 7AM-2PM) and other: [phone]  EP Consult Pager: [phone] (weekday 7AM-6PM and weekend 7AM-2PM) and other: [phone]  CICU Fellow Pager: [phone] anytime  EP Device Nurse Pager: [phone] (weekday 7AM-4PM)  Advanced Heart Failure Consult Pager: [phone] anytime         Suzette Altamirano MD   PGY1

## 2024-10-07 NOTE — NURSING NOTE
EKG was performed notified resident Julián DELGADO and Jo-Ann CHONG on call, patient is not symptomatic , patient comfortable in bed laughing and talking, call light in reach. Physician want nursing to notify team if HR persistent over 130 >for 5 min

## 2024-10-07 NOTE — CARE PLAN
Problem: Fall/Injury  Goal: Not fall by end of shift  Outcome: Progressing  Goal: Be free from injury by end of the shift  Outcome: Progressing  Goal: Verbalize understanding of personal risk factors for fall in the hospital  Outcome: Progressing  Goal: Verbalize understanding of risk factor reduction measures to prevent injury from fall in the home  Outcome: Progressing  Goal: Use assistive devices by end of the shift  Outcome: Progressing  Goal: Pace activities to prevent fatigue by end of the shift  Outcome: Progressing   The patient's goals for the shift include  help me when needed    The clinical goals for the shift include Patient will remain safe and free from injury throughout shift

## 2024-10-07 NOTE — PROGRESS NOTES
Hospitalist Progress Note        Name: Hilda Jones  :  1955(69 y.o.)  MRN:  [id number]    Date: 10/07/24     SUBJECTIVE     HPI:  This is a 69 y.o. female with past medical history of hypertension, A-fib not on anticoagulation, COPD, OMARI, diabetes (A1c 5.2023), aortic valve insufficiency s/p AVR x 2, SMA occlusion, who presented to the emergency department on 10/3/24 with chief complaint of shortness of breath. Admitted for acute hypoxemic respiratory failure. Initially thought to have PNA on CXR but procalcitonin negative, no leukotycosis, and CT chest did not show opacities therefore antibiotics stopped. D-dimer was elevated but due to VASHTI unable to have contrast, therefore V/Q scan was done which was low probability; venous duplex of LE was negative for DVT. Patient continued to have hypoxia when trialed weaning to room air therefore TTE was ordered.    Interval History:   Vitals and chart notes from overnight reviewed. No acute issues overnight.   Patient seen and evaluated at bedside. States she is breathing okay at rest. NC in place. Has dyspnea on exertion, whenever she gets up to use the bathroom or walks.   Complaining of LLQ abdominal pain this morning. Reports most recent bowel movement was yesterday, normal in volume and color. States this abdominal pain is intermittent. Does report intermittent palpitations when walking. This morning patient had HR in 110s; checked EKG which showed afib (known). Patient denied any symptoms, including palpitations, worsening SOB, or chest pain at the time.     Review of Systems:   Other than patient's chronic conditions and those complaints in the history above, the rest of the 10 systems review were done and were negative.     Current medications:  Scheduled Meds:aspirin, 81 mg, oral, Daily  atorvastatin, 40 mg, oral, Nightly  empagliflozin, 10 mg, oral, Daily  enoxaparin, 40 mg, subcutaneous, q24h  ferrous sulfate (325 mg ferrous sulfate), 65 mg of iron,  oral, Daily  fluticasone furoate-vilanteroL, 1 puff, inhalation, Daily  sennosides, 2 tablet, oral, BID      Continuous Infusions:   PRN Meds:PRN medications: acetaminophen, eucerin, metoprolol, white petrolatum      OBJECTIVE     Vitals:    10/06/24 2300 10/07/24 0537 10/07/24 0751 10/07/24 1134   BP: 116/78 113/80 107/70 116/77   BP Location: Right arm Right arm Right arm Right arm   Patient Position: Lying Lying Lying Lying   Pulse: 110 (!) 113 (!) 121 (!) 111   Resp: 18 18 18 18   Temp: 35.8 °C (96.4 °F) 36.9 °C (98.4 °F) 36.7 °C (98.1 °F) 36.9 °C (98.4 °F)   TempSrc: Temporal Temporal Temporal Temporal   SpO2: 95% 96% 96% 93%   Weight:  66.2 kg (145 lb 15.1 oz)     Height:            Physical Exam  Vitals and nursing note reviewed.   Constitutional:       General: She is not in acute distress.     Appearance: Normal appearance. She is not ill-appearing or toxic-appearing.      Comments: Sleepy   HENT:      Head: Normocephalic and atraumatic.      Nose: Nose normal.      Mouth/Throat:      Mouth: Mucous membranes are moist.      Comments: edentulous  Eyes:      Extraocular Movements: Extraocular movements intact.   Pulmonary:      Effort: Pulmonary effort is normal. No respiratory distress.      Comments: NC present.   Abdominal:      General: There is no distension.      Tenderness: There is abdominal tenderness (left abdomen, worst in LLQ). There is guarding (voluntary).   Musculoskeletal:         General: No swelling.      Cervical back: No rigidity.      Right lower leg: No edema.      Left lower leg: No edema.   Skin:     Coloration: Skin is not pale.      Comments: Multiple skin lesions throughout her back and legs, chronic appearing   Neurological:      Mental Status: She is alert and oriented to person, place, and time. Mental status is at baseline.   Psychiatric:         Mood and Affect: Mood normal.         Behavior: Behavior normal.       Labs:   Lab Results   Component Value Date     10/07/2024     K 4.9 10/07/2024     10/07/2024    CO2 33 (H) 10/07/2024    BUN 8 10/07/2024    CREATININE 0.78 10/07/2024    GLUCOSE 93 10/07/2024    CALCIUM 9.4 10/07/2024    PROT 6.5 10/03/2024    BILITOT 1.2 10/03/2024    ALKPHOS 92 10/03/2024    AST 17 10/03/2024    ALT 9 10/03/2024       Lab Results   Component Value Date    WBC 8.7 10/07/2024    HGB 8.0 (L) 10/07/2024    HCT 29.7 (L) 10/07/2024    MCV 61 (L) 10/07/2024     10/07/2024       Imaging:   ECG 12 Lead    Result Date: 10/7/2024  Atrial fibrillation with rapid ventricular response Right bundle branch block Inferior infarct (cited on or before 07-OCT-2024) Abnormal ECG When compared with ECG of 07-OCT-2024 06:57, No significant change was found       ASSESSMENT & PLAN     Acute respiratory failure  HFpEF exacerbation  Severe mitral valve stenosis with moderate regurgitation  Elevated D-dimer  -CXR concerning for multiple opacities but procal negative, no leukocytosis or fever, and CT chest wo contrast did not show findings concerning for pneumonia -- stopped antibiotics on 10/4  -VQ scan low to intermediate probability, Venous duplex LE negative for DVT; unlikely to be due to PE  -TTE done 10/5/2024 showed EF 67% with RV enlargement with moderately reduced function, severe tricuspid regurgitation, severe mitral stenosis, mild to moderate MV regurgitation, and RVSP of 58.3 mmHg; aortic valve replacement working well  -cardiology consulted, appreciate recs  - c/w home Jardiance 10 mg daily  - c/w home Breo Ellipta daily     Afib  - patient with known hx of afib, not currently on anticoagulation d/t hx of bleeding problem  - No evidence of L atrial thrombus on TTE from 10/5/2024  - Patient had afib with RVR per EKG on 10/7/2024. Asymptomatic. Will continue to monitor.     VASHTI, resolved   - Baseline Cr ~0.7 -0.8; Cr peaked at 1.22; today is 0.78  - Given CXR concerning for vol overload, will minimize IV fluids and encourage PO   - CTM UOP and creatinine       Iron deficiency anemia  :: Hgb 7.7 hx OMARI per chart review however patient denies taking any iron supplementation  :: Patient without any hematuria, blood in stool, obvious signs of bleeding  - Iron <10, % saturation 12, and unable to estimate TIBC   - Started PO iron supplementation  - Continue to monitor hemoglobin    DVT Prophylaxis: lovenox 40 q 24hr - Estimated Creatinine Clearance: 41.3 mL/min (A) (by C-G formula based on SCr of 1.14 mg/dL (H)).    Code status: Full Code  Diet: Adult diet Cardiac; 70 gm fat; 2 - 3 grams Sodium; Easy to chew; 2000 mL fluid     Disposition: continue to monitor inpatient, await consultant recommendations, await test results, and await clinical improvement    Level of MDM:  High   Risk: High   Data Reviewed and/or Analyzed:   Included: Results, including laboratory findings and imaging reports, listed above, Orders and notes from all consultants involved, and Notes for this encounter.  I personally reviewed the tests referenced above.  I discussed plan of care with patient.    The patient/family had opportunity to ask questions. All questions were answered to the best of my ability.    Patient seen and discussed with attending physician, Zhanna Mireles DO.     Electronically signed by Hermelinda Busch DO on 10/07/24 at 11:39 AM

## 2024-10-07 NOTE — SIGNIFICANT EVENT
Rapid Response Nurse Note: [] Rapid Response [x] RADAR alert: Score 6    Pager time: [phone]  Arrival time: 1145  Event end time: 1150  Location: [place] [address]  [x] Phone triage     Rapid response initiated by:  [] Rapid Response RN [] Family [] Nursing Supervisor [] Physician   [x] RADAR auto-page [] Sepsis auto-page [] RN [] RT   [] NP/PA [] Other:     Primary reason for call:   [] BAT [] New CPAP/BiPAP [] Bleeding [] Change in mental status   [] Chest pain [] Code blue [] FiO2 >/= 50% [] HR </= 40 bpm   [] HR >/= 130 bpm [] Hyperglycemia [] Hypoglycemia [x] RADAR    [] RR </= 8 bpm [] RR >/= 30 bpm [] SBP </= 90 mmHg [] SpO2 < 90%   [] Seizure [] Sepsis [] Staff concern:     Initial VS and/or RADAR VS: T 36.9 °C; ; RR 18; /77; SPO2 93%.    Interventions:  [x] None [] ABG [] Assist w/ICU transfer [] BAT paged    [] Bag mask [] Blood [] Cardioversion [] Code Blue   [] Code blue for intubation [] Code status changed [] Chest x-ray [] EKG   [] IV fluid/bolus [] KUB x-ray [] Labs/cultures [] Medication   [] Nebulizer treatment [] NIPPV (CPAP/BiPAP) [] Oxygen [] Oral airway   [] Peripheral IV [] Palliative care consult [] CT/MRI [] Sepsis protocol    [] Suctioned [] Other:       Outcome:  [] Coded and  [] Code blue for intubation [] Coded and transferred to ICU []  on division   [x] Remained on division (no change) [] Remained on division + additional monitoring [] Remained in ED [] Transferred to ED   [] Transferred to ICU [] Transferred to inpatient status [] Transferred for interventions (procedure) [] Transferred to ICU stepdown    [] Transferred to surgery [] Transferred to telemetry [] Sepsis protocol [] STEMI protocol   [] Stroke protocol [x] Bedside nurse instructed to page rapid response for any concerns or acute change in condition/VS     Additional Comments: RADAR auto page received for above vitals.  Per bedside RN, pt remains asymptomatic. Per primary team MD Busch, cardiology is being  consulted; no rapid response interventions needed at this time.  Please page rapid response for any concerns for deterioration or assistance needed.

## 2024-10-08 LAB
ALBUMIN SERPL BCP-MCNC: 3.3 G/DL (ref 3.4–5)
ANION GAP SERPL CALC-SCNC: 13 MMOL/L (ref 10–20)
APTT PPP: 32 SECONDS (ref 27–38)
ATRIAL RATE: 115 BPM
BUN SERPL-MCNC: 9 MG/DL (ref 6–23)
CALCIUM SERPL-MCNC: 9.3 MG/DL (ref 8.6–10.6)
CHLORIDE SERPL-SCNC: 99 MMOL/L (ref 98–107)
CO2 SERPL-SCNC: 34 MMOL/L (ref 21–32)
CREAT SERPL-MCNC: 0.87 MG/DL (ref 0.5–1.05)
EGFRCR SERPLBLD CKD-EPI 2021: 72 ML/MIN/1.73M*2
ERYTHROCYTE [DISTWIDTH] IN BLOOD BY AUTOMATED COUNT: 25.4 % (ref 11.5–14.5)
GLUCOSE BLD MANUAL STRIP-MCNC: 104 MG/DL (ref 74–99)
GLUCOSE BLD MANUAL STRIP-MCNC: 88 MG/DL (ref 74–99)
GLUCOSE SERPL-MCNC: 73 MG/DL (ref 74–99)
HCT VFR BLD AUTO: 28.1 % (ref 36–46)
HGB BLD-MCNC: 7.6 G/DL (ref 12–16)
MCH RBC QN AUTO: 16.6 PG (ref 26–34)
MCHC RBC AUTO-ENTMCNC: 27 G/DL (ref 32–36)
MCV RBC AUTO: 61 FL (ref 80–100)
NRBC BLD-RTO: 0.9 /100 WBCS (ref 0–0)
PHOSPHATE SERPL-MCNC: 3.5 MG/DL (ref 2.5–4.9)
PLATELET # BLD AUTO: 321 X10*3/UL (ref 150–450)
POTASSIUM SERPL-SCNC: 4.6 MMOL/L (ref 3.5–5.3)
Q ONSET: 230 MS
QRS COUNT: 18 BEATS
QRS DURATION: 132 MS
QT INTERVAL: 364 MS
QTC CALCULATION(BAZETT): 496 MS
QTC FREDERICIA: 448 MS
R AXIS: 230 DEGREES
RBC # BLD AUTO: 4.59 X10*6/UL (ref 4–5.2)
SODIUM SERPL-SCNC: 141 MMOL/L (ref 136–145)
T AXIS: 33 DEGREES
T OFFSET: 412 MS
VENTRICULAR RATE: 112 BPM
WBC # BLD AUTO: 8.9 X10*3/UL (ref 4.4–11.3)

## 2024-10-08 PROCEDURE — 1200000002 HC GENERAL ROOM WITH TELEMETRY DAILY

## 2024-10-08 PROCEDURE — 99232 SBSQ HOSP IP/OBS MODERATE 35: CPT

## 2024-10-08 PROCEDURE — 2500000004 HC RX 250 GENERAL PHARMACY W/ HCPCS (ALT 636 FOR OP/ED)

## 2024-10-08 PROCEDURE — 2500000001 HC RX 250 WO HCPCS SELF ADMINISTERED DRUGS (ALT 637 FOR MEDICARE OP)

## 2024-10-08 PROCEDURE — 85027 COMPLETE CBC AUTOMATED: CPT | Performed by: STUDENT IN AN ORGANIZED HEALTH CARE EDUCATION/TRAINING PROGRAM

## 2024-10-08 PROCEDURE — 2500000001 HC RX 250 WO HCPCS SELF ADMINISTERED DRUGS (ALT 637 FOR MEDICARE OP): Performed by: STUDENT IN AN ORGANIZED HEALTH CARE EDUCATION/TRAINING PROGRAM

## 2024-10-08 PROCEDURE — 36415 COLL VENOUS BLD VENIPUNCTURE: CPT

## 2024-10-08 PROCEDURE — 82947 ASSAY GLUCOSE BLOOD QUANT: CPT

## 2024-10-08 PROCEDURE — 85730 THROMBOPLASTIN TIME PARTIAL: CPT

## 2024-10-08 PROCEDURE — 94640 AIRWAY INHALATION TREATMENT: CPT

## 2024-10-08 PROCEDURE — 80069 RENAL FUNCTION PANEL: CPT | Performed by: STUDENT IN AN ORGANIZED HEALTH CARE EDUCATION/TRAINING PROGRAM

## 2024-10-08 PROCEDURE — 36415 COLL VENOUS BLD VENIPUNCTURE: CPT | Performed by: STUDENT IN AN ORGANIZED HEALTH CARE EDUCATION/TRAINING PROGRAM

## 2024-10-08 RX ORDER — PETROLATUM 420 MG/G
OINTMENT TOPICAL
Status: DISCONTINUED | OUTPATIENT
Start: 2024-10-08 | End: 2024-10-15 | Stop reason: HOSPADM

## 2024-10-08 RX ORDER — ONDANSETRON HYDROCHLORIDE 2 MG/ML
4 INJECTION, SOLUTION INTRAVENOUS ONCE
Status: DISCONTINUED | OUTPATIENT
Start: 2024-10-08 | End: 2024-10-14

## 2024-10-08 RX ORDER — ENOXAPARIN SODIUM 100 MG/ML
40 INJECTION SUBCUTANEOUS EVERY 24 HOURS
Status: DISCONTINUED | OUTPATIENT
Start: 2024-10-08 | End: 2024-10-08

## 2024-10-08 RX ORDER — TRAMADOL HYDROCHLORIDE 50 MG/1
50 TABLET ORAL ONCE
Status: COMPLETED | OUTPATIENT
Start: 2024-10-08 | End: 2024-10-08

## 2024-10-08 RX ORDER — ASPIRIN 81 MG/1
81 TABLET ORAL DAILY
Status: DISCONTINUED | OUTPATIENT
Start: 2024-10-09 | End: 2024-10-15 | Stop reason: HOSPADM

## 2024-10-08 RX ORDER — DIPHENHYDRAMINE HCL 25 MG
25 CAPSULE ORAL ONCE
Status: COMPLETED | OUTPATIENT
Start: 2024-10-08 | End: 2024-10-08

## 2024-10-08 RX ORDER — ONDANSETRON 4 MG/1
4 TABLET, ORALLY DISINTEGRATING ORAL EVERY 8 HOURS PRN
Status: DISCONTINUED | OUTPATIENT
Start: 2024-10-08 | End: 2024-10-15 | Stop reason: HOSPADM

## 2024-10-08 RX ORDER — POLYETHYLENE GLYCOL 3350 17 G/17G
17 POWDER, FOR SOLUTION ORAL DAILY
Status: DISCONTINUED | OUTPATIENT
Start: 2024-10-08 | End: 2024-10-14

## 2024-10-08 RX ORDER — DIPHENHYDRAMINE HCL 25 MG
25 CAPSULE ORAL EVERY 6 HOURS PRN
Status: DISCONTINUED | OUTPATIENT
Start: 2024-10-08 | End: 2024-10-14

## 2024-10-08 RX ORDER — ATORVASTATIN CALCIUM 40 MG/1
40 TABLET, FILM COATED ORAL NIGHTLY
Status: DISCONTINUED | OUTPATIENT
Start: 2024-10-08 | End: 2024-10-14

## 2024-10-08 RX ORDER — HEPARIN SODIUM 10000 [USP'U]/100ML
0-4500 INJECTION, SOLUTION INTRAVENOUS CONTINUOUS
Status: DISCONTINUED | OUTPATIENT
Start: 2024-10-08 | End: 2024-10-08

## 2024-10-08 RX ORDER — FUROSEMIDE 10 MG/ML
40 INJECTION INTRAMUSCULAR; INTRAVENOUS ONCE
Status: COMPLETED | OUTPATIENT
Start: 2024-10-08 | End: 2024-10-08

## 2024-10-08 RX ORDER — SENNOSIDES 8.6 MG/1
2 TABLET ORAL 2 TIMES DAILY
Status: DISCONTINUED | OUTPATIENT
Start: 2024-10-08 | End: 2024-10-15 | Stop reason: HOSPADM

## 2024-10-08 RX ORDER — FERROUS SULFATE 325(65) MG
65 TABLET ORAL DAILY
Status: DISCONTINUED | OUTPATIENT
Start: 2024-10-08 | End: 2024-10-14

## 2024-10-08 RX ORDER — ONDANSETRON HYDROCHLORIDE 2 MG/ML
4 INJECTION, SOLUTION INTRAVENOUS EVERY 8 HOURS PRN
Status: DISCONTINUED | OUTPATIENT
Start: 2024-10-08 | End: 2024-10-15 | Stop reason: HOSPADM

## 2024-10-08 RX ORDER — METOPROLOL TARTRATE 25 MG/1
25 TABLET, FILM COATED ORAL 2 TIMES DAILY
Status: DISCONTINUED | OUTPATIENT
Start: 2024-10-08 | End: 2024-10-09

## 2024-10-08 RX ORDER — METOPROLOL TARTRATE 1 MG/ML
5 INJECTION, SOLUTION INTRAVENOUS EVERY 5 MIN PRN
Status: DISCONTINUED | OUTPATIENT
Start: 2024-10-08 | End: 2024-10-14

## 2024-10-08 RX ORDER — FLUTICASONE FUROATE AND VILANTEROL 100; 25 UG/1; UG/1
1 POWDER RESPIRATORY (INHALATION) DAILY
Status: DISCONTINUED | OUTPATIENT
Start: 2024-10-09 | End: 2024-10-14

## 2024-10-08 RX ORDER — ACETAMINOPHEN 325 MG/1
650 TABLET ORAL EVERY 6 HOURS PRN
Status: DISCONTINUED | OUTPATIENT
Start: 2024-10-08 | End: 2024-10-15 | Stop reason: HOSPADM

## 2024-10-08 RX ORDER — HEPARIN SODIUM 10000 [USP'U]/100ML
0-4500 INJECTION, SOLUTION INTRAVENOUS CONTINUOUS
Status: DISCONTINUED | OUTPATIENT
Start: 2024-10-08 | End: 2024-10-13

## 2024-10-08 RX ORDER — HEPARIN SODIUM 10000 [USP'U]/100ML
0-4000 INJECTION, SOLUTION INTRAVENOUS CONTINUOUS
Status: DISCONTINUED | OUTPATIENT
Start: 2024-10-08 | End: 2024-10-08

## 2024-10-08 ASSESSMENT — COGNITIVE AND FUNCTIONAL STATUS - GENERAL
MOBILITY SCORE: 24
DAILY ACTIVITIY SCORE: 24
MOBILITY SCORE: 24
DAILY ACTIVITIY SCORE: 24
DAILY ACTIVITIY SCORE: 24
MOBILITY SCORE: 24

## 2024-10-08 ASSESSMENT — PAIN SCALES - GENERAL
PAINLEVEL_OUTOF10: 6
PAINLEVEL_OUTOF10: 0 - NO PAIN

## 2024-10-08 NOTE — SIGNIFICANT EVENT
The patient is clinically not intoxicated, free from distracting pain, appears to have intact insight, judgment and reason and in my medical opinion has the capacity to make decisions. The patient is also not under any duress to leave the hospital. In this scenario, it would be battery to subject a patient to treatment against her will.     I have voiced my concerns for the patient's health given that a full evaluation and treatment had not occurred. I have discussed the need for continued evaluation to determine if their symptoms are caused by a condition that present risk of death or morbidity. Risks including but not limited to death, permanent disability, prolonged hospitalization, prolonged illness, were discussed. I tried offering alternative options in hopes that the patient might be amenable to partial evaluation and treatment which would be medically beneficial to the patient, though the patient declined my options and insisted on leaving. Because I have been unable to convince the patient to stay, I answered all of their questions about their condition and asked them to return to the ED as soon as possible to complete their evaluation, especially if their symptoms worsen or do not improve.     I emphasized that leaving against medical advice does not preclude returning here for further evaluation. I asked the patient to return if they change their mind about the further evaluation and treatment. I strongly encouraged the patient to return to this Emergency Department or any Emergency Department at any time, particularly with worsening symptoms.    Jerome Kirk MD

## 2024-10-08 NOTE — HOSPITAL COURSE
This is a 69 y.o. female with past medical history of hypertension, A-fib not on anticoagulation, COPD, OMARI, diabetes (A1c 5.9 July 2023), aortic valve insufficiency s/p AVR x 2, SMA occlusion, who presented to the emergency department on 10/3/24 with chief complaint of shortness of breath. Admitted for acute hypoxemic respiratory failure. Initially thought to have PNA on CXR but procalcitonin negative, no leukotycosis, and CT chest did not show opacities therefore antibiotics stopped. D-dimer was elevated but due to VASHTI unable to have contrast, therefore V/Q scan was done which was low probability; venous duplex of LE was negative for DVT. Patient continued to have hypoxia when trialed weaning to room air therefore TTE was ordered. TTE done 10/5/2024 showed EF 67% with RV enlargement with moderately reduced function, severe tricuspid regurgitation, severe mitral stenosis, mild to moderate MV regurgitation, and RVSP of 58.3 mmHg; aortic valve replacement working well.     On 10/14, patient bradycardic and having difficulty protecting airway, therefore patient was intubated.  She was hypotensive requiring levo, vaso, phenylephrine.  Epi and amnio drip started. Amio discontinued due to bradycardia.       ABG showing pH down to 6.88 with increasing lactate.  ACS reevaluated and suggested MTP. Patient continued to be hypotensive and bradycardic after multiple RBC, platelet, and plasma transfusions.  On reevaluation ACS determined intervention high risk of exsanguination and that this is a terminal condition.  ACS updated family and family decided to change code status to DNR.

## 2024-10-08 NOTE — NURSING NOTE
Rapid Response Nurse Note:  [x] RADAR alert:   Score 6    Pager time: [phone]  Arrival time: 518  Event end time: 521  Location: [facility]  [x] Phone triage     Rapid response initiated by:  [] Rapid Response RN [] Family [] Nursing Supervisor [] Physician   [x] RADAR auto-page [] Sepsis auto-page [] RN [] RT   [] NP/PA [] Other:     Primary reason for call:   [] BAT [] New CPAP/BiPAP [] Bleeding [] Change in mental status   [] Chest pain [] Code blue [] FiO2 >/= 50% [] HR </= 40 bpm   [] HR >/= 130 bpm [] Hyperglycemia [] Hypoglycemia [x] RADAR    [] RR </= 8 bpm [] RR >/= 30 bpm [] SBP </= 90 mmHg [] SpO2 < 90%   [] Seizure [] Sepsis [] Staff concern:     Initial VS and/or RADAR VS:  radar vitals below in bold    Vitals:    10/07/24 1633 10/07/24 2022 10/07/24 2349 10/08/24 0500   BP: 90/60 105/66 102/66 102/72   BP Location: Right arm Right arm Right arm Right arm   Patient Position: Lying Sitting Lying Sitting   Pulse: 84 99 88 (!) 112   Resp: 18 20 16 18   Temp: 35.9 °C (96.6 °F) 36.9 °C (98.4 °F) 36.8 °C (98.2 °F) 36.7 °C (98.1 °F)   TempSrc: Temporal Temporal Temporal Temporal   SpO2: 94% 100% 96% 95%   Weight:       Height:            Interventions:  [x] None [] ABG [] Assist w/ICU transfer [] BAT paged    [] Bag mask [] Blood [] Cardioversion [] Code Blue   [] Code blue for intubation [] Code status changed [] Chest x-ray [] EKG   [] IV fluid/bolus [] KUB x-ray [] Labs/cultures [] Medication   [] Nebulizer treatment [] NIPPV (CPAP/BiPAP) [] Oxygen [] Oral airway   [] Peripheral IV [] Palliative care consult [] CT/MRI [] Sepsis protocol    [] Suctioned [] Other:       Outcome:  [] Coded and  [] Code blue for intubation [] Coded and transferred to ICU []  on division   [x] Remained on division (no change) [] Remained on division + additional monitoring [] Remained in ED [] Transferred to ED   [] Transferred to ICU [] Transferred to inpatient status [] Transferred for interventions (procedure) []  Transferred to ICU stepdown    [] Transferred to surgery [] Transferred to telemetry [] Sepsis protocol [] STEMI protocol   [] Stroke protocol [x] Bedside nurse instructed to page rapid response for any concerns or acute change in condition/VS     Additional Comments: Per RN, vitals were obtained after pt ambulated to bathroom.  Pt remains asymptomatic.  There are pre existing plan to transfer to cardiac tele floor.  No concerns from RN at this time.

## 2024-10-08 NOTE — NURSING NOTE
Notified MD Jacek that the order for patient needs to specify which cardiology floor patient needs to be transferred.

## 2024-10-08 NOTE — NURSING NOTE
4 Eyes Skin Assessment    Reason for assessment? Skin assessment  Does the patient have a wound? no  Wound RN consult placed? N/A  Preventative foam dressing applied? Yes - sacrum      Four eyes skin check performed with Harika Lugo RN.

## 2024-10-08 NOTE — SIGNIFICANT EVENT
Rapid Response Nurse Note: [] Rapid Response [x] RADAR alert: Score 7    Pager time: [phone]  Arrival time: 1144  Event end time: 1203  Location: [place] [address]  [] Phone triage     Rapid response initiated by:  [] Rapid Response RN [] Family [] Nursing Supervisor [] Physician   [x] RADAR auto-page [] Sepsis auto-page [] RN [] RT   [] NP/PA [] Other:     Primary reason for call:   [] BAT [] New CPAP/BiPAP [] Bleeding [] Change in mental status   [] Chest pain [] Code blue [] FiO2 >/= 50% [] HR </= 40 bpm   [] HR >/= 130 bpm [] Hyperglycemia [] Hypoglycemia [] RADAR    [] RR </= 8 bpm [] RR >/= 30 bpm [] SBP </= 90 mmHg [] SpO2 < 90%   [] Seizure [] Sepsis [] Staff concern:     Initial VS and/or RADAR VS: T 36.1 °C; ; RR 18; BP 68/49; SPO2 95% 3 Liters O2 per NC.    Interventions:  [x] None [] ABG [] Assist w/ICU transfer [] BAT paged    [] Bag mask [] Blood [] Cardioversion [] Code Blue   [] Code blue for intubation [] Code status changed [] Chest x-ray [] EKG   [] IV fluid/bolus [] KUB x-ray [] Labs/cultures [] Medication   [] Nebulizer treatment [] NIPPV (CPAP/BiPAP) [] Oxygen [] Oral airway   [] Peripheral IV [] Palliative care consult [] CT/MRI [] Sepsis protocol    [] Suctioned [] Other:     Outcome:  [] Coded and  [] Code blue for intubation [] Coded and transferred to ICU []  on division   [] Remained on division (no change) [] Remained on division + additional monitoring [] Remained in ED [] Transferred to ED   [] Transferred to ICU [] Transferred to inpatient status [] Transferred for interventions (procedure) [] Transferred to ICU stepdown    [] Transferred to surgery [] Transferred to telemetry [] Sepsis protocol [] STEMI protocol   [] Stroke protocol [x] Bedside nurse instructed to page rapid response for any concerns or acute change in condition/VS     Additional Comments: RADAR page received by Rapid Response Team RN.  Upon arrival to division, spoke with Bedside RN concerning VS entry.  VS  "rechecked.  HR - 93, BP - 90/61, 90% on 3 Liters O2 per NC.  Patient verbalizes, \"I have never been so scared in my life when my blood pressure was being checked!\"  Patient asked what was frightening her.  Patient replied, \"There was a bird that just up and  at the end of my bed!\"  Patient reoriented to surroundings and reassured that her environment was safe from harm.  Patient stated, \"That doesn't help me, I am in a hospital!\"  No further interventions provided by this here Rapid Response Team RN.   "

## 2024-10-08 NOTE — SIGNIFICANT EVENT
TRANSFER NOTE:    Hospital course:   This is a 69 y.o. female with past medical history of hypertension, A-fib not on anticoagulation, COPD, OMARI, diabetes (A1c 5.9 July 2023), aortic valve insufficiency s/p AVR x 2, SMA occlusion, who presented to the emergency department on 10/3/24 with chief complaint of shortness of breath. Admitted for acute hypoxemic respiratory failure. Initially thought to have PNA on CXR but procalcitonin negative, no leukotycosis, and CT chest did not show opacities therefore antibiotics stopped. D-dimer was elevated but due to VASHTI unable to have contrast, therefore V/Q scan was done which was low probability; venous duplex of LE was negative for DVT. Patient continued to have hypoxia when trialed weaning to room air therefore TTE was ordered. TTE done 10/5/2024 showed EF 67% with RV enlargement with moderately reduced function, severe tricuspid regurgitation, severe mitral stenosis, mild to moderate MV regurgitation, and RVSP of 58.3 mmHg; aortic valve replacement working well. Cardiology consulted 10/7, recommended lasix 40mg IV for concern for fluid overload. Fellow then recommended transfer of care to heart failure service. Transfer accepted on 10/8/24.     Subjective:  Patient having diarrhea this morning. Started acutely, has not had this until today.     Objective:  Vitals:    10/08/24 1200   BP: 90/61   Pulse: 93   Resp:    Temp:    SpO2: 90%     GENERAL: Patient in bathroom this morning, complaining of abdominal pain.   HEET: NCAT.   CARDIO: Irregular rhythm.   RESPIRATORY: Normal work of breathing, no conversational dyspnea. Currently on 3L NC (new O2 requirement).  ABDOMINAL: deferred d/t having active diarrhea  NEURO: AAO x 3. CN II-XII grossly intact.  PSYCH: Appropriate behavior and affect.     Assessment and Plan:  Acute respiratory failure  HFpEF exacerbation  Severe mitral valve stenosis with moderate regurgitation  -TTE done 10/5/2024 showed EF 67% with RV enlargement with  moderately reduced function, severe tricuspid regurgitation, severe mitral stenosis, mild to moderate MV regurgitation, and RVSP of 58.3 mmHg; aortic valve replacement working well  - transfer to [facility] for [facility] management   - continue home jardiance and breo ellipta     Afib  - patient with known hx of afib, not currently on anticoagulation d/t hx of bleeding problem  - No evidence of L atrial thrombus on TTE from 10/5/2024  - Patient had afib with RVR per EKG on 10/7/2024. Asymptomatic. Will continue to monitor.   - home metop tartrate 25 mg BID restarted 10/7     VASHTI, resolved   - Baseline Cr ~0.7 -0.8; Cr peaked at 1.22; today is 0.84  - CTM UOP and creatinine      Iron deficiency anemia  :: Hgb 7.7 hx OMARI per chart review however patient denies taking any iron supplementation  :: Patient without any hematuria, blood in stool, obvious signs of bleeding  - Iron <10, % saturation 12, and unable to estimate TIBC   - Started PO iron supplementation  - Continue to monitor hemoglobin    Hermelinda Busch DO  Family Medicine, R2

## 2024-10-08 NOTE — NURSING NOTE
4 Eyes Skin Assessment    Reason for assessment? Weekly skin assessment  Does the patient have a wound? no  Wound RN consult placed? N/A  Preventative foam dressing applied? Yes - sacrum      Four eyes skin check performed with Harika Lugo RN.

## 2024-10-08 NOTE — NURSING NOTE
1424 I reached out to the Family Medicine team regarding the patient's blood pressure 68/49.  The rapid response nurse Cristal Pittman came to see the patient and rechecked her blood pressure 90/61.  No new orders from primary team.

## 2024-10-08 NOTE — NURSING NOTE
Patient is Aox4 not intoxicated and is requesting to leave the floor. She said she does not need anything from us and she is perfectly find. I called the team and Jerome Kirk MD talked to patient at bedside and placed discharge order for the patient. I did let the patient know if she has any symptoms  or her symptoms worsen to come back to the ED.

## 2024-10-08 NOTE — PROGRESS NOTES
GENERAL CARDIOLOGY PROGRESS NOTE    Name: Hilda Jones  :  1955(69 y.o.)  MRN:  [id number]    Date: 10/09/24     HPI:  This is a 69 y.o. female with past medical history of hypertension, A-fib, COPD, OMARI, diabetes (A1c 5.2023), aortic valve insufficiency s/p AVR x 2, SMA occlusion, who presented to the emergency department on 10/3/24 with chief complaint of shortness of breath. Admitted for acute hypoxemic respiratory failure. Initially thought to have PNA on CXR but procalcitonin negative, no leukotycosis, and CT chest did not show opacities therefore antibiotics stopped. D-dimer was elevated but due to VASHTI unable to have contrast, therefore V/Q scan was done which was low probability; venous duplex of LE was negative for DVT. 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         SUBJECTIVE                       Patient was seen at bedside. 4L of oxygen. Feeling anxious, endorses slight shortness of breath (pulse ox was 92%), she looked dry and had soft pressures 85/61. Denies chest pain, denies palpitaions.      Current medications:  Scheduled Meds:aspirin, 81 mg, oral, Daily  atorvastatin, 40 mg, oral, Nightly  empagliflozin, 10 mg, oral, Daily  ferrous sulfate (325 mg ferrous sulfate), 65 mg of iron, oral, Daily  fluticasone furoate-vilanteroL, 1 puff, inhalation, Daily  metoprolol tartrate, 25 mg, oral, q6h  ondansetron, 4 mg, intravenous, Once  polyethylene glycol, 17 g, oral,  Daily  sennosides, 2 tablet, oral, BID  trimethobenzamide, 200 mg, intramuscular, Once      Continuous Infusions:heparin, 0-4,500 Units/hr, Last Rate: 1,200 Units/hr (10/09/24 0947)      PRN Meds:PRN medications: acetaminophen, diphenhydrAMINE, eucerin, heparin, metoprolol, ondansetron ODT **OR** ondansetron, white petrolatum      OBJECTIVE   Afebrile  Hr:   RR: 18  SBP: 68 - 103  DBP: 49 - 70  O2: 90 -99% on 4L      IO not recoreded      Vitals:    10/09/24 0444 10/09/24 0600 10/09/24 0744 10/09/24 0900   BP: 101/65  85/61 97/63   BP Location:   Left arm    Patient Position:   Lying    Pulse: 74  78    Resp: 18  16    Temp: 36.3 °C (97.3 °F)  37.3 °C (99.1 °F)    TempSrc: Temporal  Temporal    SpO2: 96%  92%    Weight:  68.5 kg (150 lb 14.5 oz)     Height:            Intake/Output Summary (Last 24 hours) at 10/9/2024 0953  Last data filed at 10/9/2024 0947  Gross per 24 hour   Intake 907.6 ml   Output 400 ml   Net 507.6 ml        Physical Exam  Vitals and nursing note reviewed.   Constitutional:       General: She is not in acute distress.  HENT:      Head: Normocephalic and atraumatic.      Nose: Nose normal.      Mouth/Throat:      Mouth: Mucous membranes are dry.      Comments: edentulous  Eyes:      Extraocular Movements: Extraocular movements intact.   Cardiovascular:      Rate and Rhythm: Normal rate and regular rhythm.   Pulmonary:      Effort: Pulmonary effort is normal. No respiratory distress.      Comments: NC present.   Abdominal:      General: There is no distension.      Tenderness: There is abdominal tenderness (left abdomen, worst in LLQ). There is guarding (voluntary).      Hernia: A hernia (positive when coughing.) is present.   Musculoskeletal:         General: No swelling.      Cervical back: No rigidity.      Right lower leg: No edema.      Left lower leg: No edema.   Skin:     Coloration: Skin is not pale.      Comments: Multiple skin lesions throughout her back and legs, chronic  appearing   Neurological:      Mental Status: She is alert and oriented to person, place, and time. Mental status is at baseline.   Psychiatric:         Mood and Affect: Mood normal.         Behavior: Behavior normal.       Labs:   Lab Results   Component Value Date     10/09/2024    K 4.1 10/09/2024    CL 96 (L) 10/09/2024    CO2 30 10/09/2024    BUN 14 10/09/2024    CREATININE 1.12 (H) 10/09/2024    GLUCOSE 113 (H) 10/09/2024    CALCIUM 8.7 10/09/2024    PROT 6.5 10/03/2024    BILITOT 1.2 10/03/2024    ALKPHOS 92 10/03/2024    AST 17 10/03/2024    ALT 9 10/03/2024       Lab Results   Component Value Date    WBC 9.8 10/09/2024    HGB 7.8 (L) 10/09/2024    HCT 28.3 (L) 10/09/2024    MCV 61 (L) 10/09/2024     10/09/2024        Imaging:   No results found.    Up METOP to Q6    ASSESSMENT & PLAN   This is a 69 y.o. female with past medical history of hypertension, A-fib, COPD, OMARI, diabetes (A1c 5.9 July 2023), aortic valve insufficiency s/p AVR x 2, SMA occlusion, who presented to the emergency department on 10/3/24 with chief complaint of shortness of breath. Admitted for acute hypoxemic respiratory failure. Initially thought to have PNA on CXR but procalcitonin negative, no leukotycosis, and CT chest did not show opacities therefore antibiotics stopped. D-dimer was elevated but due to VASHTI unable to have contrast, therefore V/Q scan was done which was low probability; venous duplex of LE was negative for DVT. Patient continued to have hypoxia when trialed weaning to room air therefore TTE was ordered.    : TTE done 10/5/2024 showed EF 67% with RV enlargement with moderately reduced function, severe tricuspid regurgitation, severe mitral stenosis, mild to moderate MV regurgitation, and RVSP of 58.3 mmHg; aortic valve replacement working well    Updates 10/09/24  - increased metop to q6 25mg   - structural consulted      #HFpEF exacerbation  #Severe mitral valve stenosis with moderate  regurgitation  #Elevated D-dimer  :: CXR concerning for multiple opacities but procal negative, no leukocytosis or fever, and CT chest wo contrast did not show findings concerning for pneumonia -- stopped antibiotics on 10/4  :: VQ scan low to intermediate probability, Venous duplex LE negative for DVT; unlikely to be due to PE  :: TTE done 10/5/2024 showed EF 67% with RV enlargement with moderately reduced function, severe tricuspid regurgitation, severe mitral stenosis, mild to moderate MV regurgitation, and RVSP of 58.3 mmHg; aortic valve replacement working well  PLAN:  - she looked a dry to me with her dry mouth, and I couldn't really appreciate JVP movement  - I still have her on the home Jardiance 10 mg daily  - holding lasix and odin dre  - consult structural for potential balloon valvuloplasty + CT watchmen to look for SALTY thrombus ongoing    #Afib  :: patient with known hx of afib, was not on anticoagulation d/t hx of bleeding problem  :: No evidence of L atrial thrombus on TTE from 10/5/2024  :: Patient had afib with RVR per EKG on 10/7/2024. Asymptomatic. Will continue to monitor.   PLAN:  - started on heparin ggt  - Up METOP 25mg  to Q6      #Iron deficiency anemia  :: Hgb 7.7 hx OMARI per chart review however patient denies taking any iron supplementation  :: Patient without any hematuria, blood in stool, obvious signs of bleeding  :: Iron <10, % saturation 12, and unable to estimate TIBC   PLAN:  - c/w PO iron supplementation  - Continue to monitor hemoglobin    F: PRN   E: PRN K>4, Mg>2, P>3   N: Cardiac  A: 1 PIV   Oxygen: room air     DVT ppx: heparin gtt   GI ppx: not indicated   Surrogate Medical Decision-maker:        Electronically signed by Jerome Kirk MD on 10/09/24 at 9:53 AM

## 2024-10-08 NOTE — CARE PLAN
Problem: Chronic Conditions and Co-morbidities  Goal: Patient's chronic conditions and co-morbidity symptoms are monitored and maintained or improved  Outcome: Progressing     Problem: Fall/Injury  Goal: Not fall by end of shift  Outcome: Progressing  Goal: Be free from injury by end of the shift  Outcome: Progressing  Goal: Verbalize understanding of personal risk factors for fall in the hospital  Outcome: Progressing  Goal: Verbalize understanding of risk factor reduction measures to prevent injury from fall in the home  Outcome: Progressing  Goal: Use assistive devices by end of the shift  Outcome: Progressing  Goal: Pace activities to prevent fatigue by end of the shift  Outcome: Progressing   The patient's goals for the shift include      The clinical goals for the shift include patient will be HDS

## 2024-10-08 NOTE — PROGRESS NOTES
Hospitalist Progress Note        Name: Hilda Jones  :  1955(69 y.o.)  MRN:  [id number]    Date: 10/08/24     SUBJECTIVE     HPI:  This is a 69 y.o. female with past medical history of hypertension, A-fib not on anticoagulation, COPD, OMARI, diabetes (A1c 5.2023), aortic valve insufficiency s/p AVR x 2, SMA occlusion, who presented to the emergency department on 10/3/24 with chief complaint of shortness of breath. Admitted for acute hypoxemic respiratory failure. Initially thought to have PNA on CXR but procalcitonin negative, no leukotycosis, and CT chest did not show opacities therefore antibiotics stopped. D-dimer was elevated but due to VASHTI unable to have contrast, therefore V/Q scan was done which was low probability; venous duplex of LE was negative for DVT. Patient continued to have hypoxia when trialed weaning to room air therefore TTE was ordered.    Interval History:   Vitals and chart notes from overnight reviewed. No acute issues overnight.   Patient in bathroom at time of rounds. Complaining of new diarrhea that started  15 min prior to our arrival. Denies chest pain or difficulty breathing.     Review of Systems:   Other than patient's chronic conditions and those complaints in the history above, the rest of the 10 systems review were done and were negative.     Current medications:  Scheduled Meds:aspirin, 81 mg, oral, Daily  atorvastatin, 40 mg, oral, Nightly  empagliflozin, 10 mg, oral, Daily  enoxaparin, 40 mg, subcutaneous, q24h  ferrous sulfate (325 mg ferrous sulfate), 65 mg of iron, oral, Daily  fluticasone furoate-vilanteroL, 1 puff, inhalation, Daily  metoprolol tartrate, 25 mg, oral, BID  sennosides, 2 tablet, oral, BID      Continuous Infusions:   PRN Meds:PRN medications: acetaminophen, eucerin, white petrolatum      OBJECTIVE     Vitals:    10/08/24 0600 10/08/24 0757 10/08/24 1141 10/08/24 1200   BP:  103/70 (!) 68/49 90/61   BP Location:  Right arm Left arm Right leg   Patient  Position:  Sitting Lying Lying   Pulse:  95 (!) 111 93   Resp:  18 18    Temp:  35.9 °C (96.6 °F) 36.1 °C (97 °F)    TempSrc:  Temporal Temporal    SpO2:    90%   Weight: 68.5 kg (150 lb 14.5 oz)      Height:            Physical Exam  Vitals and nursing note reviewed.   Constitutional:       Comments: Sleepy   HENT:      Head: Normocephalic and atraumatic.      Nose: Nose normal.      Mouth/Throat:      Mouth: Mucous membranes are moist.      Comments: edentulous  Eyes:      Extraocular Movements: Extraocular movements intact.   Cardiovascular:      Rate and Rhythm: Normal rate. Rhythm irregular.   Pulmonary:      Effort: Pulmonary effort is normal. No respiratory distress.      Comments: NC present.   Abdominal:      General: There is no distension.      Tenderness: There is abdominal tenderness (left abdomen, worst in LLQ). There is guarding (voluntary).   Musculoskeletal:         General: No swelling.      Cervical back: No rigidity.      Right lower leg: No edema.      Left lower leg: No edema.   Skin:     Coloration: Skin is not pale.      Comments: Multiple skin lesions throughout her back and legs, chronic appearing   Neurological:      Mental Status: She is alert and oriented to person, place, and time. Mental status is at baseline.   Psychiatric:         Mood and Affect: Mood normal.         Behavior: Behavior normal.       Labs:   Lab Results   Component Value Date     10/08/2024    K 4.6 10/08/2024    CL 99 10/08/2024    CO2 34 (H) 10/08/2024    BUN 9 10/08/2024    CREATININE 0.87 10/08/2024    GLUCOSE 73 (L) 10/08/2024    CALCIUM 9.3 10/08/2024    PROT 6.5 10/03/2024    BILITOT 1.2 10/03/2024    ALKPHOS 92 10/03/2024    AST 17 10/03/2024    ALT 9 10/03/2024       Lab Results   Component Value Date    WBC 8.9 10/08/2024    HGB 7.6 (L) 10/08/2024    HCT 28.1 (L) 10/08/2024    MCV 61 (L) 10/08/2024     10/08/2024       Imaging:   ECG 12 Lead    Result Date: 10/8/2024  Atrial fibrillation with  rapid ventricular response Right bundle branch block Inferior infarct (cited on or before 07-OCT-2024) Abnormal ECG When compared with ECG of 07-OCT-2024 06:57, No significant change was found Confirmed by Beni Gunter ([id number]) on 10/8/2024 11:23:30 AM       ASSESSMENT & PLAN     Acute respiratory failure  HFpEF exacerbation  Severe mitral valve stenosis with moderate regurgitation  Elevated D-dimer  -CXR concerning for multiple opacities but procal negative, no leukocytosis or fever, and CT chest wo contrast did not show findings concerning for pneumonia -- stopped antibiotics on 10/4  -VQ scan low to intermediate probability, Venous duplex LE negative for DVT; unlikely to be due to PE  -TTE done 10/5/2024 showed EF 67% with RV enlargement with moderately reduced function, severe tricuspid regurgitation, severe mitral stenosis, mild to moderate MV regurgitation, and RVSP of 58.3 mmHg; aortic valve replacement working well  -cardiology consulted, appreciate recs  - c/w home Jardiance 10 mg daily  - c/w home Breo Ellipta daily    - evaluated by cardiology on 10/7 - recommending transfer of patient to [facility]. Discussed with IM chief this morning, transfer approved.     Afib  - patient with known hx of afib, not currently on anticoagulation d/t hx of bleeding problem  - No evidence of L atrial thrombus on TTE from 10/5/2024  - Patient had afib with RVR per EKG on 10/7/2024. Asymptomatic. Will continue to monitor.     VASHTI, resolved   - Baseline Cr ~0.7 -0.8; Cr peaked at 1.22; today is 0.84  - CTM UOP and creatinine      Iron deficiency anemia  :: Hgb 7.7 hx OMARI per chart review however patient denies taking any iron supplementation  :: Patient without any hematuria, blood in stool, obvious signs of bleeding  - Iron <10, % saturation 12, and unable to estimate TIBC   - Started PO iron supplementation  - Continue to monitor hemoglobin    DVT Prophylaxis: lovenox 40 q 24hr - Estimated Creatinine Clearance: 41.3 mL/min  (A) (by C-G formula based on SCr of 1.14 mg/dL (H)).    Code status: Full Code  Diet: Adult diet Cardiac; 70 gm fat; 2 - 3 grams Sodium; Easy to chew; 2000 mL fluid     Disposition: continue to monitor inpatient, await consultant recommendations, await test results, and await clinical improvement    The patient/family had opportunity to ask questions. All questions were answered to the best of my ability.    Patient discussed with attending physician, Jose Francisco Ferrera MD.     Electronically signed by Hermelinda Busch DO on 10/08/24 at 12:32 PM

## 2024-10-09 LAB
ALBUMIN SERPL BCP-MCNC: 3.3 G/DL (ref 3.4–5)
ANION GAP SERPL CALC-SCNC: 14 MMOL/L (ref 10–20)
BUN SERPL-MCNC: 14 MG/DL (ref 6–23)
CALCIUM SERPL-MCNC: 8.7 MG/DL (ref 8.6–10.6)
CHLORIDE SERPL-SCNC: 96 MMOL/L (ref 98–107)
CO2 SERPL-SCNC: 30 MMOL/L (ref 21–32)
CREAT SERPL-MCNC: 1.12 MG/DL (ref 0.5–1.05)
EGFRCR SERPLBLD CKD-EPI 2021: 53 ML/MIN/1.73M*2
ERYTHROCYTE [DISTWIDTH] IN BLOOD BY AUTOMATED COUNT: 25.8 % (ref 11.5–14.5)
GLUCOSE SERPL-MCNC: 113 MG/DL (ref 74–99)
HCT VFR BLD AUTO: 28.3 % (ref 36–46)
HGB BLD-MCNC: 7.8 G/DL (ref 12–16)
HOLD SPECIMEN: NORMAL
HOLD SPECIMEN: NORMAL
MCH RBC QN AUTO: 16.7 PG (ref 26–34)
MCHC RBC AUTO-ENTMCNC: 27.6 G/DL (ref 32–36)
MCV RBC AUTO: 61 FL (ref 80–100)
NRBC BLD-RTO: 0.8 /100 WBCS (ref 0–0)
PHOSPHATE SERPL-MCNC: 3.3 MG/DL (ref 2.5–4.9)
PLATELET # BLD AUTO: 345 X10*3/UL (ref 150–450)
POTASSIUM SERPL-SCNC: 4.1 MMOL/L (ref 3.5–5.3)
RBC # BLD AUTO: 4.68 X10*6/UL (ref 4–5.2)
SODIUM SERPL-SCNC: 136 MMOL/L (ref 136–145)
UFH PPP CHRO-ACNC: 0.6 IU/ML
UFH PPP CHRO-ACNC: 0.7 IU/ML
WBC # BLD AUTO: 9.8 X10*3/UL (ref 4.4–11.3)

## 2024-10-09 PROCEDURE — 2500000004 HC RX 250 GENERAL PHARMACY W/ HCPCS (ALT 636 FOR OP/ED)

## 2024-10-09 PROCEDURE — 85520 HEPARIN ASSAY: CPT

## 2024-10-09 PROCEDURE — 2500000001 HC RX 250 WO HCPCS SELF ADMINISTERED DRUGS (ALT 637 FOR MEDICARE OP)

## 2024-10-09 PROCEDURE — 1200000002 HC GENERAL ROOM WITH TELEMETRY DAILY

## 2024-10-09 PROCEDURE — 99233 SBSQ HOSP IP/OBS HIGH 50: CPT | Performed by: INTERNAL MEDICINE

## 2024-10-09 PROCEDURE — 36415 COLL VENOUS BLD VENIPUNCTURE: CPT

## 2024-10-09 PROCEDURE — 85027 COMPLETE CBC AUTOMATED: CPT

## 2024-10-09 PROCEDURE — 80069 RENAL FUNCTION PANEL: CPT

## 2024-10-09 RX ORDER — DIPHENHYDRAMINE HCL 25 MG
25 CAPSULE ORAL ONCE
Status: DISCONTINUED | OUTPATIENT
Start: 2024-10-09 | End: 2024-10-09

## 2024-10-09 RX ORDER — METOPROLOL TARTRATE 25 MG/1
25 TABLET, FILM COATED ORAL EVERY 6 HOURS
Status: DISPENSED | OUTPATIENT
Start: 2024-10-09 | End: 2024-10-12

## 2024-10-09 ASSESSMENT — COGNITIVE AND FUNCTIONAL STATUS - GENERAL
DAILY ACTIVITIY SCORE: 24
MOBILITY SCORE: 24

## 2024-10-09 ASSESSMENT — ENCOUNTER SYMPTOMS
FATIGUE: 1
ANAL BLEEDING: 0
SHORTNESS OF BREATH: 1
CHILLS: 1
ABDOMINAL DISTENTION: 1
BLOOD IN STOOL: 0
ABDOMINAL PAIN: 1
CONSTIPATION: 1

## 2024-10-09 ASSESSMENT — PAIN SCALES - GENERAL
PAINLEVEL_OUTOF10: 0 - NO PAIN
PAINLEVEL_OUTOF10: 2

## 2024-10-09 NOTE — CONSULTS
Reason For Consult  Acute decompensated heart failure     History Of Present Illness  Hilda Jones is a 69-year-old female with history of hypertension, A-fib, COPD, diabetes (A1c 5.9 July 2023), aortic valve insufficiency status post aortic root replacement, HTN, HLD, insomnia , SMA embolectomy in August 2022 .    Patient states over the past week she has had progressive amounts of SOB with associated with dry cough. Endorses worsening in her leg swelling bilaterally that improved quickly after return diuretic in the hospital. She was not taking anticoagulants recently.     Mitral work-up checklist  - Dental - pend  - ECHO - 10/05/24  - [facility] - we will not perform it for now  - [facility]: we will not perform it for now  - Cardiac Surgery consult - Pend  - Frailty 3/5  STS  Procedure Type: Isolated MVR  Perioperative Outcome Estimate %  Operative Mortality 17%  Morbidity & Mortality 38.7%  Stroke 2.37%  Renal Failure 16%  Reoperation 12.1%  Prolonged Ventilation 25.7%  Deep Sternal Wound Infection 0.071%  Long Hospital Stay (>14 days) 32.9%  Short Hospital Stay (<6 days)* 6.09%       Past Medical History  She has a past medical history of Atrial fibrillation (Multi), Awareness under anesthesia, CHF (congestive heart failure), COPD (chronic obstructive pulmonary disease) (Multi), Diabetes (Multi), GERD (gastroesophageal reflux disease), HTN (hypertension), Mitral insufficiency, PONV (postoperative nausea and vomiting), and S/P AVR.    Surgical History  She has a past surgical history that includes Embolectomy (N/A, 08/17/2022); Aortic root replacement; Aortic valve replacement; CT angio abdomen pelvis w and or wo IV IV contrast (10/06/2023); Embolectomy (N/A, 10/06/2023); and CT angio abdomen pelvis w and or wo IV IV contrast (12/18/2023).     Social History  She reports that she has been smoking cigarettes. She has a 15 pack-year smoking history. She uses smokeless tobacco. She reports that she does not drink alcohol and  "does not use drugs.    Family History  Family History   Problem Relation Name Age of Onset    Breast cancer Maternal Grandmother  50 - 59        Allergies  Flexeril [cyclobenzaprine]    Review of Systems  Review of Systems   Constitutional:  Positive for chills and fatigue.   Respiratory:  Positive for shortness of breath.    Cardiovascular:  Negative for chest pain.   Gastrointestinal:  Positive for abdominal distention, abdominal pain and constipation. Negative for anal bleeding and blood in stool.        Physical Exam  Constitutional:       Appearance: She is obese. She is ill-appearing and toxic-appearing.   HENT:      Head: Atraumatic.   Cardiovascular:      Rate and Rhythm: Rhythm irregularly irregular.      Heart sounds: Murmur heard.      Crescendo decrescendo systolic murmur is present with a grade of 2/6.      Diastolic murmur is present with a grade of 2/4.      Gallop present. S3 sounds present.   Pulmonary:      Effort: Tachypnea present.      Breath sounds: Examination of the right-middle field reveals rhonchi and rales. Examination of the left-middle field reveals rhonchi and rales. Examination of the right-lower field reveals decreased breath sounds. Examination of the left-lower field reveals decreased breath sounds. Decreased breath sounds, rhonchi and rales present.   Abdominal:      General: Abdomen is protuberant. A surgical scar is present. There is distension.      Palpations: Abdomen is soft.      Tenderness: There is abdominal tenderness.   Musculoskeletal:      Cervical back: Neck supple.   Skin:     General: Skin is dry.      Coloration: Skin is pale.   Neurological:      General: No focal deficit present.   Psychiatric:         Attention and Perception: She is inattentive.         Mood and Affect: Affect is blunt.           Last Recorded Vitals  Blood pressure 99/70, pulse 65, temperature 36.9 °C (98.4 °F), temperature source Temporal, resp. rate 16, height 1.651 m (5' 5\"), weight 68.5 kg " (150 lb 14.5 oz), SpO2 98%.    Last Labs:  CBC - 10/9/2024:  3:24 AM  9.8 7.8 345    28.3      BMP  136  96  14                  ----------------<113     4.1  30  1.12     CMP - 10/9/2024:  3:24 AM  8.7 6.5 17 --- 1.2   3.3 3.3 9 92      PTT - 10/8/2024:  9:44 PM  1.1   12.3 32     Troponin I, High Sensitivity ([facility])   Date/Time Value Ref Range Status   10/03/2024 07:58 PM 9 0 - 34 ng/L Final     BNP   Date/Time Value Ref Range Status   10/03/2024 07:33  (H) 0 - 99 pg/mL Final        Last I/O:  I/O last 3 completed shifts:  In: 871.6 (12.7 mL/kg) [P.O.:720; I.V.:151.6 (2.2 mL/kg)]  Out: 400 (5.8 mL/kg) [Urine:400 (0.2 mL/kg/hr)]  Weight: 68.4 kg     Ejection Fractions:  EF   Date/Time Value Ref Range Status   10/05/2024 11:19 AM 67 %         Assessment/Plan     - Patient is not complying with medical treatment at home and at the present moment she presents extremely frail for perform any invasive procedure considering her valve disease.   - We suggest to keep her medical treatment.  - We appreciate the opportunity to be part on this patient care.     I spent 50 minutes in the professional and overall care of this patient.      Mic Bauer MD

## 2024-10-09 NOTE — SIGNIFICANT EVENT
10/09/24 0011   Provider Notification   Reason for Communication Evaluate   Provider Name Mary Denise MD   Provider Role Resident   Method of Communication Secure Text   Details of Communication patient has new onset of delirium   Response Waiting for response     Patient trying to leave and attempting to pull out IV with Heparin administering. This RN Redirected patient, patient refusing to wear Tele monitoring and threatening to leave. Patient was reoriented, call light in hand and was restarted on tele monitoring

## 2024-10-09 NOTE — PROGRESS NOTES
GENERAL CARDIOLOGY PROGRESS NOTE    Name: Hilda Jones  :  1955(69 y.o.)  MRN:  [id number]    Date: 10/09/24     HPI:  This is a 69 y.o. female with past medical history of hypertension, A-fib, COPD, OMARI, diabetes (A1c 5.2023), aortic valve insufficiency s/p AVR x 2, SMA occlusion, who presented to the emergency department on 10/3/24 with chief complaint of shortness of breath. Admitted for acute hypoxemic respiratory failure. Initially thought to have PNA on CXR but procalcitonin negative, no leukotycosis, and CT chest did not show opacities therefore antibiotics stopped. D-dimer was elevated but due to VASHTI unable to have contrast, therefore V/Q scan was done which was low probability; venous duplex of LE was negative for DVT. 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     SUBJECTIVE                       Patient was seen at bedside. 4L of oxygen. Feeling anxious, endorses slight shortness of breath (pulse ox was 92%), she looked dry and had soft pressures 85/61. Denies chest pain, denies palpitaions.      Current medications:  Scheduled Meds:aspirin, 81 mg, oral, Daily  atorvastatin, 40 mg, oral, Nightly  empagliflozin, 10 mg, oral, Daily  ferrous sulfate (325 mg ferrous sulfate), 65 mg of iron, oral, Daily  fluticasone furoate-vilanteroL, 1 puff, inhalation, Daily  metoprolol tartrate, 25 mg, oral, q6h  ondansetron, 4 mg, intravenous, Once  polyethylene glycol, 17 g, oral, Daily  sennosides,  2 tablet, oral, BID  trimethobenzamide, 200 mg, intramuscular, Once      Continuous Infusions:heparin, 0-4,500 Units/hr, Last Rate: 1,200 Units/hr (10/09/24 0947)      PRN Meds:PRN medications: acetaminophen, diphenhydrAMINE, eucerin, heparin, metoprolol, ondansetron ODT **OR** ondansetron, white petrolatum      OBJECTIVE   Afebrile  Hr:   RR: 18  SBP: 68 - 103  DBP: 49 - 70  O2: 90 -99% on 4L      IO not recoreded      Vitals:    10/09/24 0900 10/09/24 1045 10/09/24 1100 10/09/24 1436   BP: 97/63 86/51 98/66 99/70   BP Location:  Left arm  Left arm   Patient Position:  Lying  Lying   Pulse:  68  65   Resp:  16  16   Temp:  36.9 °C (98.4 °F)  36.9 °C (98.4 °F)   TempSrc:  Temporal  Temporal   SpO2:  95%  98%   Weight:       Height:            Intake/Output Summary (Last 24 hours) at 10/9/2024 1450  Last data filed at 10/9/2024 1436  Gross per 24 hour   Intake 967.6 ml   Output 400 ml   Net 567.6 ml        Physical Exam  Vitals and nursing note reviewed.   Constitutional:       General: She is not in acute distress.  HENT:      Head: Normocephalic and atraumatic.      Nose: Nose normal.      Mouth/Throat:      Mouth: Mucous membranes are dry.      Comments: edentulous  Eyes:      Extraocular Movements: Extraocular movements intact.   Cardiovascular:      Rate and Rhythm: Normal rate and regular rhythm.   Pulmonary:      Effort: Pulmonary effort is normal. No respiratory distress.      Comments: NC present.   Abdominal:      General: There is no distension.      Tenderness: There is abdominal tenderness (left abdomen, worst in LLQ). There is guarding (voluntary).      Hernia: A hernia (positive when coughing.) is present.   Musculoskeletal:         General: No swelling.      Cervical back: No rigidity.      Right lower leg: No edema.      Left lower leg: No edema.   Skin:     Coloration: Skin is not pale.      Comments: Multiple skin lesions throughout her back and legs, chronic appearing   Neurological:       Mental Status: She is alert and oriented to person, place, and time. Mental status is at baseline.   Psychiatric:         Mood and Affect: Mood normal.         Behavior: Behavior normal.       Labs:   Lab Results   Component Value Date     10/09/2024    K 4.1 10/09/2024    CL 96 (L) 10/09/2024    CO2 30 10/09/2024    BUN 14 10/09/2024    CREATININE 1.12 (H) 10/09/2024    GLUCOSE 113 (H) 10/09/2024    CALCIUM 8.7 10/09/2024    PROT 6.5 10/03/2024    BILITOT 1.2 10/03/2024    ALKPHOS 92 10/03/2024    AST 17 10/03/2024    ALT 9 10/03/2024       Lab Results   Component Value Date    WBC 9.8 10/09/2024    HGB 7.8 (L) 10/09/2024    HCT 28.3 (L) 10/09/2024    MCV 61 (L) 10/09/2024     10/09/2024        Imaging:   No results found.    Up METOP to Q6    ASSESSMENT & PLAN   This is a 69 y.o. female with past medical history of hypertension, A-fib, COPD, OMARI, diabetes (A1c 5.9 July 2023), aortic valve insufficiency s/p AVR x 2, SMA occlusion, who presented to the emergency department on 10/3/24 with chief complaint of shortness of breath. Admitted for acute hypoxemic respiratory failure. Initially thought to have PNA on CXR but procalcitonin negative, no leukotycosis, and CT chest did not show opacities therefore antibiotics stopped. D-dimer was elevated but due to VASHTI unable to have contrast, therefore V/Q scan was done which was low probability; venous duplex of LE was negative for DVT. Patient continued to have hypoxia when trialed weaning to room air therefore TTE was ordered. TTE done 10/5/2024 showed EF 67% with RV enlargement with moderately reduced function, severe tricuspid regurgitation, severe mitral stenosis, mild to moderate MV regurgitation, and RVSP of 58.3 mmHg; aortic valve replacement working well    Updates 10/09/24  - increased metop to q6 25mg   - structural consulted    #HFpEF exacerbation  #Severe mitral valve stenosis with moderate regurgitation  #Elevated D-dimer  :: CXR concerning for  multiple opacities but procal negative, no leukocytosis or fever, and CT chest wo contrast did not show findings concerning for pneumonia -- stopped antibiotics on 10/4  :: VQ scan low to intermediate probability, Venous duplex LE negative for DVT; unlikely to be due to PE  :: TTE done 10/5/2024 showed EF 67% with RV enlargement with moderately reduced function, severe tricuspid regurgitation, severe mitral stenosis, mild to moderate MV regurgitation, and RVSP of 58.3 mmHg; aortic valve replacement working well  PLAN:  - she looked a dry to me with her dry mouth, and I couldn't really appreciate JVP movement  - I still have her on the home Jardiance 10 mg daily  - holding lasix and odin dre  - consult structural for potential balloon valvuloplasty    #Afib  :: patient with known hx of afib, was not on anticoagulation d/t hx of bleeding problem  :: No evidence of L atrial thrombus on TTE from 10/5/2024  :: Patient had afib with RVR per EKG on 10/7/2024. Asymptomatic. Will continue to monitor.   PLAN:  - started on heparin ggt  - Up METOP 25mg  to Q6    #Iron deficiency anemia  :: Hgb 7.7 hx OMARI per chart review however patient denies taking any iron supplementation  :: Patient without any hematuria, blood in stool, obvious signs of bleeding  :: Iron <10, % saturation 12, and unable to estimate TIBC   PLAN:  - c/w PO iron supplementation  - Continue to monitor hemoglobin    F: PRN   E: PRN K>4, Mg>2, P>3   N: Cardiac  A: 1 PIV   Oxygen: room air     DVT ppx: heparin gtt   GI ppx: not indicated   Surrogate Medical Decision-maker:  Carmen Jones, Daughter, [phone]  Code Status; FULL CODE    Electronically signed by Jerome Kirk MD on 10/09/24 at 2:50 PM

## 2024-10-09 NOTE — SIGNIFICANT EVENT
Rapid Response Nurse Note: [] Rapid Response [x] RADAR alert: Score 7    Pager time: [phone]  Arrival time: 805  Event end time: 823  Location: LT 90  [] Phone triage     Rapid response initiated by:  [] Rapid Response RN [] Family [] Nursing Supervisor [] Physician   [x] RADAR auto-page [] Sepsis auto-page [] RN [] RT   [] NP/PA [] Other:     Primary reason for call:   [] BAT [] New CPAP/BiPAP [] Bleeding [] Change in mental status   [] Chest pain [] Code blue [] FiO2 >/= 50% [] HR </= 40 bpm   [] HR >/= 130 bpm [] Hyperglycemia [] Hypoglycemia [x] RADAR    [] RR </= 8 bpm [] RR >/= 30 bpm [] SBP </= 90 mmHg [] SpO2 < 90%   [] Seizure [] Sepsis [] Staff concern:     Initial VS and/or RADAR VS: T 37.3 °C; HR 78; RR 16; BP 85/61; SPO2 92% on supplemental oxygen.  Providers present at bedside (if applicable): Dr. Jerome Kirk, primary Cardiology ([facility]) service    Interventions:  [] None [] ABG [] Assist w/ICU transfer [] BAT paged    [] Bag mask [] Blood [] Cardioversion [] Code Blue   [] Code blue for intubation [] Code status changed [] Chest x-ray [] EKG   [] IV fluid/bolus [] KUB x-ray [] Labs/cultures [] Medication   [] Nebulizer treatment [] NIPPV (CPAP/BiPAP) [] Oxygen [] Oral airway   [] Peripheral IV [] Palliative care consult [] CT/MRI [] Sepsis protocol    [] Suctioned [x] Other: see below     Name of ICU Provider contacted (if applicable): n/a  Outcome:  [] Coded and  [] Code blue for intubation [] Coded and transferred to ICU []  on division   [x] Remained on division (no change) [] Remained on division + additional monitoring [] Remained in ED [] Transferred to ED   [] Transferred to ICU [] Transferred to inpatient status [] Transferred for interventions (procedure) [] Transferred to ICU stepdown    [] Transferred to surgery [] Transferred to telemetry [] Sepsis protocol [] STEMI protocol   [] Stroke protocol [x] Bedside nurse instructed to page rapid response for any concerns or  "acute change in condition/VS     Additional Comments: Reviewed above VS with bedside RN.  Patient drowsy and readily responsive to light verbal stimuli.  Speech soft and mumbled.  Denied pain, dizziness or lightheadedness.  Endorsed \"a little\" shortness of breath.  Oral mucosa dry and flaky.  Skin warm and dry.  VS confirmed: HR 71, BP 97/63 (74); SPO2 98-99% on 4 L/m NC.  Dr. Jerome Kirk at bedside and updated on above and assessment.    Collaborative plan of care:  Continuous pulse oximetry  SPO2 goal > 92%  IV fluids per primary Cardiology ([facility]) service discretion  Staff to page rapid response for any concerns or acute change in condition/VS  "

## 2024-10-09 NOTE — CARE PLAN
Problem: Chronic Conditions and Co-morbidities  Goal: Patient's chronic conditions and co-morbidity symptoms are monitored and maintained or improved  Outcome: Progressing     Problem: Fall/Injury  Goal: Not fall by end of shift  Outcome: Progressing  Goal: Be free from injury by end of the shift  Outcome: Progressing  Goal: Verbalize understanding of personal risk factors for fall in the hospital  Outcome: Progressing  Goal: Verbalize understanding of risk factor reduction measures to prevent injury from fall in the home  Outcome: Progressing  Goal: Use assistive devices by end of the shift  Outcome: Progressing  Goal: Pace activities to prevent fatigue by end of the shift  Outcome: Progressing   The patient's goals for the shift include      The clinical goals for the shift include patient will be safe throughout shift

## 2024-10-09 NOTE — PROGRESS NOTES
1845:    Patient arrived from transport, settled safely into bed. Call light given, belongings within reach. See VS per flow sheet

## 2024-10-10 LAB
ABO GROUP (TYPE) IN BLOOD: NORMAL
ALBUMIN SERPL BCP-MCNC: 3.1 G/DL (ref 3.4–5)
ANION GAP SERPL CALC-SCNC: 11 MMOL/L (ref 10–20)
ANTIBODY SCREEN: NORMAL
BUN SERPL-MCNC: 10 MG/DL (ref 6–23)
CALCIUM SERPL-MCNC: 8.4 MG/DL (ref 8.6–10.6)
CHLORIDE SERPL-SCNC: 96 MMOL/L (ref 98–107)
CO2 SERPL-SCNC: 37 MMOL/L (ref 21–32)
CREAT SERPL-MCNC: 0.92 MG/DL (ref 0.5–1.05)
EGFRCR SERPLBLD CKD-EPI 2021: 67 ML/MIN/1.73M*2
ERYTHROCYTE [DISTWIDTH] IN BLOOD BY AUTOMATED COUNT: 25.8 % (ref 11.5–14.5)
GLUCOSE SERPL-MCNC: 85 MG/DL (ref 74–99)
HCT VFR BLD AUTO: 25.4 % (ref 36–46)
HGB BLD-MCNC: 7.3 G/DL (ref 12–16)
MAGNESIUM SERPL-MCNC: 1.84 MG/DL (ref 1.6–2.4)
MCH RBC QN AUTO: 17.2 PG (ref 26–34)
MCHC RBC AUTO-ENTMCNC: 28.7 G/DL (ref 32–36)
MCV RBC AUTO: 60 FL (ref 80–100)
NRBC BLD-RTO: 0.4 /100 WBCS (ref 0–0)
PHOSPHATE SERPL-MCNC: 3 MG/DL (ref 2.5–4.9)
PLATELET # BLD AUTO: 338 X10*3/UL (ref 150–450)
POTASSIUM SERPL-SCNC: 3.3 MMOL/L (ref 3.5–5.3)
RBC # BLD AUTO: 4.25 X10*6/UL (ref 4–5.2)
RH FACTOR (ANTIGEN D): NORMAL
SODIUM SERPL-SCNC: 141 MMOL/L (ref 136–145)
UFH PPP CHRO-ACNC: 0.5 IU/ML
WBC # BLD AUTO: 7.8 X10*3/UL (ref 4.4–11.3)

## 2024-10-10 PROCEDURE — 99233 SBSQ HOSP IP/OBS HIGH 50: CPT | Performed by: INTERNAL MEDICINE

## 2024-10-10 PROCEDURE — 86901 BLOOD TYPING SEROLOGIC RH(D): CPT

## 2024-10-10 PROCEDURE — 2500000001 HC RX 250 WO HCPCS SELF ADMINISTERED DRUGS (ALT 637 FOR MEDICARE OP)

## 2024-10-10 PROCEDURE — A9575 INJ GADOTERATE MEGLUMI 0.1ML: HCPCS | Performed by: INTERNAL MEDICINE

## 2024-10-10 PROCEDURE — 1200000002 HC GENERAL ROOM WITH TELEMETRY DAILY

## 2024-10-10 PROCEDURE — 36415 COLL VENOUS BLD VENIPUNCTURE: CPT

## 2024-10-10 PROCEDURE — 2500000004 HC RX 250 GENERAL PHARMACY W/ HCPCS (ALT 636 FOR OP/ED)

## 2024-10-10 PROCEDURE — 85027 COMPLETE CBC AUTOMATED: CPT

## 2024-10-10 PROCEDURE — 2550000001 HC RX 255 CONTRASTS: Performed by: INTERNAL MEDICINE

## 2024-10-10 PROCEDURE — 85520 HEPARIN ASSAY: CPT

## 2024-10-10 PROCEDURE — 75561 CARDIAC MRI FOR MORPH W/DYE: CPT | Performed by: STUDENT IN AN ORGANIZED HEALTH CARE EDUCATION/TRAINING PROGRAM

## 2024-10-10 PROCEDURE — 80069 RENAL FUNCTION PANEL: CPT

## 2024-10-10 PROCEDURE — 86923 COMPATIBILITY TEST ELECTRIC: CPT

## 2024-10-10 PROCEDURE — 83735 ASSAY OF MAGNESIUM: CPT

## 2024-10-10 RX ORDER — GADOTERATE MEGLUMINE 376.9 MG/ML
26 INJECTION INTRAVENOUS
Status: COMPLETED | OUTPATIENT
Start: 2024-10-10 | End: 2024-10-10

## 2024-10-10 ASSESSMENT — COGNITIVE AND FUNCTIONAL STATUS - GENERAL
DAILY ACTIVITIY SCORE: 24
CLIMB 3 TO 5 STEPS WITH RAILING: A LITTLE
MOBILITY SCORE: 23
MOBILITY SCORE: 24
DAILY ACTIVITIY SCORE: 24

## 2024-10-10 ASSESSMENT — PAIN - FUNCTIONAL ASSESSMENT
PAIN_FUNCTIONAL_ASSESSMENT: 0-10
PAIN_FUNCTIONAL_ASSESSMENT: 0-10

## 2024-10-10 ASSESSMENT — PAIN SCALES - GENERAL
PAINLEVEL_OUTOF10: 0 - NO PAIN
PAINLEVEL_OUTOF10: 0 - NO PAIN

## 2024-10-10 NOTE — PROGRESS NOTES
"Hilda Jones is a 69 y.o. female on day 7 of admission presenting with Community acquired pneumonia of both lungs.    Subjective   No acute events overnight. Patient feeling ok this morning, had no acute complaints.        Objective     Physical Exam  Constitutional: in NAD  HEENT: sclerae anicteric, EOM grossly intact  CV: RRR, no murmurs noted  Pulm: CTAB, no increased WOB  GI: abd soft, NT, ND  Skin: warm and dry  Ext: bilateral LE without pitting edema   Neuro: alert and conversant  Psych: affect appropriate    Last Recorded Vitals  Blood pressure 115/66, pulse 65, temperature 36.4 °C (97.5 °F), resp. rate 20, height 1.651 m (5' 5\"), weight 64.8 kg (142 lb 13.7 oz), SpO2 100%.  Intake/Output last 3 Shifts:  I/O last 3 completed shifts:  In: 727.6 (11.2 mL/kg) [P.O.:540; I.V.:187.6 (2.9 mL/kg)]  Out: 400 (6.2 mL/kg) [Urine:400 (0.2 mL/kg/hr)]  Weight: 64.8 kg       24 Hour Vitals  Temp:  [36.2 °C (97.2 °F)-36.6 °C (97.9 °F)] 36.4 °C (97.5 °F)  Heart Rate:  [63-75] 65  Resp:  [16-20] 20  BP: ()/(64-84) 115/66    Temp (24hrs), Av.5 °C (97.7 °F), Min:36.2 °C (97.2 °F), Max:36.6 °C (97.9 °F)     24 hour Intake/Output  No intake or output data in the 24 hours ending 10/10/24 1556       Labs  CBC  Results from last 72 hours   Lab Units 10/10/24  0910 10/09/24  0324 10/08/24  0548   WBC AUTO x10*3/uL 7.8 9.8 8.9   HEMOGLOBIN g/dL 7.3* 7.8* 7.6*   HEMATOCRIT % 25.4* 28.3* 28.1*   PLATELETS AUTO x10*3/uL 338 345 321        BMP  Results from last 72 hours   Lab Units 10/10/24  0910 10/09/24  0324 10/08/24  0548   SODIUM mmol/L 141 136 141   POTASSIUM mmol/L 3.3* 4.1 4.6   CHLORIDE mmol/L 96* 96* 99   BUN mg/dL 10 14 9   CREATININE mg/dL 0.92 1.12* 0.87   MAGNESIUM mg/dL 1.84  --   --    PHOSPHORUS mg/dL 3.0 3.3 3.5       Medications   Scheduled Medications  aspirin, 81 mg, oral, Daily  atorvastatin, 40 mg, oral, Nightly  empagliflozin, 10 mg, oral, Daily  ferrous sulfate (325 mg ferrous sulfate), 65 mg of iron, " oral, Daily  fluticasone furoate-vilanteroL, 1 puff, inhalation, Daily  metoprolol tartrate, 25 mg, oral, q6h  ondansetron, 4 mg, intravenous, Once  polyethylene glycol, 17 g, oral, Daily  sennosides, 2 tablet, oral, BID  trimethobenzamide, 200 mg, intramuscular, Once     Continuous Medications  heparin, 0-4,500 Units/hr, Last Rate: 1,200 Units/hr (10/10/24 1413)     PRN Medications  PRN medications: acetaminophen, diphenhydrAMINE, eucerin, heparin, metoprolol, ondansetron ODT **OR** ondansetron, white petrolatum                  Assessment/Plan   Assessment & Plan  Community acquired pneumonia of both lungs    This is a 69 y.o. female with past medical history of hypertension, A-fib, COPD, OMARI, diabetes (A1c 5.9 July 2023), aortic valve insufficiency s/p AVR x 2, SMA occlusion, who presented to the emergency department on 10/3/24 with chief complaint of shortness of breath. Admitted for acute hypoxemic respiratory failure. Initially thought to have PNA on CXR but procalcitonin negative, no leukotycosis, and CT chest did not show opacities therefore antibiotics stopped. D-dimer was elevated but due to VASHTI unable to have contrast, therefore V/Q scan was done which was low probability; venous duplex of LE was negative for DVT. Patient continued to have hypoxia when trialed weaning to room air therefore TTE was ordered. TTE done 10/5/2024 showed EF 67% with RV enlargement with moderately reduced function, severe tricuspid regurgitation, severe mitral stenosis, mild to moderate MV regurgitation, and RVSP of 58.3 mmHg; aortic valve replacement working well       Updates 10/10/24:  -Appropriate HR in 60s overnight, continue metop 25 q6h  -cMRI done (no LA thrombus, small SALTY thrombus)  -Continue heparin for now   -Needs discussion regarding AC regimen (see below)   -Outpatient follow up with Dr. Danielle in 6 weeks per IC recs     Patient has history of GI hemorrhage from acute mesenteric ischemia and open SMA embolectomy  in 2022. Was on Warfarin that was later switched to Xarelto due to compliance concerns. 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 Colonoscopy and EGD non-diagnostic. Discharged on Xarelto with recommendations to follow up with capsule endoscopy given non-diagnostic tests.    Patient's prior presentation with acute mesenteric ischemia and embolectomy puts her at high CHADSVASC score of 6. Therefore, she will likely need to be restarted on Xarelto and monitor for bleeding. Warfarin is a better option for her valvular Afib but due to compliance concerns, Xarelto can be re-started instead. Watchman can be used in patient with non-valvular afib, however referral to [facility] clinic is reasonable.         Updates 10/09/24  - increased metop to q6 25mg   - structural consulted     #HFpEF exacerbation  #Severe mitral valve stenosis with moderate regurgitation  #Elevated D-dimer  :: CXR concerning for multiple opacities but procal negative, no leukocytosis or fever, and CT chest wo contrast did not show findings concerning for pneumonia -- stopped antibiotics on 10/4  :: VQ scan low to intermediate probability, Venous duplex LE negative for DVT; unlikely to be due to PE  :: TTE done 10/5/2024 showed EF 67% with RV enlargement with moderately reduced function, severe tricuspid regurgitation, severe mitral stenosis, mild to moderate MV regurgitation, and RVSP of 58.3 mmHg; aortic valve replacement working well  PLAN:  - she looked a dry to me with her dry mouth, and I couldn't really appreciate JVP movement  - I still have her on the home Jardiance 10 mg daily  - holding lasix and odin dre  - consult structural for potential balloon valvuloplasty     #Afib  :: patient with known hx of afib, was not on anticoagulation d/t hx of bleeding problem  :: No evidence of L atrial  thrombus on TTE from 10/5/2024  :: Patient had afib with RVR per EKG on 10/7/2024. Asymptomatic. Will continue to monitor.   PLAN:  - started on heparin ggt  - Up METOP 25mg  to Q6     #Iron deficiency anemia  :: Hgb 7.7 hx OMARI per chart review however patient denies taking any iron supplementation  :: Patient without any hematuria, blood in stool, obvious signs of bleeding  :: Iron <10, % saturation 12, and unable to estimate TIBC   PLAN:  - c/w PO iron supplementation  - Continue to monitor hemoglobin     F: PRN   E: PRN K>4, Mg>2, P>3   N: Cardiac  A: 1 PIV   Oxygen: room air     DVT ppx: heparin gtt   GI ppx: not indicated   Surrogate Medical Decision-maker:  Carmen Jones, Daughter, [phone]  Code Status; FULL CODE      Paulo Lee MD

## 2024-10-10 NOTE — TREATMENT PLAN
Please get cardiac MRI to understand if SALTY thrombus.  Continue with medical management, PT/OT.  Patient will likely need to be rehabbed prior to understanding if candidate for Transcatheter options.  Outpatient follow up made with DR. Danielle in 6 weeks to understand candidacy.    At this point we will sign off.  We appreciate the ability to participate in her care.  If there are questions or concerns please page [phone]

## 2024-10-11 PROBLEM — I50.30 (HFPEF) HEART FAILURE WITH PRESERVED EJECTION FRACTION: Status: ACTIVE | Noted: 2024-01-01

## 2024-10-11 PROBLEM — I38 VALVULAR HEART DISEASE: Chronic | Status: ACTIVE | Noted: 2023-10-06

## 2024-10-11 LAB
ALBUMIN SERPL BCP-MCNC: 3 G/DL (ref 3.4–5)
ANION GAP SERPL CALC-SCNC: 13 MMOL/L (ref 10–20)
BLOOD EXPIRATION DATE: NORMAL
BUN SERPL-MCNC: 9 MG/DL (ref 6–23)
CALCIUM SERPL-MCNC: 7.8 MG/DL (ref 8.6–10.6)
CHLORIDE SERPL-SCNC: 98 MMOL/L (ref 98–107)
CO2 SERPL-SCNC: 32 MMOL/L (ref 21–32)
CREAT SERPL-MCNC: 0.92 MG/DL (ref 0.5–1.05)
DISPENSE STATUS: NORMAL
EGFRCR SERPLBLD CKD-EPI 2021: 67 ML/MIN/1.73M*2
ERYTHROCYTE [DISTWIDTH] IN BLOOD BY AUTOMATED COUNT: 25.7 % (ref 11.5–14.5)
GLUCOSE SERPL-MCNC: 88 MG/DL (ref 74–99)
HCT VFR BLD AUTO: 24.8 % (ref 36–46)
HGB BLD-MCNC: 7 G/DL (ref 12–16)
INR PPP: 1.6 (ref 0.9–1.1)
MAGNESIUM SERPL-MCNC: 1.74 MG/DL (ref 1.6–2.4)
MCH RBC QN AUTO: 17.1 PG (ref 26–34)
MCHC RBC AUTO-ENTMCNC: 28.2 G/DL (ref 32–36)
MCV RBC AUTO: 61 FL (ref 80–100)
NRBC BLD-RTO: 0.6 /100 WBCS (ref 0–0)
PHOSPHATE SERPL-MCNC: 2.6 MG/DL (ref 2.5–4.9)
PLATELET # BLD AUTO: 361 X10*3/UL (ref 150–450)
POTASSIUM SERPL-SCNC: 3.5 MMOL/L (ref 3.5–5.3)
PRODUCT BLOOD TYPE: 7300
PRODUCT CODE: NORMAL
PROTHROMBIN TIME: 18.1 SECONDS (ref 9.8–12.8)
RBC # BLD AUTO: 4.1 X10*6/UL (ref 4–5.2)
SODIUM SERPL-SCNC: 139 MMOL/L (ref 136–145)
UFH PPP CHRO-ACNC: 0.7 IU/ML
UNIT ABO: NORMAL
UNIT NUMBER: NORMAL
UNIT RH: NORMAL
UNIT VOLUME: 350
WBC # BLD AUTO: 8 X10*3/UL (ref 4.4–11.3)
XM INTEP: NORMAL

## 2024-10-11 PROCEDURE — 80069 RENAL FUNCTION PANEL: CPT

## 2024-10-11 PROCEDURE — 2500000001 HC RX 250 WO HCPCS SELF ADMINISTERED DRUGS (ALT 637 FOR MEDICARE OP)

## 2024-10-11 PROCEDURE — 2500000002 HC RX 250 W HCPCS SELF ADMINISTERED DRUGS (ALT 637 FOR MEDICARE OP, ALT 636 FOR OP/ED)

## 2024-10-11 PROCEDURE — 99233 SBSQ HOSP IP/OBS HIGH 50: CPT | Performed by: INTERNAL MEDICINE

## 2024-10-11 PROCEDURE — 2500000004 HC RX 250 GENERAL PHARMACY W/ HCPCS (ALT 636 FOR OP/ED)

## 2024-10-11 PROCEDURE — 94640 AIRWAY INHALATION TREATMENT: CPT

## 2024-10-11 PROCEDURE — 85610 PROTHROMBIN TIME: CPT

## 2024-10-11 PROCEDURE — 36415 COLL VENOUS BLD VENIPUNCTURE: CPT

## 2024-10-11 PROCEDURE — 85520 HEPARIN ASSAY: CPT

## 2024-10-11 PROCEDURE — 36430 TRANSFUSION BLD/BLD COMPNT: CPT

## 2024-10-11 PROCEDURE — 83735 ASSAY OF MAGNESIUM: CPT

## 2024-10-11 PROCEDURE — P9016 RBC LEUKOCYTES REDUCED: HCPCS

## 2024-10-11 PROCEDURE — 85027 COMPLETE CBC AUTOMATED: CPT

## 2024-10-11 PROCEDURE — 1200000002 HC GENERAL ROOM WITH TELEMETRY DAILY

## 2024-10-11 RX ORDER — METOPROLOL SUCCINATE 100 MG/1
100 TABLET, EXTENDED RELEASE ORAL DAILY
Status: DISCONTINUED | OUTPATIENT
Start: 2024-10-12 | End: 2024-10-14

## 2024-10-11 RX ORDER — FUROSEMIDE 10 MG/ML
40 INJECTION INTRAMUSCULAR; INTRAVENOUS ONCE
Status: COMPLETED | OUTPATIENT
Start: 2024-10-11 | End: 2024-10-11

## 2024-10-11 RX ORDER — WARFARIN SODIUM 5 MG/1
5 TABLET ORAL DAILY
Status: DISCONTINUED | OUTPATIENT
Start: 2024-10-11 | End: 2024-10-13

## 2024-10-11 ASSESSMENT — COGNITIVE AND FUNCTIONAL STATUS - GENERAL
CLIMB 3 TO 5 STEPS WITH RAILING: A LITTLE
MOBILITY SCORE: 23
DAILY ACTIVITIY SCORE: 24

## 2024-10-11 ASSESSMENT — PAIN - FUNCTIONAL ASSESSMENT: PAIN_FUNCTIONAL_ASSESSMENT: 0-10

## 2024-10-11 ASSESSMENT — PAIN SCALES - GENERAL
PAINLEVEL_OUTOF10: 0 - NO PAIN
PAINLEVEL_OUTOF10: 0 - NO PAIN

## 2024-10-11 NOTE — CARE PLAN
The patient's goals for the shift include      The clinical goals for the shift include pt will remain safe throughout shift

## 2024-10-11 NOTE — PROGRESS NOTES
"Hilda Jones is a 69 y.o. female on day 8 of admission presenting with Valvular heart disease.    Subjective   Vitals and chart notes from overnight reviewed. No acute issues overnight.   Patient seen and evaluated at bedside. Denies chest pain, SOB, n/v. Was on 3L oxygen.        Objective     Physical Exam  Constitutional: in NAD, wearing NC on 3L  HEENT: sclerae anicteric, EOM grossly intact, edentulous  CV: RRR, no murmurs noted  Pulm: CTAB, no increased WOB  GI: tenderness in LLQ, supra-umbical hernia visible on cough, abd soft, ND  Skin: warm and dry  Ext: no pitting edema   Neuro: alert and conversant  Psych: affect appropriate    Last Recorded Vitals  Blood pressure 109/70, pulse 54, temperature 36.4 °C (97.5 °F), temperature source Temporal, resp. rate 20, height 1.651 m (5' 5\"), weight 64.8 kg (142 lb 13.7 oz), SpO2 96%.  Intake/Output last 3 Shifts:  No intake/output data recorded.      24 Hour Vitals  Temp:  [36.3 °C (97.3 °F)-36.8 °C (98.2 °F)] 36.4 °C (97.5 °F)  Heart Rate:  [54-77] 54  Resp:  [17-20] 20  BP: ()/(52-70) 109/70    Temp (24hrs), Av.4 °C (97.6 °F), Min:36.3 °C (97.3 °F), Max:36.8 °C (98.2 °F)     24 hour Intake/Output  No intake or output data in the 24 hours ending 10/11/24 1414       Labs  CBC  Results from last 72 hours   Lab Units 10/11/24  0743 10/10/24  0910 10/09/24  0324   WBC AUTO x10*3/uL 8.0 7.8 9.8   HEMOGLOBIN g/dL 7.0* 7.3* 7.8*   HEMATOCRIT % 24.8* 25.4* 28.3*   PLATELETS AUTO x10*3/uL 361 338 345        BMP  Results from last 72 hours   Lab Units 10/11/24  0743 10/10/24  0910 10/09/24  0324   SODIUM mmol/L 139 141 136   POTASSIUM mmol/L 3.5 3.3* 4.1   CHLORIDE mmol/L 98 96* 96*   BUN mg/dL 9 10 14   CREATININE mg/dL 0.92 0.92 1.12*   MAGNESIUM mg/dL 1.74 1.84  --    PHOSPHORUS mg/dL 2.6 3.0 3.3       Medications   Scheduled Medications  aspirin, 81 mg, oral, Daily  atorvastatin, 40 mg, oral, Nightly  empagliflozin, 10 mg, oral, Daily  ferrous sulfate (325 mg " ferrous sulfate), 65 mg of iron, oral, Daily  fluticasone furoate-vilanteroL, 1 puff, inhalation, Daily  [START ON 10/12/2024] metoprolol succinate XL, 100 mg, oral, Daily  metoprolol tartrate, 25 mg, oral, q6h  ondansetron, 4 mg, intravenous, Once  polyethylene glycol, 17 g, oral, Daily  sennosides, 2 tablet, oral, BID  trimethobenzamide, 200 mg, intramuscular, Once  warfarin, 5 mg, oral, Daily     Continuous Medications  heparin, 0-4,500 Units/hr, Last Rate: 1,200 Units/hr (10/11/24 1103)     PRN Medications  PRN medications: acetaminophen, diphenhydrAMINE, eucerin, heparin, metoprolol, ondansetron ODT **OR** ondansetron, white petrolatum                  Assessment/Plan   Assessment & Plan  (HFpEF) heart failure with preserved ejection fraction    Valvular heart disease    This is a 69 y.o. female with past medical history of hypertension, A-fib, COPD, OMARI, diabetes (A1c 5.9 July 2023), aortic valve insufficiency s/p AVR x 2, SMA occlusion, who presented to the emergency department on 10/3/24 with chief complaint of shortness of breath. TTE done 10/5/2024 showed EF 67% with RV enlargement with moderately reduced function, severe tricuspid regurgitation, severe mitral stenosis, mild to moderate MV regurgitation, and RVSP of 58.3 mmHg; aortic valve replacement working well. Too frail for surgery at this time. Will discharge patient on warfarin after heparin bridge. Patient to follow up in 6 weeks in outpatient setting for surgery reassesment.     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  #Severe tricuspid regurgitation  :: CXR concerning for multiple opacities but procal negative, no leukocytosis or fever, and CT chest wo contrast did not show findings concerning for pneumonia -- stopped antibiotics on  10/4  :: VQ scan low to intermediate probability, Venous duplex LE negative for DVT; unlikely to be due to PE  :: TTE done 10/5/2024 showed EF 67% with RV enlargement with moderately reduced function, severe tricuspid regurgitation, severe mitral stenosis, mild to moderate MV regurgitation, and RVSP of 58.3 mmHg; aortic valve replacement working well  PLAN:  - empa 10 mg daily  - metop 100mg / day  - lasix 40mg     #Afib  #SALTY thrombus  :: patient with known hx of afib, was not on anticoagulation d/t hx of bleeding problem  :: No evidence of L atrial thrombus on TTE from 10/5/2024  :: Patient had afib with RVR per EKG on 10/7/2024. Asymptomatic. Will continue to monitor.   PLAN:  - warfarin 5mg / day w/ heparin bridge  - metop 100mg / day     #Iron deficiency anemia  :: Hgb 7.7 hx OMARI per chart review however patient denies taking any iron supplementation  :: Patient without any hematuria, blood in stool, obvious signs of bleeding  :: Iron <10, % saturation 12, and unable to estimate TIBC   PLAN:  - c/w PO iron supplementation  - transfuse 1 unit pRBC     F: PRN   E: PRN K>4, Mg>2, P>3   N: Cardiac  A: 1 PIV   Oxygen: room air     DVT ppx: heparin gtt   GI ppx: not indicated   Surrogate Medical Decision-maker:  Carmen Jones, Daughter, [phone]  Code Status; FULL CODE      Jerome Kirk MD

## 2024-10-12 LAB
ALBUMIN SERPL BCP-MCNC: 3 G/DL (ref 3.4–5)
ANION GAP BLDV CALCULATED.4IONS-SCNC: ABNORMAL MMOL/L
ANION GAP SERPL CALC-SCNC: 10 MMOL/L (ref 10–20)
BASE EXCESS BLDV CALC-SCNC: 6.6 MMOL/L (ref -2–3)
BASOPHILS # BLD AUTO: 0.1 X10*3/UL (ref 0–0.1)
BASOPHILS NFR BLD AUTO: 1 %
BODY TEMPERATURE: 37 DEGREES CELSIUS
BUN SERPL-MCNC: 9 MG/DL (ref 6–23)
CA-I BLDV-SCNC: 1.09 MMOL/L (ref 1.1–1.33)
CALCIUM SERPL-MCNC: 8 MG/DL (ref 8.6–10.6)
CHLORIDE BLDV-SCNC: ABNORMAL MMOL/L
CHLORIDE SERPL-SCNC: 98 MMOL/L (ref 98–107)
CO2 SERPL-SCNC: 35 MMOL/L (ref 21–32)
CREAT SERPL-MCNC: 0.89 MG/DL (ref 0.5–1.05)
EGFRCR SERPLBLD CKD-EPI 2021: 70 ML/MIN/1.73M*2
EOSINOPHIL # BLD AUTO: 0.32 X10*3/UL (ref 0–0.7)
EOSINOPHIL NFR BLD AUTO: 3.1 %
ERYTHROCYTE [DISTWIDTH] IN BLOOD BY AUTOMATED COUNT: 28.4 % (ref 11.5–14.5)
GLUCOSE BLDV-MCNC: 78 MG/DL (ref 74–99)
GLUCOSE SERPL-MCNC: 75 MG/DL (ref 74–99)
HCO3 BLDV-SCNC: 32.2 MMOL/L (ref 22–26)
HCT VFR BLD AUTO: 27.9 % (ref 36–46)
HCT VFR BLD EST: 25 % (ref 36–46)
HGB BLD-MCNC: 7.9 G/DL (ref 12–16)
HGB BLDV-MCNC: 8.2 G/DL (ref 12–16)
HYPOCHROMIA BLD QL SMEAR: NORMAL
IMM GRANULOCYTES # BLD AUTO: 0.07 X10*3/UL (ref 0–0.7)
IMM GRANULOCYTES NFR BLD AUTO: 0.7 % (ref 0–0.9)
INHALED O2 CONCENTRATION: 28 %
INR PPP: 1.3 (ref 0.9–1.1)
LACTATE BLDV-SCNC: 0.8 MMOL/L (ref 0.4–2)
LACTATE SERPL-SCNC: 0.8 MMOL/L (ref 0.4–2)
LYMPHOCYTES # BLD AUTO: 1.62 X10*3/UL (ref 1.2–4.8)
LYMPHOCYTES NFR BLD AUTO: 15.5 %
MAGNESIUM SERPL-MCNC: 1.72 MG/DL (ref 1.6–2.4)
MCH RBC QN AUTO: 17.9 PG (ref 26–34)
MCHC RBC AUTO-ENTMCNC: 28.3 G/DL (ref 32–36)
MCV RBC AUTO: 63 FL (ref 80–100)
MONOCYTES # BLD AUTO: 1.96 X10*3/UL (ref 0.1–1)
MONOCYTES NFR BLD AUTO: 18.7 %
NEUTROPHILS # BLD AUTO: 6.39 X10*3/UL (ref 1.2–7.7)
NEUTROPHILS NFR BLD AUTO: 61 %
NRBC BLD-RTO: 0.2 /100 WBCS (ref 0–0)
OXYHGB MFR BLDV: 79.8 % (ref 45–75)
PCO2 BLDV: 52 MM HG (ref 41–51)
PH BLDV: 7.4 PH (ref 7.33–7.43)
PHOSPHATE SERPL-MCNC: 2.7 MG/DL (ref 2.5–4.9)
PLATELET # BLD AUTO: 328 X10*3/UL (ref 150–450)
PO2 BLDV: 50 MM HG (ref 35–45)
POLYCHROMASIA BLD QL SMEAR: NORMAL
POTASSIUM BLDV-SCNC: 3.4 MMOL/L (ref 3.5–5.3)
POTASSIUM SERPL-SCNC: 3.5 MMOL/L (ref 3.5–5.3)
PROTHROMBIN TIME: 15.1 SECONDS (ref 9.8–12.8)
RBC # BLD AUTO: 4.41 X10*6/UL (ref 4–5.2)
RBC MORPH BLD: NORMAL
SAO2 % BLDV: 82 % (ref 45–75)
SODIUM BLDV-SCNC: 135 MMOL/L (ref 136–145)
SODIUM SERPL-SCNC: 139 MMOL/L (ref 136–145)
TARGETS BLD QL SMEAR: NORMAL
UFH PPP CHRO-ACNC: 0.6 IU/ML
WBC # BLD AUTO: 10.5 X10*3/UL (ref 4.4–11.3)

## 2024-10-12 PROCEDURE — 85610 PROTHROMBIN TIME: CPT

## 2024-10-12 PROCEDURE — 2500000001 HC RX 250 WO HCPCS SELF ADMINISTERED DRUGS (ALT 637 FOR MEDICARE OP)

## 2024-10-12 PROCEDURE — 94640 AIRWAY INHALATION TREATMENT: CPT

## 2024-10-12 PROCEDURE — 1200000002 HC GENERAL ROOM WITH TELEMETRY DAILY

## 2024-10-12 PROCEDURE — 83605 ASSAY OF LACTIC ACID: CPT

## 2024-10-12 PROCEDURE — 82435 ASSAY OF BLOOD CHLORIDE: CPT

## 2024-10-12 PROCEDURE — 2500000002 HC RX 250 W HCPCS SELF ADMINISTERED DRUGS (ALT 637 FOR MEDICARE OP, ALT 636 FOR OP/ED)

## 2024-10-12 PROCEDURE — 83735 ASSAY OF MAGNESIUM: CPT

## 2024-10-12 PROCEDURE — 85520 HEPARIN ASSAY: CPT

## 2024-10-12 PROCEDURE — 99222 1ST HOSP IP/OBS MODERATE 55: CPT | Performed by: STUDENT IN AN ORGANIZED HEALTH CARE EDUCATION/TRAINING PROGRAM

## 2024-10-12 PROCEDURE — 85025 COMPLETE CBC W/AUTO DIFF WBC: CPT

## 2024-10-12 PROCEDURE — 36415 COLL VENOUS BLD VENIPUNCTURE: CPT

## 2024-10-12 PROCEDURE — 2500000004 HC RX 250 GENERAL PHARMACY W/ HCPCS (ALT 636 FOR OP/ED)

## 2024-10-12 PROCEDURE — 99233 SBSQ HOSP IP/OBS HIGH 50: CPT | Performed by: INTERNAL MEDICINE

## 2024-10-12 ASSESSMENT — COGNITIVE AND FUNCTIONAL STATUS - GENERAL
MOBILITY SCORE: 24
DAILY ACTIVITIY SCORE: 24

## 2024-10-12 ASSESSMENT — PAIN DESCRIPTION - ORIENTATION: ORIENTATION: RIGHT;LEFT

## 2024-10-12 ASSESSMENT — PAIN DESCRIPTION - LOCATION: LOCATION: HIP

## 2024-10-12 ASSESSMENT — PAIN SCALES - GENERAL
PAINLEVEL_OUTOF10: 7
PAINLEVEL_OUTOF10: 0 - NO PAIN
PAINLEVEL_OUTOF10: 5 - MODERATE PAIN

## 2024-10-12 ASSESSMENT — PAIN - FUNCTIONAL ASSESSMENT: PAIN_FUNCTIONAL_ASSESSMENT: 0-10

## 2024-10-12 NOTE — PROGRESS NOTES
"Hilda Jones is a 69 y.o. female on day 9 of admission presenting with Valvular heart disease.    Subjective   Vitals and chart notes from overnight reviewed. No acute issues overnight.   Patient seen and evaluated at bedside. Endorses severe LLQ pain. Denies chest pain, SOB, n/v. Was on 3L oxygen.        Objective     Physical Exam  Constitutional: in NAD, wearing NC on 3L  HEENT: sclerae anicteric, EOM grossly intact, edentulous  CV: RRR, no murmurs noted  Pulm: CTAB, no increased WOB  GI: tenderness in LLQ, supra-umbical hernia visible on cough, abd soft, ND  Skin: warm and dry  Ext: no pitting edema   Neuro: alert and conversant  Psych: affect appropriate    Last Recorded Vitals  Blood pressure 107/68, pulse 69, temperature 36.4 °C (97.5 °F), resp. rate 17, height 1.651 m (5' 5\"), weight 64.8 kg (142 lb 13.7 oz), SpO2 96%.  Intake/Output last 3 Shifts:  I/O last 3 completed shifts:  In: 350 (5.4 mL/kg) [Blood:350]  Out: - (0 mL/kg)   Weight: 64.8 kg       24 Hour Vitals  Temp:  [36.4 °C (97.5 °F)-36.7 °C (98.1 °F)] 36.4 °C (97.5 °F)  Heart Rate:  [69-77] 69  Resp:  [17-20] 17  BP: ()/(63-79) 107/68    Temp (24hrs), Av.5 °C (97.7 °F), Min:36.4 °C (97.5 °F), Max:36.7 °C (98.1 °F)     24 hour Intake/Output    Intake/Output Summary (Last 24 hours) at 10/12/2024 1535  Last data filed at 10/12/2024 1204  Gross per 24 hour   Intake 470 ml   Output --   Net 470 ml          Labs  CBC  Results from last 72 hours   Lab Units 10/12/24  1025 10/11/24  0743 10/10/24  0910   WBC AUTO x10*3/uL 10.5 8.0 7.8   HEMOGLOBIN g/dL 7.9* 7.0* 7.3*   HEMATOCRIT % 27.9* 24.8* 25.4*   PLATELETS AUTO x10*3/uL 328 361 338        BMP  Results from last 72 hours   Lab Units 10/12/24  1025 10/11/24  0743 10/10/24  0910   SODIUM mmol/L 139 139 141   POTASSIUM mmol/L 3.5 3.5 3.3*   CHLORIDE mmol/L 98 98 96*   BUN mg/dL 9 9 10   CREATININE mg/dL 0.89 0.92 0.92   MAGNESIUM mg/dL 1.72 1.74 1.84   PHOSPHORUS mg/dL 2.7 2.6 3.0 "       Medications   Scheduled Medications  aspirin, 81 mg, oral, Daily  atorvastatin, 40 mg, oral, Nightly  empagliflozin, 10 mg, oral, Daily  ferrous sulfate (325 mg ferrous sulfate), 65 mg of iron, oral, Daily  fluticasone furoate-vilanteroL, 1 puff, inhalation, Daily  metoprolol succinate XL, 100 mg, oral, Daily  ondansetron, 4 mg, intravenous, Once  polyethylene glycol, 17 g, oral, Daily  sennosides, 2 tablet, oral, BID  trimethobenzamide, 200 mg, intramuscular, Once  warfarin, 5 mg, oral, Daily     Continuous Medications  heparin, 0-4,500 Units/hr, Last Rate: 1,200 Units/hr (10/12/24 1115)     PRN Medications  PRN medications: acetaminophen, diphenhydrAMINE, eucerin, heparin, metoprolol, ondansetron ODT **OR** ondansetron, white petrolatum                  Assessment/Plan   Assessment & Plan  (HFpEF) heart failure with preserved ejection fraction    Valvular heart disease    This is a 69 y.o. female with past medical history of hypertension, A-fib, COPD, OMARI, diabetes (A1c 5.9 July 2023), aortic valve insufficiency s/p AVR x 2, SMA occlusion, who presented to the emergency department on 10/3/24 with chief complaint of shortness of breath. TTE done 10/5/2024 showed EF 67% with RV enlargement with moderately reduced function, severe tricuspid regurgitation, severe mitral stenosis, mild to moderate MV regurgitation, and RVSP of 58.3 mmHg; aortic valve replacement working well. Too frail for surgery at this time. Will discharge patient on warfarin after heparin bridge. Patient to follow up in 6 weeks in outpatient setting for surgery reassesment.     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  #Severe tricuspid regurgitation  :: CXR concerning for multiple opacities but procal negative, no leukocytosis or fever, and CT chest wo contrast did not show findings concerning for  pneumonia -- stopped antibiotics on 10/4  :: VQ scan low to intermediate probability, Venous duplex LE negative for DVT; unlikely to be due to PE  :: TTE done 10/5/2024 showed EF 67% with RV enlargement with moderately reduced function, severe tricuspid regurgitation, severe mitral stenosis, mild to moderate MV regurgitation, and RVSP of 58.3 mmHg; aortic valve replacement working well  PLAN:  - empa 10 mg daily  - metop 100mg / day     #Afib  #SALTY thrombus  :: patient with known hx of afib, was not on anticoagulation d/t hx of bleeding problem  :: No evidence of L atrial thrombus on TTE from 10/5/2024  :: Patient had afib with RVR per EKG on 10/7/2024. Asymptomatic. Will continue to monitor.   PLAN:  - warfarin 5mg / day w/ heparin bridge  - metop 100mg / day    #LLQ tenderness  - gen surgery consulted  - Follow CT AP      #Iron deficiency anemia  :: Hgb 7.7 hx OMARI per chart review however patient denies taking any iron supplementation  :: Patient without any hematuria, blood in stool, obvious signs of bleeding  :: Iron <10, % saturation 12, and unable to estimate TIBC   PLAN:  - c/w PO iron supplementation  - transfuse 1 unit pRBC     F: PRN   E: PRN K>4, Mg>2, P>3   N: Cardiac  A: 1 PIV   Oxygen: room air     DVT ppx: heparin gtt   GI ppx: not indicated   Surrogate Medical Decision-maker:  Carmen Jones, Daughter, [phone]  Code Status; FULL CODE      Jerome Kirk MD

## 2024-10-12 NOTE — CONSULTS
[facility]  ACUTE CARE SURGERY - HISTORY AND PHYSICAL / CONSULT    Patient Name: Hilda Jones  MRN: [id number]  Admit Date: 1003  : 1955  AGE: 69 y.o.   GENDER: female  ==============================================================================  TODAY'S ASSESSMENT AND PLAN OF CARE:  Hilda Jones is a 69 y.o. female with past medical history of hypertension, A-fib, COPD, OMARI, diabetes (A1c 5.2023), aortic valve insufficiency s/p AVR x 2, and past surgical history significant for exploratory laparotomy x2 (Oct 2023, 2022) for mesenteric ischemia (no bowel resected, embolectomy only) who presented to the emergency department on 10/3/24 with chief complaint of shortness of breath. Surgery was consulted for a ventral incisional hernia with concern for incarceration given increased pain this morning. Patient's exam is benign at this time, and she has no obstructive symptoms - no nausea, vomiting and she is still passing gas and having bowel movements.     Recommendations:  - Please obtain CTAP w/ IV contrast to evaluate status of hernias  - No indication at this time for acute surgical intervention. Patient can likely follow up outpatient for hernia evaluation.   - Please trend lactate  - ACS will continue to follow      Plans discussed with attending, Dr. Mattie Mayberry MD  PGY-1  P. [phone]    ==============================================================================  CHIEF COMPLAINT/REASON FOR CONSULT:  Hilda Jones is a 69 y.o. female with past medical history of hypertension, A-fib, COPD, OMARI, diabetes (A1c 5.2023), aortic valve insufficiency s/p AVR x 2, and past surgical history significant for exploratory laparotomy x2 (Oct 2023, 2022) for mesenteric ischemia (no bowel resected, embolectomy only) who presented to the emergency department on 10/3/24 with chief complaint of shortness of breath.     Surgery was consulted due to  "increased abdominal pain and concern for incarcerated hernia. Patient endorses chronic abdominal pain which worsened this AM with no nausea and one mild emesis. She is still passing gas and having bowel movements and has no other symptoms. The pain is a 7.10 and feels like a \"burning along the front\". Denies worsening shortness of breath, vision changes, headaches, chest pain, abdominal pain, dysuria, diarrhea, constipation, numbness and tingling in the extremities.       PAST MEDICAL HISTORY:   PMH: As below  Past Medical History:   Diagnosis Date    Atrial fibrillation (Multi)     Awareness under anesthesia     CHF (congestive heart failure)     COPD (chronic obstructive pulmonary disease) (Multi)     Diabetes (Multi)     GERD (gastroesophageal reflux disease)     HTN (hypertension)     Mitral insufficiency     PONV (postoperative nausea and vomiting)     S/P AVR        PSH:   Past Surgical History:   Procedure Laterality Date    AORTIC ROOT REPLACEMENT      AORTIC VALVE REPLACEMENT      CT ABDOMEN PELVIS ANGIOGRAM W AND/OR WO IV CONTRAST  10/06/2023    CT ABDOMEN PELVIS ANGIOGRAM W AND/OR WO IV CONTRAST 10/6/2023 [facility]    CT ABDOMEN PELVIS ANGIOGRAM W AND/OR WO IV CONTRAST  12/18/2023    CT ABDOMEN PELVIS ANGIOGRAM W AND/OR WO IV CONTRAST [place] [facility] [place]    EMBOLECTOMY N/A 08/17/2022    SMA embolectomy via laparotomy    EMBOLECTOMY N/A 10/06/2023    Open SMA embolectomy     FH:   Family History   Problem Relation Name Age of Onset    Breast cancer Maternal Grandmother  50 - 59     SOCIAL HISTORY:    Smoking:  Current smoker  Social History     Tobacco Use   Smoking Status Every Day    Current packs/day: 1.00    Average packs/day: 1 pack/day for 15.0 years (15.0 ttl pk-yrs)    Types: Cigarettes   Smokeless Tobacco Current       Alcohol:  Not recently   Social History     Substance and Sexual Activity   Alcohol Use Never       Drug use: None    MEDICATIONS:   Prior to Admission medications    Medication Sig " Start Date End Date Taking? Authorizing Provider   atorvastatin (Lipitor) 40 mg tablet Take 1 tablet (40 mg) by mouth once daily at bedtime. 10/19/23 10/13/24 Yes Bahman Dubose MD   cholecalciferol (Vitamin D-3) 50 MCG (2000 UT) tablet Take 1 tablet (50 mcg) by mouth once daily.   Yes Historical Provider, MD   donepezil (Aricept) 5 mg tablet TAKE 1 TABLET BY MOUTH AT BEDTIME 6/12/23 10/4/24 Yes ANTONIO Yan   empagliflozin (Jardiance) 10 mg TAKE 1 TABLET BY MOUTH ONCE DAILY 6/12/23 10/4/24 Yes ANTONIO Yan   famotidine (Pepcid) 20 mg tablet Take 1 tablet (20 mg) by mouth 2 times a day.   Yes Historical Provider, MD   FLUoxetine (PROzac) 40 mg capsule Take 1 capsule (40 mg) by mouth once daily in the morning.   Yes Historical Provider, MD   fluticasone furoate-vilanteroL (Breo Ellipta) 100-25 mcg/dose inhaler INHALE 1 PUFF BY MOUTH ONCE DAILY 6/12/23 10/4/24 Yes ANTONIO Yan   gabapentin (Neurontin) 100 mg capsule TAKE 1 CAPSULE BY MOUTH AT BEDTIME  Patient taking differently: Take 1 capsule (100 mg) by mouth as needed at bedtime. 6/12/23 10/4/24 Yes ANTONIO Yan   metoprolol tartrate (Lopressor) 25 mg tablet Take 1 tablet (25 mg) by mouth 2 times a day.   Yes Historical Provider, MD   multivitamin with minerals tablet Take 1 tablet by mouth once daily.   Yes Historical Provider, MD   potassium chloride CR 20 mEq ER tablet Take 1 tablet (20 mEq) by mouth every other day.   Yes Historical Provider, MD   psyllium (Metamucil Fiber Singles) 3.4 gram packet Take 1 packet by mouth 2 times a day as needed.   Yes Historical Provider, MD   valACYclovir (Valtrex) 1 gram tablet Take 1 tablet (1,000 mg) by mouth 3 times a day. For 7 days. Start Date 10/3/24, Stop Date 10/10/24   Yes Historical Provider, MD   albuterol 90 mcg/actuation inhaler INHALE 1 PUFF BY MOUTH EVERY 4 HOURS AS NEEDED 6/12/23 6/11/24  Vandana Villanueva, APRN-CNP   aspirin 81 mg EC tablet Take 1 tablet (81 mg) by  mouth once daily. Do not start before October 20, 2023. 10/20/23 10/19/24  Bahman Dubose MD   insulin glargine (Lantus U-100 Insulin) 100 unit/mL injection Inject 10 Units under the skin once daily.    Historical Provider, MD   loperamide (Imodium) 2 mg capsule Take 1 capsule (2 mg) by mouth 4 times a day as needed for diarrhea. 10/19/23   Bahman Dubose MD   melatonin 5 mg tablet,chewable Chew 1 tablet as needed at bedtime.    Historical Provider, MD   mirtazapine (Remeron) 15 mg tablet Take 1 tablet (15 mg) by mouth as needed at bedtime.    Historical Provider, MD   nicotine (Nicoderm CQ) 14 mg/24 hr patch APPLY 1 PATCH TO SKIN EVERY 24 HOURS  Patient not taking: Reported on 10/4/2024 6/12/23 6/11/24  Vandana Villanueva, APRN-CNP   sennosides-docusate sodium (Jackie-Colace) 8.6-50 mg tablet Take 1 tablet by mouth once daily as needed for constipation.    Historical Provider, MD   spironolactone (Aldactone) 25 mg tablet Take 1 tablet (25 mg) by mouth once daily.    Historical Provider, MD   torsemide (Demadex) 20 mg tablet Take 2 tablets (40 mg) by mouth once daily.    Historical Provider, MD   rivaroxaban (Xarelto) 20 mg tablet Take 1 tablet (20 mg) by mouth once daily in the evening. Take with meals. Take with food.  10/11/24  Historical Provider, MD     ALLERGIES: As below  Allergies   Allergen Reactions    Flexeril [Cyclobenzaprine] Unknown       REVIEW OF SYSTEMS:  Denies nausea, vomiting, shortness of breath, vision changes, headaches, chest pain, abdominal pain, dysuria, diarrhea, constipation, numbness and tingling in the extremities.     PHYSICAL EXAM:  Physical Exam  Constitutional:       Appearance: She is normal weight.   HENT:      Head: Normocephalic.      Mouth/Throat:      Mouth: Mucous membranes are moist.   Cardiovascular:      Rate and Rhythm: Normal rate.   Pulmonary:      Effort: Pulmonary effort is normal.      Comments: On 4L NC  Abdominal:      Comments: Soft, mildly tender, nondistended. 2  midline ventral hernias, easily reducible. Midline surgical scar. No masses.    Musculoskeletal:         General: Normal range of motion.   Skin:     General: Skin is warm and dry.   Neurological:      Mental Status: She is alert.       IMAGING SUMMARY:  (summary of findings, not a copy of dictation)  CT 10/3 significant for 2 small hernias in midline; superior hernia with no bowel and inferior hernia containing bowel.     LABS:  Results from last 7 days   Lab Units 10/12/24  1025 10/11/24  0743 10/10/24  0910 10/07/24  0558 10/06/24  1628   WBC AUTO x10*3/uL 10.5 8.0 7.8   < > 7.9   HEMOGLOBIN g/dL 7.9* 7.0* 7.3*   < > 7.5*   HEMATOCRIT % 27.9* 24.8* 25.4*   < > 27.2*   PLATELETS AUTO x10*3/uL 328 361 338   < > 299   LYMPHO PCT MAN %  --   --   --   --  7.9   MONO PCT MAN %  --   --   --   --  2.6   EOSINO PCT MAN %  --   --   --   --  7.0    < > = values in this interval not displayed.     Results from last 7 days   Lab Units 10/12/24  1026 10/11/24  0743 10/08/24  2144   APTT seconds  --   --  32   INR  1.3* 1.6*  --      Results from last 7 days   Lab Units 10/11/24  0743 10/10/24  0910 10/09/24  0324   SODIUM mmol/L 139 141 136   POTASSIUM mmol/L 3.5 3.3* 4.1   CHLORIDE mmol/L 98 96* 96*   CO2 mmol/L 32 37* 30   BUN mg/dL 9 10 14   CREATININE mg/dL 0.92 0.92 1.12*   CALCIUM mg/dL 7.8* 8.4* 8.7   GLUCOSE mg/dL 88 85 113*                 I have reviewed all laboratory and imaging results ordered/pertinent for this encounter.

## 2024-10-12 NOTE — CARE PLAN
The patient's goals for the shift include      The clinical goals for the shift include safety management

## 2024-10-13 LAB
ABO GROUP (TYPE) IN BLOOD: NORMAL
ALBUMIN SERPL BCP-MCNC: 2.4 G/DL (ref 3.4–5)
ALBUMIN SERPL BCP-MCNC: 2.6 G/DL (ref 3.4–5)
ALBUMIN SERPL BCP-MCNC: 3.5 G/DL (ref 3.4–5)
ANION GAP BLDA CALCULATED.4IONS-SCNC: 13 MMO/L (ref 10–25)
ANION GAP BLDV CALCULATED.4IONS-SCNC: 12 MMOL/L (ref 10–25)
ANION GAP BLDV CALCULATED.4IONS-SCNC: 14 MMOL/L (ref 10–25)
ANION GAP BLDV CALCULATED.4IONS-SCNC: 7 MMOL/L (ref 10–25)
ANION GAP SERPL CALC-SCNC: 14 MMOL/L (ref 10–20)
ANION GAP SERPL CALC-SCNC: 15 MMOL/L (ref 10–20)
ANION GAP SERPL CALC-SCNC: 18 MMOL/L (ref 10–20)
ANTIBODY SCREEN: NORMAL
APPEARANCE UR: ABNORMAL
APTT PPP: 31 SECONDS (ref 27–38)
BASE EXCESS BLDA CALC-SCNC: 0.5 MMOL/L (ref -2–3)
BASE EXCESS BLDV CALC-SCNC: -5 MMOL/L (ref -2–3)
BASE EXCESS BLDV CALC-SCNC: 0.1 MMOL/L (ref -2–3)
BASE EXCESS BLDV CALC-SCNC: 4.5 MMOL/L (ref -2–3)
BASOPHILS # BLD AUTO: 0.03 X10*3/UL (ref 0–0.1)
BASOPHILS # BLD AUTO: 0.07 X10*3/UL (ref 0–0.1)
BASOPHILS NFR BLD AUTO: 0.2 %
BASOPHILS NFR BLD AUTO: 0.5 %
BILIRUB UR STRIP.AUTO-MCNC: NEGATIVE MG/DL
BLOOD EXPIRATION DATE: NORMAL
BODY TEMPERATURE: 37 DEGREES CELSIUS
BUN SERPL-MCNC: 12 MG/DL (ref 6–23)
BUN SERPL-MCNC: 15 MG/DL (ref 6–23)
BUN SERPL-MCNC: 18 MG/DL (ref 6–23)
CA-I BLDA-SCNC: 1.11 MMOL/L (ref 1.1–1.33)
CA-I BLDV-SCNC: 0.98 MMOL/L (ref 1.1–1.33)
CA-I BLDV-SCNC: 1.05 MMOL/L (ref 1.1–1.33)
CA-I BLDV-SCNC: 1.13 MMOL/L (ref 1.1–1.33)
CALCIUM SERPL-MCNC: 7.8 MG/DL (ref 8.6–10.6)
CALCIUM SERPL-MCNC: 8.3 MG/DL (ref 8.6–10.6)
CALCIUM SERPL-MCNC: 8.7 MG/DL (ref 8.6–10.6)
CHLORIDE BLDA-SCNC: 98 MMOL/L (ref 98–107)
CHLORIDE BLDV-SCNC: 100 MMOL/L (ref 98–107)
CHLORIDE BLDV-SCNC: 97 MMOL/L (ref 98–107)
CHLORIDE BLDV-SCNC: 98 MMOL/L (ref 98–107)
CHLORIDE SERPL-SCNC: 96 MMOL/L (ref 98–107)
CHLORIDE SERPL-SCNC: 96 MMOL/L (ref 98–107)
CHLORIDE SERPL-SCNC: 98 MMOL/L (ref 98–107)
CO2 SERPL-SCNC: 22 MMOL/L (ref 21–32)
CO2 SERPL-SCNC: 28 MMOL/L (ref 21–32)
CO2 SERPL-SCNC: 28 MMOL/L (ref 21–32)
COLOR UR: YELLOW
CREAT SERPL-MCNC: 0.94 MG/DL (ref 0.5–1.05)
CREAT SERPL-MCNC: 1.03 MG/DL (ref 0.5–1.05)
CREAT SERPL-MCNC: 1.26 MG/DL (ref 0.5–1.05)
DISPENSE STATUS: NORMAL
EGFRCR SERPLBLD CKD-EPI 2021: 46 ML/MIN/1.73M*2
EGFRCR SERPLBLD CKD-EPI 2021: 59 ML/MIN/1.73M*2
EGFRCR SERPLBLD CKD-EPI 2021: 65 ML/MIN/1.73M*2
EOSINOPHIL # BLD AUTO: 0 X10*3/UL (ref 0–0.7)
EOSINOPHIL # BLD AUTO: 0.01 X10*3/UL (ref 0–0.7)
EOSINOPHIL NFR BLD AUTO: 0 %
EOSINOPHIL NFR BLD AUTO: 0.1 %
ERYTHROCYTE [DISTWIDTH] IN BLOOD BY AUTOMATED COUNT: 29 % (ref 11.5–14.5)
ERYTHROCYTE [DISTWIDTH] IN BLOOD BY AUTOMATED COUNT: 29.8 % (ref 11.5–14.5)
ERYTHROCYTE [DISTWIDTH] IN BLOOD BY AUTOMATED COUNT: 29.9 % (ref 11.5–14.5)
ERYTHROCYTE [DISTWIDTH] IN BLOOD BY AUTOMATED COUNT: ABNORMAL %
GLUCOSE BLDA-MCNC: 142 MG/DL (ref 74–99)
GLUCOSE BLDV-MCNC: 112 MG/DL (ref 74–99)
GLUCOSE BLDV-MCNC: 128 MG/DL (ref 74–99)
GLUCOSE BLDV-MCNC: 130 MG/DL (ref 74–99)
GLUCOSE SERPL-MCNC: 113 MG/DL (ref 74–99)
GLUCOSE SERPL-MCNC: 124 MG/DL (ref 74–99)
GLUCOSE SERPL-MCNC: 152 MG/DL (ref 74–99)
GLUCOSE UR STRIP.AUTO-MCNC: ABNORMAL MG/DL
HCO3 BLDA-SCNC: 24.3 MMOL/L (ref 22–26)
HCO3 BLDV-SCNC: 22.3 MMOL/L (ref 22–26)
HCO3 BLDV-SCNC: 25.9 MMOL/L (ref 22–26)
HCO3 BLDV-SCNC: 29.7 MMOL/L (ref 22–26)
HCT VFR BLD AUTO: 19.4 % (ref 36–46)
HCT VFR BLD AUTO: 20.4 % (ref 36–46)
HCT VFR BLD AUTO: 26.3 % (ref 36–46)
HCT VFR BLD AUTO: 31 % (ref 36–46)
HCT VFR BLD EST: 19 % (ref 36–46)
HCT VFR BLD EST: 20 % (ref 36–46)
HCT VFR BLD EST: 25 % (ref 36–46)
HCT VFR BLD EST: 27 % (ref 36–46)
HGB BLD-MCNC: 5.8 G/DL (ref 12–16)
HGB BLD-MCNC: 5.9 G/DL (ref 12–16)
HGB BLD-MCNC: 8.4 G/DL (ref 12–16)
HGB BLD-MCNC: 8.4 G/DL (ref 12–16)
HGB BLDA-MCNC: 9 G/DL (ref 12–16)
HGB BLDV-MCNC: 6.2 G/DL (ref 12–16)
HGB BLDV-MCNC: 6.6 G/DL (ref 12–16)
HGB BLDV-MCNC: 8.3 G/DL (ref 12–16)
HOLD SPECIMEN: NORMAL
HYPOCHROMIA BLD QL SMEAR: NORMAL
IMM GRANULOCYTES # BLD AUTO: 0.09 X10*3/UL (ref 0–0.7)
IMM GRANULOCYTES # BLD AUTO: 0.11 X10*3/UL (ref 0–0.7)
IMM GRANULOCYTES NFR BLD AUTO: 0.6 % (ref 0–0.9)
IMM GRANULOCYTES NFR BLD AUTO: 0.8 % (ref 0–0.9)
INHALED O2 CONCENTRATION: 28 %
INHALED O2 CONCENTRATION: 28 %
INHALED O2 CONCENTRATION: 36 %
INHALED O2 CONCENTRATION: 36 %
INR PPP: 1.5 (ref 0.9–1.1)
INR PPP: 1.6 (ref 0.9–1.1)
KETONES UR STRIP.AUTO-MCNC: ABNORMAL MG/DL
LACTATE BLDA-SCNC: 3 MMOL/L (ref 0.4–2)
LACTATE BLDV-SCNC: 2 MMOL/L (ref 0.4–2)
LACTATE BLDV-SCNC: 4.3 MMOL/L (ref 0.4–2)
LACTATE BLDV-SCNC: 5.8 MMOL/L (ref 0.4–2)
LACTATE BLDV-SCNC: 5.8 MMOL/L (ref 0.4–2)
LACTATE SERPL-SCNC: 4.3 MMOL/L (ref 0.4–2)
LACTATE SERPL-SCNC: 5.1 MMOL/L (ref 0.4–2)
LEUKOCYTE ESTERASE UR QL STRIP.AUTO: ABNORMAL
LYMPHOCYTES # BLD AUTO: 0.87 X10*3/UL (ref 1.2–4.8)
LYMPHOCYTES # BLD AUTO: 0.9 X10*3/UL (ref 1.2–4.8)
LYMPHOCYTES NFR BLD AUTO: 6.5 %
LYMPHOCYTES NFR BLD AUTO: 6.5 %
MAGNESIUM SERPL-MCNC: 1.71 MG/DL (ref 1.6–2.4)
MCH RBC QN AUTO: 18 PG (ref 26–34)
MCH RBC QN AUTO: 18.1 PG (ref 26–34)
MCH RBC QN AUTO: 20.4 PG (ref 26–34)
MCH RBC QN AUTO: 22.6 PG (ref 26–34)
MCHC RBC AUTO-ENTMCNC: 27.1 G/DL (ref 32–36)
MCHC RBC AUTO-ENTMCNC: 28.4 G/DL (ref 32–36)
MCHC RBC AUTO-ENTMCNC: 30.4 G/DL (ref 32–36)
MCHC RBC AUTO-ENTMCNC: 31.9 G/DL (ref 32–36)
MCV RBC AUTO: 63 FL (ref 80–100)
MCV RBC AUTO: 67 FL (ref 80–100)
MCV RBC AUTO: 67 FL (ref 80–100)
MCV RBC AUTO: 71 FL (ref 80–100)
MONOCYTES # BLD AUTO: 1.45 X10*3/UL (ref 0.1–1)
MONOCYTES # BLD AUTO: 2.02 X10*3/UL (ref 0.1–1)
MONOCYTES NFR BLD AUTO: 10.9 %
MONOCYTES NFR BLD AUTO: 14.5 %
NEUTROPHILS # BLD AUTO: 10.84 X10*3/UL (ref 1.2–7.7)
NEUTROPHILS # BLD AUTO: 10.88 X10*3/UL (ref 1.2–7.7)
NEUTROPHILS NFR BLD AUTO: 78.2 %
NEUTROPHILS NFR BLD AUTO: 81.2 %
NITRITE UR QL STRIP.AUTO: NEGATIVE
NRBC BLD-RTO: 0.2 /100 WBCS (ref 0–0)
NRBC BLD-RTO: 0.4 /100 WBCS (ref 0–0)
NRBC BLD-RTO: 0.5 /100 WBCS (ref 0–0)
NRBC BLD-RTO: 0.5 /100 WBCS (ref 0–0)
OVALOCYTES BLD QL SMEAR: NORMAL
OXYHGB MFR BLDA: 96.2 % (ref 94–98)
OXYHGB MFR BLDV: 22.8 % (ref 45–75)
OXYHGB MFR BLDV: 25.6 % (ref 45–75)
OXYHGB MFR BLDV: 34.3 % (ref 45–75)
PCO2 BLDA: 35 MM HG (ref 38–42)
PCO2 BLDV: 48 MM HG (ref 41–51)
PCO2 BLDV: 48 MM HG (ref 41–51)
PCO2 BLDV: 52 MM HG (ref 41–51)
PH BLDA: 7.45 PH (ref 7.38–7.42)
PH BLDV: 7.24 PH (ref 7.33–7.43)
PH BLDV: 7.34 PH (ref 7.33–7.43)
PH BLDV: 7.4 PH (ref 7.33–7.43)
PH UR STRIP.AUTO: 5.5 [PH]
PHOSPHATE SERPL-MCNC: 3.6 MG/DL (ref 2.5–4.9)
PHOSPHATE SERPL-MCNC: 4.9 MG/DL (ref 2.5–4.9)
PHOSPHATE SERPL-MCNC: 5.5 MG/DL (ref 2.5–4.9)
PLATELET # BLD AUTO: 234 X10*3/UL (ref 150–450)
PLATELET # BLD AUTO: 239 X10*3/UL (ref 150–450)
PLATELET # BLD AUTO: 311 X10*3/UL (ref 150–450)
PLATELET # BLD AUTO: 353 X10*3/UL (ref 150–450)
PO2 BLDA: 106 MM HG (ref 85–95)
PO2 BLDV: 20 MM HG (ref 35–45)
PO2 BLDV: 21 MM HG (ref 35–45)
PO2 BLDV: 27 MM HG (ref 35–45)
POLYCHROMASIA BLD QL SMEAR: NORMAL
POTASSIUM BLDA-SCNC: 4.7 MMOL/L (ref 3.5–5.3)
POTASSIUM BLDV-SCNC: 3.8 MMOL/L (ref 3.5–5.3)
POTASSIUM BLDV-SCNC: 4 MMOL/L (ref 3.5–5.3)
POTASSIUM BLDV-SCNC: 4.7 MMOL/L (ref 3.5–5.3)
POTASSIUM SERPL-SCNC: 3.7 MMOL/L (ref 3.5–5.3)
POTASSIUM SERPL-SCNC: 4.1 MMOL/L (ref 3.5–5.3)
POTASSIUM SERPL-SCNC: 4.8 MMOL/L (ref 3.5–5.3)
PRODUCT BLOOD TYPE: 7300
PRODUCT BLOOD TYPE: 7300
PRODUCT BLOOD TYPE: 9500
PRODUCT CODE: NORMAL
PROT UR STRIP.AUTO-MCNC: ABNORMAL MG/DL
PROTHROMBIN TIME: 17.4 SECONDS (ref 9.8–12.8)
PROTHROMBIN TIME: 18.4 SECONDS (ref 9.8–12.8)
RBC # BLD AUTO: 2.89 X10*6/UL (ref 4–5.2)
RBC # BLD AUTO: 3.22 X10*6/UL (ref 4–5.2)
RBC # BLD AUTO: 3.71 X10*6/UL (ref 4–5.2)
RBC # BLD AUTO: 4.65 X10*6/UL (ref 4–5.2)
RBC # UR STRIP.AUTO: ABNORMAL /UL
RBC #/AREA URNS AUTO: NORMAL /HPF
RBC MORPH BLD: NORMAL
RBC MORPH BLD: NORMAL
RH FACTOR (ANTIGEN D): NORMAL
SAO2 % BLDA: 99 % (ref 94–100)
SAO2 % BLDV: 23 % (ref 45–75)
SAO2 % BLDV: 26 % (ref 45–75)
SAO2 % BLDV: 35 % (ref 45–75)
SCHISTOCYTES BLD QL SMEAR: NORMAL
SODIUM BLDA-SCNC: 131 MMOL/L (ref 136–145)
SODIUM BLDV-SCNC: 131 MMOL/L (ref 136–145)
SODIUM BLDV-SCNC: 131 MMOL/L (ref 136–145)
SODIUM BLDV-SCNC: 132 MMOL/L (ref 136–145)
SODIUM SERPL-SCNC: 133 MMOL/L (ref 136–145)
SODIUM SERPL-SCNC: 134 MMOL/L (ref 136–145)
SODIUM SERPL-SCNC: 135 MMOL/L (ref 136–145)
SP GR UR STRIP.AUTO: 1.02
TARGETS BLD QL SMEAR: NORMAL
TARGETS BLD QL SMEAR: NORMAL
UFH PPP CHRO-ACNC: 0.4 IU/ML
UFH PPP CHRO-ACNC: 0.6 IU/ML
UNIT ABO: NORMAL
UNIT NUMBER: NORMAL
UNIT RH: NORMAL
UNIT VOLUME: 285
UNIT VOLUME: 350
UNIT VOLUME: 350
UROBILINOGEN UR STRIP.AUTO-MCNC: ABNORMAL MG/DL
WBC # BLD AUTO: 13.4 X10*3/UL (ref 4.4–11.3)
WBC # BLD AUTO: 13.9 X10*3/UL (ref 4.4–11.3)
WBC # BLD AUTO: 15.1 X10*3/UL (ref 4.4–11.3)
WBC # BLD AUTO: 18 X10*3/UL (ref 4.4–11.3)
WBC #/AREA URNS AUTO: NORMAL /HPF
XM INTEP: NORMAL

## 2024-10-13 PROCEDURE — 83605 ASSAY OF LACTIC ACID: CPT

## 2024-10-13 PROCEDURE — 93010 ELECTROCARDIOGRAM REPORT: CPT | Performed by: INTERNAL MEDICINE

## 2024-10-13 PROCEDURE — 36415 COLL VENOUS BLD VENIPUNCTURE: CPT

## 2024-10-13 PROCEDURE — 85027 COMPLETE CBC AUTOMATED: CPT

## 2024-10-13 PROCEDURE — 2550000001 HC RX 255 CONTRASTS: Performed by: INTERNAL MEDICINE

## 2024-10-13 PROCEDURE — 36430 TRANSFUSION BLD/BLD COMPNT: CPT

## 2024-10-13 PROCEDURE — 80069 RENAL FUNCTION PANEL: CPT

## 2024-10-13 PROCEDURE — 2500000004 HC RX 250 GENERAL PHARMACY W/ HCPCS (ALT 636 FOR OP/ED)

## 2024-10-13 PROCEDURE — 82435 ASSAY OF BLOOD CHLORIDE: CPT

## 2024-10-13 PROCEDURE — 2020000001 HC ICU ROOM DAILY

## 2024-10-13 PROCEDURE — 99233 SBSQ HOSP IP/OBS HIGH 50: CPT | Performed by: INTERNAL MEDICINE

## 2024-10-13 PROCEDURE — 99232 SBSQ HOSP IP/OBS MODERATE 35: CPT | Performed by: STUDENT IN AN ORGANIZED HEALTH CARE EDUCATION/TRAINING PROGRAM

## 2024-10-13 PROCEDURE — 2500000001 HC RX 250 WO HCPCS SELF ADMINISTERED DRUGS (ALT 637 FOR MEDICARE OP)

## 2024-10-13 PROCEDURE — 86901 BLOOD TYPING SEROLOGIC RH(D): CPT

## 2024-10-13 PROCEDURE — 85520 HEPARIN ASSAY: CPT

## 2024-10-13 PROCEDURE — 85025 COMPLETE CBC W/AUTO DIFF WBC: CPT

## 2024-10-13 PROCEDURE — 85610 PROTHROMBIN TIME: CPT

## 2024-10-13 PROCEDURE — 83735 ASSAY OF MAGNESIUM: CPT

## 2024-10-13 PROCEDURE — 81001 URINALYSIS AUTO W/SCOPE: CPT

## 2024-10-13 PROCEDURE — 84100 ASSAY OF PHOSPHORUS: CPT

## 2024-10-13 PROCEDURE — 87086 URINE CULTURE/COLONY COUNT: CPT

## 2024-10-13 PROCEDURE — 37799 UNLISTED PX VASCULAR SURGERY: CPT

## 2024-10-13 PROCEDURE — P9016 RBC LEUKOCYTES REDUCED: HCPCS

## 2024-10-13 PROCEDURE — 86923 COMPATIBILITY TEST ELECTRIC: CPT

## 2024-10-13 PROCEDURE — 74174 CTA ABD&PLVS W/CONTRAST: CPT

## 2024-10-13 RX ORDER — HYDROMORPHONE HYDROCHLORIDE 0.2 MG/ML
0.2 INJECTION INTRAMUSCULAR; INTRAVENOUS; SUBCUTANEOUS ONCE
Status: CANCELLED | OUTPATIENT
Start: 2024-10-13 | End: 2024-10-13

## 2024-10-13 RX ORDER — ACETAMINOPHEN 500 MG
5 TABLET ORAL ONCE
Status: COMPLETED | OUTPATIENT
Start: 2024-10-13 | End: 2024-10-13

## 2024-10-13 RX ORDER — PROTAMINE SULFATE 10 MG/ML
50 INJECTION, SOLUTION INTRAVENOUS ONCE
Status: COMPLETED | OUTPATIENT
Start: 2024-10-13 | End: 2024-10-13

## 2024-10-13 RX ORDER — SODIUM CHLORIDE, SODIUM LACTATE, POTASSIUM CHLORIDE, CALCIUM CHLORIDE 600; 310; 30; 20 MG/100ML; MG/100ML; MG/100ML; MG/100ML
750 INJECTION, SOLUTION INTRAVENOUS ONCE
Status: COMPLETED | OUTPATIENT
Start: 2024-10-13 | End: 2024-10-13

## 2024-10-13 RX ORDER — CALCIUM GLUCONATE 20 MG/ML
0.5 INJECTION, SOLUTION INTRAVENOUS ONCE
Status: COMPLETED | OUTPATIENT
Start: 2024-10-13 | End: 2024-10-13

## 2024-10-13 RX ORDER — NOREPINEPHRINE BITARTRATE/D5W 8 MG/250ML
.01-.05 PLASTIC BAG, INJECTION (ML) INTRAVENOUS CONTINUOUS
Status: DISCONTINUED | OUTPATIENT
Start: 2024-10-13 | End: 2024-10-14

## 2024-10-13 RX ORDER — NOREPINEPHRINE BITARTRATE/D5W 8 MG/250ML
.01-1 PLASTIC BAG, INJECTION (ML) INTRAVENOUS CONTINUOUS
Status: CANCELLED | OUTPATIENT
Start: 2024-10-13

## 2024-10-13 RX ORDER — SODIUM CHLORIDE, SODIUM LACTATE, POTASSIUM CHLORIDE, CALCIUM CHLORIDE 600; 310; 30; 20 MG/100ML; MG/100ML; MG/100ML; MG/100ML
250 INJECTION, SOLUTION INTRAVENOUS ONCE
Status: COMPLETED | OUTPATIENT
Start: 2024-10-13 | End: 2024-10-13

## 2024-10-13 ASSESSMENT — COGNITIVE AND FUNCTIONAL STATUS - GENERAL
MOBILITY SCORE: 24
DAILY ACTIVITIY SCORE: 24

## 2024-10-13 ASSESSMENT — PAIN - FUNCTIONAL ASSESSMENT
PAIN_FUNCTIONAL_ASSESSMENT: 0-10

## 2024-10-13 ASSESSMENT — PAIN SCALES - GENERAL
PAINLEVEL_OUTOF10: 0 - NO PAIN
PAINLEVEL_OUTOF10: 8
PAINLEVEL_OUTOF10: 5 - MODERATE PAIN

## 2024-10-13 ASSESSMENT — PAIN DESCRIPTION - DESCRIPTORS: DESCRIPTORS: PRESSURE

## 2024-10-13 NOTE — CARE PLAN
The patient's goals for the shift include  no pain    The clinical goals for the shift include Patient remain free from falls throughout shift    Over the shift, the patient did not make progress toward the following goals. Barriers to progression include impulsiveness. Recommendations to address these barriers include redirect patient to safety and fall prevention protocols.    Problem: Chronic Conditions and Co-morbidities  Goal: Patient's chronic conditions and co-morbidity symptoms are monitored and maintained or improved  10/13/2024 0332 by Kelli Pak RN  Outcome: Progressing  10/13/2024 0332 by Kelli Pak RN  Outcome: Progressing     Problem: Pain - Adult  Goal: Verbalizes/displays adequate comfort level or baseline comfort level  Outcome: Progressing     Problem: Safety - Adult  Goal: Free from fall injury  Outcome: Progressing     Problem: Fall/Injury  Goal: Not fall by end of shift  10/13/2024 0332 by Kelli Pak RN  Outcome: Progressing  10/13/2024 0332 by Kelli Pak RN  Outcome: Progressing  Goal: Be free from injury by end of the shift  10/13/2024 0332 by Kelli Pak RN  Outcome: Progressing  10/13/2024 0332 by Kelli Pak RN  Outcome: Progressing  Goal: Verbalize understanding of personal risk factors for fall in the hospital  10/13/2024 0332 by Kelli Pak RN  Outcome: Progressing  10/13/2024 0332 by Kelli Pak RN  Outcome: Progressing  Goal: Verbalize understanding of risk factor reduction measures to prevent injury from fall in the home  10/13/2024 0332 by Kelli Pak RN  Outcome: Progressing  10/13/2024 0332 by Kelli Pak RN  Outcome: Progressing  Goal: Use assistive devices by end of the shift  10/13/2024 0332 by Kelli Pak RN  Outcome: Progressing  10/13/2024 0332 by Kelli Pak RN  Outcome: Progressing  Goal: Pace activities to prevent fatigue by end of the shift  10/13/2024 0332 by Kelli Pak  RN  Outcome: Progressing  10/13/2024 0332 by Kelli Pak RN  Outcome: Progressing

## 2024-10-13 NOTE — SIGNIFICANT EVENT
Rapid Response Nurse Note: [x] Rapid Response      Pager time: [phone]  Arrival time: 1410  Event end time:   Location: LT 7068  [] Phone triage     Rapid response initiated by:  [] Rapid response RN [] Family [] Nursing Supervisor [] Physician   [] RADAR auto page [] Sepsis auto-page [x] RN [] RT   [] NP/PA [] Other:     Primary reason for call:   [] BAT [] New CPAP/BiPAP [x] Bleeding [] Change in mental status   [] Chest pain [] Code blue [] FiO2 >/= 50% [] HR </= 40 bpm   [] HR >/= 130 bpm [] Hyperglycemia [] Hypoglycemia [] RADAR    [] RR </= 8 bpm [] RR >/= 30 bpm [] SBP </= 90 mmHg [] SpO2 < 90%   [] Seizure [] Sepsis [x] Staff concern:     Initial VS and/or RADAR VS: HR 90; BP 85/65  Providers present at bedside (if applicable): DACNGUYEN Brush; [facility] Team MD Dr. Jerome Kirk    Interventions:  [] None [x] ABG/VBG [x] Assist w/ICU transfer [] BAT paged    [] Bag mask [x] Blood [] Cardioversion [] Code Blue   [] Code blue for intubation [] Code status changed [] Chest x-ray [] EKG   [x] IV fluid/bolus [] KUB x-ray [x] Labs/cultures [] Medication   [] Nebulizer treatment [] NIPPV (CPAP/BiPAP) [x] Oxygen [] Oral airway   [x] Peripheral IV [] Palliative care consult [x] CT/MRI [] Sepsis protocol    [] Suctioned [] Other:     Name of ICU Provider contacted (if applicable): CICU Fellow/SICU Fellow    Outcome:  [] Coded and  [] Code blue for intubation [] Coded and transferred to ICU []  on division   [] Remained on division (no change) [] Remained on division + additional monitoring [] Remained in ED [] Transferred to ED   [x] Transferred to ICU [] Transferred to inpatient status [] Transferred for interventions (procedure) [] Transferred to ICU stepdown    [] Transferred to surgery [] Transferred to telemetry [] Sepsis protocol [] STEMI protocol   [] Stroke protocol [] Bedside nurse instructed to page rapid response for any concerns or acute change in condition/VS     Additional  Comments: Rapid Response initiated by Bedside RN for patient with concern for internal bleeding.  Patient in need of emergent CTA of abdomen/pelvis d/t acute drop in H/H.  Upon arrival to bedside, patient alert and oriented to self.  Robson Brush and Reagan at bedside discussing patient and plan of care.  PIVs assessed by Rapid Response Team RN; patient with #18 placed right arm.  Patient taken to CT scan and returned to LT 7068.  Patient with decrease in BP (74/42 manually) and IVF bolus ordered and initiated by floor RN staff.  PRBCs ordered and received from blood bank.  During Rapid Response patient received 500 mL LR bolus and 1st unit of PRBCs (2nd unit hung as patient transferred to CICU bed #9).  Levophed drip prepared, but never administered.  Labs/VBG drawn and sent.  CT Scan concerning for venous bleed.  IR unable to provide intervention at present time.  TSICU Fellow to bedside to evaluate patient.  CICU Fellow to bedside to evaluate patient.  Surgery Team aware of patient.  Patient deemed best supported in care for CICU.  RN report called and patient safely transferred to CICU Bed 9.

## 2024-10-13 NOTE — CONSULTS
Interventional Radiology Consult Note    Reason for Consult: multiple abdominal hematomas    68yo F with PMH HTN, Afib, COPD, OMARI, DM, and aortic valvular disease s/p AVRx2 admitted for shortness of breath for to be in heart failure exacerbation. The patient has a known SALTY thrombuis and has not been on anticoagulation prior to admission. While admitted the patient was on a heparin bridge to warfarin when today complained of LLQ pain as wella s a drop in hemoglobin from 8.4 to 5.8. Single phase contrast CT demonstrated multiple large abdominal hematomas in the retroperitoneum, pelvis, and rectal sheath, some of which had foci of contrast extravasation. IR was consulted for potential intervention.     CTA of the abdomen and pelvis was subsequently performed to determine if the bleeding was arterial or venous. Review of the CTA demonstrated significantly enlarging intra-abdominal hematomas without evidence of arterial extravasation; all extravasation looked venous.     Given the above imaging findings no intervention is available from an IR standpoint. Close monitoring and continued supportive measures as well as surgical consultation are recommended.     The above was discussed with Dr Argueta (IR attending) and Dr Jerome Kirk (cardiology resident).    Galindo Saldivar MD PGY5  Diagnostic and Interventional Radiology  Pager: [phone]

## 2024-10-13 NOTE — SIGNIFICANT EVENT
Hospital Course:    This is a 69-year-old female with a past medical history of hypertension, atrial fibrillation (not on anticoagulation), COPD, iron deficiency anemia (OMARI), diabetes (A1c 5.9 as of July 2023), aortic valve insufficiency status post aortic valve replacement (AVR) x 2, and superior mesenteric artery (SMA) occlusion. The patient presented to the emergency department on 10/3/24 with a chief complaint of shortness of breath and was admitted for acute hypoxemic respiratory failure. A transthoracic echocardiogram (TTE) performed on 10/5/24 revealed an ejection fraction (EF) of 67%, right ventricular (RV) enlargement with moderately reduced function, severe tricuspid regurgitation, severe mitral stenosis, mild to moderate mitral valve regurgitation, and an RV systolic pressure (RVSP) of 58.3 mmHg. Due to her frailty and high surgical risk, cardiothoracic (CT) surgery was consulted and determined that she was not a candidate for surgical intervention at this time. A plan was made to bridge her to Coumadin for anticoagulation, as her valvular fibrillation would not be effectively managed with other anticoagulants.    Significant Event Leading to CICU Transfer:    Today the patient began to experience a drop in hemoglobin from 8.4 to 5.8, raising concerns for potential bleeding. A CT of the abdomen and pelvis was obtained, revealing a retroperitoneal hematoma and a rectus sheath hematoma. Given that the bleeding was not arterial in nature, interventional radiology (IR) was unable to intervene, and surgery was not a viable option at this time. The patient’s condition deteriorated as the hematoma expanded significantly, leading to a drop in blood pressure to 60/50 mmHg,  (likely suppressed by metoprolol),. In response, the patient was transfused with one unit of blood and administered 1 liter of Lactated Ringer's solution. Due to the rapid decline in blood pressure and the severe expansion of the  hematoma, the patient was transferred to the Cardiac Intensive Care Unit (CICU) for further management and close monitoring.

## 2024-10-13 NOTE — CONSULTS
[facility] [place]  Consult Note    Hilda Jones  [id number]  1955 (69 y.o.)  Reason for Consult:  Torre placement    HPI: 69 y.o. female w/PMHx most notable for HTN, severe rheumatic mitral stenosis, valvular A-fib not on anticoagulation, SALTY thrombus in 2023, COPD, OMARI, DM, aortic valve insufficiency s/p AVR x 2, mesenteric ischemic s/p open SMA embolectomy in 2023 and aortic root repair who presented on 10/3 with shortness of breath, who initially presented with SOB. Urology consulted for difficulty torre placement.     Patient was bladder scanned for >600cc's and nursing attempted straight cath twice but met some resistance. Patient states she voided several hours ago, but feels like she needs to void and is unable to. Also endorses diffuse abdominal pain and flank pain.      Interval Hx: Patient had CT scan showing large retroperitoneal and pelvic hematomas with contrast extrav, as well as mass effect on bladder. Significant Hgb drop. IR consulted, Taken for STAT CTA showing interval enlargement of hematomas with active venous bleed, but no arterial bleed. No IR intervention given no arterial bleed. Patient transferred to CICU.    ROS:  As per HPI, 10 point comprehensive review of systems reviewed and otherwise negative.    Allergies   Allergen Reactions    Flexeril [Cyclobenzaprine] Unknown       Past Medical History:   Diagnosis Date    Atrial fibrillation (Multi)     Awareness under anesthesia     CHF (congestive heart failure)     COPD (chronic obstructive pulmonary disease) (Multi)     Diabetes (Multi)     GERD (gastroesophageal reflux disease)     HTN (hypertension)     Mitral insufficiency     PONV (postoperative nausea and vomiting)     S/P AVR      Past Surgical History:   Procedure Laterality Date    AORTIC ROOT REPLACEMENT      AORTIC VALVE REPLACEMENT      CT ABDOMEN PELVIS ANGIOGRAM W AND/OR WO IV CONTRAST  10/06/2023    CT ABDOMEN PELVIS ANGIOGRAM W AND/OR WO IV CONTRAST [place]/20[place] [facility] [place]  ABDOMEN PELVIS ANGIOGRAM W AND/OR WO IV CONTRAST  12/18/2023    CT ABDOMEN PELVIS ANGIOGRAM W AND/OR WO IV CONTRAST 12/18/2023 [facility] CT    EMBOLECTOMY N/A 08/17/2022    SMA embolectomy via laparotomy    EMBOLECTOMY N/A 10/06/2023    Open SMA embolectomy     Social History     Socioeconomic History    Marital status: Single     Spouse name: Not on file    Number of children: Not on file    Years of education: Not on file    Highest education level: Not on file   Occupational History    Not on file   Tobacco Use    Smoking status: Every Day     Current packs/day: 1.00     Average packs/day: 1 pack/day for 15.0 years (15.0 ttl pk-yrs)     Types: Cigarettes    Smokeless tobacco: Current   Vaping Use    Vaping status: Unknown    Passive vaping exposure: Yes   Substance and Sexual Activity    Alcohol use: Never    Drug use: Never    Sexual activity: Defer   Other Topics Concern    Not on file   Social History Narrative    Not on file     Social Determinants of Health     Financial Resource Strain: Low Risk  (10/4/2024)    Overall Financial Resource Strain (CARDIA)     Difficulty of Paying Living Expenses: Not very hard   Food Insecurity: No Food Insecurity (10/4/2024)    Hunger Vital Sign     Worried About Running Out of Food in the Last Year: Never true     Ran Out of Food in the Last Year: Never true   Transportation Needs: Unmet Transportation Needs (10/4/2024)    PRAPARE - Transportation     Lack of Transportation (Medical): No     Lack of Transportation (Non-Medical): Yes   Physical Activity: Not on File (8/24/2019)    Received from [facility]    Physical Activity     Physical Activity: 0   Stress: Not on File (8/24/2019)    Received from [facility]    Stress     Stress: 0   Social Connections: Not on File (9/18/2024)    Received from [facility]    Social Connections     Connectedness: 0   Intimate Partner Violence: Not At Risk (10/4/2024)    Humiliation, Afraid, Rape, and Kick questionnaire     Fear of Current or  Ex-Partner: No     Emotionally Abused: No     Physically Abused: No     Sexually Abused: No   Housing Stability: Low Risk  (10/4/2024)    Housing Stability Vital Sign     Unable to Pay for Housing in the Last Year: No     Number of Times Moved in the Last Year: 1     Homeless in the Last Year: No     Family History   Problem Relation Name Age of Onset    Breast cancer Maternal Grandmother  50 - 59     Current Facility-Administered Medications   Medication Dose Route Frequency Provider Last Rate Last Admin    acetaminophen (Tylenol) tablet 650 mg  650 mg oral q6h PRN Jerome Kirk MD   650 mg at 10/12/24 2351    [Held by provider] aspirin EC tablet 81 mg  81 mg oral Daily Jerome Kirk MD   81 mg at 10/13/24 0934    atorvastatin (Lipitor) tablet 40 mg  40 mg oral Nightly Jerome Kirk MD   40 mg at 10/12/24 2006    calcium gluconate 0.5 g in sodium chloride (iso) IV 25 mL  0.5 g intravenous Once China Nava MD        diphenhydrAMINE (BENADryl) capsule 25 mg  25 mg oral q6h PRN Jerome Kirk MD        [Held by provider] empagliflozin (Jardiance) tablet 10 mg  10 mg oral Daily Jerome Kirk MD   10 mg at 10/13/24 0939    eucerin cream   Topical PRN Jerome Kirk MD        ferrous sulfate (325 mg ferrous sulfate) tablet 325 mg  65 mg of iron oral Daily Jerome Kirk MD   325 mg at 10/13/24 0935    fluticasone furoate-vilanteroL (Breo Ellipta) 100-25 mcg/dose inhaler 1 puff  1 puff inhalation Daily Jerome Kirk MD   1 puff at 10/12/24 0816    [Held by provider] metoprolol succinate XL (Toprol-XL) 24 hr tablet 100 mg  100 mg oral Daily Jerome Kirk MD   100 mg at 10/13/24 0934    metoprolol tartrate (Lopressor) injection 5 mg  5 mg intravenous q5 min PRN Jerome Kirk MD        norepinephrine (Levophed) 8 mg in dextrose 5% 250 mL (0.032 mg/mL) infusion (premix)  0.01-0.05 mcg/kg/min intravenous Continuous China Nava MD        ondansetron (Zofran) injection 4 mg  4 mg intravenous Once Jerome Kirk MD         ondansetron ODT (Zofran-ODT) disintegrating tablet 4 mg  4 mg oral q8h PRN Jerome Kirk MD        Or    ondansetron (Zofran) injection 4 mg  4 mg intravenous q8h PRN Jerome Kirk MD        polyethylene glycol (Glycolax, Miralax) packet 17 g  17 g oral Daily Jerome Kirk MD   17 g at 10/13/24 0934    protamine injection 50 mg  50 mg intravenous Once China Nava MD        sennosides (Senokot) tablet 17.2 mg  2 tablet oral BID Jerome Kirk MD   17.2 mg at 10/13/24 0935    trimethobenzamide (Tigan) injection 200 mg  200 mg intramuscular Once Jerome Kirk MD        white petrolatum (Aquaphor) ointment   Topical q1h PRN Jerome Kirk MD             Labs & Test Results  10/12 0700 - 10/13 1859  In: 595.2 [P.O.:120; I.V.:115.2]  Out: 125 [Urine:125]    @[id number]@  @[address] [place] [address]@  [unfilled]  [unfilled]      Physical Exam     Gen -  Uncomfortable     Neuro - Alert, oriented, conversant     CV -  Regular rate     Pulm - Symmetric chest rise, non-labored breathing     Abd - Soft, mildly tender, distended,      Ext - Warm, well perfused     Skin - without jaunice       Psych - appropriate tone, affect      - Bilateral flank tenderness      Imaging  CT  IMPRESSION AP w/ IV contrast 10/13  IMPRESSION:  1. Interval development of multiple hematomas. There is a large left  retroperitoneal hematoma which extends to the left pelvis with active  contrast extravasation suggestive to suggest a bleed. Large pelvic  hematoma resulting in mass effect to the bladder displacing it to the  right hemipelvis. In addition, there is a left rectal sheath hematoma  with active contrast extravasation. Recommend emergent surgical  evaluation.  2. Cholelithiasis with mild gallbladder wall thickening similar to  prior without evidence of pericholecystic fluid.  3.  L1 compression fracture,  4. Enlarged heterogenous uterus with masslike expansion of the  endometrial cavity. Recommend correlation with pelvic ultrasound to  exclude underlying  tumor. Interval increase in diffuse body wall  anasarca.    CT angio abdomen/pelvis 10/13/24  IMPRESSION:  1. Interval enlargement of abdominopelvic hematomas, left  retroperitoneal and pelvic hematoma, with contrast extravasation only  on delayed phase suggestive of active venous bleed. No evidence of  contrast extravasation on arterial phase.  2. Large pelvic hematoma compressing in the distal left ureter  resulting in mild upstream hydroureteronephrosis. In addition, the  pelvic hematoma compresses and displaces the urinary bladder to the  right hemipelvis.  3. Stable ventral abdominal wall hernia containing a small bowel loop  with partial obstruction.  4. Enlarged heterogenous uterus and masslike expansion of the  endometrial cavity. Recommend further evaluation with nonemergent  pelvic ultrasound to evaluate for possible malignancy.  5. Additional findings as described above.                    Impression & Recommendations  69 y.o. female w/PMHx most notable for HTN, severe rheumatic mitral stenosis, valvular A-fib not on anticoagulation, SALTY thrombus in 2023, COPD, OMARI, DM, aortic valve insufficiency s/p AVR x 2, mesenteric ischemic s/p open SMA embolectomy in 2023 and aortic root repair who presented on 10/3 with shortness of breath, who initially presented with SOB. Urology consulted for difficulty torre placement.     Procedure note:    The urethra was prepped and draped in the usual sterile fashion.  Lubricating jelly was injected into the urethra. 16French torre catheter inserted with ease. Balloon inflated with 10cc's of sterile water. Return of yellow urine upon insertion of catheter into bladder. Patient tolerated the procedure well. Catheter connected to sterile tubing and collection bag and positioned to over side gravity drainage.     Of note, Patient had CT scan showing large retroperitoneal and pelvic hematomas with contrast extrav, as well as mass effect on bladder. Significant Hgb drop.            - TOV per primary. Patient was not in true urinary retention. Likely having bladder discomfort from mass effect on bladder.  - agree with CTA and IR consult. IR consulted, Taken for STAT CTA showing interval enlargement of hematomas with active venous bleed, but no arterial bleed. No IR intervention given no arterial bleed. Patient transferred to CICU.  - general surgery following   - agree with serial CBC and transfusion as necessary  - please page with questions     Discussed with chief resident Dr. Anjum Knott MD   Urology PGY-3  Adult Urology Pager: [phone]   Pediatric Urology: [phone]

## 2024-10-13 NOTE — CARE PLAN
The patient's goals for the shift include      The clinical goals for the shift include   Throughout the shift patient with c/o abdominal pain. Went for CT scan this am after IV access placed.  Pt attempting to urinate with no success. Team aware and urology consult placed.  Pt remained alert and oriented throughout the shift but at times with hallucinations.  CBC downtrended throughout the day with Hgb 5.8. Results from scan reviewed . Rapid called to assist with situation. Blood transfusion was initiated along with IV fluids.  Report was called to CICU and pt tramsported via bed

## 2024-10-13 NOTE — SIGNIFICANT EVENT
I was called by the patient's primary team to assess patient iso new retroperitoneal hematoma on CT.    Hilda Jones is a 69 y.o. female with past medical history of hypertension, A-fib, COPD, OMARI, diabetes (A1c 5.9 July 2023), aortic valve insufficiency s/p AVR x 2, and past surgical history significant for exploratory laparotomy x2 (Oct 2023, August 2022) for mesenteric ischemia (no bowel resected, embolectomy only) who has been followed by [facility] for abdominal pain iso incisional hernias x2, both reducible.     She endorses LLQ and back pain, worse today than yesterday. Her last BM was yesterday, has not been passing flatus since then. She endorses SOB, dizziness and lightheadedness worse with standing. No CP. Per nursing, patient has been retaining urine. She had 600cc on bladder scan but nursing was unable to place a catheter.    Physical Exam:  Visit Vitals  BP 85/55   Pulse 100   Temp 36.2 °C (97.2 °F)   Resp 20      Gen: no acute distress  HEENT: scleral icterus  Resp: nonlabored breathing on 4L NC  CV: Regular HR, intermittently tachy, hypotensive  Abd: Soft, distended, diffusely tender to palpation, worse in the LLQ. Non peritonitic. Voluntary guarding. Midline incision scar. Reducible ventral hernias x2.  Skin: warm and dry  MSK: العلي. Able to flex hips    Patient requiring increasing oxygen with soft BP. Hgb on initial VBG 6.2, primary team sent for stat CBC.    Radiology called primary team to discuss CT findings. They noted a retroperitoneal hematoma with active extravasation. When independently reviewed, a rectal sheath hematoma was appreciated in addition to the retroperitoneal hematoma.     Recommendations:  - Surgical intervention not indicated at this time  - IR consult for intervention on hematomas if not tamponading   - Urology consult for bladder decompression iso hematoma likely compressing bladder/urethra making her a difficult cath

## 2024-10-13 NOTE — H&P
CARDIAC ICU ADMISSION NOTE    History of Present Illness  Hilda Jones is a 69 y.o. female with history of HTN, severe rheumatic mitral stenosis, valvular A-fib not on anticoagulation, SALTY thrombus in 2023, COPD, OMARI, DM, aortic valve insufficiency s/p AVR x 2, mesenteric ischemic s/p open SMA embolectomy in 2023 and aortic root repair who presented on 10/3 with shortness of breath requiring 3 L of O2. Originally concern for HFpEF exacerbation versus community-acquired pneumonia s/p 1 dose ceftriaxone and azithromycin. TTE done 10/5/2024 showed EF 67% with RV enlargement with moderately reduced function, severe tricuspid regurgitation, severe mitral stenosis, mild to moderate MV regurgitation, and RVSP of 58.3 mmHg; aortic valve replacement working well.     Per patient on 10/9 she noted abdominal pain. CT abdomen and pelvis was ordered at this time, but on 10/13 her pain worsened and CT abdomen pelvis showing showed retroperitoneal hemorrhage.  CT angiogram was performed 1 hour later showing increasing hematoma.  IR was consulted and as it was not an arterial source did not have any intervention on their end.  Vascular surgery was consulted as well and recommended monitoring for abdominal compartment syndrome but no surgical intervention as venous bleed will likely self tamponade, and will continue to evaluate.    She was given 1 unit of blood on 10/11 and 2 units prior to arrival to CICU along with 1 L of LR.  Blood pressure remains soft with systolic around 90s and she is tachycardic to 108 on beta-blocker.  Lactate elevated at 5.1. Urine output has been decreasing.     She reports generalized abdominal and upper back pain, currently an 8 out of 10 and sensation of needing to void.  She denies chest pain, nausea, vomiting, numbness or tingling in her lower extremities.       CARDIAC DATA:  EKG:  Encounter Date: 10/03/24   ECG 12 Lead   Result Value    Ventricular Rate 112    Atrial Rate 115    QRS Duration 132     QT Interval 364    QTC Calculation(Bazett) 496    R Axis 230    T Axis 33    QRS Count 18    Q Onset 230    T Offset 412    QTC Fredericia 448    Narrative    Atrial fibrillation with rapid ventricular response  Right bundle branch block  Inferior infarct (cited on or before 07-OCT-2024)  Abnormal ECG  When compared with ECG of 07-OCT-2024 06:57,  No significant change was found  Confirmed by Beni Gunter ([id number]) on 10/8/2024 11:23:30 AM         Surgical History  Past Surgical History:   Procedure Laterality Date    AORTIC ROOT REPLACEMENT      AORTIC VALVE REPLACEMENT      CT ABDOMEN PELVIS ANGIOGRAM W AND/OR WO IV CONTRAST  10/06/2023    CT ABDOMEN PELVIS ANGIOGRAM W AND/OR WO IV CONTRAST 10/6/2023 [facility] CT    CT ABDOMEN PELVIS ANGIOGRAM W AND/OR WO IV CONTRAST  12/18/2023    CT ABDOMEN PELVIS ANGIOGRAM W AND/OR WO IV CONTRAST 12/18/2023 [facility] CT    EMBOLECTOMY N/A 08/17/2022    SMA embolectomy via laparotomy    EMBOLECTOMY N/A 10/06/2023    Open SMA embolectomy       Social History  She reports that she has been smoking cigarettes. She has a 15 pack-year smoking history. She uses smokeless tobacco. She reports that she does not drink alcohol and does not use drugs.    Family History  Family History   Problem Relation Name Age of Onset    Breast cancer Maternal Grandmother  50 - 59        Allergies  Flexeril [cyclobenzaprine]    Home Medications  Prior to Admission medications    Medication Sig Start Date End Date Taking? Authorizing Provider   atorvastatin (Lipitor) 40 mg tablet Take 1 tablet (40 mg) by mouth once daily at bedtime. 10/19/23 10/13/24 Yes Bahman Dubose MD   cholecalciferol (Vitamin D-3) 50 MCG (2000 UT) tablet Take 1 tablet (50 mcg) by mouth once daily.   Yes Historical Provider, MD   donepezil (Aricept) 5 mg tablet TAKE 1 TABLET BY MOUTH AT BEDTIME 6/12/23 10/4/24 Yes Vandana Villanueva APRN-CNP   empagliflozin (Jardiance) 10 mg TAKE 1 TABLET BY MOUTH ONCE DAILY 6/12/23 10/4/24 Yes Vandana Villanueva  APRN-CNP   famotidine (Pepcid) 20 mg tablet Take 1 tablet (20 mg) by mouth 2 times a day.   Yes Historical Provider, MD   FLUoxetine (PROzac) 40 mg capsule Take 1 capsule (40 mg) by mouth once daily in the morning.   Yes Historical Provider, MD   fluticasone furoate-vilanteroL (Breo Ellipta) 100-25 mcg/dose inhaler INHALE 1 PUFF BY MOUTH ONCE DAILY 6/12/23 10/4/24 Yes ANTONIO Yan   gabapentin (Neurontin) 100 mg capsule TAKE 1 CAPSULE BY MOUTH AT BEDTIME  Patient taking differently: Take 1 capsule (100 mg) by mouth as needed at bedtime. 6/12/23 10/4/24 Yes ANTONIO Yan   metoprolol tartrate (Lopressor) 25 mg tablet Take 1 tablet (25 mg) by mouth 2 times a day.   Yes Historical Provider, MD   multivitamin with minerals tablet Take 1 tablet by mouth once daily.   Yes Historical Provider, MD   potassium chloride CR 20 mEq ER tablet Take 1 tablet (20 mEq) by mouth every other day.   Yes Historical Provider, MD   psyllium (Metamucil Fiber Singles) 3.4 gram packet Take 1 packet by mouth 2 times a day as needed.   Yes Historical Provider, MD   valACYclovir (Valtrex) 1 gram tablet Take 1 tablet (1,000 mg) by mouth 3 times a day. For 7 days. Start Date 10/3/24, Stop Date 10/10/24   Yes Historical Provider, MD   albuterol 90 mcg/actuation inhaler INHALE 1 PUFF BY MOUTH EVERY 4 HOURS AS NEEDED 6/12/23 6/11/24  ANTONIO Yan   aspirin 81 mg EC tablet Take 1 tablet (81 mg) by mouth once daily. Do not start before October 20, 2023. 10/20/23 10/19/24  Bahman Dubose MD   insulin glargine (Lantus U-100 Insulin) 100 unit/mL injection Inject 10 Units under the skin once daily.    Historical Provider, MD   loperamide (Imodium) 2 mg capsule Take 1 capsule (2 mg) by mouth 4 times a day as needed for diarrhea. 10/19/23   Bahman Dubose MD   melatonin 5 mg tablet,chewable Chew 1 tablet as needed at bedtime.    Historical Provider, MD   mirtazapine (Remeron) 15 mg tablet Take 1 tablet (15 mg) by mouth as  "needed at bedtime.    Historical Provider, MD   nicotine (Nicoderm CQ) 14 mg/24 hr patch APPLY 1 PATCH TO SKIN EVERY 24 HOURS  Patient not taking: Reported on 10/4/2024 6/12/23 6/11/24  Vandana Villanueva, APRN-CNP   sennosides-docusate sodium (Jackie-Colace) 8.6-50 mg tablet Take 1 tablet by mouth once daily as needed for constipation.    Historical Provider, MD   spironolactone (Aldactone) 25 mg tablet Take 1 tablet (25 mg) by mouth once daily.    Historical Provider, MD   torsemide (Demadex) 20 mg tablet Take 2 tablets (40 mg) by mouth once daily.    Historical Provider, MD   rivaroxaban (Xarelto) 20 mg tablet Take 1 tablet (20 mg) by mouth once daily in the evening. Take with meals. Take with food.  10/11/24  Historical Provider, MD         Vitals:   BP (!) 74/42   Pulse 90   Temp 36.8 °C (98.2 °F) (Temporal)   Resp 20   Ht 1.651 m (5' 5\")   Wt 64.6 kg (142 lb 8 oz)   SpO2 100%   BMI 23.71 kg/m²          10/13/2024     1:57 PM 10/13/2024     2:06 PM 10/13/2024     2:49 PM 10/13/2024     2:52 PM 10/13/2024     3:03 PM 10/13/2024     3:07 PM 10/13/2024     3:22 PM   Vitals   Systolic 75 87 78 70 88 88 74   Diastolic 58 64 63 52 62 65 42   Heart Rate 103  94 85  93 90   Temp    36.8 °C (98.2 °F)  36.8 °C (98.2 °F)    Resp    20  20        Vent settings:    Most Recent Range Past 24hrs   Mode      FiO2   No data recorded   Rate   No data recorded   Vt    No data recorded   PEEP   No data recorded     Invasive Hemodynamics:    Most Recent Range Past 24hrs   BP (Art)   No data recorded   MAP(Art)   No data recorded   RA/CVP   No data recorded   PA   No data recorded   PA(mean)   No data recorded   PCWP   No data recorded   CO   No data recorded   CI   No data recorded   Mixed Venous   No data recorded   SVR    No data recorded   PVR   No data recorded       Labs:  Results from last 7 days   Lab Units 10/13/24  1255 10/13/24  0417 10/12/24  1025   WBC AUTO x10*3/uL 13.9* 13.4* 10.5   HEMOGLOBIN g/dL 5.8* 8.4* 7.9* " "  HEMATOCRIT % 20.4* 31.0* 27.9*   PLATELETS AUTO x10*3/uL 311 353 328     Results from last 7 days   Lab Units 10/13/24  0417 10/12/24  1025 10/11/24  0743   SODIUM mmol/L 135* 139 139   POTASSIUM mmol/L 3.7 3.5 3.5   CO2 mmol/L 28 35* 32   ANION GAP mmol/L 15 10 13   BUN mg/dL 12 9 9   CREATININE mg/dL 0.94 0.89 0.92   GLUCOSE mg/dL 124* 75 88   EGFR mL/min/1.73m*2 65 70 67   MAGNESIUM mg/dL 1.71 1.72 1.74   PHOSPHORUS mg/dL 3.6 2.7 2.6            No lab exists for component: \"BILIT\"   Results from last 7 days   Lab Units 10/13/24  0417 10/12/24  1026 10/11/24  0743   INR  1.5* 1.3* 1.6*     Results from last 7 days   Lab Units 10/13/24  1215 10/12/24  1025   FIO2 % 36 28     Results from last 7 days   Lab Units 10/13/24  1215 10/12/24  1025   POCT PH, VENOUS pH 7.40 7.40   POCT PCO2, VENOUS mm Hg 48 52*     Results from last 7 days   Lab Units 10/12/24  1025   LACTATE mmol/L 0.8               Lab Results   Component Value Date    TROPONINI <0.02 03/06/2020      Lab Results   Component Value Date    HGBA1C 5.9 (A) 07/25/2023      Lab Results   Component Value Date    CHOL 141 07/25/2023    CHOL 78 03/06/2020     Lab Results   Component Value Date    HDL 51.4 07/25/2023    HDL 18.8 (A) 03/06/2020     No results found for: \"LDLCALC\"  Lab Results   Component Value Date    TRIG 86 07/25/2023    TRIG 104 03/06/2020     No components found for: \"CHOLHDL\"     Imaging:  CT abdomen pelvis w IV contrast    Result Date: 10/13/2024  STUDY: CT ABDOMEN PELVIS W IV CONTRAST;  10/13/2024 10:15 am   INDICATION: Signs/Symptoms:LLQ pain.   Hx of hypertension, A-fib, COPD, DM, aortic valve insufficiency s/p AVR x 2, and PSHx significant for exploratory laparotomy x2 (Oct 2023, August 2022) for mesenteric ischemia (no bowel resected, embolectomy only) presented to the ED on 10/3/24 with cc of SOB. Surgery was consulted for a ventral incisional hernia with concern for incarceration given increased pain. Hernias remain reducible despite " abdominal pain.     COMPARISON: CT abdomen/pelvis 10/03/2024 CT chest 10/05/2024   ACCESSION NUMBER(S): [id number]   ORDERING CLINICIAN: BRODIE LORENZO   TECHNIQUE: CT of the abdomen and pelvis was performed.  Standard contiguous axial images were obtained at 3 mm slice thickness through the abdomen and pelvis. Coronal and sagittal reconstructions at 3 mm slice thickness were performed.   80 ml of contrast Omnipaque 350 were administered intravenously without immediate complication.   FINDINGS: LOWER CHEST: Similar bilateral trace pleural effusion with bibasilar atelectasis. Partially visualized component of loculated pleural effusion along the right fissures better seen on 10/05/2024. No new focal consolidation. Marked biatrial cardiac enlargement similar to prior.   ABDOMEN:   LIVER: Liver is normal size with smooth contour. Subcentimeter hepatic hypodensity (series 201, image 25), too small to characterize.   BILE DUCTS: Normal caliber.   GALLBLADDER: Gallstones with mild gallbladder wall thickening similar to prior. No evidence of pericholecystic fluid.   PANCREAS: No significant abnormality.   SPLEEN: No significant abnormality.   ADRENAL GLANDS: No significant abnormality.   KIDNEYS AND URETERS: Kidneys are normal in size. Left renal cortical scarring of the superior pole of the left kidney (series 201, image 42). Again seen bilateral renal cysts.   Interval development of mild hydronephrosis on the left (series 201, image 49) likely from the mass effect of the large left retroperitoneal hematoma.. No evidence of hydroureteronephrosis on the right.   PELVIS:   BLADDER: Hematoma anterior to the bladder wall discernible fat plane discussed below.   Nonspecific linear perpendicular hyperdensities throughout the urinary bladder (series 201, image 128).   REPRODUCTIVE ORGANS: Again seen enlarged heterogenous uterus with masslike expansion of the endometrial cavity measuring 5.8 cm.   BOWEL: The stomach is  unremarkable.   The small and large bowel are normal in caliber and demonstrate no wall thickening.   VESSELS: Atherosclerosis of the abdominal aorta and its branching vessels. There is no aneurysmal dilatation of the abdominal aorta. The IVC appears normal.   PERITONEUM/RETROPERITONEUM/LYMPH NODES: No evidence of pneumoperitoneum. No enlarged lymph nodes by CT criteria   Large left retroperitoneal hematomas tracking along the superior left psoas muscle into the left hemipelvis resulting in mass effect on the pelvic structures.   Most superior left retroperitoneal hematoma measures 9.0 x 8.2 x 5.3 cm (series 201, image 76 and series 203, image 51). Within this hematoma, there is active contrast extravasation in the most superior (series 201, image 66) and posteroinferior aspect (series 201, image 86).   Left lower quadrant/left pelvic hematoma measures 8.9 x 8.3 x 6.4 cm (series 203, image 43) and series 201, image 91) with contrast extravasation noted within the posterior aspect of the collection (series 201, image 98).   Heterogenous mass anterior to the bladder without a discernible fat plane measuring 8.4 x 4.8 x 7.0 cm (series 201, image 106 and series 204, image 62) consistent with hematoma.   Heterogenous rectal sheath hematoma measuring 4.8 x 2.7 x 4.9 cm (series 201, image 117 and series 204, image 76) with contrast extravasation within the periphery (series 201, image 118).   There is blood products within left paracolic  gutter and abdomen.   BONES AND ABDOMINAL WALL: No suspicious osseous lesions are identified.   Diffuse body wall anasarca which has increased when compared to prior CT.       1. Interval development of large left retroperitoneal hematoma which extends to the left pelvis with active contrast extravasation suggestive of bleed.  In addition, there is a left rectal sheath hematoma with active contrast extravasation suggestive of active bleed. There is mass effect on the pelvic structures. 2.  Large hematoma anterior to the urinary bladder without a discernible fat plane. 3. Nonspecific intraluminal linear perpendicular hyperdensities throughout the urinary bladder likely indicative of foreign body. Recommend correlation with patient's clinical history. If clinically indicated, a bladder ultrasound could be obtained. 4. Cholelithiasis with mild gallbladder wall thickening similar to prior without evidence of pericholecystic fluid. 5. Enlarged heterogenous uterus with masslike expansion of the endometrial cavity. Recommend correlation with pelvic ultrasound to exclude underlying tumor. 6. Interval increase in diffuse body wall anasarca.   I personally reviewed the images/study and I agree with the findings as stated by Sergey Painting DO, PGY-3. This study was interpreted at [facility], [place], [place].   MACRO: Sergey Painting discussed the significance and urgency of this critical finding by telephone with  Jerome Kirk MD on 10/13/2024 at 12:35 pm.  (**-RCF-**) Findings:  See findings.       Dictation workstation:   [id number]      ED Interventions:  Medications   traMADol (Ultram) tablet 50 mg (50 mg oral Given 10/4/24 2202)   metoprolol tartrate (Lopressor) injection 5 mg (has no administration in time range)   trimethobenzamide (Tigan) injection 200 mg (200 mg intramuscular Not Given 10/8/24 2152)   ondansetron (Zofran) injection 4 mg (4 mg intravenous Not Given 10/8/24 2149)   ferrous sulfate (325 mg ferrous sulfate) tablet 325 mg (325 mg oral Given 10/13/24 0935)   white petrolatum (Aquaphor) ointment (has no administration in time range)   eucerin cream (has no administration in time range)   diphenhydrAMINE (BENADryl) capsule 25 mg (25 mg oral Not Given 10/8/24 2153)   acetaminophen (Tylenol) tablet 650 mg (650 mg oral Given 10/12/24 2351)   empagliflozin (Jardiance) tablet 10 mg (10 mg oral Given 10/13/24 0939)   atorvastatin (Lipitor) tablet 40 mg (40 mg  oral Given 10/12/24 2006)   aspirin EC tablet 81 mg (81 mg oral Given 10/13/24 0934)   fluticasone furoate-vilanteroL (Breo Ellipta) 100-25 mcg/dose inhaler 1 puff (1 puff inhalation Not Given 10/13/24 0959)   sennosides (Senokot) tablet 17.2 mg (17.2 mg oral Given 10/13/24 0935)   ondansetron ODT (Zofran-ODT) disintegrating tablet 4 mg (has no administration in time range)     Or   ondansetron (Zofran) injection 4 mg (has no administration in time range)   polyethylene glycol (Glycolax, Miralax) packet 17 g (17 g oral Given 10/13/24 0934)   metoprolol tartrate (Lopressor) tablet 25 mg (25 mg oral Not Given 10/11/24 2339)   metoprolol succinate XL (Toprol-XL) 24 hr tablet 100 mg (100 mg oral Given 10/13/24 0934)   norepinephrine (Levophed) 8 mg in dextrose 5% 250 mL (0.032 mg/mL) infusion (premix) (has no administration in time range)   lactated Ringer's infusion 750 mL (has no administration in time range)   lactated Ringer's bolus 1,000 mL (0 mL intravenous Stopped 10/3/24 2212)   ipratropium-albuteroL (Duo-Neb) 0.5-2.5 mg/3 mL nebulizer solution 3 mL (3 mL nebulization Given 10/3/24 2029)   methylPREDNISolone sod succinate (SOLU-Medrol) injection 125 mg (125 mg intravenous Given 10/3/24 2030)   azithromycin 500 mg in dextrose 5% 250 mL IV (0 mg intravenous Stopped 10/3/24 2212)   iohexol (OMNIPaque) 350 mg iodine/mL solution 75 mL (75 mL intravenous Given 10/3/24 2122)   cefTRIAXone (Rocephin) 1 g in dextrose (iso) IV 50 mL (0 g intravenous Stopped 10/3/24 2333)   perflutren lipid microspheres (Definity) injection 0.5-10 mL of dilution (2 mL of dilution intravenous Given 10/5/24 1100)   potassium chloride (Klor-Con) packet 20 mEq (20 mEq oral Given 10/4/24 0422)   potassium chloride (Klor-Con) packet 40 mEq (40 mEq oral Given 10/4/24 7719)   Tc-99m-albumin (Draximage MAA) injection 4.2 millicurie (4.2 millicuries intravenous Given 10/4/24 1135)   potassium chloride (Klor-Con) packet 60 mEq (60 mEq oral Given  10/5/24 1702)   traMADol (Ultram) tablet 50 mg (50 mg oral Given 10/6/24 0430)   diphenhydrAMINE (BENADryl) capsule 25 mg (25 mg oral Given 10/6/24 0430)   trimethobenzamide (Tigan) injection 200 mg (200 mg intramuscular Given 10/6/24 2129)   furosemide (Lasix) injection 40 mg (40 mg intravenous Given 10/7/24 1503)   furosemide (Lasix) injection 40 mg (40 mg intravenous Given 10/8/24 2153)   traMADol (Ultram) tablet 50 mg (50 mg oral Given 10/8/24 2158)   diphenhydrAMINE (BENADryl) capsule 25 mg (25 mg oral Given 10/8/24 2152)   heparin bolus from bag 5,480 Units (5,480 Units intravenous Bolus from Bag 10/8/24 2244)   gadoterate meglumine (Dotarem) 0.5 mmol/mL contrast injection 26 mL (26 mL intravenous Given 10/10/24 1105)   furosemide (Lasix) injection 40 mg (40 mg intravenous Given 10/11/24 1307)   iohexol (OMNIPaque) 350 mg iodine/mL solution 80 mL (80 mL intravenous Given 10/13/24 1001)   iohexol (OMNIPaque) 350 mg iodine/mL solution 90 mL (90 mL intravenous Given 10/13/24 1450)   lactated Ringer's infusion 250 mL (250 mL intravenous New Bag 10/13/24 1503)        Physical Exam   Constitutional: in acute distress, cooperative  HEENT: Normocephalic, atraumatic, EOMI grossly intact, dry mucous membranes  Cardiovascular: Tachycardia irregular rate and rhythm, no murmurs appreciated   Pulmonary: Clear to auscultation b/l, no wheezes/crackles/rhonchi, no increased work of breathing, on 2L supplemental oxygen  GI: Soft, tender LLQ worse, distended,  : torer catheter  Lower extremities: Warm and well perfused, no lower extremity edema  Neuro: A&O x2-3 (confused on place), able to move all 4 extremities spontaneously  Psych: Appropriate mood and affect, visual hallucinations per nursing report     Medications  Scheduled medications  aspirin, 81 mg, oral, Daily  atorvastatin, 40 mg, oral, Nightly  empagliflozin, 10 mg, oral, Daily  ferrous sulfate (325 mg ferrous sulfate), 65 mg of iron, oral, Daily  fluticasone  furoate-vilanteroL, 1 puff, inhalation, Daily  lactated Ringer's, 750 mL, intravenous, Once  metoprolol succinate XL, 100 mg, oral, Daily  ondansetron, 4 mg, intravenous, Once  polyethylene glycol, 17 g, oral, Daily  sennosides, 2 tablet, oral, BID  trimethobenzamide, 200 mg, intramuscular, Once        Continuous medications  norepinephrine, 0.01-1 mcg/kg/min        PRN medications  PRN medications: acetaminophen, diphenhydrAMINE, eucerin, metoprolol, ondansetron ODT **OR** ondansetron, white petrolatum      Assessment/Plan   Hilda Jones is a 69 y.o. female with history of HTN, severe rheumatic mitral stenosis, valvular A-fib not on anticoagulation, SALTY thrombus in 2023, COPD, OMARI, DM, aortic valve insufficiency s/p AVR x 2, mesenteric ischemic s/p open SMA embolectomy in 2023 and aortic root repair who presented on 10/3 with shortness of breath requiring 3 L of O2. Originally concern for HFpEF exacerbation versus community-acquired pneumonia now with retroperitoneal bleed and concern for abdominal compartment syndrome.      NEUROLOGIC  No active issues    CARDIOVASCULAR  #Spontaneous retroperitoneal hematoma on heparin  :: CTAP on 10/13: retroperitoneal hematoma + rectal sheath hematoma   :: CTA on 10/13 showing increasing hematoma with delayed extravasation c/w venous source  :: LLQ tenderness  - IR consulted and without arterial source, no intervention from IR perspective  - ACS consulted, appreciate recommendations  - getting 3rd unit RBC and 2 L LR  - trend CBC Q6H  - Given protamine reversal 50mg and plasma      #HFpEF exacerbation  #Severe rheumatic mitral valve stenosis with moderate regurgitation  #Severe tricuspid regurgitation  :: TTE done 10/5/2024 showed EF 67% with RV enlargement with moderately reduced function, severe tricuspid regurgitation, severe mitral stenosis, mild to moderate MV regurgitation, and RVSP of 58.3 mmHg; aortic valve replacement working well  Plan:  - no mitral valvular  intervention as previous attempt at valvuloplasty was aborted based on preprocedure KATHY demonstrating SALTY thrombus in 2023.  Per signout, patient frailty makes patient not a good candidate for intervention and decided on medical management. Patient needs Coumadin for valvular fibrillation and cannot do other DOAC's     - empa 10 mg daily hold in setting of hypotension  - metop 100mg / day hold in setting of hypotension    #Afib  #small SALTY thrombus   :: Cardiac MRI 10/10 showing small thrombus noted in the left  atrial appendage measuring 4 x 3 mm. No LA thrombus.  - holding anticoagulation in the setting of bleed  - continue tele     PULMONARY  #COPD   - currently on 2L O2    RENAL/  #Olguria  :: secondary to hypotension vs. Abdominal compartment syndrome  :: CTA showing pelvic hematoma compressing distal L ureter with mild hydronephrosis  -Continue continue to trend RFP's    GASTROINTESTINAL  No active issues  #H/o mesenteric ischemia s/p embolectomy  #H/o GIB    ENDOCRINE  #H/o T2DM   :: not on home insulin  - can consider starting ISS if patient consistently hyperglycemic    HEME/ONC  #Anemia 2/2 retroperitoneal bleed  :: s/p 3U RBC  - see CV above    #masslike expansion of the endometrial cavity seen on CTAP  - will need OP follow up for possible malignancy      INFECTIOUS DISEASE  No active issues    MSK/DERM  No active issues       F: PRN  E: PRN K>4, Mg>2, P>3   N: NPO as surgery eval and possible procedure    DVT prophylaxis: Holding in the setting of retroperitoneal bleed    Code Status: Full Code (confirmed on admission)   NOK: Extended Emergency Contact Information  Primary Emergency Contact: HOLLY MEEKS  Address: [address] [place] [address] [address]           [address]  Home Phone: [phone]  Mobile Phone: [phone]  Relation: Daughter        China Nava MD  Internal Medicine, PGY-1  Cardiology Critical Care

## 2024-10-13 NOTE — SIGNIFICANT EVENT
Hilda Jones is a 69 y.o. female with past medical history of hypertension, A-fib, COPD, OMARI, diabetes (A1c 5.9 July 2023), aortic valve insufficiency s/p AVR x 2, and past surgical history significant for exploratory laparotomy x2 (Oct 2023, August 2022) for mesenteric ischemia (no bowel resected, embolectomy only) who has been followed by [facility] for abdominal pain iso incisional hernias x2, both reducible.     Patient found to have a left sided retroperitoneal hematoma and rectal sheath hematoma on CT. CTA suggesting venous source. IR unable to embolize without an arterial source of bleeding. She became hemodynamically unstable 2/2 bleeding with SBPs to the 70s, HR remained in the 90s. After receiving 1U pRBC pressures improved to 102/63. Patient currently receiving 2nd unit.     Patient stating she feels better now that blood is transfusing. Pain unchanged from prior exam. Torre placed in interval.    On repeat exam, patient is more distended, soft, tender to palpation in the LLQ. Remains non peritonitic, no guarding. She has full ROM, motor and sensory intact in the b/l LEs. Traumatic torre with blood in urine.    Recommendations.   - Abdominal binder  - Obtain TEG  - Aggressive resuscitation with blood products prn if patient is symptomatic or hemodynamically unstable  - Agree with CICU transfer  - No surgical intervention indicated iso retroperitoneal bleeding  - Iso large volume resuscitation, there is increased risk of abdominal compartment syndrome. If patient is receiving mass transfusions, recommend monitoring for worsening, diffuse abdominal pain, a more tense abdomen, and decreased UOP even with Torre in place  - Please page for any concerning changes in exam c/f [facility]  - Will continue to follow

## 2024-10-13 NOTE — PROGRESS NOTES
Hilda Jones is a 69 y.o. female on day 10 of admission presenting with Valvular heart disease.    Subjective   Vitals and chart notes from overnight reviewed. No acute issues overnight.   Patient seen and evaluated at bedside. Endorses same LLQ pain not improved not worse. Denies chest pain, SOB, n/v. Was in Afib this AM. She also wasn't wearing her oxygen and nurse was at bedside with pulse ox which was reading 82%. Unsure if accurate as patient was removing pulse ox and was not cooperative this AM.         Objective   VITALS  Temp: Afeb  HR: 69 - 90  Resp: 17 - 20  SBP: 97 - 124  DBP: 79 - 82  SpO2:   Tele: Afib with HR of 103      Physical Exam  Constitutional: in NAD.  HEENT: sclerae anicteric, EOM grossly intact, edentulous  CV: Slightly tachycardic. Irregular rhythm, systolic murmur noted  Pulm: CTAB, no increased WOB  GI: tenderness in LLQ, supra-umbical hernia visible on cough, abd soft, ND  Skin: warm and dry  Ext: no pitting edema   Neuro: alert and conversant  Psych: affect appropriate      24 Hour Vitals  Temp:  [36.4 °C (97.5 °F)-36.7 °C (98.1 °F)] 36.5 °C (97.7 °F)  Heart Rate:  [] 103  Resp:  [17-20] 20  BP: ()/(68-82) 107/70    Temp (24hrs), Av.5 °C (97.7 °F), Min:36.4 °C (97.5 °F), Max:36.7 °C (98.1 °F)     24 hour Intake/Output    Intake/Output Summary (Last 24 hours) at 10/13/2024 0847  Last data filed at 10/13/2024 0600  Gross per 24 hour   Intake 235.2 ml   Output --   Net 235.2 ml          Labs  CBC  Results from last 72 hours   Lab Units 10/13/24  0417 10/12/24  1025 10/11/24  0743   WBC AUTO x10*3/uL 13.4* 10.5 8.0   HEMOGLOBIN g/dL 8.4* 7.9* 7.0*   HEMATOCRIT % 31.0* 27.9* 24.8*   PLATELETS AUTO x10*3/uL 353 328 361        BMP  Results from last 72 hours   Lab Units 10/13/24  0417 10/12/24  1025 10/11/24  0743   SODIUM mmol/L 135* 139 139   POTASSIUM mmol/L 3.7 3.5 3.5   CHLORIDE mmol/L 96* 98 98   BUN mg/dL 12 9 9   CREATININE mg/dL 0.94 0.89 0.92   MAGNESIUM mg/dL  1.71 1.72 1.74   PHOSPHORUS mg/dL 3.6 2.7 2.6       Medications   Scheduled Medications  aspirin, 81 mg, oral, Daily  atorvastatin, 40 mg, oral, Nightly  empagliflozin, 10 mg, oral, Daily  ferrous sulfate (325 mg ferrous sulfate), 65 mg of iron, oral, Daily  fluticasone furoate-vilanteroL, 1 puff, inhalation, Daily  metoprolol succinate XL, 100 mg, oral, Daily  ondansetron, 4 mg, intravenous, Once  polyethylene glycol, 17 g, oral, Daily  sennosides, 2 tablet, oral, BID  trimethobenzamide, 200 mg, intramuscular, Once  warfarin, 5 mg, oral, Daily     Continuous Medications  heparin, 0-4,500 Units/hr, Last Rate: 1,200 Units/hr (10/12/24 2024)     PRN Medications  PRN medications: acetaminophen, diphenhydrAMINE, eucerin, heparin, metoprolol, ondansetron ODT **OR** ondansetron, white petrolatum                  Assessment/Plan   Assessment & Plan  (HFpEF) heart failure with preserved ejection fraction    Valvular heart disease    This is a 69 y.o. female with past medical history of hypertension, A-fib, COPD, OMARI, diabetes (A1c 5.2023), aortic valve insufficiency s/p AVR x 2, SMA occlusion, who presented to the emergency department on 10/3/24 with chief complaint of shortness of breath. TTE done 10/5/2024 showed EF 67% with RV enlargement with moderately reduced function, severe tricuspid regurgitation, severe mitral stenosis, mild to moderate MV regurgitation, and RVSP of 58.3 mmHg; aortic valve replacement working well. Too frail for surgery at this time. Will discharge patient on warfarin after heparin bridge. Patient to follow up in 6 weeks in outpatient setting for surgery reassesment.     Updates 10/13/24   - Urology consulted iso 600ml urine retention and 2x failed cath.  - CTAP: retroperitoneal hematoma + rectal sheath hematoma - likely causing urinary retention  - VB.4 / O 21 / CO2 48 / Hbg 6.2  - consulted IR; ordered CT angio abdomen  - surgery and urology following  - ordered STAT CBC, T&S.  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  #Severe tricuspid regurgitation  :: CXR concerning for multiple opacities but procal negative, no leukocytosis or fever, and CT chest wo contrast did not show findings concerning for pneumonia -- stopped antibiotics on 10/4  :: VQ scan low to intermediate probability, Venous duplex LE negative for DVT; unlikely to be due to PE  :: TTE done 10/5/2024 showed EF 67% with RV enlargement with moderately reduced function, severe tricuspid regurgitation, severe mitral stenosis, mild to moderate MV regurgitation, and RVSP of 58.3 mmHg; aortic valve replacement working well  PLAN:  - empa 10 mg daily  - metop 100mg / day     #Afib  #SALTY thrombus  :: patient with known hx of afib, was not on anticoagulation d/t hx of bleeding problem  :: No evidence of L atrial thrombus on TTE from 10/5/2024  :: Patient had afib with RVR per EKG on 10/7/2024. Asymptomatic. Will continue to monitor.   PLAN:  - warfarin 5mg / day w/ heparin bridge  - metop 100mg / day     #Iron deficiency anemia  :: Hgb 7.7 hx OMARI per chart review however patient denies taking any iron supplementation  :: Patient without any hematuria, blood in stool, obvious signs of bleeding  :: Iron <10, % saturation 12, and unable to estimate TIBC   PLAN:  - c/w PO iron supplementation  - transfuse 1 unit pRBC     F: PRN   E: PRN K>4, Mg>2, P>3   N: Cardiac  A: 1 PIV   Oxygen: room air     DVT ppx: heparin gtt   GI ppx: not indicated   Surrogate Medical Decision-maker:  Carmen Jones, Daughter,  [phone]  Code Status; FULL CODE      Jerome Kirk MD

## 2024-10-13 NOTE — PROGRESS NOTES
[facility]  ACUTE CARE SURGERY - PROGRESS NOTE    Patient Name: Hilda Jones  MRN: [id number]  Admit Date: 1003  : 1955  AGE: 69 y.o.   GENDER: female  ==============================================================================  TODAY'S ASSESSMENT AND PLAN OF CARE:  Hilda Jones is a 69 y.o. female with past medical history of hypertension, A-fib, COPD, OMARI, diabetes (A1c 5.2023), aortic valve insufficiency s/p AVR x 2, and past surgical history significant for exploratory laparotomy x2 (Oct 2023, 2022) for mesenteric ischemia (no bowel resected, embolectomy only) who presented to the emergency department on 10/3/24 with chief complaint of shortness of breath. Surgery was consulted for a ventral incisional hernia with concern for incarceration given increased pain this morning. Hernias remain reducible despite abdominal pain, and patient has no obstructive symptoms - no nausea, vomiting and she is still passing gas and having bowel movements.     Recommendations:  - Will follow-up on CTAP with IV contrast  - No indication at this time for surgical intervention  - Please call with any questions or concerns or if patient's abdominal exam changes.     ==============================================================================  CHIEF COMPLAINT / EVENTS LAST 24HRS / HPI:  NAEO    MEDICAL HISTORY / ROS:   Admission history and ROS reviewed. Pertinent changes as follows:  NAEO    PHYSICAL EXAM:  Heart Rate:  []   Temp:  [36.4 °C (97.5 °F)-36.7 °C (98.1 °F)]   Resp:  [17-20]   BP: ()/(68-82)   Weight:  [64.6 kg (142 lb 8 oz)]   SpO2:  [93 %-100 %]   Constitutional:       Appearance: She is normal weight.   HENT:      Head: Normocephalic.      Mouth/Throat:      Mouth: Mucous membranes are moist.   Cardiovascular:      Rate and Rhythm: Normal rate.   Pulmonary:      Effort: Pulmonary effort is normal.      Comments: On 4L NC  Abdominal:      Comments:  Soft, mildly tender, nondistended. 2 midline ventral hernias, easily reducible. Midline surgical scar. No masses.    Musculoskeletal:         General: Normal range of motion.   Skin:     General: Skin is warm and dry.   Neurological:      Mental Status: She is alert.    IMAGING SUMMARY:  (summary of new imaging findings, not a copy of dictation)  No new imaging    LABS:  Results from last 7 days   Lab Units 10/13/24  0417 10/12/24  1025 10/11/24  0743 10/07/24  0558 10/06/24  1628   WBC AUTO x10*3/uL 13.4* 10.5 8.0   < > 7.9   HEMOGLOBIN g/dL 8.4* 7.9* 7.0*   < > 7.5*   HEMATOCRIT % 31.0* 27.9* 24.8*   < > 27.2*   PLATELETS AUTO x10*3/uL 353 328 361   < > 299   NEUTROS PCT AUTO % 81.2 61.0  --   --   --    LYMPHO PCT MAN %  --   --   --   --  7.9   LYMPHS PCT AUTO % 6.5 15.5  --   --   --    MONO PCT MAN %  --   --   --   --  2.6   MONOS PCT AUTO % 10.9 18.7  --   --   --    EOSINO PCT MAN %  --   --   --   --  7.0   EOS PCT AUTO % 0.1 3.1  --   --   --     < > = values in this interval not displayed.     Results from last 7 days   Lab Units 10/13/24  0417 10/12/24  1026 10/11/24  0743 10/08/24  2144   APTT seconds  --   --   --  32   INR  1.5* 1.3* 1.6*  --      Results from last 7 days   Lab Units 10/13/24  0417 10/12/24  1025 10/11/24  0743   SODIUM mmol/L 135* 139 139   POTASSIUM mmol/L 3.7 3.5 3.5   CHLORIDE mmol/L 96* 98 98   CO2 mmol/L 28 35* 32   BUN mg/dL 12 9 9   CREATININE mg/dL 0.94 0.89 0.92   CALCIUM mg/dL 8.7 8.0* 7.8*   GLUCOSE mg/dL 124* 75 88               I have reviewed all medications, laboratory results, and imaging pertinent for today's encounter.

## 2024-10-13 NOTE — SIGNIFICANT EVENT
Serial Abdominal Exam:    Hilda Jones is a 69 y.o. female with past medical history of hypertension, A-fib, COPD, OMARI, diabetes (A1c 5.9 July 2023), aortic valve insufficiency s/p AVR x 2, and past surgical history significant for exploratory laparotomy x2 (Oct 2023, August 2022) for mesenteric ischemia (no bowel resected, embolectomy only) who presented to the emergency department on 10/3/24 with chief complaint of shortness of breath. Surgery was consulted for a ventral incisional hernia with concern for incarceration given increased pain this morning.    Patient reports no nausea or vomiting since yesterday. Pain well managed, stable. Last BM today, diarrhea. Passing flatus.    Exam:  General - no acute distress  Resp - non labored breathing on RA  CV - regular rate  Abd - Soft, distended, minimally tender to palpation, more so periumbilically. Ventral hernias reducible.   Extremities - العلي  Skin - warm, dry    Stable abdominal exam.

## 2024-10-14 VITALS
HEIGHT: 65 IN | WEIGHT: 155.2 LBS | HEART RATE: 73 BPM | BODY MASS INDEX: 25.86 KG/M2 | SYSTOLIC BLOOD PRESSURE: 98 MMHG | OXYGEN SATURATION: 83 % | TEMPERATURE: 98.2 F | RESPIRATION RATE: 22 BRPM | DIASTOLIC BLOOD PRESSURE: 65 MMHG

## 2024-10-14 VITALS
TEMPERATURE: 96.1 F | WEIGHT: 142.5 LBS | HEART RATE: 105 BPM | OXYGEN SATURATION: 96 % | HEIGHT: 65 IN | RESPIRATION RATE: 20 BRPM | BODY MASS INDEX: 23.74 KG/M2 | SYSTOLIC BLOOD PRESSURE: 93 MMHG | DIASTOLIC BLOOD PRESSURE: 51 MMHG

## 2024-10-14 LAB
ACANTHOCYTES BLD QL SMEAR: ABNORMAL
ALBUMIN SERPL BCP-MCNC: 2.1 G/DL (ref 3.4–5)
ALBUMIN SERPL BCP-MCNC: 2.3 G/DL (ref 3.4–5)
ALBUMIN SERPL BCP-MCNC: 2.8 G/DL (ref 3.4–5)
ALBUMIN SERPL BCP-MCNC: 2.9 G/DL (ref 3.4–5)
ANION GAP BLDA CALCULATED.4IONS-SCNC: -2 MMO/L (ref 10–25)
ANION GAP BLDA CALCULATED.4IONS-SCNC: 18 MMO/L (ref 10–25)
ANION GAP BLDA CALCULATED.4IONS-SCNC: 18 MMO/L (ref 10–25)
ANION GAP BLDA CALCULATED.4IONS-SCNC: 19 MMO/L (ref 10–25)
ANION GAP BLDA CALCULATED.4IONS-SCNC: 22 MMO/L (ref 10–25)
ANION GAP BLDA CALCULATED.4IONS-SCNC: 22 MMO/L (ref 10–25)
ANION GAP BLDA CALCULATED.4IONS-SCNC: 23 MMO/L (ref 10–25)
ANION GAP BLDA CALCULATED.4IONS-SCNC: 30 MMO/L (ref 10–25)
ANION GAP BLDA CALCULATED.4IONS-SCNC: 30 MMO/L (ref 10–25)
ANION GAP BLDA CALCULATED.4IONS-SCNC: 32 MMO/L (ref 10–25)
ANION GAP SERPL CALC-SCNC: 23 MMOL/L (ref 10–20)
ANION GAP SERPL CALC-SCNC: 30 MMOL/L (ref 10–20)
ANION GAP SERPL CALC-SCNC: 35 MMOL/L (ref 10–20)
ANION GAP SERPL CALC-SCNC: 37 MMOL/L (ref 10–20)
APTT PPP: 40 SECONDS (ref 27–38)
BASE EXCESS BLDA CALC-SCNC: -0.3 MMOL/L (ref -2–3)
BASE EXCESS BLDA CALC-SCNC: -12.2 MMOL/L (ref -2–3)
BASE EXCESS BLDA CALC-SCNC: -18.9 MMOL/L (ref -2–3)
BASE EXCESS BLDA CALC-SCNC: -18.9 MMOL/L (ref -2–3)
BASE EXCESS BLDA CALC-SCNC: -2.3 MMOL/L (ref -2–3)
BASE EXCESS BLDA CALC-SCNC: -23.8 MMOL/L (ref -2–3)
BASE EXCESS BLDA CALC-SCNC: -4.9 MMOL/L (ref -2–3)
BASE EXCESS BLDA CALC-SCNC: -7.2 MMOL/L (ref -2–3)
BASE EXCESS BLDA CALC-SCNC: -9.4 MMOL/L (ref -2–3)
BASE EXCESS BLDA CALC-SCNC: 38.1 MMOL/L (ref -2–3)
BASOPHILS # BLD AUTO: 0.03 X10*3/UL (ref 0–0.1)
BASOPHILS # BLD AUTO: 0.05 X10*3/UL (ref 0–0.1)
BASOPHILS # BLD MANUAL: 0 X10*3/UL (ref 0–0.1)
BASOPHILS # BLD MANUAL: 0 X10*3/UL (ref 0–0.1)
BASOPHILS NFR BLD AUTO: 0.1 %
BASOPHILS NFR BLD AUTO: 0.2 %
BASOPHILS NFR BLD MANUAL: 0 %
BASOPHILS NFR BLD MANUAL: 0 %
BLOOD EXPIRATION DATE: NORMAL
BODY TEMPERATURE: 37 DEGREES CELSIUS
BUN SERPL-MCNC: 20 MG/DL (ref 6–23)
BUN SERPL-MCNC: 21 MG/DL (ref 6–23)
BUN SERPL-MCNC: 21 MG/DL (ref 6–23)
BUN SERPL-MCNC: 23 MG/DL (ref 6–23)
BURR CELLS BLD QL SMEAR: ABNORMAL
BURR CELLS BLD QL SMEAR: ABNORMAL
BURR CELLS BLD QL SMEAR: NORMAL
CA-I BLDA-SCNC: 0.86 MMOL/L (ref 1.1–1.33)
CA-I BLDA-SCNC: 0.87 MMOL/L (ref 1.1–1.33)
CA-I BLDA-SCNC: 0.87 MMOL/L (ref 1.1–1.33)
CA-I BLDA-SCNC: 0.94 MMOL/L (ref 1.1–1.33)
CA-I BLDA-SCNC: 0.97 MMOL/L (ref 1.1–1.33)
CA-I BLDA-SCNC: 1.05 MMOL/L (ref 1.1–1.33)
CA-I BLDA-SCNC: 1.09 MMOL/L (ref 1.1–1.33)
CA-I BLDA-SCNC: 1.09 MMOL/L (ref 1.1–1.33)
CA-I BLDA-SCNC: 1.14 MMOL/L (ref 1.1–1.33)
CA-I BLDA-SCNC: 2.58 MMOL/L (ref 1.1–1.33)
CALCIUM SERPL-MCNC: 7.8 MG/DL (ref 8.6–10.6)
CALCIUM SERPL-MCNC: 7.9 MG/DL (ref 8.6–10.6)
CALCIUM SERPL-MCNC: 8.3 MG/DL (ref 8.6–10.6)
CALCIUM SERPL-MCNC: 8.6 MG/DL (ref 8.6–10.6)
CFT FORM KAOLIN IND BLD RES TEG: 2.5 MIN (ref 0.8–2.1)
CHLORIDE BLDA-SCNC: 101 MMOL/L (ref 98–107)
CHLORIDE BLDA-SCNC: 103 MMOL/L (ref 98–107)
CHLORIDE BLDA-SCNC: 110 MMOL/L (ref 98–107)
CHLORIDE BLDA-SCNC: 93 MMOL/L (ref 98–107)
CHLORIDE BLDA-SCNC: 94 MMOL/L (ref 98–107)
CHLORIDE BLDA-SCNC: 96 MMOL/L (ref 98–107)
CHLORIDE BLDA-SCNC: 97 MMOL/L (ref 98–107)
CHLORIDE BLDA-SCNC: 98 MMOL/L (ref 98–107)
CHLORIDE BLDA-SCNC: 98 MMOL/L (ref 98–107)
CHLORIDE BLDA-SCNC: 99 MMOL/L (ref 98–107)
CHLORIDE SERPL-SCNC: 94 MMOL/L (ref 98–107)
CHLORIDE SERPL-SCNC: 95 MMOL/L (ref 98–107)
CHLORIDE SERPL-SCNC: 96 MMOL/L (ref 98–107)
CHLORIDE SERPL-SCNC: 99 MMOL/L (ref 98–107)
CLOT ANGLE.KAOLIN INDUCED BLD RES TEG: 60.4 DEG (ref 63–78)
CLOT INIT KAO IND P HEP NEUT BLD RES TEG: 7.1 MIN (ref 4.3–8.3)
CLOT INIT KAO IND P HEP NEUT BLD RES TEG: 7.8 MIN (ref 4.6–9.1)
CO2 SERPL-SCNC: 11 MMOL/L (ref 21–32)
CO2 SERPL-SCNC: 16 MMOL/L (ref 21–32)
CO2 SERPL-SCNC: 19 MMOL/L (ref 21–32)
CO2 SERPL-SCNC: 19 MMOL/L (ref 21–32)
CREAT SERPL-MCNC: 1.67 MG/DL (ref 0.5–1.05)
CREAT SERPL-MCNC: 1.67 MG/DL (ref 0.5–1.05)
CREAT SERPL-MCNC: 1.7 MG/DL (ref 0.5–1.05)
CREAT SERPL-MCNC: 1.83 MG/DL (ref 0.5–1.05)
DACRYOCYTES BLD QL SMEAR: NORMAL
DISPENSE STATUS: NORMAL
EGFRCR SERPLBLD CKD-EPI 2021: 29 ML/MIN/1.73M*2
EGFRCR SERPLBLD CKD-EPI 2021: 32 ML/MIN/1.73M*2
EGFRCR SERPLBLD CKD-EPI 2021: 33 ML/MIN/1.73M*2
EGFRCR SERPLBLD CKD-EPI 2021: 33 ML/MIN/1.73M*2
EOSINOPHIL # BLD AUTO: 0 X10*3/UL (ref 0–0.7)
EOSINOPHIL # BLD AUTO: 0.01 X10*3/UL (ref 0–0.7)
EOSINOPHIL # BLD MANUAL: 0 X10*3/UL (ref 0–0.7)
EOSINOPHIL # BLD MANUAL: 0 X10*3/UL (ref 0–0.7)
EOSINOPHIL NFR BLD AUTO: 0 %
EOSINOPHIL NFR BLD AUTO: 0 %
EOSINOPHIL NFR BLD MANUAL: 0 %
EOSINOPHIL NFR BLD MANUAL: 0 %
ERYTHROCYTE [DISTWIDTH] IN BLOOD BY AUTOMATED COUNT: 18.9 % (ref 11.5–14.5)
ERYTHROCYTE [DISTWIDTH] IN BLOOD BY AUTOMATED COUNT: 28.5 % (ref 11.5–14.5)
ERYTHROCYTE [DISTWIDTH] IN BLOOD BY AUTOMATED COUNT: 28.9 % (ref 11.5–14.5)
ERYTHROCYTE [DISTWIDTH] IN BLOOD BY AUTOMATED COUNT: 29.1 % (ref 11.5–14.5)
FIBRINOGEN BLD CALC-MCNC: 274 MG/DL (ref 278–581)
GLUCOSE BLDA-MCNC: 105 MG/DL (ref 74–99)
GLUCOSE BLDA-MCNC: 109 MG/DL (ref 74–99)
GLUCOSE BLDA-MCNC: 128 MG/DL (ref 74–99)
GLUCOSE BLDA-MCNC: 131 MG/DL (ref 74–99)
GLUCOSE BLDA-MCNC: 138 MG/DL (ref 74–99)
GLUCOSE BLDA-MCNC: 257 MG/DL (ref 74–99)
GLUCOSE BLDA-MCNC: 73 MG/DL (ref 74–99)
GLUCOSE BLDA-MCNC: 89 MG/DL (ref 74–99)
GLUCOSE BLDA-MCNC: 91 MG/DL (ref 74–99)
GLUCOSE BLDA-MCNC: 92 MG/DL (ref 74–99)
GLUCOSE SERPL-MCNC: 114 MG/DL (ref 74–99)
GLUCOSE SERPL-MCNC: 132 MG/DL (ref 74–99)
GLUCOSE SERPL-MCNC: 139 MG/DL (ref 74–99)
GLUCOSE SERPL-MCNC: 98 MG/DL (ref 74–99)
HCO3 BLDA-SCNC: 14.1 MMOL/L (ref 22–26)
HCO3 BLDA-SCNC: 14.6 MMOL/L (ref 22–26)
HCO3 BLDA-SCNC: 15.9 MMOL/L (ref 22–26)
HCO3 BLDA-SCNC: 16.3 MMOL/L (ref 22–26)
HCO3 BLDA-SCNC: 17.8 MMOL/L (ref 22–26)
HCO3 BLDA-SCNC: 19.2 MMOL/L (ref 22–26)
HCO3 BLDA-SCNC: 22.4 MMOL/L (ref 22–26)
HCO3 BLDA-SCNC: 24.8 MMOL/L (ref 22–26)
HCO3 BLDA-SCNC: 66.4 MMOL/L (ref 22–26)
HCO3 BLDA-SCNC: 9.2 MMOL/L (ref 22–26)
HCT VFR BLD AUTO: 21 % (ref 36–46)
HCT VFR BLD AUTO: 24.1 % (ref 36–46)
HCT VFR BLD AUTO: 26.6 % (ref 36–46)
HCT VFR BLD AUTO: 42.9 % (ref 36–46)
HCT VFR BLD EST: 20 % (ref 36–46)
HCT VFR BLD EST: 20 % (ref 36–46)
HCT VFR BLD EST: 21 % (ref 36–46)
HCT VFR BLD EST: 22 % (ref 36–46)
HCT VFR BLD EST: 22 % (ref 36–46)
HCT VFR BLD EST: 25 % (ref 36–46)
HCT VFR BLD EST: 26 % (ref 36–46)
HCT VFR BLD EST: 37 % (ref 36–46)
HCT VFR BLD EST: 39 % (ref 36–46)
HCT VFR BLD EST: 40 % (ref 36–46)
HGB BLD-MCNC: 13.1 G/DL (ref 12–16)
HGB BLD-MCNC: 6.5 G/DL (ref 12–16)
HGB BLD-MCNC: 7.9 G/DL (ref 12–16)
HGB BLD-MCNC: 8.7 G/DL (ref 12–16)
HGB BLDA-MCNC: 12.3 G/DL (ref 12–16)
HGB BLDA-MCNC: 13.1 G/DL (ref 12–16)
HGB BLDA-MCNC: 13.2 G/DL (ref 12–16)
HGB BLDA-MCNC: 6.8 G/DL (ref 12–16)
HGB BLDA-MCNC: 6.8 G/DL (ref 12–16)
HGB BLDA-MCNC: 7 G/DL (ref 12–16)
HGB BLDA-MCNC: 7.2 G/DL (ref 12–16)
HGB BLDA-MCNC: 7.4 G/DL (ref 12–16)
HGB BLDA-MCNC: 8.2 G/DL (ref 12–16)
HGB BLDA-MCNC: 8.8 G/DL (ref 12–16)
HYPOCHROMIA BLD QL SMEAR: ABNORMAL
HYPOCHROMIA BLD QL SMEAR: ABNORMAL
HYPOCHROMIA BLD QL SMEAR: NORMAL
HYPOCHROMIA BLD QL SMEAR: NORMAL
IMM GRANULOCYTES # BLD AUTO: 0.19 X10*3/UL (ref 0–0.7)
IMM GRANULOCYTES # BLD AUTO: 0.28 X10*3/UL (ref 0–0.7)
IMM GRANULOCYTES # BLD AUTO: 0.29 X10*3/UL (ref 0–0.7)
IMM GRANULOCYTES # BLD AUTO: 1.84 X10*3/UL (ref 0–0.7)
IMM GRANULOCYTES NFR BLD AUTO: 0.9 % (ref 0–0.9)
IMM GRANULOCYTES NFR BLD AUTO: 1 % (ref 0–0.9)
IMM GRANULOCYTES NFR BLD AUTO: 1.1 % (ref 0–0.9)
IMM GRANULOCYTES NFR BLD AUTO: 7.4 % (ref 0–0.9)
INHALED O2 CONCENTRATION: 100 %
INHALED O2 CONCENTRATION: 100 %
INHALED O2 CONCENTRATION: 28 %
INHALED O2 CONCENTRATION: 50 %
INR PPP: 1.9 (ref 0.9–1.1)
INR PPP: 2 (ref 0.9–1.1)
LACTATE BLDA-SCNC: 10.1 MMOL/L (ref 0.4–2)
LACTATE BLDA-SCNC: 10.1 MMOL/L (ref 0.4–2)
LACTATE BLDA-SCNC: 12 MMOL/L (ref 0.4–2)
LACTATE BLDA-SCNC: 14 MMOL/L (ref 0.4–2)
LACTATE BLDA-SCNC: 15.2 MMOL/L (ref 0.4–2)
LACTATE BLDA-SCNC: 6.7 MMOL/L (ref 0.4–2)
LACTATE BLDA-SCNC: 8.8 MMOL/L (ref 0.4–2)
LACTATE BLDA-SCNC: >17 MMOL/L (ref 0.4–2)
LACTATE SERPL-SCNC: 17.4 MMOL/L (ref 0.4–2)
LACTATE SERPL-SCNC: 22.1 MMOL/L (ref 0.4–2)
LYMPHOCYTES # BLD AUTO: 0.65 X10*3/UL (ref 1.2–4.8)
LYMPHOCYTES # BLD AUTO: 1.32 X10*3/UL (ref 1.2–4.8)
LYMPHOCYTES # BLD MANUAL: 1.24 X10*3/UL (ref 1.2–4.8)
LYMPHOCYTES # BLD MANUAL: 2.85 X10*3/UL (ref 1.2–4.8)
LYMPHOCYTES NFR BLD AUTO: 2.5 %
LYMPHOCYTES NFR BLD AUTO: 4.8 %
LYMPHOCYTES NFR BLD MANUAL: 11.5 %
LYMPHOCYTES NFR BLD MANUAL: 5.6 %
MA KAOLIN BLD RES TEG: 55.2 MM (ref 52–69)
MA KAOLIN+TF BLD RES TEG: 51.2 MM (ref 52–70)
MA TF IND+IIB-IIIA INH BLD RES TEG: 15 MM (ref 15–32)
MAGNESIUM SERPL-MCNC: 1.83 MG/DL (ref 1.6–2.4)
MAGNESIUM SERPL-MCNC: 2.16 MG/DL (ref 1.6–2.4)
MAGNESIUM SERPL-MCNC: 2.18 MG/DL (ref 1.6–2.4)
MCH RBC QN AUTO: 24.1 PG (ref 26–34)
MCH RBC QN AUTO: 24.4 PG (ref 26–34)
MCH RBC QN AUTO: 24.5 PG (ref 26–34)
MCH RBC QN AUTO: 27 PG (ref 26–34)
MCHC RBC AUTO-ENTMCNC: 30.5 G/DL (ref 32–36)
MCHC RBC AUTO-ENTMCNC: 31 G/DL (ref 32–36)
MCHC RBC AUTO-ENTMCNC: 32.7 G/DL (ref 32–36)
MCHC RBC AUTO-ENTMCNC: 32.8 G/DL (ref 32–36)
MCV RBC AUTO: 74 FL (ref 80–100)
MCV RBC AUTO: 75 FL (ref 80–100)
MCV RBC AUTO: 79 FL (ref 80–100)
MCV RBC AUTO: 89 FL (ref 80–100)
MONOCYTES # BLD AUTO: 4.07 X10*3/UL (ref 0.1–1)
MONOCYTES # BLD AUTO: 4.81 X10*3/UL (ref 0.1–1)
MONOCYTES # BLD MANUAL: 1.6 X10*3/UL (ref 0.1–1)
MONOCYTES # BLD MANUAL: 1.64 X10*3/UL (ref 0.1–1)
MONOCYTES NFR BLD AUTO: 15.6 %
MONOCYTES NFR BLD AUTO: 17.5 %
MONOCYTES NFR BLD MANUAL: 6.6 %
MONOCYTES NFR BLD MANUAL: 7.2 %
NEUTROPHILS # BLD AUTO: 20.94 X10*3/UL (ref 1.2–7.7)
NEUTROPHILS # BLD AUTO: 21.12 X10*3/UL (ref 1.2–7.7)
NEUTROPHILS # BLD MANUAL: 19.36 X10*3/UL (ref 1.2–7.7)
NEUTROPHILS NFR BLD AUTO: 76.5 %
NEUTROPHILS NFR BLD AUTO: 80.7 %
NEUTS BAND # BLD MANUAL: 0.18 X10*3/UL (ref 0–0.7)
NEUTS BAND NFR BLD MANUAL: 0.8 %
NEUTS SEG # BLD MANUAL: 19.18 X10*3/UL (ref 1.2–7)
NEUTS SEG # BLD MANUAL: 20.11 X10*3/UL (ref 1.2–7)
NEUTS SEG NFR BLD MANUAL: 81.1 %
NEUTS SEG NFR BLD MANUAL: 86.4 %
NRBC BLD-RTO: 0.7 /100 WBCS (ref 0–0)
NRBC BLD-RTO: 0.8 /100 WBCS (ref 0–0)
NRBC BLD-RTO: 1.3 /100 WBCS (ref 0–0)
NRBC BLD-RTO: 5.6 /100 WBCS (ref 0–0)
OVALOCYTES BLD QL SMEAR: ABNORMAL
OVALOCYTES BLD QL SMEAR: NORMAL
OVALOCYTES BLD QL SMEAR: NORMAL
OXYHGB MFR BLDA: 32.9 % (ref 94–98)
OXYHGB MFR BLDA: 88.7 % (ref 94–98)
OXYHGB MFR BLDA: 89.8 % (ref 94–98)
OXYHGB MFR BLDA: 90.1 % (ref 94–98)
OXYHGB MFR BLDA: 95.8 % (ref 94–98)
OXYHGB MFR BLDA: 95.9 % (ref 94–98)
OXYHGB MFR BLDA: 96 % (ref 94–98)
OXYHGB MFR BLDA: 96 % (ref 94–98)
OXYHGB MFR BLDA: 96.2 % (ref 94–98)
OXYHGB MFR BLDA: 97.3 % (ref 94–98)
PCO2 BLDA: 100 MM HG (ref 38–42)
PCO2 BLDA: 31 MM HG (ref 38–42)
PCO2 BLDA: 33 MM HG (ref 38–42)
PCO2 BLDA: 33 MM HG (ref 38–42)
PCO2 BLDA: 34 MM HG (ref 38–42)
PCO2 BLDA: 37 MM HG (ref 38–42)
PCO2 BLDA: 42 MM HG (ref 38–42)
PCO2 BLDA: 49 MM HG (ref 38–42)
PCO2 BLDA: 73 MM HG (ref 38–42)
PCO2 BLDA: 89 MM HG (ref 38–42)
PH BLDA: 6.86 PH (ref 7.38–7.42)
PH BLDA: 6.88 PH (ref 7.38–7.42)
PH BLDA: 6.91 PH (ref 7.38–7.42)
PH BLDA: 7.24 PH (ref 7.38–7.42)
PH BLDA: 7.29 PH (ref 7.38–7.42)
PH BLDA: 7.34 PH (ref 7.38–7.42)
PH BLDA: 7.38 PH (ref 7.38–7.42)
PH BLDA: 7.39 PH (ref 7.38–7.42)
PH BLDA: 7.4 PH (ref 7.38–7.42)
PH BLDA: 7.43 PH (ref 7.38–7.42)
PHOSPHATE SERPL-MCNC: 11.9 MG/DL (ref 2.5–4.9)
PHOSPHATE SERPL-MCNC: 12.3 MG/DL (ref 2.5–4.9)
PHOSPHATE SERPL-MCNC: 7 MG/DL (ref 2.5–4.9)
PHOSPHATE SERPL-MCNC: 8.6 MG/DL (ref 2.5–4.9)
PLATELET # BLD AUTO: 129 X10*3/UL (ref 150–450)
PLATELET # BLD AUTO: 182 X10*3/UL (ref 150–450)
PLATELET # BLD AUTO: 213 X10*3/UL (ref 150–450)
PLATELET # BLD AUTO: 227 X10*3/UL (ref 150–450)
PO2 BLDA: 101 MM HG (ref 85–95)
PO2 BLDA: 104 MM HG (ref 85–95)
PO2 BLDA: 106 MM HG (ref 85–95)
PO2 BLDA: 106 MM HG (ref 85–95)
PO2 BLDA: 112 MM HG (ref 85–95)
PO2 BLDA: 222 MM HG (ref 85–95)
PO2 BLDA: 26 MM HG (ref 85–95)
PO2 BLDA: 71 MM HG (ref 85–95)
PO2 BLDA: 73 MM HG (ref 85–95)
PO2 BLDA: 77 MM HG (ref 85–95)
POLYCHROMASIA BLD QL SMEAR: ABNORMAL
POLYCHROMASIA BLD QL SMEAR: ABNORMAL
POLYCHROMASIA BLD QL SMEAR: NORMAL
POLYCHROMASIA BLD QL SMEAR: NORMAL
POTASSIUM BLDA-SCNC: 4.7 MMOL/L (ref 3.5–5.3)
POTASSIUM BLDA-SCNC: 5.1 MMOL/L (ref 3.5–5.3)
POTASSIUM BLDA-SCNC: 5.2 MMOL/L (ref 3.5–5.3)
POTASSIUM BLDA-SCNC: 5.2 MMOL/L (ref 3.5–5.3)
POTASSIUM BLDA-SCNC: 5.3 MMOL/L (ref 3.5–5.3)
POTASSIUM BLDA-SCNC: 5.4 MMOL/L (ref 3.5–5.3)
POTASSIUM BLDA-SCNC: 6 MMOL/L (ref 3.5–5.3)
POTASSIUM BLDA-SCNC: 6.1 MMOL/L (ref 3.5–5.3)
POTASSIUM SERPL-SCNC: 4.9 MMOL/L (ref 3.5–5.3)
POTASSIUM SERPL-SCNC: 5.2 MMOL/L (ref 3.5–5.3)
POTASSIUM SERPL-SCNC: 5.5 MMOL/L (ref 3.5–5.3)
POTASSIUM SERPL-SCNC: 6.3 MMOL/L (ref 3.5–5.3)
PRODUCT BLOOD TYPE: 1700
PRODUCT BLOOD TYPE: 7300
PRODUCT CODE: NORMAL
PROTHROMBIN TIME: 21.2 SECONDS (ref 9.8–12.8)
PROTHROMBIN TIME: 22.3 SECONDS (ref 9.8–12.8)
RBC # BLD AUTO: 2.66 X10*6/UL (ref 4–5.2)
RBC # BLD AUTO: 3.23 X10*6/UL (ref 4–5.2)
RBC # BLD AUTO: 3.61 X10*6/UL (ref 4–5.2)
RBC # BLD AUTO: 4.85 X10*6/UL (ref 4–5.2)
RBC MORPH BLD: ABNORMAL
RBC MORPH BLD: ABNORMAL
RBC MORPH BLD: NORMAL
RBC MORPH BLD: NORMAL
SAO2 % BLDA: 100 % (ref 94–100)
SAO2 % BLDA: 100 % (ref 94–100)
SAO2 % BLDA: 34 % (ref 94–100)
SAO2 % BLDA: 91 % (ref 94–100)
SAO2 % BLDA: 92 % (ref 94–100)
SAO2 % BLDA: 92 % (ref 94–100)
SAO2 % BLDA: 99 % (ref 94–100)
SCHISTOCYTES BLD QL SMEAR: ABNORMAL
SCHISTOCYTES BLD QL SMEAR: NORMAL
SCHISTOCYTES BLD QL SMEAR: NORMAL
SODIUM BLDA-SCNC: 129 MMOL/L (ref 136–145)
SODIUM BLDA-SCNC: 130 MMOL/L (ref 136–145)
SODIUM BLDA-SCNC: 131 MMOL/L (ref 136–145)
SODIUM BLDA-SCNC: 131 MMOL/L (ref 136–145)
SODIUM BLDA-SCNC: 132 MMOL/L (ref 136–145)
SODIUM BLDA-SCNC: 133 MMOL/L (ref 136–145)
SODIUM BLDA-SCNC: 134 MMOL/L (ref 136–145)
SODIUM BLDA-SCNC: 138 MMOL/L (ref 136–145)
SODIUM BLDA-SCNC: 148 MMOL/L (ref 136–145)
SODIUM BLDA-SCNC: 163 MMOL/L (ref 136–145)
SODIUM SERPL-SCNC: 133 MMOL/L (ref 136–145)
SODIUM SERPL-SCNC: 136 MMOL/L (ref 136–145)
SODIUM SERPL-SCNC: 141 MMOL/L (ref 136–145)
SODIUM SERPL-SCNC: 142 MMOL/L (ref 136–145)
TARGETS BLD QL SMEAR: ABNORMAL
TARGETS BLD QL SMEAR: NORMAL
TARGETS BLD QL SMEAR: NORMAL
TOTAL CELLS COUNTED BLD: 122
TOTAL CELLS COUNTED BLD: 125
UNIT ABO: NORMAL
UNIT NUMBER: NORMAL
UNIT RH: NORMAL
UNIT VOLUME: 176
UNIT VOLUME: 350
VARIANT LYMPHS # BLD MANUAL: 0.2 X10*3/UL (ref 0–0.5)
VARIANT LYMPHS NFR BLD: 0.8 %
WBC # BLD AUTO: 22.2 X10*3/UL (ref 4.4–11.3)
WBC # BLD AUTO: 24.8 X10*3/UL (ref 4.4–11.3)
WBC # BLD AUTO: 26.2 X10*3/UL (ref 4.4–11.3)
WBC # BLD AUTO: 27.4 X10*3/UL (ref 4.4–11.3)
XM INTEP: NORMAL

## 2024-10-14 PROCEDURE — 2500000004 HC RX 250 GENERAL PHARMACY W/ HCPCS (ALT 636 FOR OP/ED)

## 2024-10-14 PROCEDURE — 36556 INSERT NON-TUNNEL CV CATH: CPT | Performed by: STUDENT IN AN ORGANIZED HEALTH CARE EDUCATION/TRAINING PROGRAM

## 2024-10-14 PROCEDURE — 99232 SBSQ HOSP IP/OBS MODERATE 35: CPT | Performed by: SURGERY

## 2024-10-14 PROCEDURE — 5A1935Z RESPIRATORY VENTILATION, LESS THAN 24 CONSECUTIVE HOURS: ICD-10-PCS | Performed by: INTERNAL MEDICINE

## 2024-10-14 PROCEDURE — 82435 ASSAY OF BLOOD CHLORIDE: CPT

## 2024-10-14 PROCEDURE — 02H633Z INSERTION OF INFUSION DEVICE INTO RIGHT ATRIUM, PERCUTANEOUS APPROACH: ICD-10-PCS | Performed by: INTERNAL MEDICINE

## 2024-10-14 PROCEDURE — 85384 FIBRINOGEN ACTIVITY: CPT

## 2024-10-14 PROCEDURE — 74174 CTA ABD&PLVS W/CONTRAST: CPT | Performed by: RADIOLOGY

## 2024-10-14 PROCEDURE — 2550000001 HC RX 255 CONTRASTS: Performed by: HOSPITALIST

## 2024-10-14 PROCEDURE — 31500 INSERT EMERGENCY AIRWAY: CPT | Mod: GC | Performed by: STUDENT IN AN ORGANIZED HEALTH CARE EDUCATION/TRAINING PROGRAM

## 2024-10-14 PROCEDURE — 85007 BL SMEAR W/DIFF WBC COUNT: CPT | Performed by: STUDENT IN AN ORGANIZED HEALTH CARE EDUCATION/TRAINING PROGRAM

## 2024-10-14 PROCEDURE — 82435 ASSAY OF BLOOD CHLORIDE: CPT | Performed by: STUDENT IN AN ORGANIZED HEALTH CARE EDUCATION/TRAINING PROGRAM

## 2024-10-14 PROCEDURE — 0BH17EZ INSERTION OF ENDOTRACHEAL AIRWAY INTO TRACHEA, VIA NATURAL OR ARTIFICIAL OPENING: ICD-10-PCS | Performed by: INTERNAL MEDICINE

## 2024-10-14 PROCEDURE — 85610 PROTHROMBIN TIME: CPT | Performed by: STUDENT IN AN ORGANIZED HEALTH CARE EDUCATION/TRAINING PROGRAM

## 2024-10-14 PROCEDURE — 36430 TRANSFUSION BLD/BLD COMPNT: CPT

## 2024-10-14 PROCEDURE — 36415 COLL VENOUS BLD VENIPUNCTURE: CPT

## 2024-10-14 PROCEDURE — P9016 RBC LEUKOCYTES REDUCED: HCPCS

## 2024-10-14 PROCEDURE — 83605 ASSAY OF LACTIC ACID: CPT | Performed by: STUDENT IN AN ORGANIZED HEALTH CARE EDUCATION/TRAINING PROGRAM

## 2024-10-14 PROCEDURE — 85025 COMPLETE CBC W/AUTO DIFF WBC: CPT

## 2024-10-14 PROCEDURE — 2500000005 HC RX 250 GENERAL PHARMACY W/O HCPCS: Performed by: STUDENT IN AN ORGANIZED HEALTH CARE EDUCATION/TRAINING PROGRAM

## 2024-10-14 PROCEDURE — 85610 PROTHROMBIN TIME: CPT

## 2024-10-14 PROCEDURE — 94002 VENT MGMT INPAT INIT DAY: CPT

## 2024-10-14 PROCEDURE — 71045 X-RAY EXAM CHEST 1 VIEW: CPT | Performed by: RADIOLOGY

## 2024-10-14 PROCEDURE — 80069 RENAL FUNCTION PANEL: CPT

## 2024-10-14 PROCEDURE — 36557 INSERT TUNNELED CV CATH: CPT | Performed by: STUDENT IN AN ORGANIZED HEALTH CARE EDUCATION/TRAINING PROGRAM

## 2024-10-14 PROCEDURE — 3E043XZ INTRODUCTION OF VASOPRESSOR INTO CENTRAL VEIN, PERCUTANEOUS APPROACH: ICD-10-PCS | Performed by: INTERNAL MEDICINE

## 2024-10-14 PROCEDURE — 2500000002 HC RX 250 W HCPCS SELF ADMINISTERED DRUGS (ALT 637 FOR MEDICARE OP, ALT 636 FOR OP/ED)

## 2024-10-14 PROCEDURE — 83735 ASSAY OF MAGNESIUM: CPT | Performed by: STUDENT IN AN ORGANIZED HEALTH CARE EDUCATION/TRAINING PROGRAM

## 2024-10-14 PROCEDURE — 85027 COMPLETE CBC AUTOMATED: CPT | Performed by: STUDENT IN AN ORGANIZED HEALTH CARE EDUCATION/TRAINING PROGRAM

## 2024-10-14 PROCEDURE — 85027 COMPLETE CBC AUTOMATED: CPT

## 2024-10-14 PROCEDURE — 74018 RADEX ABDOMEN 1 VIEW: CPT | Performed by: RADIOLOGY

## 2024-10-14 PROCEDURE — 37799 UNLISTED PX VASCULAR SURGERY: CPT

## 2024-10-14 PROCEDURE — P9017 PLASMA 1 DONOR FRZ W/IN 8 HR: HCPCS

## 2024-10-14 PROCEDURE — P9073 PLATELETS PHERESIS PATH REDU: HCPCS

## 2024-10-14 PROCEDURE — 83735 ASSAY OF MAGNESIUM: CPT

## 2024-10-14 PROCEDURE — 2500000005 HC RX 250 GENERAL PHARMACY W/O HCPCS

## 2024-10-14 PROCEDURE — 85007 BL SMEAR W/DIFF WBC COUNT: CPT

## 2024-10-14 PROCEDURE — 99239 HOSP IP/OBS DSCHRG MGMT >30: CPT

## 2024-10-14 PROCEDURE — 87081 CULTURE SCREEN ONLY: CPT

## 2024-10-14 PROCEDURE — 87040 BLOOD CULTURE FOR BACTERIA: CPT

## 2024-10-14 RX ORDER — PHENYLEPHRINE 10 MG/250 ML(40 MCG/ML)IN 0.9 % SOD.CHLORIDE INTRAVENOUS
0-2 CONTINUOUS
Status: DISCONTINUED | OUTPATIENT
Start: 2024-10-14 | End: 2024-10-14

## 2024-10-14 RX ORDER — MIDAZOLAM HYDROCHLORIDE 1 MG/ML
0-20 INJECTION, SOLUTION INTRAVENOUS CONTINUOUS
Status: DISCONTINUED | OUTPATIENT
Start: 2024-10-14 | End: 2024-10-15 | Stop reason: HOSPADM

## 2024-10-14 RX ORDER — LORAZEPAM 2 MG/ML
0.5 INJECTION INTRAMUSCULAR EVERY 4 HOURS PRN
Status: DISCONTINUED | OUTPATIENT
Start: 2024-10-14 | End: 2024-10-15 | Stop reason: HOSPADM

## 2024-10-14 RX ORDER — VANCOMYCIN HYDROCHLORIDE 1 G/200ML
1000 INJECTION, SOLUTION INTRAVENOUS EVERY 24 HOURS
Status: DISCONTINUED | OUTPATIENT
Start: 2024-10-14 | End: 2024-10-14

## 2024-10-14 RX ORDER — CALCIUM GLUCONATE 20 MG/ML
2 INJECTION, SOLUTION INTRAVENOUS ONCE
Status: COMPLETED | OUTPATIENT
Start: 2024-10-14 | End: 2024-10-14

## 2024-10-14 RX ORDER — VANCOMYCIN HYDROCHLORIDE 1 G/20ML
INJECTION, POWDER, LYOPHILIZED, FOR SOLUTION INTRAVENOUS DAILY PRN
Status: DISCONTINUED | OUTPATIENT
Start: 2024-10-14 | End: 2024-10-14

## 2024-10-14 RX ORDER — FENTANYL CITRATE-0.9 % NACL/PF 10 MCG/ML
0-300 PLASTIC BAG, INJECTION (ML) INTRAVENOUS CONTINUOUS
Status: DISCONTINUED | OUTPATIENT
Start: 2024-10-14 | End: 2024-10-15 | Stop reason: HOSPADM

## 2024-10-14 RX ORDER — PHENYLEPHRINE HYDROCHLORIDE 10 MG/ML
INJECTION INTRAVENOUS
Status: COMPLETED
Start: 2024-10-14 | End: 2024-10-14

## 2024-10-14 RX ORDER — ATROPINE SULFATE 0.1 MG/ML
INJECTION INTRAVENOUS
Status: COMPLETED
Start: 2024-10-14 | End: 2024-10-14

## 2024-10-14 RX ORDER — EPINEPHRINE 0.1 MG/ML
1 INJECTION INTRACARDIAC; INTRAVENOUS
Status: ACTIVE | OUTPATIENT
Start: 2024-10-14 | End: 2024-10-14

## 2024-10-14 RX ORDER — EPINEPHRINE HCL IN DEXTROSE 5% 4MG/250ML
0-.2 PLASTIC BAG, INJECTION (ML) INTRAVENOUS CONTINUOUS
Status: DISCONTINUED | OUTPATIENT
Start: 2024-10-14 | End: 2024-10-14

## 2024-10-14 RX ORDER — LORAZEPAM 2 MG/ML
2 INJECTION INTRAMUSCULAR EVERY 10 MIN PRN
Status: DISCONTINUED | OUTPATIENT
Start: 2024-10-14 | End: 2024-10-15 | Stop reason: HOSPADM

## 2024-10-14 RX ORDER — DEXTROSE MONOHYDRATE 100 MG/ML
50 INJECTION, SOLUTION INTRAVENOUS CONTINUOUS
Status: DISCONTINUED | OUTPATIENT
Start: 2024-10-14 | End: 2024-10-14

## 2024-10-14 RX ORDER — EPINEPHRINE 0.1 MG/ML
2 INJECTION INTRACARDIAC; INTRAVENOUS ONCE
Status: DISCONTINUED | OUTPATIENT
Start: 2024-10-14 | End: 2024-10-14

## 2024-10-14 RX ORDER — MORPHINE SULFATE 4 MG/ML
2 INJECTION, SOLUTION INTRAMUSCULAR; INTRAVENOUS
Status: DISCONTINUED | OUTPATIENT
Start: 2024-10-14 | End: 2024-10-15 | Stop reason: HOSPADM

## 2024-10-14 RX ORDER — OLANZAPINE 10 MG/2ML
2.5 INJECTION, POWDER, FOR SOLUTION INTRAMUSCULAR ONCE AS NEEDED
Status: COMPLETED | OUTPATIENT
Start: 2024-10-14 | End: 2024-10-14

## 2024-10-14 RX ORDER — SODIUM BICARBONATE 1 MEQ/ML
50 SYRINGE (ML) INTRAVENOUS EVERY 5 MIN PRN
Status: DISCONTINUED | OUTPATIENT
Start: 2024-10-14 | End: 2024-10-15 | Stop reason: HOSPADM

## 2024-10-14 RX ORDER — DEXTROSE 50 % IN WATER (D50W) INTRAVENOUS SYRINGE
25 ONCE
Status: COMPLETED | OUTPATIENT
Start: 2024-10-14 | End: 2024-10-14

## 2024-10-14 RX ORDER — ALBUTEROL SULFATE 0.83 MG/ML
15 SOLUTION RESPIRATORY (INHALATION) ONCE
Status: DISCONTINUED | OUTPATIENT
Start: 2024-10-14 | End: 2024-10-14

## 2024-10-14 RX ORDER — ATROPINE SULFATE 0.1 MG/ML
0.5 INJECTION INTRAVENOUS ONCE
Status: COMPLETED | OUTPATIENT
Start: 2024-10-14 | End: 2024-10-14

## 2024-10-14 RX ORDER — GLYCOPYRROLATE 0.2 MG/ML
0.2 INJECTION INTRAMUSCULAR; INTRAVENOUS EVERY 4 HOURS PRN
Status: DISCONTINUED | OUTPATIENT
Start: 2024-10-14 | End: 2024-10-15 | Stop reason: HOSPADM

## 2024-10-14 RX ORDER — HALOPERIDOL 5 MG/ML
1 INJECTION INTRAMUSCULAR EVERY 4 HOURS PRN
Status: DISCONTINUED | OUTPATIENT
Start: 2024-10-14 | End: 2024-10-15 | Stop reason: HOSPADM

## 2024-10-14 RX ORDER — SODIUM BICARBONATE 1 MEQ/ML
200 SYRINGE (ML) INTRAVENOUS ONCE
Status: DISCONTINUED | OUTPATIENT
Start: 2024-10-14 | End: 2024-10-15 | Stop reason: HOSPADM

## 2024-10-14 RX ORDER — MIDAZOLAM HYDROCHLORIDE 1 MG/ML
2 INJECTION INTRAMUSCULAR; INTRAVENOUS EVERY 2 HOUR PRN
Status: CANCELLED | OUTPATIENT
Start: 2024-10-14

## 2024-10-14 RX ORDER — PHENYLEPHRINE HCL IN 0.9% NACL 0.4MG/10ML
SYRINGE (ML) INTRAVENOUS
Status: DISCONTINUED
Start: 2024-10-14 | End: 2024-10-14 | Stop reason: HOSPADM

## 2024-10-14 RX ORDER — NOREPINEPHRINE BITARTRATE/D5W 8 MG/250ML
0-1 PLASTIC BAG, INJECTION (ML) INTRAVENOUS CONTINUOUS
Status: DISCONTINUED | OUTPATIENT
Start: 2024-10-14 | End: 2024-10-14

## 2024-10-14 RX ORDER — VANCOMYCIN HYDROCHLORIDE 1 G/200ML
15 INJECTION, SOLUTION INTRAVENOUS ONCE
Status: DISCONTINUED | OUTPATIENT
Start: 2024-10-14 | End: 2024-10-14 | Stop reason: DRUGHIGH

## 2024-10-14 RX ORDER — BUMETANIDE 0.25 MG/ML
4 INJECTION, SOLUTION INTRAMUSCULAR; INTRAVENOUS ONCE
Status: DISCONTINUED | OUTPATIENT
Start: 2024-10-14 | End: 2024-10-14

## 2024-10-14 ASSESSMENT — PAIN - FUNCTIONAL ASSESSMENT
PAIN_FUNCTIONAL_ASSESSMENT: 0-10
PAIN_FUNCTIONAL_ASSESSMENT: CPOT (CRITICAL CARE PAIN OBSERVATION TOOL)
PAIN_FUNCTIONAL_ASSESSMENT: CPOT (CRITICAL CARE PAIN OBSERVATION TOOL)
PAIN_FUNCTIONAL_ASSESSMENT: 0-10

## 2024-10-14 ASSESSMENT — PAIN SCALES - GENERAL
PAINLEVEL_OUTOF10: 0 - NO PAIN
PAINLEVEL_OUTOF10: 0 - NO PAIN

## 2024-10-14 NOTE — PROGRESS NOTES
"CARDIAC ICU PROGRESS NOTE    Hilda Jones/[id number]    Admit Date: 10/3/2024  Hospital Length of Stay: 11   ICU Length of Stay: 1d 1h     Subjective   This morning patient bradycardic and having difficulty protecting airway, therefore patient was intubated.  She was hypotensive requiring levo, vaso, phenylephrine.  Epi and amnio drip started. Amio discontinued due to bradycardia.      ABG showing pH down to 6.88 with increasing lactate.  ACS reevaluated and suggested MTP. Patient continued to be hypotensive and bradycardic after multiple RBC, platelet, and plasma transfusions.  On reevaluation ACS determined intervention high risk of exsanguination and that this is a terminal condition.  ACS updated family and family decided to change code status to DNR.         Objective    VITALS:   Visit Vitals  BP 98/65   Pulse 64   Temp 35.9 °C (96.6 °F)   Resp 22   Ht 1.651 m (5' 5\")   Wt 70.4 kg (155 lb 3.3 oz)   SpO2 (!) 55%   BMI 25.83 kg/m²   OB Status Postmenopausal   Smoking Status Every Day   BSA 1.8 m²       Vent settings:    Most Recent Range Past 24hrs   Mode Pressure control/assist control    FiO2   No data recorded   Rate 22 Resp Rate (Set)  Min: 22   Min taken time: 10/14/24 1555  Max: 22   Max taken time: 10/14/24 1555   Vt 350 mL  Vt (Set, mL)  Min: 350 mL   Min taken time: 10/14/24 1115  Max: 350 mL   Max taken time: 10/14/24 1115   PEEP 5 cm H20 PEEP/CPAP (cm H2O)  Min: 5 cm H20   Min taken time: 10/14/24 1555  Max: 5 cm H20   Max taken time: 10/14/24 1555     Invasive Hemodynamics:    Most Recent Range Past 24hrs   BP (Art) 106/59 Arterial Line BP 1  Min: 56/49  Max: 165/129   MAP(Art) 72 mmHg Arterial Line MAP 1 (mmHg)   Min: 52 mmHg  Max: 144 mmHg   RA/CVP   No data recorded   PA   No data recorded   PA(mean)   No data recorded   PCWP   No data recorded   CO   No data recorded   CI   No data recorded   Mixed Venous   No data recorded   SVR    No data recorded   PVR   No data recorded     Infusions:  dextrose " 10 % in water (D10W), Last Rate: 50 mL/hr (10/14/24 1649)  EPINEPHrine, Last Rate: 0.3 mcg/kg/min (10/14/24 1447)  fentaNYL  midazolam, Last Rate: 1 mg/hr (10/14/24 1200)  norepinephrine, Last Rate: 0.1 mcg/kg/min (10/14/24 1630)  phenylephrine, Last Rate: Stopped (10/14/24 1201)  sodium bicarbonate 150 mEq in dextrose 5% 1,150 mL infusion, Last Rate: 100 mL/hr (10/14/24 1200)  vasopressin, Last Rate: Stopped (10/14/24 1519)        INTAKE/OUTPUT:  I/O last 3 completed shifts:  In: 1600.2 (22.7 mL/kg) [I.V.:115.2 (1.6 mL/kg); Blood:710; IV Piggyback:775]  Out: 275 (3.9 mL/kg) [Urine:275 (0.1 mL/kg/hr)]  Weight: 70.4 kg      Wt Readings from Last 5 Encounters:   10/14/24 70.4 kg (155 lb 3.3 oz)   07/12/24 56.7 kg (125 lb)   03/30/24 58.1 kg (128 lb)   12/22/23 54.4 kg (120 lb)   12/20/23 45.8 kg (101 lb)       LABS:  CBC:  Recent Labs     10/14/24  1608 10/14/24  0844 10/14/24  0451 10/13/24  2311 10/13/24  1722 10/13/24  1544 10/13/24  1255 10/13/24  0417   WBC 24.8* 22.2* 26.2* 27.4* 18.0* 15.1* 13.9* 13.4*   HGB 13.1 6.5* 7.9* 8.7* 8.4* 5.9* 5.8* 8.4*   HCT 42.9 21.0* 24.1* 26.6* 26.3* 19.4* 20.4* 31.0*   * 182 213 227 234 239 311 353   MCV 89 79* 75* 74* 71* 67* 63* 67*     CMP:  Recent Labs     10/14/24  1221 10/14/24  0844 10/14/24  0451 10/13/24  2311 10/13/24  1544 10/13/24  0417 10/12/24  1025 10/11/24  0743 10/10/24  0910 10/09/24  0324 10/04/24  0500 10/03/24  1933 12/23/23  0557 12/22/23  0603 12/21/23  0552    136 133* 133* 134* 135* 139 139 141 136   < > 140 137 135* 136   K 6.3* 5.2 4.9 4.8 4.1 3.7 3.5 3.5 3.3* 4.1   < > 3.0* 3.9 3.8 3.8   CL 99 95* 96* 98 96* 96* 98 98 96* 96*   < > 99 104 101 98   CO2 11* 16* 19* 22 28 28 35* 32 37* 30   < > 30 26 27 29   ANIONGAP 37* 30* 23* 18 14 15 10 13 11 14   < > 14 11 11 13   BUN 21 21 20 18 15 12 9 9 10 14   < > 14 5* 4* 5*   CREATININE 1.67* 1.70* 1.67* 1.26* 1.03 0.94 0.89 0.92 0.92 1.12*   < > 1.16* 0.82 0.74 0.72   EGFR 33* 32* 33* 46* 59* 65  70 67 67 53*   < > 51* 78 88 >90   MG  --  2.16 1.83  --   --  1.71 1.72 1.74 1.84  --   --  1.98 2.11 1.87 2.29    < > = values in this interval not displayed.     Recent Labs     10/14/24  1221 10/14/24  0844 10/14/24  0451 10/13/24  2311 10/13/24  1544 10/13/24  0417 10/12/24  1025 10/11/24  0743 10/04/24  0500 10/03/24  1933 12/21/23  0552 12/20/23  0622 12/19/23  0619 12/18/23  2327 12/18/23  1648 10/06/23  2035 10/05/23  2342 08/14/23  1531 07/06/23  1511 05/28/23  1149 05/27/23  2250 12/28/22  0220 12/27/22  1735 08/17/22  1450 08/17/22  0123   ALBUMIN 2.1* 2.3* 2.8* 2.6* 2.4* 3.5 3.0* 3.0*   < > 3.3*   < > 2.7*   < > 2.7* 3.3*   < > 4.3   < > 4.2   < > 3.3*   < > 3.0*   < > 4.4   ALT  --   --   --   --   --   --   --   --   --  9  --  10  --  9 11  --  18  --  13  --  9  --  11   < > 11   AST  --   --   --   --   --   --   --   --   --  17  --  16  --  14 19  --  53*  --  16  --  13  --  12   < > 16   BILITOT  --   --   --   --   --   --   --   --   --  1.2  --  0.6  --  0.4 0.5  --  0.6  --  0.7  --  2.3*  --  3.5*   < > 0.6   LIPASE  --   --   --   --   --   --   --   --   --   --   --   --   --   --   --   --   --   --   --   --   --   --   --   --  41    < > = values in this interval not displayed.     COAG:   Recent Labs     10/14/24  1608 10/14/24  0451 10/13/24  1544 10/13/24  0417 10/12/24  1026 10/11/24  0743 12/20/23  0622 12/18/23  2327   INR 1.9* 2.0* 1.6* 1.5* 1.3* 1.6* 1.1 1.5*     ABO:   Recent Labs     10/13/24  1255   ABO B     HEME/ENDO:  Recent Labs     10/03/24  1958 10/13/23  0627 08/14/23  1531 07/25/23  1115 07/06/23  1511 05/30/23  1845 05/29/23  1828   FERRITIN  --   --  30  --   --  14  --    IRONSAT  --   --  12*  --   --  4*  --    TSH 2.73 4.35*  --   --    < >  --   --    HGBA1C  --   --   --  5.9*  --   --  6.8*    < > = values in this interval not displayed.      CARDIAC:   Recent Labs     10/03/24  1958 10/03/24  1933 08/14/23  1531 05/27/23  2250 12/27/22  1735  "08/18/22  0422 08/17/22  0124 08/17/22  0123 03/05/20  1535   TROPHS 9 8  --  7 9 13  --  5  --    BNP  --  887* 385* 546*  --   --  270*  --  891*     Recent Labs     10/14/24  1726 10/14/24  1608 10/14/24  1438 10/14/24  1125 10/14/24  0837   LACTATEART >17.0* >17.0* >17.0* 15.2* 14.0*     No results for input(s): \"TACROLIMUS\", \"SIROLIMUS\", \"CYCLOSPORINE\" in the last 94503 hours.    Results from last 7 days   Lab Units 10/14/24  1608 10/14/24  0844 10/14/24  0451   WBC AUTO x10*3/uL 24.8* 22.2* 26.2*   HEMOGLOBIN g/dL 13.1 6.5* 7.9*   HEMATOCRIT % 42.9 21.0* 24.1*   PLATELETS AUTO x10*3/uL 129* 182 213     Results from last 7 days   Lab Units 10/14/24  1221 10/14/24  0844 10/14/24  0451 10/13/24  1544 10/13/24  0417   SODIUM mmol/L 141 136 133*   < > 135*   POTASSIUM mmol/L 6.3* 5.2 4.9   < > 3.7   CO2 mmol/L 11* 16* 19*   < > 28   ANION GAP mmol/L 37* 30* 23*   < > 15   BUN mg/dL 21 21 20   < > 12   CREATININE mg/dL 1.67* 1.70* 1.67*   < > 0.94   GLUCOSE mg/dL 98 114* 132*   < > 124*   EGFR mL/min/1.73m*2 33* 32* 33*   < > 65   MAGNESIUM mg/dL  --  2.16 1.83  --  1.71   PHOSPHORUS mg/dL 11.9* 8.6* 7.0*   < > 3.6    < > = values in this interval not displayed.            No lab exists for component: \"BILIT\"   Results from last 7 days   Lab Units 10/14/24  1608 10/14/24  0451 10/13/24  1544   INR  1.9* 2.0* 1.6*     Results from last 7 days   Lab Units 10/14/24  1726 10/14/24  1608 10/14/24  1438   POCT PH, ARTERIAL pH 6.86* 6.91* 6.88*   POCT PO2, ARTERIAL mm Hg 77* 73* 71*   POCT PCO2, ARTERIAL mm Hg 89* 73* 49*   FIO2 % 100 100 50     Results from last 7 days   Lab Units 10/13/24  1934 10/13/24  1544 10/13/24  1215   POCT PH, VENOUS pH 7.24* 7.34 7.40   POCT PCO2, VENOUS mm Hg 52* 48 48     Results from last 7 days   Lab Units 10/14/24  0844 10/13/24  1834 10/13/24  1544   LACTATE mmol/L 17.4* 4.3* 5.1*               Lab Results   Component Value Date    TROPONINI <0.02 03/06/2020      Lab Results   Component " "Value Date    HGBA1C 5.9 (A) 07/25/2023      Lab Results   Component Value Date    CHOL 141 07/25/2023    CHOL 78 03/06/2020     Lab Results   Component Value Date    HDL 51.4 07/25/2023    HDL 18.8 (A) 03/06/2020     No results found for: \"LDLCALC\"  Lab Results   Component Value Date    TRIG 86 07/25/2023    TRIG 104 03/06/2020     No components found for: \"CHOLHDL\"     IMAGING:   XR chest 1 view    Result Date: 10/14/2024  Interpreted By:  Rigoberto Camara, STUDY: XR CHEST 1 VIEW; 10/14/2024 10:41 am   INDICATION: Signs/Symptoms:eval cvc pllacement.   COMPARISON: Radiograph dated 10/14/2024, 9:11 a.m.   ACCESSION NUMBER(S): [id number]   ORDERING CLINICIAN: CARL GILLOMBARDO   FINDINGS: ET tube is terminating 1.8 cm from the sally. Enteric tube is in place with the tip positioned at the level of gastric body. Right IJ central venous catheter is projecting over the right atrium.   The cardiac silhouette size is slightly enlarged, stable. Calcification of the aortic knob.   There is right midlung focal opacity. Blunting of the costophrenic angles. Wrist interstitial markings of the lungs. No sizable pneumothorax.   No acute osseous abnormality.       1. ET tube is 1.8 cm from the sally. Consider retraction. Other medical devices and postsurgical changes as described above. 2. Persistent bibasilar pleural effusion and atelectasis. Unchanged right midlung opacity       Signed by: Rigoberto Mcpherson 10/14/2024 11:02 AM Dictation workstation:   [id number]    XR chest 1 view    Result Date: 10/14/2024  Interpreted By:  Rigoberto Camara, STUDY: XR CHEST 1 VIEW; 10/14/2024 9:11 am   INDICATION: Signs/Symptoms:NG tube placement.   COMPARISON: Radiograph dated 10/14/2024   ACCESSION NUMBER(S): [id number]   ORDERING CLINICIAN: CARL GILLOMBARDO   FINDINGS: ET tube is terminating 2.4 cm from the sally. Enteric tube is in place with the tip positioned at the level of gastric body. Right IJ central venous " catheter is projecting over the right atrium.   The cardiac silhouette size is slightly enlarged, stable. Calcification of the aortic knob.   There is right midlung focal opacity. Blunting of the costophrenic angles. Wrist interstitial markings of the lungs. No sizable pneumothorax.   No acute osseous abnormality.       1. Persistent bibasilar pleural effusion and bibasilar atelectasis. Mild perihilar congestion. 2. Unchanged right mid lung opacity corresponding to presumed loculated effusion seen on chest CT dated 10/05/2024. 3. Interval intubation. Other medical devices as described above.       Signed by: Rigoberto Mcpherson 10/14/2024 11:01 AM Dictation workstation:   [id number]    XR abdomen 1 view    Result Date: 10/14/2024  Interpreted By:  Rigoberto Camara, STUDY: XR ABDOMEN 1 VIEW; 10/14/2024 9:10 am   INDICATION: Signs/Symptoms:eval ng.   COMPARISON: Radiograph dated 10/18/2023   ACCESSION NUMBER(S): [id number]   ORDERING CLINICIAN: CARL GILLOMBARDO   FINDINGS: Single-view of the abdomen. The lower abdomen and pelvis are not included. Enteric tube is in place with the tip projecting over the stomach. Multiple dilated loops of bowel in the abdomen. Excreted contrast in the collecting system of bilateral kidneys. Limited evaluation of pneumoperitoneum on supine imaging, however no gross evidence of free air is noted.   Atelectasis/scarring in lung bases.   Osseous structures demonstrate no acute bony changes.       1.  Multiple dilated loops of bowel throughout the visualized portion of the abdomen. Correlate with ileus or concern for bowel obstruction. Recommend follow-up radiograph.   Signed by: Rigoberto Mcpherson 10/14/2024 10:59 AM Dictation workstation:   [id number]    XR chest 1 view    Result Date: 10/14/2024  Interpreted By:  Rigoberto Camara,  and Renee Eason STUDY: XR CHEST 1 VIEW;  10/14/2024 1:50 am   INDICATION: Signs/Symptoms:elevated WBC 27.   COMPARISON: Chest  radiograph: 10/13/2024. CT abdomen and pelvis 10/13/2024   ACCESSION NUMBER(S): [id number]   ORDERING CLINICIAN: RISA SHIELDS   FINDINGS: AP radiograph of the chest was provided.   Postsurgical changes status post median sternotomy. Fractures of the upper most and lower most sternal wires are again noted which are otherwise in the midline.   CARDIOMEDIASTINAL SILHOUETTE: Cardiopericardial silhouette is slightly enlarged however similar compared to prior imaging. Calcified aortic knob is again noted..   LUNGS: There are persistent focal areas of opacification in the right mid and lower lung fields. There is slight interval improvement of the overall aeration. There is persistent blunting of the costophrenic angles compatible with a combination of small pleural effusions and lower lobe atelectasis. No pneumothorax.   ABDOMEN: No remarkable upper abdominal findings.   BONES: No acute osseous changes.       1.  Persistent focal opacification of the right mid lung fields, correlating with fluid along the fissure seen on recent CT from 10/05/2024. 2. Persistent bibasilar pleural effusion and bibasilar atelectasis. Slight improvement in the aeration of the lungs.   I personally reviewed the images/study and I agree with the findings as stated by Resident Jenaro Duran MD.   MACRO: NONE.   Signed by: Rigoberto Mcpherson 10/14/2024 8:43 AM Dictation workstation:   [id number]    CT angio abdomen pelvis w and or wo IV IV contrast    Result Date: 10/14/2024  Interpreted By:  Antonio Gallardo and Sheng Max STUDY: CT ANGIO ABDOMEN PELVIS W AND/OR WO IV IV CONTRAST;  10/14/2024 6:10 am   INDICATION: Signs/Symptoms:c/f active RP bleed.       Per EMR: 69-year-old female history of COPD, diabetes, aortic valve insufficiency status post aortic valve replacement and ex lap for mesenteric ischemia in 2022 and 2023 currently admitted for CHF exacerbation. CTA on 10/13/2024 showed enlargement of abdominopelvic hematomas and  retroperitoneal hematomas. CTA for further evaluation.   COMPARISON: CTA and CT abdomen and pelvis 10/13/2024   ACCESSION NUMBER(S): [id number]   ORDERING CLINICIAN: RISA SHIELDS   TECHNIQUE: Contiguous non-contrast axial images of the abdomen and pelvis were initially obtained.   Thin-section axial images of the abdomen and pelvis were obtained in the arterial phase after intravenous administration of 100 mL Omnipaque 350 contrast. Sagittal and coronal reformatted images were provided. MIP images and 3D reconstructions were created on an independent workstation and reviewed.   FINDINGS: VASCULATURE:   THORACIC AORTA: Non-contrast images show no evidence of acute intramural hematoma. No thoracic aortic aneurysm or dissection. Mild thoracic aortic atherosclerosis.   ABDOMINAL AORTA: No abdominal aortic aneurysm or dissection. Mild abdominal aortic atherosclerosis.   ABDOMINAL AND PELVIC ARTERIES:   Celiac artery demonstrates no significant focal stenosis.   Superior mesenteric artery demonstrates no significant focal stenosis.   Inferior mesenteric artery demonstrates no significant focal stenosis.   There is 1 right renal artery.  Right renal artery demonstrates no significant focal stenosis.  There is 1 renal vein which is patent.   There is 1 left renal artery.  Left renal artery demonstrates no significant focal stenosis. There is 1 renal vein which is patent.   RIGHT LEG: Right common iliac artery is widely patent with no significant stenosis. Right external iliac artery is widely patent with no significant stenosis. Right internal iliac artery demonstrates moderate-to-severe ostial stenosis due to calcific atherosclerosis. Right common femoral artery is widely patent with no significant stenosis. Visualized proximal right profunda femoris artery is widely patent with no significant stenosis. Visualized proximal right superficial femoral artery is widely patent with no significant stenosis.   LEFT LEG: Left  common iliac artery: Nonocclusive atherosclerotic plaque extends along the left common iliac artery without significant stenosis. Left external iliac artery is widely patent with no significant stenosis. Left internal iliac artery demonstrates moderate ostial stenosis due to calcific atherosclerosis. Left common femoral artery is widely patent with no significant stenosis. Visualized proximal left profunda femoris artery is widely patent with no significant stenosis. Visualized proximal left superficial femoral artery is widely patent with no significant stenosis.     CT LOWER CHEST: Trace bilateral pleural effusions with linear atelectasis in the bilateral lung bases. There is mild thickening interlobular septal thickening in the bilateral lung bases likely representing pulmonary interstitial edema. The four-chamber cardiomegaly with mild pericardial effusion. Patulous appearance of the distal thoracic esophagus with small hiatal hernia. Pleural calcifications tracking along the right lower lobe pleural can be seen in prior asbestosis exposure. Numerous intact median sternotomy wires.   CT ABDOMEN/PELVIS:   LIVER: The liver is normal in size measuring 11.1 cm in cranial caudal dimensions without evidence of focal liver lesions. There is new multiloculated scattered regions of decreased perfusion throughout the left and right hepatic lobes on delayed sequences likely due to suboptimal bolus timing for delayed sequences rather than ischemic insult.   BILE DUCTS: The intrahepatic and extrahepatic ducts are not dilated.   GALLBLADDER: Vicarious excretion of contrast within the gallbladder. There is no radiopaque gallstones.   PANCREAS: The pancreas appears unremarkable without evidence of ductal dilatation or masses.   SPLEEN: The spleen is normal in size measuring 6.9 cm in maximum coronal oblique axis without focal lesions.   ADRENAL GLANDS: Bilateral adrenal glands appear normal.   KIDNEYS AND URETERS: The kidneys  are atrophic. Persistent bilateral nephrograms is suggestive of severe delayed renal functions. There is similar multifocal wedge perfusion defects throughout the bilateral kidneys that are annotated on series 701 there are present on the prior CTA, concerning for multifocal renal infarcts. There are numerous hypoattenuating lesions throughout the bilateral kidneys, largest measuring 2.1 cm in the interpolar region of the right kidney, likely representing renal cysts. No hydroureteronephrosis or nephroureterolithiasis is identified.     BLADDER: The urinary bladder appears collapsed. Wood balloon is present within the urinary bladder lumen.   REPRODUCTIVE ORGANS: The uterus is enlarged with mixed hyper and hypoattenuating densities stent with leiomyomas. There is contrast enhancement of the tracking along the periphery of the uterus likely due to uterine venous plexus (series 601, image 326).   BOWEL: The stomach is severely distended. There is fecalization of contents within the duodenum. Dilatation of numerous small bowel loops measuring up to 4.2 cm in the mid ileum. There is herniation of a loop of ileum through the ventral abdominal wall defect as seen on series 701, image 269. There is no wall thickening or fat stranding to suggest strangulation. There is gradual tapering of its ends without a definite transition point. The colon is unremarkable without wall thickening. The appendix is normal.     VESSELS: Please see vasculature above. The IVC appears normal.   PERITONEUM/RETROPERITONEUM/LYMPH NODES: There is a stable to minimally enlarged in size retroperitoneal hematoma occupying the left iliac fossa extending superiorly throughout the facial planes of the left posterior pararenal space and abutting left Gerota's fascia now approximating 28.4 x 14.0 x 10.7 cm previously 26.5 x 12.1 x 11.0 cm measured in a similar configuration (series 701, image 230) (series 602, image 91).   Diffuse mesenteric edema with  ascites throughout the supra and inframesocolic spaces. There is no loculated fluid collection, no free intraperitoneal air. No abdominopelvic lymphadenopathy is present by CT criteria.   BONES AND ABDOMINAL WALL: No suspicious osseous lesions are identified. Moderate degenerative discogenic disease is noted in the lower thoracic and lumbar spine. Diffuse body wall anasarca.       VASCULAR: 1. Stable to minimal interval enlargement in retroperitoneal hematoma occupying the left iliac fossa extending superiorly dissecting through the fascial planes of the left posterior pararenal space and abutting left Gerota's fascia now approximating 28.4 x 14.0 x 10.7 cm. There is no contrast blush on arterial and delayed phases to suggest an active arterial bleed. 2. Calcific atherosclerosis results in moderate-to-severe ostial stenosis of the right internal iliac artery and moderate ostial stenosis of the left internal iliac artery.     ABDOMEN PELVIS: 1. Similar appearance in numerous wedge shaped defects throughout the bilateral kidneys concerning for multifocal renal infarcts. Persistent bilateral nephrograms is suggestive of severe delayed renal function. 2. The stomach is severely distended. There are numerous distended loops of small bowel, most prominent in the mid ileum, measuring up to 4.2 cm with gradual tapering of its ends suggestive of ileus likely being compressed by retroperitoneal hematoma. There is no definitive transition point. Few loops of ileum herniate through the ventral abdominal wall defect however there is no wall thickening or fat stranding to suggest strangulation. No pneumatosis. 3. Pleural calcifications tracking along the right lower lobe pleura can be seen in prior asbestosis exposure. 4. Four-chamber cardiomegaly with pericardial effusion, diffuse body wall anasarca, mesenteric edema and ascites are consistent with worsening volume overload. Please correlate with patient's fluid status. 5.  Additional incidental non-acute findings as detailed above.     I personally reviewed the images/study and I agree with the findings as stated by Dr. Lj Armstrong. This study was interpreted at [facility], [place], [place].   MACRO: Lj Armstrong discussed the significance and urgency of this critical finding by telephone with  Dr. Clarke on 10/14/2024 at 7:30 am. (**-RCF-**) Findings:  See findings.   .   Signed by: Antonio Gallardo 10/14/2024 8:07 AM Dictation workstation:   [id number]    CT angio abdomen pelvis w and or wo IV IV contrast    Result Date: 10/13/2024  Interpreted By:  Freddy Rebolledo  and Mert Rincon STUDY: CT ANGIO ABDOMEN PELVIS W AND/OR WO IV IV CONTRAST;  10/13/2024 2:49 pm   INDICATION: Signs/Symptoms:RP hematoma + rectal sheath hematoma; Non contrast and arterial phase only.   COMPARISON: Same day CT abdomen/pelvis 10/13/2024   ACCESSION NUMBER(S): [id number]   ORDERING CLINICIAN: BRODIE LORENZO   TECHNIQUE: Contiguous non-contrast axial images of the abdomen and pelvis were initially obtained.   Thin-section axial images of the abdomen and pelvis were obtained in the arterial and portal venous phase after intravenous administration of iodinated contrast. Sagittal and coronal reformatted images were provided. MIP images and 3D reconstructions were created on an independent workstation and reviewed.   FINDINGS: VASCULATURE:   ABDOMINAL AORTA: No abdominal aortic aneurysm or dissection. Moderate calcified/noncalcified abdominal aortic atherosclerosis.   ABDOMINAL and PELVIC ARTERIES: Moderate calcified/noncalcified atherosclerosis of the bilateral common iliac arteries without high-grade stenosis. Multifocal high-grade stenosis of the bilateral external iliac arteries with scattered atherosclerosis. Bilateral external iliac arteries and, femoral arteries are patent. Celiac artery, superior mesenteric artery and inferior mesenteric artery without hemodynamically  significant stenosis or occlusion.     CT ABDOMEN/PELVIS:   LOWER CHEST: Similar bilateral trace pleural effusion with bibasilar atelectasis. Again seen partially visualized component of loculated pleural effusion along the right major fissure. No new focal consolidation. Marked biatrial cardiac enlargement similar to prior.   ABDOMINAL WALL: Diffuse body wall anasarca. Stable ventral abdominal wall hernia containing a small bowel loop with partial obstruction (series 501, image 268)..   LIVER: Liver is normal size with smooth contour. Again seen subcentimeter hepatic hypodensity (series 601, image 82), too small to characterize.   BILE DUCTS: No significant intrahepatic or extrahepatic dilatation.   GALLBLADDER: Gallstones and possible sludge with mild gallbladder wall thickening similar to prior. No evidence of pericholecystic fluid.   SPLEEN: No significant abnormality.   PANCREAS: No significant abnormality.   ADRENALS: Mild bilateral adrenal gland thickening, otherwise no significant abnormality.   KIDNEYS, URETERS, BLADDER: Kidneys are normal in size. Left renal cortical scarring of the superior pole of the left kidney (series 601, image 133). Again seen bilateral renal cysts.   Contrast is noted within the left renal collecting system and left ureter. The large pelvic hematoma described below compresses the distal ureter (series 601, image 332) resulting in mild upstream hydroureteronephrosis.   No evidence of right hydroureteronephrosis.   Interval insertion of urinary Wood catheter with the urinary bladder shifted to the right (series 601, image 347) from the large mass effect from the pelvic hematoma..   REPRODUCTIVE ORGANS: Again seen enlarged heterogenous uterus and masslike expansion of the endometrial cavity measuring 5.7 cm.   VESSELS: (See above). No additional significant abnormality.   LYMPH NODES: No enlarged lymph nodes.   BOWEL: There are mildly dilated  oops of small bowel proximal to the  hernia compatible with partial obstruction as above.   RETROPERITONEUM/MESENTERY/PERITONEUM:   No evidence of pneumoperitoneum.   Interval enlargement of abdominopelvic hematomas with active contrast extravasation only on delayed phase suggestive of venous bleeding. No evidence of contrast extravasation on arterial phase.   Large left retroperitoneal hematoma tracking along the superior left psoas muscle resulting in mass effect on the kidney displacing towards the midline. The hematoma measures 16 x 12 x 11 mm (series 602, image 72 and series 601, image 222), previously 10 x 8.2 x 5.3 cm. Active contrast extravasation can be seen on delayed phase (series 601, image 254).     Pelvic heterogenous hematoma measures 11 x 10 x 10 cm (series 601, image 353 and series 602, image 79), previously 11 x 9 x 10 cm. This pelvic hematoma causes significant mass effect on the bladder shifting it to the right hemipelvis.   Left rectal sheath hematoma measures 4.8 x 2.7 cm, similar to prior.     There is blood products within left paracolic gutter, abdomen, pelvis.     OSSEOUS STRUCTURES: Mild compression deformity of the superior endplate of L1 (series 603, image 95), stable dating back to 10/06/2023. No acute osseous abnormality.       1. Interval enlargement of abdominopelvic hematomas, left retroperitoneal and pelvic hematoma, with contrast extravasation only on delayed phase suggestive of active venous bleed. No evidence of contrast extravasation on arterial phase. 2. Large pelvic hematoma compressing in the distal left ureter resulting in mild upstream hydroureteronephrosis. In addition, the pelvic hematoma compresses and displaces the urinary bladder to the right hemipelvis. 3. Stable ventral abdominal wall hernia containing a small bowel loop with partial obstruction. 4. Enlarged heterogenous uterus and masslike expansion of the endometrial cavity. Recommend further evaluation with nonemergent pelvic ultrasound to evaluate  for possible malignancy. 5. Additional findings as described above.   I personally reviewed the images/study and I agree with the findings as stated by Sergey Painting DO, PGY-3. This study was interpreted at [facility], [place], [place].   MACRO: Freddy Rebolledo discussed the significance and urgency of this critical finding by message with Zafar Kirk MD on 10/13/2024 at 6:02 pm. (**-RCF-**) Findings:  See findings.   .   Signed by: Freddy Rebolledo 10/13/2024 6:06 PM Dictation workstation:   [id number]    CT abdomen pelvis w IV contrast    Addendum Date: 10/13/2024    Interpreted By:  Freddy Rebolledo, ADDENDUM: Stable, mildly dilated loops of small bowel located proximal to the left paracentral abdominal wall hernia, suggestive of moderate partial obstruction when compared to the previous CT scan.   Signed by: Freddy Rebolledo 10/13/2024 4:52 PM   -------- ORIGINAL REPORT -------- Dictation workstation:   [id number]    Result Date: 10/13/2024  Interpreted By:  Freddy Rebolledo,  and Mert Rincon STUDY: CT ABDOMEN PELVIS W IV CONTRAST;  10/13/2024 10:15 am   INDICATION: Signs/Symptoms:LLQ pain.   Hx of hypertension, A-fib, COPD, DM, aortic valve insufficiency s/p AVR x 2, and PSHx significant for exploratory laparotomy x2 (Oct 2023, August 2022) for mesenteric ischemia (no bowel resected, embolectomy only) presented to the ED on 10/3/24 with cc of SOB. Surgery was consulted for a ventral incisional hernia with concern for incarceration given increased pain. Hernias remain reducible despite abdominal pain.     COMPARISON: CT abdomen/pelvis 10/03/2024 CT chest 10/05/2024   ACCESSION NUMBER(S): [id number]   ORDERING CLINICIAN: BRODIE LORENZO   TECHNIQUE: CT of the abdomen and pelvis was performed.  Standard contiguous axial images were obtained at 3 mm slice thickness through the abdomen and pelvis. Coronal and sagittal reconstructions at 3 mm slice thickness were performed.   80 ml of contrast  Omnipaque 350 were administered intravenously without immediate complication.   FINDINGS: LOWER CHEST: Similar bilateral trace pleural effusion with bibasilar atelectasis. Partially visualized component of loculated pleural effusion along the right fissures better seen on 10/05/2024. No new focal consolidation. Marked biatrial cardiac enlargement similar to prior.   ABDOMEN:   LIVER: Liver is normal size with smooth contour. Subcentimeter hepatic hypodensity (series 201, image 25), too small to characterize.   BILE DUCTS: Normal caliber.   GALLBLADDER: Gallstones with mild gallbladder wall thickening similar to prior. No evidence of pericholecystic fluid.   PANCREAS: No significant abnormality.   SPLEEN: No significant abnormality.   ADRENAL GLANDS: No significant abnormality.   KIDNEYS AND URETERS: Kidneys are normal in size. Left renal cortical scarring of the superior pole of the left kidney (series 201, image 42). Again seen bilateral renal cysts.   Interval development of mild hydronephrosis on the left (series 201, image 49) likely from the mass effect of the large left retroperitoneal hematoma.. No evidence of hydroureteronephrosis on the right.   PELVIS:   BLADDER: Bladder is displaced to the right from mass effect from the large pelvic hematoma (series 201, image 128).   REPRODUCTIVE ORGANS: Again seen enlarged heterogenous uterus with masslike expansion of the endometrial cavity measuring 5.8 cm.   BOWEL: The stomach is unremarkable.   The small and large bowel are normal in caliber and demonstrate no wall thickening.   VESSELS: Atherosclerosis of the abdominal aorta and its branching vessels. There is no aneurysmal dilatation of the abdominal aorta. The IVC appears normal.   PERITONEUM/RETROPERITONEUM/LYMPH NODES: No evidence of pneumoperitoneum. No enlarged lymph nodes by CT criteria   Large left retroperitoneal hematomas tracking along the superior left psoas muscle into the left hemipelvis resulting in  mass effect on the pelvic structures.   Most superior left retroperitoneal hematoma measures 9.0 x 8.2 x 5.3 cm (series 201, image 76 and series 203, image 51). Within this hematoma, there is active contrast extravasation in the most superior (series 201, image 66) and posteroinferior aspect (series 201, image 86).   Left lower quadrant/left pelvic hematoma measures 8.9 x 8.3 x 6.4 cm (series 203, image 43) and series 201, image 91) with contrast extravasation noted within the posterior aspect of the collection (series 201, image 98).   Heterogenous mass in the mid-pelvis measuring 11 x 0.9 x 10 cm (series 201, image 106 and series 203, image 48) consistent with hematoma. There is internal hyperdensities suggestive of extravasation.   Heterogenous rectal sheath hematoma measuring 4.8 x 2.7 x 4.9 cm (series 201, image 117 and series 204, image 76) with contrast extravasation within the periphery (series 201, image 118).   There is blood products within left paracolic  gutter and abdomen.   BONES AND ABDOMINAL WALL: L1 compression fractur. E   Diffuse body wall anasarca which has increased when compared to prior CT.       1. Interval development of multiple hematomas. There is a large left retroperitoneal hematoma which extends to the left pelvis with active contrast extravasation suggestive to suggest a bleed. Large pelvic hematoma resulting in mass effect to the bladder displacing it to the right hemipelvis. In addition, there is a left rectal sheath hematoma with active contrast extravasation. Recommend emergent surgical evaluation. 2. Cholelithiasis with mild gallbladder wall thickening similar to prior without evidence of pericholecystic fluid. 3.  L1 compression fracture, 4. Enlarged heterogenous uterus with masslike expansion of the endometrial cavity. Recommend correlation with pelvic ultrasound to exclude underlying tumor. Interval increase in diffuse body wall anasarca.   I personally reviewed the  images/study and I agree with the findings as stated by Sergey Painting DO, PGY-3. This study was interpreted at [facility], [place], [place].   MACRO: Sergey Painting discussed the significance and urgency of this critical finding by telephone with  Jerome Kirk MD on 10/13/2024 at 12:35 pm.  (**-RCF-**) Findings:  See findings.     Signed by: Freddy Rebolledo 10/13/2024 4:09 PM Dictation workstation:   [id number]      PHYSICAL EXAM:  Constitutional: Intubated and sedated  HEENT: ETT in place  Cardiovascular: Bradycardic, no murmurs noted  Pulmonary: Mechanical breath sounds  GI: Firm, tympanic, distended  Lower extremities: Cool with dopplerable dorsalis pedis bilaterally, no lower extremity edema  Neuro: Sedated      MEDICATIONS:   Scheduled medications  albuterol, 15 mg, nebulization, Once  [Held by provider] aspirin, 81 mg, oral, Daily  atorvastatin, 40 mg, oral, Nightly  bumetanide, 4 mg, intravenous, Once  [Held by provider] empagliflozin, 10 mg, oral, Daily  ferrous sulfate (325 mg ferrous sulfate), 65 mg of iron, oral, Daily  fluticasone furoate-vilanteroL, 1 puff, inhalation, Daily  lactated Ringer's, 1,000 mL, intravenous, Once  [Held by provider] metoprolol succinate XL, 100 mg, oral, Daily  ondansetron, 4 mg, intravenous, Once  oxygen, , inhalation, Continuous - Inhalation  phenylephrine HCl in 0.9% NaCl, , ,   piperacillin-tazobactam, 3.375 g, intravenous, q6h  polyethylene glycol, 17 g, oral, Daily  sennosides, 2 tablet, oral, BID  trimethobenzamide, 200 mg, intramuscular, Once  vancomycin, 1,000 mg, intravenous, q24h        Continuous medications  dextrose 10 % in water (D10W), 50 mL/hr, Last Rate: 50 mL/hr (10/14/24 1649)  EPINEPHrine, 0-0.2 mcg/kg/min, Last Rate: 0.3 mcg/kg/min (10/14/24 1447)  fentaNYL, 0-300 mcg/hr  midazolam, 0-20 mg/hr, Last Rate: 1 mg/hr (10/14/24 1200)  norepinephrine, 0-1 mcg/kg/min, Last Rate: 0.1 mcg/kg/min (10/14/24 1630)  phenylephrine,  0-2 mcg/kg/min, Last Rate: Stopped (10/14/24 1201)  sodium bicarbonate 150 mEq in dextrose 5% 1,150 mL infusion, 100 mL/hr, Last Rate: 100 mL/hr (10/14/24 1200)  vasopressin, 0.06 Units/min, Last Rate: Stopped (10/14/24 1519)        PRN medications  PRN medications: acetaminophen, diphenhydrAMINE, eucerin, fentaNYL, metoprolol, midazolam, ondansetron ODT **OR** ondansetron, phenylephrine HCl in 0.9% NaCl, sodium bicarbonate, sodium bicarbonate, vancomycin, white petrolatum          Assessment/Plan   Hilda Jones is a 69 y.o. 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 Originally concern for HFpEF exacerbation now with retroperitoneal bleed. This morning patient became bradycardic, hypotensive and required intubation for airway protection. Despite MTP activation, she is currently on max ventilatory support and pressor support.  No surgical intervention appropriate at this time and ongoing discussions with family about goals of care.        NEUROLOGIC  Intubated and sedated      CARDIOVASCULAR  #Spontaneous retroperitoneal hematoma on heparin  :: CTAP on 10/13: retroperitoneal hematoma + rectal sheath hematoma   :: CTA on 10/13 showing increasing hematoma with delayed extravasation c/w venous source  :: LLQ tenderness  - IR consulted and without arterial source, no intervention from IR perspective  - ACS consulted, high risk of procedure worsening bleeding without significant benefit  - s/p MTP   - trend CBC   - s/p protamine reversal 50mg and plasma  - Continue GOC with family        #HFpEF exacerbation  #Severe rheumatic mitral valve stenosis with moderate regurgitation  #Severe tricuspid regurgitation  :: TTE done 10/5/2024 showed EF 67% with RV enlargement with moderately  reduced function, severe tricuspid regurgitation, severe mitral stenosis, mild to moderate MV regurgitation, and RVSP of 58.3 mmHg; aortic valve replacement working well  Plan:  - no mitral valvular intervention as previous attempt at valvuloplasty was aborted based on preprocedure KATHY demonstrating ASLTY thrombus in 2023.  Per signout, patient frailty makes patient not a good candidate for intervention and decided on medical management. Patient needs Coumadin for valvular fibrillation and cannot do other DOAC's      - empa 10 mg daily hold in setting of hypotension  - metop 100mg / day hold in setting of hypotension     #Afib  #small SALTY thrombus   :: Cardiac MRI 10/10 showing small thrombus noted in the left  atrial appendage measuring 4 x 3 mm. No LA thrombus.  - holding anticoagulation in the setting of bleed  - continue tele      PULMONARY  #COPD   - currently on 2L O2     RENAL/  #Olguria  :: secondary to hypotension vs. Abdominal compartment syndrome  :: CTA showing pelvic hematoma compressing distal L ureter with mild hydronephrosis  -Continue continue to trend RFP's     GASTROINTESTINAL  No active issues  #H/o mesenteric ischemia s/p embolectomy  #H/o GIB     ENDOCRINE  #H/o T2DM   :: not on home insulin  - can consider starting ISS if patient consistently hyperglycemic     HEME/ONC  #Anemia 2/2 retroperitoneal bleed  :: s/p 3U RBC  - see CV above     #masslike expansion of the endometrial cavity seen on CTAP  - will need OP follow up for possible malignancy        INFECTIOUS DISEASE  No active issues     MSK/DERM  No active issues     F : PRN  E: PRN  N: NPO    DVT prophylaxis: None in setting of retroperitoneal bleed    Code Status: DNR (confirmed on admission)   NOK: Extended Emergency Contact Information  Primary Emergency Contact: Dianna Jones  Mobile Phone: [phone]  Relation: Power of   Secondary Emergency Contact: HOLLY JONES  Address: [address] [place] [address] [address]           [facility]  [place] [address] [place]  Home Phone: [phone]  Mobile Phone: [phone]  Relation: Daughter        China Nava MD  Internal Medicine, PGY-1  Cardiology critical care

## 2024-10-14 NOTE — SIGNIFICANT EVENT
Called to see patient for unresponsiveness. On exam the patient did not respond to verbal or physical stimuli. Absent heart and breath sounds for one minute, no response to noxious stimuli, and absent corneal reflex. Absent peripheral pulses. Pupils are fixed and dilated. Patient pronounced dead at 19:51, shortly after transition to DNR- comfort care. Family at bedside.   Meri Monson MD

## 2024-10-14 NOTE — PROCEDURES
Airway  Date/Time: 10/14/2024 8:05 AM  Urgency: emergent    Airway not difficult    Staffing  Performed: resident   Authorized by: Chris Rose DO    Performed by: Melissa White MD  Patient location during procedure: ICU    Consent for Airway (if performed for an anesthetic, see related documentation for consents)  Patient identity confirmed: arm band and hospital-assigned identification number  Consent: The procedure was performed in an emergent situation. Verbal consent not obtained. Written consent not obtained.  Risks and benefits: risks, benefits and alternatives were not discussed      Indications and Patient Condition  Indications for airway management: airway protection and cardio/pulmonary arrest  Spontaneous Ventilation: absent  Sedation level: deep  Preoxygenated: yes (non rebreather and nasal cannula)  Patient position: sniffing  MILS maintained throughout  Mask difficulty assessment: 0 - not attempted    Final Airway Details  Final airway type: endotracheal airway      Successful airway: ETT  Cuffed: yes   Successful intubation technique: video laryngoscopy  Facilitating devices/methods: intubating stylet  Endotracheal tube insertion site: oral  Blade: Anny  Blade size: #3  ETT size (mm): 7.0  Cormack-Lehane Classification: grade I - full view of glottis  Placement verified by: chest auscultation and capnometry   Measured from: lips  ETT to lips (cm): 21  Number of attempts at approach: 1    Additional Comments  Code intubate was called, arrived at bedside. Induction meds Etomidate 16 mg, Rocuronium 100mg, norepinephrine total 160mcg of through multiple boluses. Deferred using Succinylcholine due to K 5.2

## 2024-10-14 NOTE — PROCEDURES
A time-out was performed. The patient's right neck region was prepped and draped in sterile fashion using chlorhexidine scrub. Anesthesia was achieved with 1% lidocaine. The right internal jugular vein was accessed used a micropuncture needle and sheath. Venous blood was withdrawn and the sheath was advanced into the vein and the needle was withdrawn. A larger guidewire was advanced through the sheath. A small incision was made with a 10 blade scalpel and the sheath was exchanged for a dilator and then a 7 German sheath. Triple lumen sheath was flushed appropriately. We dressed the access site appropriate. Post procedure chest x-ray was ordered and will be reviewed for correct placement and signs of pneumothorax. Pt tolerated procedure in entirety without issue.

## 2024-10-14 NOTE — CARE PLAN
Problem: Safety - Medical Restraint  Goal: Remains free of injury from restraints (Restraint for Interference with Medical Device)  Flowsheets (Taken 10/14/2024 0633)  Remains free of injury from restraints (restraint for interference with medical device):   Determine that other, less restrictive measures have been tried or would not be effective before applying the restraint   Evaluate the patient's condition at the time of restraint application   Every 2 hours: Monitor safety, psychosocial status, comfort, nutrition and hydration   Inform patient/family regarding the reason for restraint    Over the shift, the patient did not make progress toward the following goals. Barriers to progression include confused. Recommendations to address these barriers include repeated reorientation.

## 2024-10-14 NOTE — CONSULTS
Vancomycin Dosing by Pharmacy- INITIAL    Hilda Jones is a 69 y.o. year old female who Pharmacy has been consulted for vancomycin dosing for abdominal infection. Based on the patient's indication and renal status this patient will be dosed based on a goal AUC of 400-600.     Renal function is currently declining    Visit Vitals  BP 93/51   Pulse 101   Temp 35.6 °C (96.1 °F) (Temporal)   Resp 20        Lab Results   Component Value Date    CREATININE 1.26 (H) 10/13/2024    CREATININE 1.03 10/13/2024    CREATININE 0.94 10/13/2024    CREATININE 0.89 10/12/2024        Patient weight is as follows:   Vitals:    10/13/24 0556   Weight: 64.6 kg (142 lb 8 oz)       Cultures:  No results found for the encounter in last 14 days.        I/O last 3 completed shifts:  In: 595.2 (9.2 mL/kg) [P.O.:120; I.V.:115.2 (1.8 mL/kg); Blood:360]  Out: 125 (1.9 mL/kg) [Urine:125 (0.1 mL/kg/hr)]  Weight: 64.6 kg   I/O during current shift:  I/O this shift:  In: 375 [Blood:350; IV Piggyback:25]  Out: 45 [Urine:45]    Temp (24hrs), Av.2 °C (97.1 °F), Min:35.3 °C (95.5 °F), Max:36.8 °C (98.2 °F)         Assessment/Plan     Patient will not be given a loading dose.    Will initiate vancomycin maintenance,  1000 mg every 24 hours.    This dosing regimen is predicted by [employer] to result in the following pharmacokinetic parameters:    Loading dose: N/A  Regimen: 1000 mg IV every 24 hours.  Start time: 01:39 on 10/15/2024  Exposure target: AUC24 (range)400-600 mg/L.hr   PEP63-79: 325 mg/L.hr  AUC24,ss: 468 mg/L.hr  Probability of AUC24 > 400: 69 %  Ctrough,ss: 14.4 mg/L  Probability of Ctrough,ss > 20: 18 %      Follow-up level will be ordered on 10/15 with PM lab draw (1800), unless clinically indicated sooner.  Will continue to monitor renal function daily while on vancomycin and order serum creatinine at least every 48 hours if not already ordered.  Follow for continued vancomycin needs, clinical response, and signs/symptoms of toxicity.        Mariola Kwon, PharmD

## 2024-10-14 NOTE — NURSING NOTE
"This morning around 0745, this RN went to the patient's room to insert NG tube to decompress patient per order. Patient said she \"couldn't breathe\" and when we sat her up her heart rate went down into the 20's and BP's down to 60/40's. MD Clarke was nearby and came into the room when notified of patient's vitals. Epi was given and levo started, patient went into sustained VT and one shock was delivered per MD Clarke. Code intubate was called and patient intubated. Multiple IV push medications given, see MAR. Surgical team called to reevaluate and 2 units PRBC ordered. Later MTP was initiated per ACS recommendations.   "

## 2024-10-14 NOTE — PROGRESS NOTES
Social Work Progress Note   - ICU TREATMENT PLAN: The patient presented to the ED on 10/3/24 with a chief complaint of shortness of breath and was admitted for acute hypoxemic respiratory failure. A TTE performed on 10/5/24 revealed an ejection fraction (EF) of 67%, right ventricular enlargement with moderately reduced function, severe tricuspid regurgitation, severe mitral stenosis, mild to moderate mitral valve regurgitation, and an RV systolic pressure. On 10/13 the patient began to experience a drop in hemoglobin. A CT of the abdomen and pelvis was obtained, revealing a retroperitoneal hematoma and a rectus sheath hematoma. Surgery was not a viable option at this time. The patient's condition deteriorated as the hematoma expanded significantly, leading to a drop in blood pressure. Patient was transferred to the CICU for further management.  - Payer: [facility].  -Support System: Dianna Brooklyn is listed as POA.   - Planned Disposition: Pending medical outcome and rehab recommendations.  - Barriers to discharge: None at this time. SW will continue to follow.  ADDIS HAYES

## 2024-10-14 NOTE — PROCEDURES
Pt is delirious, pulled RIJ CVC. An emergent replacement RIJ CVC  placed given she is in shock.        A time out was performed. My hands were washed immediately prior to the procedure. I wore a surgical cap, mask with protective eyewear, full gown and sterile gloves throughout the procedure. The patient was placed in Trendelenburg position. RIGHT chest/neck region were prepped using chlorhexidine scrub and draped in sterile fashion using a full drape and sterile probe cover and sterile gel employed. The medial and lateral heads of the sternocleidomastoid muscle were identified as was the carotid pulse. The Internal Jugular vein was identified using the ultrasound. Anesthesia was achieved over the vein using 1% lidocaine. Using real-time out of plane guidance, the introducer needle was inserted into the Internal Jugular vein under direct ultrasound visualization. Venous blood was withdrawn. The syringe was removed and a guidewire was advanced into the introducer needle. The guidewire was visualized in the Internal Jugular Vein by ultrasound. A small incision was made at the skin surface with a scalpel and the introducer needle was exchanged for a dilator over the guidewire. After appropriate dilation was obtained, the dilator was exchanged over the wire for a 7 F triple lumen central venous catheter. The wire was removed and the catheter was sutured in place. A sterile dressing was placed over the catheter at the insertion site. The patient tolerated the procedure without any hemodynamic compromise. At time of procedure completion, all ports aspirated and flushed properly. Post-procedure chest x-ray is pending at this time.

## 2024-10-14 NOTE — ACP (ADVANCE CARE PLANNING)
Advance Care Planning Note     Discussion Date: 10/14/24   Discussion Participants: daughter Carmen Jones (bedside), HCPOA Shayla Jones (via facetime), Dr. Clarke, Dr. Duff and myself.     Patient has capacity to make their own medical decisions: No  Health Care Agent/Surrogate Decision Maker documented in chart: Yes    Documents on file and valid:  Advance Directive/Living Will: No   Health Care Power of : Yes, Shayla Jones (daughter)    Communication of Medical Status/Prognosis:   Dr. Clarke spoke extensively with daughters about patient's critical condition and poor prognosis. Daughters expressed understanding that patient is on full support for breathing, blood pressure and heart rate and that no surgical intervention was felt appropriate given benefit/risk discussion by the team. They they expressed understanding that patient was sedated for comfort. Goals of cares discussed were patient comfort and not prolonging suffering.  Decision was made with both daughters in full agreement to proceed with comfort care measures including sedation, and to begin withdrawing blood pressure, heart rate and breathing support.    Time Statement: Total face to face time spent on advance care planning was 30 minutes with 20 minutes spent in counseling, including the explanation.    China Nava MD  Internal Medicine, PGY-1  10/14/2024 6:30 PM

## 2024-10-14 NOTE — PROGRESS NOTES
Vancomycin Dosing by Pharmacy- Cessation of Therapy    Consult to pharmacy for vancomycin dosing has been discontinued by the prescriber, pharmacy will sign off at this time.    Please call pharmacy if there are further questions or re-enter a consult if vancomycin is resumed.     Mary Baeza, PharmD

## 2024-10-14 NOTE — PROGRESS NOTES
Life Sustaining Treatment Progress Note     Discussion had with attending Dr. Posey, chief resident Dr. Obrien, patient's daughter, and patient's friend this morning. We discussed patient's current situation and likely prognosis. At the time of discussion, patient was on increasing pressors, MTP was running, and patient was hypothermic to 34 degrees celcius. Patient's daughter and friend did not want patient to suffer. We are not offering a surgery given the high risk of exsanguination upon release of RP hematoma. They called the patient's other daughter and they all agreed to make the patient DNR - no chest compressions. They are okay with medications, vasopressors, and mechanical ventilation.     Patient discussed with attending Dr. Posey.    Emmanuelle Patel  PGY2 General Surgery   ACS [id number]

## 2024-10-14 NOTE — PROCEDURES
The patient was prepped and draped in the usual sterile manner using chlorhexidine scrub. 1% lidocaine was used to numb the region. The LEFT radial artery was palpated and successfully cannulated. Pulsatile, arterial blood was visualized and the artery was then threaded using the Seldinger technique and a catheter was then sutured into place. Good wave-form was obtained. The patient tolerated the procedure well without any immediate complications. The area was cleaned and Tegaderm was applied.

## 2024-10-14 NOTE — SIGNIFICANT EVENT
Rounded on patient at bedside at 2200 and 0400. Pt resting comfortably in bed, Aox1. Endorsing diffuse abdominal pain similar to previous exams. Continues to be markedly distended, fairly unchanged throughout the night. Recent lactate 6.7 from 3.0, although continues to be HDS, afebrile, off pressors, low UOP.    Temp:  [35.3 °C (95.5 °F)-36.8 °C (98.2 °F)] 36 °C (96.8 °F)  Heart Rate:  [] 87  Resp:  [13-27] 21  BP: ()/(32-87) 93/51  Arterial Line BP 1: ()/(63-74) 103/71        NAD, resting in bed, soft restraints  Aox1  Abdomen markedly distended, non peritonitic, tender to palpation diffusely  B/l flank pain, R>L  Able to raise both legs without increased pain  No new lesions appreciated             8.7     27.4>-----<227              26.6   133  98  18                  ----------------<152     4.8  22  1.26          Ca 8.3 Phos 5.5 Mg 1.71       ALT 9 AST 17 AlkPhos 92 tBili 1.2         Results from last 7 days   Lab Units 10/14/24  0239   POCT PH, ARTERIAL pH 7.40   POCT PCO2, ARTERIAL mm Hg 31*   POCT PO2, ARTERIAL mm Hg 106*   POCT HCO3 CALCULATED, ARTERIAL mmol/L 19.2*   POCT BASE EXCESS, ARTERIAL mmol/L -4.9*          I/O last 2 completed shifts:  In: 475.2 (7.4 mL/kg) [I.V.:115.2 (1.8 mL/kg); Blood:360]  Out: 125 (1.9 mL/kg) [Urine:125 (0.1 mL/kg/hr)]  Weight: 64.6 kg       Abdominal exam and vitals/Hgb remain stable, not retaining CO2. Low c/f abdominal compartment syndrome at this time. Primary team to obtain repeat lactate. Will continue to monitor with FUNMI.    Seen w Mackenzie Simerlink PGY4    Suzette Benton PGY1

## 2024-10-14 NOTE — PROCEDURES
Procedure: RIGHT femoral arterial line  Indication: Shock  Time Out: Time out performed  Consent: Written and/or verbal consent obtained in chart// EMERGENCY      The patient was prepped and draped in the usual sterile manner using chlorhexidine scrub. 1% lidocaine was used to numb the region. The RIGHT FEMORAL artery was palpated and successfully cannulated with ultrasound guidance. Pulsatile, arterial blood was visualized and the artery was then threaded using the Seldinger technique and a catheter was then sutured into place. Good wave-form was obtained. The patient tolerated the procedure well without any immediate complications. The area was cleaned and Tegaderm was applied     Complications: No complications, pt tolerated procedure well  Blood Loss: Minimal

## 2024-10-15 LAB
BACTERIA UR CULT: ABNORMAL
BLOOD EXPIRATION DATE: NORMAL
DISPENSE STATUS: NORMAL
PRODUCT BLOOD TYPE: 7300
PRODUCT BLOOD TYPE: 8400
PRODUCT CODE: NORMAL
STAPHYLOCOCCUS SPEC CULT: NORMAL
UNIT ABO: NORMAL
UNIT NUMBER: NORMAL
UNIT RH: NORMAL
UNIT VOLUME: 200
UNIT VOLUME: 205
UNIT VOLUME: 206
UNIT VOLUME: 211
UNIT VOLUME: 213
UNIT VOLUME: 218
UNIT VOLUME: 238
UNIT VOLUME: 264
UNIT VOLUME: 279
UNIT VOLUME: 284
UNIT VOLUME: 316
UNIT VOLUME: 339
UNIT VOLUME: 350
UNIT VOLUME: 352
XM INTEP: NORMAL

## 2024-10-15 NOTE — DISCHARGE SUMMARY
Discharge Diagnosis  Valvular heart disease    Issues Requiring Follow-Up  N/a    Test Results Pending At Discharge  Pending Labs       Order Current Status    Staphylococcus aureus/MRSA colonization, Culture In process    Blood Culture Preliminary result    Urine Culture Preliminary result            Hospital Course  This is a 69 y.o. female with past medical history of hypertension, A-fib not on anticoagulation, COPD, OMARI, diabetes (A1c 5.9 July 2023), aortic valve insufficiency s/p AVR x 2, SMA occlusion, who presented to the emergency department on 10/3/24 with chief complaint of shortness of breath. Admitted for acute hypoxemic respiratory failure. Initially thought to have PNA on CXR but procalcitonin negative, no leukotycosis, and CT chest did not show opacities therefore antibiotics stopped. D-dimer was elevated but due to VASHTI unable to have contrast, therefore V/Q scan was done which was low probability; venous duplex of LE was negative for DVT. Patient continued to have hypoxia when trialed weaning to room air therefore TTE was ordered. TTE done 10/5/2024 showed EF 67% with RV enlargement with moderately reduced function, severe tricuspid regurgitation, severe mitral stenosis, mild to moderate MV regurgitation, and RVSP of 58.3 mmHg; aortic valve replacement working well.     Due to her frailty and high surgical risk, cardiothoracic (CT) surgery was consulted and determined that she was not a candidate for surgical intervention at this time. A plan was made to bridge her to Coumadin for anticoagulation, as her valvular fibrillation would not be effectively managed with other anticoagulants. On 10/9 patient reported abdominal pain. CT A/P was ordered and ACS was consulted for abdominal pain thought to be 2/2 incisional hernias but subsequent CT showed left sided retroperitoneal hematoma and rectal sheath hematoma with CTA suggesting venous source on 10/13. ACS recommended CT angio and consulting IR for  possible intervention. No plans for OR from ACS as retroperitoneal and rectus sheath hematomas are typically managed nonoperatively. Opening of the tamponading effect may lead to high volume bleeding and further hemodynamic instability. ACS following with serial abdominal exams.     CT angio was performed 10/13 to determine if bleeding was arterial and amendable to IR intervention. This showed an increasing hematoma with delayed extravasation consistent with venous source. Due to non-arterial source, IR did not have plans for intervention as venous source will likely tamponade.    On 10/14, patient bradycardic and having difficulty protecting airway, therefore patient was intubated.  She was hypotensive requiring levo, vaso, phenylephrine.  Epi and amnio drip started. Amio discontinued due to bradycardia.       ABG showing pH down to 6.88 with increasing lactate.  ACS reevaluated and suggested MTP. Patient continued to be hypotensive and bradycardic after multiple RBC, platelet, and plasma transfusions.  On reevaluation ACS determined intervention high risk of exsanguination and that this is a terminal condition.  ACS updated family and family decided to change code status to DNR- comfort care.    10/14 in the evening called to see patient for unresponsiveness. On exam the patient did not respond to verbal or physical stimuli. Absent heart and breath sounds for one minute, no response to noxious stimuli, and absent corneal reflex. Absent peripheral pulses. Pupils are fixed and dilated. Patient pronounced dead at 19:51, shortly after transition to DNR- comfort care. Family at bedside.       Pertinent Physical Exam At Time of Discharge  Physical Exam  As above    Home Medications     Medication List      CHANGE how you take these medications     gabapentin 100 mg capsule; Commonly known as: Neurontin; TAKE 1 CAPSULE   BY MOUTH AT BEDTIME; What changed: when to take this, reasons to take this     CONTINUE taking these  medications     albuterol 90 mcg/actuation inhaler; INHALE 1 PUFF BY MOUTH EVERY 4 HOURS   AS NEEDED   aspirin 81 mg EC tablet; Take 1 tablet (81 mg) by mouth once daily. Do   not start before October 20, 2023.   atorvastatin 40 mg tablet; Commonly known as: Lipitor; Take 1 tablet (40   mg) by mouth once daily at bedtime.   Breo Ellipta 100-25 mcg/dose inhaler; Generic drug: fluticasone   furoate-vilanteroL; INHALE 1 PUFF BY MOUTH ONCE DAILY   cholecalciferol 50 MCG (2000 UT) tablet; Commonly known as: Vitamin D-3   donepezil 5 mg tablet; Commonly known as: Aricept; TAKE 1 TABLET BY   MOUTH AT BEDTIME   famotidine 20 mg tablet; Commonly known as: Pepcid   FLUoxetine 40 mg capsule; Commonly known as: PROzac   Jardiance 10 mg; Generic drug: empagliflozin; TAKE 1 TABLET BY MOUTH   ONCE DAILY   Lantus U-100 Insulin 100 unit/mL injection; Generic drug: insulin   glargine   loperamide 2 mg capsule; Commonly known as: Imodium; Take 1 capsule (2   mg) by mouth 4 times a day as needed for diarrhea.   melatonin 5 mg tablet,chewable   Metamucil Fiber Singles 3.4 gram packet; Generic drug: psyllium   metoprolol tartrate 25 mg tablet; Commonly known as: Lopressor   mirtazapine 15 mg tablet; Commonly known as: Remeron   multivitamin with minerals tablet   nicotine 14 mg/24 hr patch; Commonly known as: Nicoderm CQ; APPLY 1   PATCH TO SKIN EVERY 24 HOURS   potassium chloride CR 20 mEq ER tablet; Commonly known as: Klor-Con M20   sennosides-docusate sodium 8.6-50 mg tablet; Commonly known as:   Jackie-Colace   spironolactone 25 mg tablet; Commonly known as: Aldactone   torsemide 20 mg tablet; Commonly known as: Demadex   valACYclovir 1 gram tablet; Commonly known as: Valtrex       Outpatient Follow-Up  No future appointments.    Meri Monson MD

## 2024-10-16 LAB
ATRIAL RATE: 241 BPM
AUTOPSY REPORT: NORMAL
LAB AP POST MORTEM HOURS: 11.53
PATH REPORT.RELEVANT HX SPEC: NORMAL
PATH REPORT.TOTAL CANCER: NORMAL
Q ONSET: 233 MS
QRS COUNT: 14 BEATS
QRS DURATION: 132 MS
QT INTERVAL: 462 MS
QTC CALCULATION(BAZETT): 549 MS
QTC FREDERICIA: 518 MS
R AXIS: 172 DEGREES
T AXIS: 30 DEGREES
T OFFSET: 464 MS
VENTRICULAR RATE: 85 BPM

## 2024-10-16 NOTE — SIGNIFICANT EVENT
Death Within 24 Hours of Soft Wrist Restraint Utilization    Death within 24 hours of soft wrist restraint logged at 10/16/2024 at 12:24 PM.    Annabelle Alonzo RN  Date: 10/16/2024  Time: 12:24 PM

## 2024-10-18 LAB — BACTERIA BLD CULT: NORMAL

## 2024-10-24 LAB
ATRIAL RATE: 241 BPM
Q ONSET: 233 MS
QRS COUNT: 14 BEATS
QRS DURATION: 132 MS
QT INTERVAL: 462 MS
QTC CALCULATION(BAZETT): 549 MS
QTC FREDERICIA: 518 MS
R AXIS: 172 DEGREES
T AXIS: 30 DEGREES
T OFFSET: 464 MS
VENTRICULAR RATE: 85 BPM

## 2024-11-22 ENCOUNTER — APPOINTMENT (OUTPATIENT)
Dept: CARDIOLOGY | Facility: HOSPITAL | Age: 69
End: 2024-11-22
Payer: MEDICARE

## 2024-12-04 LAB
LAB AP POST MORTEM HOURS: 11.53
LABORATORY COMMENT REPORT: NORMAL
PATH REPORT.FINAL DX SPEC: NORMAL
PATH REPORT.MICROSCOPIC SPEC OTHER STN: NORMAL
PATH REPORT.RELEVANT HX SPEC: NORMAL
PATH REPORT.TOTAL CANCER: NORMAL
RESIDENT REVIEW: NORMAL
RPT COMMENT SECTION: NORMAL

## (undated) DEVICE — SUTURE, VICRYL, 2-0, 36 IN, CT-1, UNDYED

## (undated) DEVICE — TOWEL, SURGICAL, NEURO, O/R, 16 X 26, BLUE, STERILE

## (undated) DEVICE — SPONGE, DISSECTOR, PEANUT, 3/8, STERILE 5 FOAM HOLDER"

## (undated) DEVICE — SEALANT, HEMOSTATIC, FLOSEAL, 10 ML

## (undated) DEVICE — Device

## (undated) DEVICE — SUTURE, SILK, 3-0, 30 IN, MULTIPACK, BLACK

## (undated) DEVICE — DRAPE, PAD, INSTRUMENT, MAGNETIC, MEDIUM, 10 X 16 IN, DISPOSABLE

## (undated) DEVICE — SUTURE, PROLENE, 2-0, 36 IN, MH, BLUE

## (undated) DEVICE — SUTURE, PROLENE, 6-0, 30 IN, C-1, CV-11, BLUE

## (undated) DEVICE — SUTURE, ETHIBOND XTRA, 0, SHSH, 36IN, LF

## (undated) DEVICE — INSERT, EVERGRIP 61

## (undated) DEVICE — INSERT, CLAMP, SURGICAL, SOFT/TRACTION, STEALTH, 5 MM

## (undated) DEVICE — TOWEL, OR, XRAY DETECT 5 PK, WHITE, 17X26, W/DMT TAG, ST

## (undated) DEVICE — CATHETER, EMBOLECTOMY, 3F X 80CM, SYNTEL, SILICONE

## (undated) DEVICE — SUTURE, PROLENE, 3-0, 36 IN, MHMH

## (undated) DEVICE — SUTURE, SILK, 2-0, 30 IN, SH, BLACK

## (undated) DEVICE — SLEEVE, SURGICAL, 21.5 X 5.5 IN, LF, STERILE

## (undated) DEVICE — DRESSING, MEPILEX BORDER, POST-OP AG, 4 X 10 IN

## (undated) DEVICE — BLANKET, HYPERTHERMIA, LOWER BODY, BAIR HUGGER, ADULT

## (undated) DEVICE — SUTURE, PROLENE, 6-0, 30 IN, BV-1 BV-1

## (undated) DEVICE — GOWN, SURGICAL, SMARTGOWN, XLARGE, STERILE

## (undated) DEVICE — SUTURE, SILK, 3-0, 18 IN, MULTIPACK, BLACK

## (undated) DEVICE — DRAPE, INSTRUMENT, W/POUCH, STERI DRAPE, 7 X 11 IN, DISPOSABLE, STERILE

## (undated) DEVICE — TAPE, UMBILICAL, 1/8 X 30 IN, MULTIPACK, COTTON, WHITE

## (undated) DEVICE — SUTURE, SILK, 2-0, TIES, 12-30 IN, BLACK

## (undated) DEVICE — SUTURE, VICRYL, 3-0, 27 IN, SH

## (undated) DEVICE — INSERT, CLAMP, SURGICAL, SOFT/TRACTION, STEALTH, 1 MM

## (undated) DEVICE — SPONGE, LAP, XRAY DECT, 18IN X 18IN, W/MASTER DMT, STERILE

## (undated) DEVICE — DRESSING, ADHESIVE, ISLAND, TELFA, 4 X 14 IN

## (undated) DEVICE — CONTAINER, SPECIMEN, 120 ML, STERILE

## (undated) DEVICE — SUTURE, PROLENE, 5-0, 36 IN, C-1, CV-11, BLUE

## (undated) DEVICE — SUTURE, ETHILON, 2, MONO, LR-30, BLACK

## (undated) DEVICE — PROTECTOR, NERVE, ULNAR, PINK

## (undated) DEVICE — SUTURE, VICRYL, 3-0, 27 IN, SH, VIOLET

## (undated) DEVICE — MANIFOLD, 4 PORT NEPTUNE STANDARD

## (undated) DEVICE — SUTURE, PROLENE, 2-0, 36 IN, SH, DA, BLUE

## (undated) DEVICE — LIGASURE, MARYLAND JAW, OPEN DELIVERY

## (undated) DEVICE — CLIP, LIGATING, HORIZON, LARGE, TITANIUM

## (undated) DEVICE — HOLSTER, ELECTROSURGERY ACCESSORY, STERILE

## (undated) DEVICE — DRESSING, MEPILEX, BORDER, HEEL, 8.7 X 9.1 IN

## (undated) DEVICE — SUTURE, PROLENE, 2-0, 18 IN, FS, BLUE

## (undated) DEVICE — DRESSING, MEPILEX, BORDER, SACRUM, 8.7 X 9.8 IN

## (undated) DEVICE — KIT, ABTHERA DRESSING W/SENSA TRAC

## (undated) DEVICE — SUTURE, SILK, 0, 24 IN, SUTUPAK, BLACK

## (undated) DEVICE — EVACUATOR, WOUND, SUCTION, CLOSED, JACKSON-PRATT, 100 CC, SILICONE

## (undated) DEVICE — SUTURE, VICRYL, 3-0,18 IN, SH, UNDYED

## (undated) DEVICE — SUTURE, PROLENE, 1, 30 IN, CT-1, BLUE

## (undated) DEVICE — STAPLER, SKIN PROXIMATE, 35 WIDE

## (undated) DEVICE — SUTURE, SILK, 2-0, 18 IN, BLACK

## (undated) DEVICE — SUTURE, MONOCRYL, 4-0, 18 IN, PS2, UNDYED

## (undated) DEVICE — TRAY, MINOR, SINGLE BASIN, STERILE

## (undated) DEVICE — CATHETER TRAY, SURESTEP, 16FR, URINE METER W/STATLOCK

## (undated) DEVICE — SUTURE, SILK, 3-0, 18 IN SH/CR, BLACK

## (undated) DEVICE — COVER, TABLE, UHC

## (undated) DEVICE — COVER, CART, 45 X 27 X 48 IN, CLEAR

## (undated) DEVICE — CATHETER, EMBOLECTOMY, 2F X 60CM, SYNTEL, SILICONE

## (undated) DEVICE — SUTURE, PROLENE, 3-0, 48 IN, SH, DA, BLUE

## (undated) DEVICE — SYRINGE, 20 CC, LUER LOCK, MONOJECT, W/O CAP, LF

## (undated) DEVICE — SUTURE, MONOCRYL PLUS 4-0, PS-2, 18IN, UNDYED

## (undated) DEVICE — GLOVE, SURGICAL, PROTEXIS PI MICRO, 7.0, PF, LF

## (undated) DEVICE — SUTURE, PDSII, 1, TP-1, VIL, MONO, 48LP